# Patient Record
Sex: MALE | Race: WHITE | NOT HISPANIC OR LATINO | Employment: FULL TIME | URBAN - METROPOLITAN AREA
[De-identification: names, ages, dates, MRNs, and addresses within clinical notes are randomized per-mention and may not be internally consistent; named-entity substitution may affect disease eponyms.]

---

## 2021-04-01 ENCOUNTER — HOSPITAL ENCOUNTER (EMERGENCY)
Facility: CLINIC | Age: 31
Discharge: ANOTHER HOSPITAL | End: 2021-04-02
Attending: EMERGENCY MEDICINE | Admitting: EMERGENCY MEDICINE
Payer: COMMERCIAL

## 2021-04-01 ENCOUNTER — APPOINTMENT (OUTPATIENT)
Dept: GENERAL RADIOLOGY | Facility: CLINIC | Age: 31
End: 2021-04-01
Attending: EMERGENCY MEDICINE
Payer: COMMERCIAL

## 2021-04-01 ENCOUNTER — APPOINTMENT (OUTPATIENT)
Dept: CT IMAGING | Facility: CLINIC | Age: 31
End: 2021-04-01
Attending: EMERGENCY MEDICINE
Payer: COMMERCIAL

## 2021-04-01 DIAGNOSIS — E13.10 DIABETIC KETOACIDOSIS WITHOUT COMA ASSOCIATED WITH OTHER SPECIFIED DIABETES MELLITUS (H): ICD-10-CM

## 2021-04-01 DIAGNOSIS — R65.20 SEVERE SEPSIS (H): ICD-10-CM

## 2021-04-01 DIAGNOSIS — A41.9 SEVERE SEPSIS (H): ICD-10-CM

## 2021-04-01 LAB
ALBUMIN SERPL-MCNC: 3.7 G/DL (ref 3.4–5)
ALBUMIN UR-MCNC: 10 MG/DL
ALP SERPL-CCNC: 96 U/L (ref 40–150)
ALT SERPL W P-5'-P-CCNC: 23 U/L (ref 0–70)
ANION GAP SERPL CALCULATED.3IONS-SCNC: 25 MMOL/L (ref 3–14)
APPEARANCE UR: CLEAR
AST SERPL W P-5'-P-CCNC: 15 U/L (ref 0–45)
BACTERIA #/AREA URNS HPF: ABNORMAL /HPF
BASE DEFICIT BLDV-SCNC: 16.4 MMOL/L
BASOPHILS # BLD AUTO: 0.1 10E9/L (ref 0–0.2)
BASOPHILS NFR BLD AUTO: 0.4 %
BILIRUB SERPL-MCNC: 0.5 MG/DL (ref 0.2–1.3)
BILIRUB UR QL STRIP: NEGATIVE
BUN SERPL-MCNC: 26 MG/DL (ref 7–30)
CALCIUM SERPL-MCNC: 8.4 MG/DL (ref 8.5–10.1)
CHLORIDE SERPL-SCNC: 96 MMOL/L (ref 94–109)
CO2 BLDCOV-SCNC: 14 MMOL/L (ref 21–28)
CO2 SERPL-SCNC: 12 MMOL/L (ref 20–32)
COLOR UR AUTO: ABNORMAL
CREAT SERPL-MCNC: 0.8 MG/DL (ref 0.66–1.25)
DIFFERENTIAL METHOD BLD: ABNORMAL
EOSINOPHIL # BLD AUTO: 0 10E9/L (ref 0–0.7)
EOSINOPHIL NFR BLD AUTO: 0 %
ERYTHROCYTE [DISTWIDTH] IN BLOOD BY AUTOMATED COUNT: 11.6 % (ref 10–15)
FLUAV RNA RESP QL NAA+PROBE: NEGATIVE
FLUBV RNA RESP QL NAA+PROBE: NEGATIVE
GFR SERPL CREATININE-BSD FRML MDRD: >90 ML/MIN/{1.73_M2}
GLUCOSE BLDC GLUCOMTR-MCNC: 308 MG/DL (ref 70–99)
GLUCOSE BLDC GLUCOMTR-MCNC: 391 MG/DL (ref 70–99)
GLUCOSE BLDC GLUCOMTR-MCNC: 416 MG/DL (ref 70–99)
GLUCOSE BLDC GLUCOMTR-MCNC: 474 MG/DL (ref 70–99)
GLUCOSE SERPL-MCNC: 531 MG/DL (ref 70–99)
GLUCOSE UR STRIP-MCNC: >1000 MG/DL
HBA1C MFR BLD: 12 % (ref 0–5.6)
HCO3 BLDV-SCNC: 12 MMOL/L (ref 21–28)
HCT VFR BLD AUTO: 46.6 % (ref 40–53)
HGB BLD-MCNC: 15.7 G/DL (ref 13.3–17.7)
HGB UR QL STRIP: NEGATIVE
HYALINE CASTS #/AREA URNS LPF: 3 /LPF (ref 0–2)
IMM GRANULOCYTES # BLD: 0.5 10E9/L (ref 0–0.4)
IMM GRANULOCYTES NFR BLD: 1.8 %
KETONES UR STRIP-MCNC: >150 MG/DL
LABORATORY COMMENT REPORT: NORMAL
LACTATE BLD-SCNC: 2.2 MMOL/L (ref 0.7–2.1)
LACTATE BLD-SCNC: 4.4 MMOL/L (ref 0.7–2)
LEUKOCYTE ESTERASE UR QL STRIP: NEGATIVE
LYMPHOCYTES # BLD AUTO: 1.3 10E9/L (ref 0.8–5.3)
LYMPHOCYTES NFR BLD AUTO: 5 %
MAGNESIUM SERPL-MCNC: 2 MG/DL (ref 1.6–2.3)
MCH RBC QN AUTO: 29.6 PG (ref 26.5–33)
MCHC RBC AUTO-ENTMCNC: 33.7 G/DL (ref 31.5–36.5)
MCV RBC AUTO: 88 FL (ref 78–100)
MONOCYTES # BLD AUTO: 1 10E9/L (ref 0–1.3)
MONOCYTES NFR BLD AUTO: 3.9 %
MUCOUS THREADS #/AREA URNS LPF: PRESENT /LPF
NEUTROPHILS # BLD AUTO: 22.8 10E9/L (ref 1.6–8.3)
NEUTROPHILS NFR BLD AUTO: 88.9 %
NITRATE UR QL: NEGATIVE
NRBC # BLD AUTO: 0 10*3/UL
NRBC BLD AUTO-RTO: 0 /100
OXYHGB MFR BLDV: 69 %
PCO2 BLDV: 36 MM HG (ref 40–50)
PCO2 BLDV: 40 MM HG (ref 40–50)
PH BLDV: 7.13 PH (ref 7.32–7.43)
PH BLDV: 7.16 PH (ref 7.32–7.43)
PH UR STRIP: 5 PH (ref 5–7)
PHOSPHATE SERPL-MCNC: 6.8 MG/DL (ref 2.5–4.5)
PLATELET # BLD AUTO: 391 10E9/L (ref 150–450)
PO2 BLDV: 42 MM HG (ref 25–47)
PO2 BLDV: 43 MM HG (ref 25–47)
POTASSIUM SERPL-SCNC: 4.5 MMOL/L (ref 3.4–5.3)
PROT SERPL-MCNC: 7.7 G/DL (ref 6.8–8.8)
RBC # BLD AUTO: 5.3 10E12/L (ref 4.4–5.9)
RBC #/AREA URNS AUTO: 0 /HPF (ref 0–2)
RSV RNA SPEC QL NAA+PROBE: NEGATIVE
SAO2 % BLDV FROM PO2: 65 %
SARS-COV-2 RNA RESP QL NAA+PROBE: NEGATIVE
SODIUM SERPL-SCNC: 133 MMOL/L (ref 133–144)
SOURCE: ABNORMAL
SP GR UR STRIP: 1.02 (ref 1–1.03)
SPECIMEN SOURCE: NORMAL
SQUAMOUS #/AREA URNS AUTO: 0 /HPF (ref 0–1)
UROBILINOGEN UR STRIP-MCNC: 0 MG/DL (ref 0–2)
WBC # BLD AUTO: 25.6 10E9/L (ref 4–11)
WBC #/AREA URNS AUTO: 2 /HPF (ref 0–5)

## 2021-04-01 PROCEDURE — 81001 URINALYSIS AUTO W/SCOPE: CPT | Performed by: EMERGENCY MEDICINE

## 2021-04-01 PROCEDURE — 83036 HEMOGLOBIN GLYCOSYLATED A1C: CPT | Performed by: EMERGENCY MEDICINE

## 2021-04-01 PROCEDURE — 250N000012 HC RX MED GY IP 250 OP 636 PS 637: Performed by: EMERGENCY MEDICINE

## 2021-04-01 PROCEDURE — 83605 ASSAY OF LACTIC ACID: CPT | Performed by: EMERGENCY MEDICINE

## 2021-04-01 PROCEDURE — 84132 ASSAY OF SERUM POTASSIUM: CPT | Mod: 91 | Performed by: EMERGENCY MEDICINE

## 2021-04-01 PROCEDURE — 83605 ASSAY OF LACTIC ACID: CPT | Mod: 91

## 2021-04-01 PROCEDURE — 87636 SARSCOV2 & INF A&B AMP PRB: CPT | Performed by: EMERGENCY MEDICINE

## 2021-04-01 PROCEDURE — 999N001017 HC STATISTIC GLUCOSE BY METER IP

## 2021-04-01 PROCEDURE — 85025 COMPLETE CBC W/AUTO DIFF WBC: CPT | Performed by: EMERGENCY MEDICINE

## 2021-04-01 PROCEDURE — 74177 CT ABD & PELVIS W/CONTRAST: CPT

## 2021-04-01 PROCEDURE — 250N000011 HC RX IP 250 OP 636: Performed by: EMERGENCY MEDICINE

## 2021-04-01 PROCEDURE — 83735 ASSAY OF MAGNESIUM: CPT | Performed by: EMERGENCY MEDICINE

## 2021-04-01 PROCEDURE — 80053 COMPREHEN METABOLIC PANEL: CPT | Performed by: EMERGENCY MEDICINE

## 2021-04-01 PROCEDURE — 71045 X-RAY EXAM CHEST 1 VIEW: CPT

## 2021-04-01 PROCEDURE — 84100 ASSAY OF PHOSPHORUS: CPT | Performed by: EMERGENCY MEDICINE

## 2021-04-01 PROCEDURE — 96366 THER/PROPH/DIAG IV INF ADDON: CPT

## 2021-04-01 PROCEDURE — 250N000009 HC RX 250: Performed by: EMERGENCY MEDICINE

## 2021-04-01 PROCEDURE — 96368 THER/DIAG CONCURRENT INF: CPT

## 2021-04-01 PROCEDURE — 99291 CRITICAL CARE FIRST HOUR: CPT | Mod: 25

## 2021-04-01 PROCEDURE — 82805 BLOOD GASES W/O2 SATURATION: CPT | Performed by: EMERGENCY MEDICINE

## 2021-04-01 PROCEDURE — 96365 THER/PROPH/DIAG IV INF INIT: CPT

## 2021-04-01 PROCEDURE — 96375 TX/PRO/DX INJ NEW DRUG ADDON: CPT

## 2021-04-01 PROCEDURE — 87040 BLOOD CULTURE FOR BACTERIA: CPT | Mod: XS | Performed by: EMERGENCY MEDICINE

## 2021-04-01 PROCEDURE — 999N001174 HC STATISTIC STREP A RAPID: Performed by: EMERGENCY MEDICINE

## 2021-04-01 PROCEDURE — 99292 CRITICAL CARE ADDL 30 MIN: CPT

## 2021-04-01 PROCEDURE — 87651 STREP A DNA AMP PROBE: CPT | Performed by: EMERGENCY MEDICINE

## 2021-04-01 PROCEDURE — 82803 BLOOD GASES ANY COMBINATION: CPT

## 2021-04-01 PROCEDURE — C9803 HOPD COVID-19 SPEC COLLECT: HCPCS

## 2021-04-01 PROCEDURE — 258N000003 HC RX IP 258 OP 636: Performed by: EMERGENCY MEDICINE

## 2021-04-01 RX ORDER — PIPERACILLIN SODIUM, TAZOBACTAM SODIUM 4; .5 G/20ML; G/20ML
4.5 INJECTION, POWDER, LYOPHILIZED, FOR SOLUTION INTRAVENOUS ONCE
Status: COMPLETED | OUTPATIENT
Start: 2021-04-01 | End: 2021-04-01

## 2021-04-01 RX ORDER — ONDANSETRON 2 MG/ML
4 INJECTION INTRAMUSCULAR; INTRAVENOUS EVERY 30 MIN PRN
Status: DISCONTINUED | OUTPATIENT
Start: 2021-04-01 | End: 2021-04-02 | Stop reason: HOSPADM

## 2021-04-01 RX ORDER — IOPAMIDOL 755 MG/ML
75 INJECTION, SOLUTION INTRAVASCULAR ONCE
Status: COMPLETED | OUTPATIENT
Start: 2021-04-01 | End: 2021-04-01

## 2021-04-01 RX ORDER — DIPHENHYDRAMINE HYDROCHLORIDE AND LIDOCAINE HYDROCHLORIDE AND ALUMINUM HYDROXIDE AND MAGNESIUM HYDRO
10 KIT ONCE
Status: COMPLETED | OUTPATIENT
Start: 2021-04-01 | End: 2021-04-02

## 2021-04-01 RX ORDER — POTASSIUM CHLORIDE 7.45 MG/ML
10 INJECTION INTRAVENOUS ONCE
Status: COMPLETED | OUTPATIENT
Start: 2021-04-01 | End: 2021-04-01

## 2021-04-01 RX ORDER — DEXTROSE MONOHYDRATE 25 G/50ML
25-50 INJECTION, SOLUTION INTRAVENOUS
Status: DISCONTINUED | OUTPATIENT
Start: 2021-04-01 | End: 2021-04-02 | Stop reason: HOSPADM

## 2021-04-01 RX ADMIN — ONDANSETRON 4 MG: 2 INJECTION INTRAMUSCULAR; INTRAVENOUS at 20:40

## 2021-04-01 RX ADMIN — POTASSIUM CHLORIDE 10 MEQ: 7.46 INJECTION, SOLUTION INTRAVENOUS at 20:39

## 2021-04-01 RX ADMIN — IOPAMIDOL 75 ML: 755 INJECTION, SOLUTION INTRAVENOUS at 21:16

## 2021-04-01 RX ADMIN — SODIUM CHLORIDE 5.5 UNITS/HR: 9 INJECTION, SOLUTION INTRAVENOUS at 21:09

## 2021-04-01 RX ADMIN — PIPERACILLIN SODIUM AND TAZOBACTAM SODIUM 4.5 G: 4; .5 INJECTION, POWDER, LYOPHILIZED, FOR SOLUTION INTRAVENOUS at 20:51

## 2021-04-01 RX ADMIN — SODIUM CHLORIDE 62 ML: 9 INJECTION, SOLUTION INTRAVENOUS at 21:23

## 2021-04-01 RX ADMIN — SODIUM CHLORIDE 1000 ML: 9 INJECTION, SOLUTION INTRAVENOUS at 20:39

## 2021-04-02 ENCOUNTER — HOSPITAL ENCOUNTER (INPATIENT)
Facility: CLINIC | Age: 31
LOS: 5 days | Discharge: HOME | End: 2021-04-07
Attending: INTERNAL MEDICINE | Admitting: INTERNAL MEDICINE
Payer: COMMERCIAL

## 2021-04-02 VITALS
RESPIRATION RATE: 20 BRPM | WEIGHT: 150 LBS | OXYGEN SATURATION: 99 % | SYSTOLIC BLOOD PRESSURE: 110 MMHG | DIASTOLIC BLOOD PRESSURE: 71 MMHG | TEMPERATURE: 98.1 F | HEART RATE: 129 BPM

## 2021-04-02 DIAGNOSIS — E10.69 TYPE 1 DIABETES MELLITUS WITH OTHER SPECIFIED COMPLICATION (H): ICD-10-CM

## 2021-04-02 DIAGNOSIS — E10.10 DIABETIC KETOACIDOSIS WITHOUT COMA ASSOCIATED WITH TYPE 1 DIABETES MELLITUS (H): Primary | ICD-10-CM

## 2021-04-02 DIAGNOSIS — K21.00 GASTROESOPHAGEAL REFLUX DISEASE WITH ESOPHAGITIS WITHOUT HEMORRHAGE: ICD-10-CM

## 2021-04-02 LAB
ANION GAP SERPL CALCULATED.3IONS-SCNC: 11 MMOL/L (ref 3–14)
ANION GAP SERPL CALCULATED.3IONS-SCNC: 13 MMOL/L (ref 3–14)
ANION GAP SERPL CALCULATED.3IONS-SCNC: 4 MMOL/L (ref 3–14)
ANION GAP SERPL CALCULATED.3IONS-SCNC: 7 MMOL/L (ref 3–14)
BASE DEFICIT BLDV-SCNC: 5.4 MMOL/L
BUN SERPL-MCNC: 14 MG/DL (ref 7–30)
BUN SERPL-MCNC: 19 MG/DL (ref 7–30)
BUN SERPL-MCNC: 21 MG/DL (ref 7–30)
BUN SERPL-MCNC: 23 MG/DL (ref 7–30)
CALCIUM SERPL-MCNC: 8.5 MG/DL (ref 8.5–10.1)
CALCIUM SERPL-MCNC: 8.8 MG/DL (ref 8.5–10.1)
CALCIUM SERPL-MCNC: 9 MG/DL (ref 8.5–10.1)
CALCIUM SERPL-MCNC: 9.2 MG/DL (ref 8.5–10.1)
CHLORIDE SERPL-SCNC: 112 MMOL/L (ref 94–109)
CHLORIDE SERPL-SCNC: 112 MMOL/L (ref 94–109)
CHLORIDE SERPL-SCNC: 113 MMOL/L (ref 94–109)
CHLORIDE SERPL-SCNC: 113 MMOL/L (ref 94–109)
CO2 SERPL-SCNC: 17 MMOL/L (ref 20–32)
CO2 SERPL-SCNC: 18 MMOL/L (ref 20–32)
CO2 SERPL-SCNC: 22 MMOL/L (ref 20–32)
CO2 SERPL-SCNC: 26 MMOL/L (ref 20–32)
CREAT SERPL-MCNC: 0.5 MG/DL (ref 0.66–1.25)
CREAT SERPL-MCNC: 0.57 MG/DL (ref 0.66–1.25)
CREAT SERPL-MCNC: 0.58 MG/DL (ref 0.66–1.25)
CREAT SERPL-MCNC: 0.58 MG/DL (ref 0.66–1.25)
DEPRECATED S PYO AG THROAT QL EIA: NEGATIVE
ERYTHROCYTE [DISTWIDTH] IN BLOOD BY AUTOMATED COUNT: 12 % (ref 10–15)
GFR SERPL CREATININE-BSD FRML MDRD: >90 ML/MIN/{1.73_M2}
GLUCOSE BLDC GLUCOMTR-MCNC: 122 MG/DL (ref 70–99)
GLUCOSE BLDC GLUCOMTR-MCNC: 139 MG/DL (ref 70–99)
GLUCOSE BLDC GLUCOMTR-MCNC: 144 MG/DL (ref 70–99)
GLUCOSE BLDC GLUCOMTR-MCNC: 147 MG/DL (ref 70–99)
GLUCOSE BLDC GLUCOMTR-MCNC: 159 MG/DL (ref 70–99)
GLUCOSE BLDC GLUCOMTR-MCNC: 163 MG/DL (ref 70–99)
GLUCOSE BLDC GLUCOMTR-MCNC: 171 MG/DL (ref 70–99)
GLUCOSE BLDC GLUCOMTR-MCNC: 178 MG/DL (ref 70–99)
GLUCOSE BLDC GLUCOMTR-MCNC: 179 MG/DL (ref 70–99)
GLUCOSE BLDC GLUCOMTR-MCNC: 182 MG/DL (ref 70–99)
GLUCOSE BLDC GLUCOMTR-MCNC: 186 MG/DL (ref 70–99)
GLUCOSE BLDC GLUCOMTR-MCNC: 189 MG/DL (ref 70–99)
GLUCOSE BLDC GLUCOMTR-MCNC: 196 MG/DL (ref 70–99)
GLUCOSE BLDC GLUCOMTR-MCNC: 219 MG/DL (ref 70–99)
GLUCOSE BLDC GLUCOMTR-MCNC: 222 MG/DL (ref 70–99)
GLUCOSE BLDC GLUCOMTR-MCNC: 223 MG/DL (ref 70–99)
GLUCOSE BLDC GLUCOMTR-MCNC: 224 MG/DL (ref 70–99)
GLUCOSE BLDC GLUCOMTR-MCNC: 228 MG/DL (ref 70–99)
GLUCOSE BLDC GLUCOMTR-MCNC: 229 MG/DL (ref 70–99)
GLUCOSE BLDC GLUCOMTR-MCNC: 242 MG/DL (ref 70–99)
GLUCOSE BLDC GLUCOMTR-MCNC: 242 MG/DL (ref 70–99)
GLUCOSE BLDC GLUCOMTR-MCNC: 251 MG/DL (ref 70–99)
GLUCOSE BLDC GLUCOMTR-MCNC: 261 MG/DL (ref 70–99)
GLUCOSE BLDC GLUCOMTR-MCNC: 275 MG/DL (ref 70–99)
GLUCOSE SERPL-MCNC: 132 MG/DL (ref 70–99)
GLUCOSE SERPL-MCNC: 203 MG/DL (ref 70–99)
GLUCOSE SERPL-MCNC: 223 MG/DL (ref 70–99)
GLUCOSE SERPL-MCNC: 239 MG/DL (ref 70–99)
HCO3 BLDV-SCNC: 20 MMOL/L (ref 21–28)
HCT VFR BLD AUTO: 42.5 % (ref 40–53)
HGB BLD-MCNC: 14.9 G/DL (ref 13.3–17.7)
KETONES BLD-SCNC: 3.1 MMOL/L (ref 0–0.6)
MAGNESIUM SERPL-MCNC: 2.2 MG/DL (ref 1.6–2.3)
MAGNESIUM SERPL-MCNC: 2.3 MG/DL (ref 1.6–2.3)
MCH RBC QN AUTO: 30.4 PG (ref 26.5–33)
MCHC RBC AUTO-ENTMCNC: 35.1 G/DL (ref 31.5–36.5)
MCV RBC AUTO: 87 FL (ref 78–100)
O2/TOTAL GAS SETTING VFR VENT: ABNORMAL %
OXYHGB MFR BLDV: 87 %
PCO2 BLDV: 38 MM HG (ref 40–50)
PH BLDV: 7.33 PH (ref 7.32–7.43)
PHOSPHATE SERPL-MCNC: 1.8 MG/DL (ref 2.5–4.5)
PHOSPHATE SERPL-MCNC: 2.5 MG/DL (ref 2.5–4.5)
PLATELET # BLD AUTO: 354 10E9/L (ref 150–450)
PO2 BLDV: 49 MM HG (ref 25–47)
POTASSIUM SERPL-SCNC: 3.8 MMOL/L (ref 3.4–5.3)
POTASSIUM SERPL-SCNC: 3.9 MMOL/L (ref 3.4–5.3)
POTASSIUM SERPL-SCNC: 4 MMOL/L (ref 3.4–5.3)
POTASSIUM SERPL-SCNC: 4 MMOL/L (ref 3.4–5.3)
POTASSIUM SERPL-SCNC: 4.2 MMOL/L (ref 3.4–5.3)
RBC # BLD AUTO: 4.9 10E12/L (ref 4.4–5.9)
SODIUM SERPL-SCNC: 141 MMOL/L (ref 133–144)
SODIUM SERPL-SCNC: 142 MMOL/L (ref 133–144)
SODIUM SERPL-SCNC: 142 MMOL/L (ref 133–144)
SODIUM SERPL-SCNC: 143 MMOL/L (ref 133–144)
SPECIMEN SOURCE: NORMAL
SPECIMEN SOURCE: NORMAL
STREP GROUP A PCR: NOT DETECTED
TSH SERPL DL<=0.005 MIU/L-ACNC: 0.87 MU/L (ref 0.4–4)
WBC # BLD AUTO: 23.9 10E9/L (ref 4–11)

## 2021-04-02 PROCEDURE — 120N000013 HC R&B IMCU

## 2021-04-02 PROCEDURE — 80048 BASIC METABOLIC PNL TOTAL CA: CPT | Performed by: INTERNAL MEDICINE

## 2021-04-02 PROCEDURE — 85027 COMPLETE CBC AUTOMATED: CPT | Performed by: INTERNAL MEDICINE

## 2021-04-02 PROCEDURE — 250N000013 HC RX MED GY IP 250 OP 250 PS 637: Performed by: INTERNAL MEDICINE

## 2021-04-02 PROCEDURE — 250N000012 HC RX MED GY IP 250 OP 636 PS 637: Performed by: EMERGENCY MEDICINE

## 2021-04-02 PROCEDURE — 36415 COLL VENOUS BLD VENIPUNCTURE: CPT | Performed by: INTERNAL MEDICINE

## 2021-04-02 PROCEDURE — 250N000012 HC RX MED GY IP 250 OP 636 PS 637: Performed by: INTERNAL MEDICINE

## 2021-04-02 PROCEDURE — 258N000003 HC RX IP 258 OP 636: Performed by: INTERNAL MEDICINE

## 2021-04-02 PROCEDURE — 82010 KETONE BODYS QUAN: CPT | Performed by: INTERNAL MEDICINE

## 2021-04-02 PROCEDURE — 250N000013 HC RX MED GY IP 250 OP 250 PS 637: Performed by: EMERGENCY MEDICINE

## 2021-04-02 PROCEDURE — 84100 ASSAY OF PHOSPHORUS: CPT | Performed by: INTERNAL MEDICINE

## 2021-04-02 PROCEDURE — 84443 ASSAY THYROID STIM HORMONE: CPT | Performed by: INTERNAL MEDICINE

## 2021-04-02 PROCEDURE — 250N000011 HC RX IP 250 OP 636: Performed by: INTERNAL MEDICINE

## 2021-04-02 PROCEDURE — 99291 CRITICAL CARE FIRST HOUR: CPT | Performed by: INTERNAL MEDICINE

## 2021-04-02 PROCEDURE — 999N001017 HC STATISTIC GLUCOSE BY METER IP

## 2021-04-02 PROCEDURE — 250N000009 HC RX 250: Performed by: INTERNAL MEDICINE

## 2021-04-02 PROCEDURE — 258N000003 HC RX IP 258 OP 636: Performed by: EMERGENCY MEDICINE

## 2021-04-02 PROCEDURE — 82805 BLOOD GASES W/O2 SATURATION: CPT | Performed by: INTERNAL MEDICINE

## 2021-04-02 PROCEDURE — 83735 ASSAY OF MAGNESIUM: CPT | Performed by: INTERNAL MEDICINE

## 2021-04-02 PROCEDURE — 250N000011 HC RX IP 250 OP 636: Performed by: EMERGENCY MEDICINE

## 2021-04-02 PROCEDURE — 99223 1ST HOSP IP/OBS HIGH 75: CPT | Mod: AI | Performed by: INTERNAL MEDICINE

## 2021-04-02 PROCEDURE — 96368 THER/DIAG CONCURRENT INF: CPT

## 2021-04-02 PROCEDURE — 258N000002 HC RX IP 258 OP 250: Performed by: INTERNAL MEDICINE

## 2021-04-02 RX ORDER — BISACODYL 5 MG
15 TABLET, DELAYED RELEASE (ENTERIC COATED) ORAL DAILY PRN
Status: DISCONTINUED | OUTPATIENT
Start: 2021-04-02 | End: 2021-04-07 | Stop reason: HOSPADM

## 2021-04-02 RX ORDER — PROCHLORPERAZINE 25 MG
25 SUPPOSITORY, RECTAL RECTAL EVERY 12 HOURS PRN
Status: DISCONTINUED | OUTPATIENT
Start: 2021-04-02 | End: 2021-04-07 | Stop reason: HOSPADM

## 2021-04-02 RX ORDER — LIDOCAINE 40 MG/G
CREAM TOPICAL
Status: DISCONTINUED | OUTPATIENT
Start: 2021-04-02 | End: 2021-04-04

## 2021-04-02 RX ORDER — POTASSIUM CHLORIDE 7.45 MG/ML
10 INJECTION INTRAVENOUS ONCE
Status: COMPLETED | OUTPATIENT
Start: 2021-04-02 | End: 2021-04-02

## 2021-04-02 RX ORDER — DEXTROSE MONOHYDRATE 25 G/50ML
25-50 INJECTION, SOLUTION INTRAVENOUS
Status: DISCONTINUED | OUTPATIENT
Start: 2021-04-02 | End: 2021-04-02

## 2021-04-02 RX ORDER — NALOXONE HYDROCHLORIDE 0.4 MG/ML
0.2 INJECTION, SOLUTION INTRAMUSCULAR; INTRAVENOUS; SUBCUTANEOUS
Status: DISCONTINUED | OUTPATIENT
Start: 2021-04-02 | End: 2021-04-07 | Stop reason: HOSPADM

## 2021-04-02 RX ORDER — NICOTINE POLACRILEX 4 MG
15-30 LOZENGE BUCCAL
Status: DISCONTINUED | OUTPATIENT
Start: 2021-04-02 | End: 2021-04-02

## 2021-04-02 RX ORDER — BISACODYL 5 MG
5 TABLET, DELAYED RELEASE (ENTERIC COATED) ORAL DAILY PRN
Status: DISCONTINUED | OUTPATIENT
Start: 2021-04-02 | End: 2021-04-07 | Stop reason: HOSPADM

## 2021-04-02 RX ORDER — BISACODYL 5 MG
10 TABLET, DELAYED RELEASE (ENTERIC COATED) ORAL DAILY PRN
Status: DISCONTINUED | OUTPATIENT
Start: 2021-04-02 | End: 2021-04-07 | Stop reason: HOSPADM

## 2021-04-02 RX ORDER — DEXTROSE MONOHYDRATE 25 G/50ML
25-50 INJECTION, SOLUTION INTRAVENOUS
Status: DISCONTINUED | OUTPATIENT
Start: 2021-04-02 | End: 2021-04-04

## 2021-04-02 RX ORDER — ACETAMINOPHEN 325 MG/1
650 TABLET ORAL EVERY 4 HOURS PRN
Status: DISCONTINUED | OUTPATIENT
Start: 2021-04-02 | End: 2021-04-07 | Stop reason: HOSPADM

## 2021-04-02 RX ORDER — SODIUM CHLORIDE 9 MG/ML
INJECTION, SOLUTION INTRAVENOUS CONTINUOUS
Status: DISCONTINUED | OUTPATIENT
Start: 2021-04-02 | End: 2021-04-04

## 2021-04-02 RX ORDER — NALOXONE HYDROCHLORIDE 0.4 MG/ML
0.4 INJECTION, SOLUTION INTRAMUSCULAR; INTRAVENOUS; SUBCUTANEOUS
Status: DISCONTINUED | OUTPATIENT
Start: 2021-04-02 | End: 2021-04-07 | Stop reason: HOSPADM

## 2021-04-02 RX ORDER — ONDANSETRON 2 MG/ML
4 INJECTION INTRAMUSCULAR; INTRAVENOUS EVERY 6 HOURS PRN
Status: DISCONTINUED | OUTPATIENT
Start: 2021-04-02 | End: 2021-04-07 | Stop reason: HOSPADM

## 2021-04-02 RX ORDER — SODIUM CHLORIDE 450 MG/100ML
INJECTION, SOLUTION INTRAVENOUS CONTINUOUS
Status: DISCONTINUED | OUTPATIENT
Start: 2021-04-02 | End: 2021-04-03

## 2021-04-02 RX ORDER — DEXTROSE MONOHYDRATE 100 MG/ML
INJECTION, SOLUTION INTRAVENOUS CONTINUOUS PRN
Status: DISCONTINUED | OUTPATIENT
Start: 2021-04-02 | End: 2021-04-02

## 2021-04-02 RX ORDER — ONDANSETRON 4 MG/1
4 TABLET, ORALLY DISINTEGRATING ORAL EVERY 6 HOURS PRN
Status: DISCONTINUED | OUTPATIENT
Start: 2021-04-02 | End: 2021-04-07 | Stop reason: HOSPADM

## 2021-04-02 RX ORDER — HYDROMORPHONE HYDROCHLORIDE 1 MG/ML
0.3 INJECTION, SOLUTION INTRAMUSCULAR; INTRAVENOUS; SUBCUTANEOUS
Status: DISCONTINUED | OUTPATIENT
Start: 2021-04-02 | End: 2021-04-06

## 2021-04-02 RX ORDER — NICOTINE POLACRILEX 4 MG
15-30 LOZENGE BUCCAL
Status: DISCONTINUED | OUTPATIENT
Start: 2021-04-02 | End: 2021-04-04

## 2021-04-02 RX ORDER — DEXTROSE MONOHYDRATE, SODIUM CHLORIDE, AND POTASSIUM CHLORIDE 50; 1.49; 4.5 G/1000ML; G/1000ML; G/1000ML
INJECTION, SOLUTION INTRAVENOUS CONTINUOUS
Status: DISCONTINUED | OUTPATIENT
Start: 2021-04-02 | End: 2021-04-05

## 2021-04-02 RX ADMIN — POTASSIUM CHLORIDE, DEXTROSE MONOHYDRATE AND SODIUM CHLORIDE: 150; 5; 450 INJECTION, SOLUTION INTRAVENOUS at 20:19

## 2021-04-02 RX ADMIN — SODIUM CHLORIDE: 9 INJECTION, SOLUTION INTRAVENOUS at 04:21

## 2021-04-02 RX ADMIN — PROCHLORPERAZINE EDISYLATE 10 MG: 5 INJECTION INTRAMUSCULAR; INTRAVENOUS at 17:19

## 2021-04-02 RX ADMIN — SODIUM CHLORIDE 3 UNITS/HR: 9 INJECTION, SOLUTION INTRAVENOUS at 04:23

## 2021-04-02 RX ADMIN — SODIUM CHLORIDE: 4.5 INJECTION, SOLUTION INTRAVENOUS at 04:39

## 2021-04-02 RX ADMIN — ACETAMINOPHEN 650 MG: 325 TABLET, FILM COATED ORAL at 08:22

## 2021-04-02 RX ADMIN — SODIUM CHLORIDE: 9 INJECTION, SOLUTION INTRAVENOUS at 01:35

## 2021-04-02 RX ADMIN — INSULIN GLARGINE 5 UNITS: 100 INJECTION, SOLUTION SUBCUTANEOUS at 04:31

## 2021-04-02 RX ADMIN — LIDOCAINE HYDROCHLORIDE 30 ML: 20 SOLUTION ORAL; TOPICAL at 12:21

## 2021-04-02 RX ADMIN — ONDANSETRON 4 MG: 2 INJECTION INTRAMUSCULAR; INTRAVENOUS at 07:05

## 2021-04-02 RX ADMIN — Medication: at 16:00

## 2021-04-02 RX ADMIN — POTASSIUM CHLORIDE 10 MEQ: 7.46 INJECTION, SOLUTION INTRAVENOUS at 00:32

## 2021-04-02 RX ADMIN — PROCHLORPERAZINE EDISYLATE 10 MG: 5 INJECTION INTRAMUSCULAR; INTRAVENOUS at 11:16

## 2021-04-02 RX ADMIN — POTASSIUM CHLORIDE, DEXTROSE MONOHYDRATE AND SODIUM CHLORIDE: 150; 5; 450 INJECTION, SOLUTION INTRAVENOUS at 13:22

## 2021-04-02 RX ADMIN — Medication 3 UNITS/HR: at 10:09

## 2021-04-02 RX ADMIN — SODIUM CHLORIDE, POTASSIUM CHLORIDE, SODIUM LACTATE AND CALCIUM CHLORIDE 1000 ML: 600; 310; 30; 20 INJECTION, SOLUTION INTRAVENOUS at 08:14

## 2021-04-02 RX ADMIN — SODIUM PHOSPHATE, MONOBASIC, MONOHYDRATE AND SODIUM PHOSPHATE, DIBASIC, ANHYDROUS 15 MMOL: 276; 142 INJECTION, SOLUTION INTRAVENOUS at 22:38

## 2021-04-02 RX ADMIN — DIPHENHYDRAMINE HYDROCHLORIDE AND LIDOCAINE HYDROCHLORIDE AND ALUMINUM HYDROXIDE AND MAGNESIUM HYDRO 10 ML: KIT at 00:32

## 2021-04-02 RX ADMIN — Medication 1 LOZENGE: at 03:35

## 2021-04-02 SDOH — HEALTH STABILITY: MENTAL HEALTH: HOW OFTEN DO YOU HAVE 6 OR MORE DRINKS ON ONE OCCASION?: NOT ASKED

## 2021-04-02 SDOH — HEALTH STABILITY: MENTAL HEALTH: HOW MANY STANDARD DRINKS CONTAINING ALCOHOL DO YOU HAVE ON A TYPICAL DAY?: NOT ASKED

## 2021-04-02 SDOH — HEALTH STABILITY: MENTAL HEALTH: HOW OFTEN DO YOU HAVE A DRINK CONTAINING ALCOHOL?: MONTHLY OR LESS

## 2021-04-02 ASSESSMENT — ACTIVITIES OF DAILY LIVING (ADL)
ADLS_ACUITY_SCORE: 14
ADLS_ACUITY_SCORE: 14
DEPENDENT_IADLS:: INDEPENDENT
ADLS_ACUITY_SCORE: 16
ADLS_ACUITY_SCORE: 16

## 2021-04-02 ASSESSMENT — ENCOUNTER SYMPTOMS
NAUSEA: 1
POLYDIPSIA: 1
WEAKNESS: 1
FREQUENCY: 1
TROUBLE SWALLOWING: 1
COUGH: 0
SORE THROAT: 1
SHORTNESS OF BREATH: 0
FEVER: 1
DIARRHEA: 1
FATIGUE: 1
VOMITING: 1
CHILLS: 1

## 2021-04-02 ASSESSMENT — MIFFLIN-ST. JEOR: SCORE: 1708.32

## 2021-04-02 NOTE — PHARMACY-ADMISSION MEDICATION HISTORY
Admission medication history interview status for this patient is complete. See [facility] admission navigator for allergy information, prior to admission medications and immunization status.     Medication history interview done, indicate source(s): Patient and Family (wife).  Medication history resources (including written lists, pill bottles, clinic record):None  Pharmacy: none    Changes made to PTA medication list:  Added: none  Deleted: none  Changed: none    Actions taken by pharmacist (provider contacted, etc): Patient was not completely interviewable in the morning for MR pharmacist (Rita).  I spoke to patient's wife in the room ~noon.      Additional medication history information: Does not take any prescription medications. Patient sometimes takes Dayquil and Nyquil prn. Also prn OTC allergy medication, however wife did not think that patient will need his allergy medication while hospitalized.    Medication reconciliation/reorder completed by provider prior to medication history?  no   (Y/N)     For patients on insulin therapy: no    Prior to Admission medications    No prescription meds pta

## 2021-04-02 NOTE — ED NOTES
1 set of blood cultures collected and sent to lab  
Bed: ED15  Expected date:   Expected time:   Means of arrival:   Comments:  EMS  
DATE:  4/1/2021   TIME OF RECEIPT FROM LAB:  2052  LAB TEST:  bs  LAB VALUE:  531  RESULTS GIVEN WITH READ-BACK TO (PROVIDER):  [facility]  TIME LAB VALUE REPORTED TO PROVIDER:   2053    
Update to wife Ananya.  [phone]  
complains of pain/discomfort

## 2021-04-02 NOTE — ED PROVIDER NOTES
History     Chief Complaint:  Hyperglycemia       The history is provided by the patient and the EMS personnel.     Wally Avendano is a 30 year old male otherwise healthy who presents for evaluation of vomiting.  The patient was BIBA from home, and states that he developed vomiting an diarrhea today.  He complains of generalized abdominal and chest pain.  He had temperature to 101F at home yesterday.  His wife and 6 month old child were tested for COVID yesterday, which was negative.  He complains of sore throat secondary to vomiting, which is giving him difficulty .  He notes that he has found it difficult to gain weight for the last several weeks.  He drinks lots of water and urinates frequently.  He denies cough, SOB, dysuria, or severe headache.        Review of Systems   Constitutional: Positive for chills, fatigue and fever.   HENT: Positive for sore throat and trouble swallowing.    Respiratory: Negative for cough and shortness of breath.    Gastrointestinal: Positive for diarrhea, nausea and vomiting.   Endocrine: Positive for polydipsia and polyuria.   Genitourinary: Positive for frequency. Negative for urgency.   Neurological: Positive for weakness.   All other systems reviewed and are negative.      Allergies:  No Known Drug Allergies      Medications:   The patient is not currently taking any prescribed medications.      Medical History:   History reviewed. The patient denies any medical history.      Social History:  The patient was accompanied to the ED by EMS.  Marital Status:       Physical Exam     Patient Vitals for the past 24 hrs:   BP Temp Temp src Pulse Resp SpO2 Weight   04/01/21 2100 122/65 -- -- 123 10 99 % --   04/01/21 2045 133/74 -- -- 134 16 99 % --   04/01/21 2030 132/80 -- -- 130 18 95 % --   04/01/21 2015 135/80 -- -- 123 15 94 % --   04/01/21 2000 133/83 -- -- 126 11 96 % --   04/01/21 1945 (!) 125/91 98.1  F (36.7  C) Oral 133 22 99 % 68 kg (150 lb)          Physical  Exam  General: ill appearing, pale, thin  HENT: dry mucous membranes, lips peeling.  OP reddened, but no abscess or tonsillar hypertropy.  CV: tachycardic rate, regular rhythm  Resp: tachypneic, clear throughout, no crackles or wheezing  GI: abdomen soft and nontender, no guarding  MSK: no bony tenderness  Skin: warm, pale  Neuro: very fatigued  Psych: unable to assess    Emergency Department Course     Imaging:    XR Chest Port 1 View:   IMPRESSION: No focal infiltrate, pleural effusion or pneumothorax.   Normal cardiomediastinal silhouette.   Reading per radiology.     CT Abdomen Pelvis w Contrast:  IMPRESSION:   1.  No acute findings in the abdomen and pelvis.   Reading per radiology.     Laboratory:    CBC: WBC 25.6 (H), HGB 15.7,   CMP: Carbon Dioxide 12 (L), Anion Gap 25 (H), Glucose 531 (HH), Calcium 8.4 (L), o/w WNL (Creatinine 0.80)   Lactic Acid (Resulted 2000): 4.4 (HH)    Magnesium: 2.0  Phosphorous: 6.8 (H)   Glucose by Meter (Collected 2057): 474 (H)    Glucose by Meter (Collected 2207): 416 (H)    Hemoglobin A1c: 12.0 (H)   Blood Culture x2: Pending     Blood gas venous (Resulted 2051): pH Venous 7.13 (LL), pCO2 Venous 36 (L), pO2 Venous 43, Bicarbonate Venous 12 (L), Oxyhemoglobin Venous 69,  Base Deficit Venous 16.4 (A)   Venous - ISTAT Lactate (Resulted 2239): pH 7.16 (LL), pCO2 40, pO2 42, Bicarbonate 14 (L), O2 Sat 65, Lactic Acid 2.2 (H)      UA with Microscopic: Glucose >1000 (A), Ketones >150 (A), Protein Albumin 10 (A), Bacteria Few (A), Mucous Present (A), Hyaline Casts 3 (H), o/w WNL     Symptomatic Influenza A/B & SARS-CoV2 (COVID-19) Virus PCR Multiplex: Pending     Emergency Department Course:    Reviewed:  I reviewed the patient's nursing notes, vitals, past medical records.    Assessments:  2016 I performed an exam of the patient, as documented above.   2229 Patient rechecked and updated following imaging and laboratory results. We discussed the plan for transfer as there are no  available ICU beds here.   2315 I rechecked the patient and updated him on the plan for transfer to [facility] for further care. He is in agreement with this plan.  He appears clinically improved.  Recheck of VBG shows improvement in metabolic acidosis.  Potassium 3.9, additional repletion ordered.    Consults:   2312 I spoke with Dr. Beltran of the hospitalist/intensivist service at [facility] regarding patient's presentation, findings, and plan of care. They are in agreement to accept the patient for transfer to a bed for further monitoring, care, and treatment.      Interventions:  2039 Normal Saline 1000 mL IV   2039 Potassium chloride 10 mEq IV   2040 Zofran 4 mg IV   2051 Zosyn 4.5 mg IV  2109 Insulin novolog 5.5 units/hour IV  2302 Insulin novolog 12 units/hour (Rate Change) IV    Disposition:  The patient was transferred to [facility] via EMS. Dr. Beltran accepted the patient for transfer.     Impression & Plan       Medical Decision Making:  Wally Avendano is a 30 year old male previously healthy who was brought in by ambulance from home. He presented with diarrhea and vomiting, though en route to the ED he had blood sugar as high as 470.  On exam, he appears very dehydrated, tachycardic, but afebrile. He does not have previous history of diabetes. He was started on aggressive fluid hydration. Labs are concerning for a possible infectious etiology as the precipitant whose symptoms, although I have not yet identified a source of infection. Chest xray is negative for focal infiltrate. CT abdomen pelvis is negative as well. Urinalysis does not demonstrate findings of infection. Blood cultures are pending as is COVID PCR. The patient has a anion gap of 25 with bicarb of 12 and pH of 7.13. He was started on an insulin drip in the ED. He will need to be transferred as our facility currently does not have any ICU beds available. Patient was informed and agrees to this plan.     Critical Care Time for this patient is  45 minutes, exclusive of procedures.     Covid-19  Wally Avendano was evaluated during a global COVID-19 pandemic, which necessitated consideration that the patient might be at risk for infection with the SARS-CoV-2 virus that causes COVID-19.   Applicable protocols for evaluation were followed during the patient's care.   COVID-19 was considered as part of the patient's evaluation. The plan for testing is:  a test was obtained during this visit.     Diagnosis:     ICD-10-CM    1. Diabetic ketoacidosis without coma associated with other specified diabetes mellitus (H)  E13.10 Blood culture     Blood culture     Group A Streptococcus PCR Throat Swab   2. Severe sepsis (H)  A41.9     R65.20         Scribe Disclosure:  IReyna, am serving as a scribe at 8:13 PM on 4/1/2021 to document services personally performed by Ayesha Estrada MD based on my observations and the provider's statements to me.      Ayesha Estrada MD  04/02/21 1050

## 2021-04-02 NOTE — ED PROVIDER NOTES
I assumed care from my partner Dr. Estrada while the patient was in room 15, awaiting EMS arrival for transport to [facility] to be admitted to the ICU there, for DKA, given lack of ICU beds here at [facility].    There were no acute events in the early morning hours.  At 2 AM, I went to room 15 and found the paramedics about to leave with the patient after having just received report from the nurse.  He departed uneventfully from the emergency department.     Ruben Watson MD  04/02/21 0231

## 2021-04-02 NOTE — PLAN OF CARE
VS stable. Clear LS. Complained of sore throat. Tele is sinus tach. Transitioning off of insulin drip. Slept on and off throughout the night.

## 2021-04-02 NOTE — ED TRIAGE NOTES
BIBA from home. Pt came home from work at 1130am, started feeling unwell, having diarrhea. Pt called an ambulance BG initially was 299, then 470. Pt states he has been having fevers at home since yesterday and that his whole family had COVID but he hasn't been tested.    Pt denies any PMHx.

## 2021-04-02 NOTE — PROGRESS NOTES
[facility]  Hospitalist Progress Note  Fuad Landeros MD 04/02/2021    Reason for Stay (Diagnosis): new dx insulin-dependent DM         Assessment and Plan:      Summary of Stay: Wally Avendano is a 30 year old male admitted on 4/2/2021 with DKA.      Mr. Avendano had presented to [facility] with severe malaise assoc with fevers/chills, diarrhea, vomiting with polyuria/polydypsia. He was found to be significantly hyperglcemic and ultimately to have AGMA with VBG pH of 7.16 (HCO3 12).  His AG closed quickly, but it appears that his resuscitation was very spare, having been given only 1 L NS.      He had been started on Insulin drip and because the gap closed quickly, this was stopped.  Pt gives a history of more than 6 months of intermittent polyuria/polydypsia and significant weight loss.     Problem List:   1. DKA.    2. 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  Will need eval for DM including anti-NATHANAEL antibody.     PLAN:  1.  Empirically will gave another bolus of crystalloid (LR 1000 ml over the next 2 hr because the chloride is already 112).    2.  Recheck labs at noon (about 4 hrs after bolus).   3.  Resumed insulin drip with glucose -anticipating this will continue overnight- due to pt being unable to eat (odynophagia) and with persistently measurable ketones.     DVT Prophylaxis: PCDs  Code Status: Full Code  Discharge Dispo: home  Estimated Disch Date / # of Days until Disch: TBD, likely at least 2 days    At least 30 minutes critical care time was spent in management of this pt today.          Interval History (Subjective):      Chart reviewed, pt interviewed.    Very uncomfortable due to pain with swallowing. Having some persistent nausea.     I met with patient's wife who indicates that Mr. Avendano has been losing weight over the course of at least a year.  When they  about 5 years ago, he weighed more than 200 pounds.                  Physical Exam:      Last Vital  "Signs:  /85   Pulse 128   Temp 98.8  F (37.1  C) (Oral)   Resp 20   Ht 1.981 m (6' 6\")   Wt 62 kg (136 lb 11.2 oz)   SpO2 93%   BMI 15.80 kg/m      I/O last 3 completed shifts:  In: 172 [I.V.:172]  Out: -     Constitutional: Awake, alert, cooperative, no apparent distress.  Cachectic.   Respiratory: Clear to auscultation bilaterally, no crackles or wheezing   Cardiovascular: Regular rate and rhythm, normal S1 and S2, and no murmur noted   Abdomen: Normal bowel sounds, soft, non-distended, non-tender   Skin: No rashes, no cyanosis, dry to touch   Neuro: Alert and oriented.  Appears fully appropriate to situation.  Exhausted.   Extremities: No edema, normal range of motion   Other(s):        All other systems: Negative          Medications:      All current medications were reviewed with changes reflected in problem list.         Data:      All new lab and imaging data was reviewed.   Labs/Imaging:  Results for orders placed or performed during the hospital encounter of 04/02/21 (from the past 24 hour(s))   Glucose by meter   Result Value Ref Range    Glucose 196 (H) 70 - 99 mg/dL   Basic metabolic panel   Result Value Ref Range    Sodium 142 133 - 144 mmol/L    Potassium 4.0 3.4 - 5.3 mmol/L    Chloride 113 (H) 94 - 109 mmol/L    Carbon Dioxide 22 20 - 32 mmol/L    Anion Gap 7 3 - 14 mmol/L    Glucose 203 (H) 70 - 99 mg/dL    Urea Nitrogen 23 7 - 30 mg/dL    Creatinine 0.58 (L) 0.66 - 1.25 mg/dL    GFR Estimate >90 >60 mL/min/[1.73_m2]    GFR Estimate If Black >90 >60 mL/min/[1.73_m2]    Calcium 9.2 8.5 - 10.1 mg/dL   Glucose by meter   Result Value Ref Range    Glucose 222 (H) 70 - 99 mg/dL   Glucose by meter   Result Value Ref Range    Glucose 179 (H) 70 - 99 mg/dL   Glucose by meter   Result Value Ref Range    Glucose 224 (H) 70 - 99 mg/dL   Basic metabolic panel   Result Value Ref Range    Sodium 142 133 - 144 mmol/L    Potassium 4.2 3.4 - 5.3 mmol/L    Chloride 112 (H) 94 - 109 mmol/L    Carbon " Dioxide 17 (L) 20 - 32 mmol/L    Anion Gap 13 3 - 14 mmol/L    Glucose 239 (H) 70 - 99 mg/dL    Urea Nitrogen 21 7 - 30 mg/dL    Creatinine 0.57 (L) 0.66 - 1.25 mg/dL    GFR Estimate >90 >60 mL/min/[1.73_m2]    GFR Estimate If Black >90 >60 mL/min/[1.73_m2]    Calcium 9.0 8.5 - 10.1 mg/dL   TSH   Result Value Ref Range    TSH 0.87 0.40 - 4.00 mU/L   Glucose by meter   Result Value Ref Range    Glucose 228 (H) 70 - 99 mg/dL   Glucose by meter   Result Value Ref Range    Glucose 223 (H) 70 - 99 mg/dL   Glucose by meter   Result Value Ref Range    Glucose 261 (H) 70 - 99 mg/dL   Glucose by meter   Result Value Ref Range    Glucose 275 (H) 70 - 99 mg/dL   Glucose by meter   Result Value Ref Range    Glucose 242 (H) 70 - 99 mg/dL   Glucose by meter   Result Value Ref Range    Glucose 219 (H) 70 - 99 mg/dL   Blood gas venous with oxyhemoglobin   Result Value Ref Range    Ph Venous 7.33 7.32 - 7.43 pH    PCO2 Venous 38 (L) 40 - 50 mm Hg    PO2 Venous 49 (H) 25 - 47 mm Hg    Bicarbonate Venous 20 (L) 21 - 28 mmol/L    FIO2 Room air      Oxyhemoglobin Venous 87 %    Base Deficit Venous 5.4 mmol/L   Basic metabolic panel   Result Value Ref Range    Sodium 141 133 - 144 mmol/L    Potassium 4.0 3.4 - 5.3 mmol/L    Chloride 112 (H) 94 - 109 mmol/L    Carbon Dioxide 18 (L) 20 - 32 mmol/L    Anion Gap 11 3 - 14 mmol/L    Glucose 223 (H) 70 - 99 mg/dL    Urea Nitrogen 19 7 - 30 mg/dL    Creatinine 0.58 (L) 0.66 - 1.25 mg/dL    GFR Estimate >90 >60 mL/min/[1.73_m2]    GFR Estimate If Black >90 >60 mL/min/[1.73_m2]    Calcium 8.8 8.5 - 10.1 mg/dL   Phosphorus   Result Value Ref Range    Phosphorus 2.5 2.5 - 4.5 mg/dL   Magnesium   Result Value Ref Range    Magnesium 2.3 1.6 - 2.3 mg/dL   CBC with platelets   Result Value Ref Range    WBC 23.9 (H) 4.0 - 11.0 10e9/L    RBC Count 4.90 4.4 - 5.9 10e12/L    Hemoglobin 14.9 13.3 - 17.7 g/dL    Hematocrit 42.5 40.0 - 53.0 %    MCV 87 78 - 100 fl    MCH 30.4 26.5 - 33.0 pg    MCHC 35.1 31.5  - 36.5 g/dL    RDW 12.0 10.0 - 15.0 %    Platelet Count 354 150 - 450 10e9/L   Ketone Beta-Hydroxybutyrate Quantitative   Result Value Ref Range    Ketone Quantitative 3.1 (HH) 0.0 - 0.6 mmol/L   Glucose by meter   Result Value Ref Range    Glucose 186 (H) 70 - 99 mg/dL   Care Management / Social Work IP Consult    Narrative    Yeni Guajardo RN     4/2/2021  1:24 PM  Care Management Initial Consult    General Information  Assessment completed with: Patient,    Type of CM/SW Visit: Initial Assessment    Primary Care Provider verified and updated as needed:   Yes(currently does not have PCP- arranging PCP apt)   Readmission within the last 30 days:        Reason for Consult: care coordination/care conference,   discharge planning  Advance Care Planning: Advance Care Planning Reviewed: no   concerns identified          Communication Assessment  Patient's communication style: spoken language (English or   Bilingual)             Cognitive  Cognitive/Neuro/Behavioral: WDL                      Living Environment:   People in home: child(brian), dependent, spouse     Current living Arrangements: apartment      Able to return to prior arrangements: yes       Family/Social Support:  Care provided by: self  Provides care for: child(brian)  Marital Status:   Wife          Description of Support System: Supportive, Involved    Support Assessment: Adequate family and caregiver support    Current Resources:   Patient receiving home care services: No     Community Resources: None  Equipment currently used at home: none  Supplies currently used at home: None    Employment/Financial:  Employment Status: employed full-time        Financial Concerns: No concerns identified           Lifestyle & Psychosocial Needs:        Socioeconomic History     Marital status:      Spouse name: Not on file     Number of children: Not on file     Years of education: Not on file     Highest education level: Not on file     Tobacco Use  "    Smoking status: Never Smoker   Substance and Sexual Activity     Alcohol use: Yes     Frequency: Monthly or less       Functional Status:  Prior to admission patient needed assistance:   Dependent ADLs:: Independent  Dependent IADLs:: Independent       Mental Health Status:  Mental Health Status: No Current Concerns       Chemical Dependency Status:      Unable to assess          Values/Beliefs:  Spiritual, Cultural Beliefs, Christian Practices, Values that   affect care: no               Additional Information:  Pt presented with fevers/chills temp 101 with heaving and emesis.    He was admitted DKA.  A1C 12.0. Met with pt at bedside to   discuss discharge planning and diabetes education.  Pt said he   was tired and had difficulty talking due to sore throat from   throwing up and asked that visit be brief at this time.  Pt   confirmed he currently does not have a PCP and welcomed   assistance in establishing care with a PCP.  Choice of system was   offered.  Pt requested to go to [facility]. [facility]   referral sent for apt support.  Provided \" Understanding   Diabetes\" booklet with pt.  He requested to review content when   he was feeling better.    Yeni Guajardo RN, BSN, PHN, CTS  Care Coordinator  [facility]  [phone]           Glucose by meter   Result Value Ref Range    Glucose 171 (H) 70 - 99 mg/dL   Glucose by meter   Result Value Ref Range    Glucose 189 (H) 70 - 99 mg/dL   Glucose by meter   Result Value Ref Range    Glucose 147 (H) 70 - 99 mg/dL   Glucose by meter   Result Value Ref Range    Glucose 178 (H) 70 - 99 mg/dL     "

## 2021-04-02 NOTE — PLAN OF CARE
[facility] - Pt A/O x 4, VSS, pt denies headache, dizziness & SOB. Pt reports throat pain and intermittent nausea. Pt also reports that he is having difficulty swallowing. Pt tried GI cocktail, refused throat lozengers, tried gargling with warm salt water. Pt up independent/SBA. Lung sounds clear, RA. Tele: . Insulin gtt restarted @ 1000. Will continue with plan of care.    [facility] notified.

## 2021-04-02 NOTE — H&P
History and Physical     Wally Avendano MRN# [id number]   YOB: 1990 Age: 30 year old      Date of Admission:  4/2/2021    Primary care provider: No Ref-Primary, Physician          Assessment and Plan:     Summary of Stay: Wally Avendano is a 30 year old otherwise healthy male admitted on 4/2/2021 with newly diagnosed T1dm complicated by dka.     2 days ago noticed fevers/chills, with temp up to 101.  This was followed by n/v, dry heaving, with associated abdominal and throat pain from throwing up.  He has not had any diarrhea.    He's also noticed polyuria and polydipsia.  Yesterday he came home and was feeling so badly that he called the ambulance and was brought into [facility] ER for evaluation.     EMS reports  range, on recheck at [facility] it was > 500 with bicarb of 12, gap of 25, and vbg 7.16/40/42. CBC with elevated WBC 25.6 with neutrophilia.  He was given a 1 L IVF bolus followed by 100 ml/hour, empiric pip-tazo and on an insulin gtt and transferred here for direct admission as they did not have any ICU beds available there.     He only complains of throat pain for me, to the point that it is difficult to swallow and he just wants to spit out his secretions as it hurts to swallow.  No more fevers noted at home.  There was some concern for COVID and he reports getting tested yesterday and it was negative.  His wife and child are negative as well.  He denies cp/cough/sob/loss of sense of taste or smell/or any known covid exposures.     Repeat BMP on arrival here while on the insulin gtt show that his gap has closed and bicarb is now 22.  hgb A1c 12 %    Problem List:   DKA  DKA resolved as gap closed and bicarb wnl.  Decreased gtt 6->3 unit(s) per hour.  Start glargine 5 unit(s), along with ISS, and stop gtt one hour later  -will need titration of insulin  -diabetic/insulin administration education   -additional IVF    Newly diagnosed T1dm    Sore throat  Strep negative, likely due to profuse n/v from  DKA  -cepastat lozenges    Leukocytosis  Due to dehydration and stress. No clear infection that I can see.  Recheck in am. Monitor for fever.  Will hold off on additional empiric abx at this time.       DVT Prophylaxis: Pneumatic Compression Devices  Code Status: Full Code  Functional Status:  independent  Fuentes: not needed  Access:  PIV              Chief Complaint:     dka       History of Present Illness:   Wally Avendano is a 30 year old otherwise healthy male admitted on 4/2/2021 with newly diagnosed T1dm complicated by dka.     2 days ago noticed fevers/chills, with temp up to 101.  This was followed by n/v, dry heaving, with associated abdominal and throat pain from throwing up.  He has not had any diarrhea.    He's also noticed polyuria and polydipsia.  Yesterday he came home and was feeling so badly that he called the ambulance and was brought into [facility] ER for evaluation.     EMS reports  range, on recheck at [facility] it was > 500 with bicarb of 12, gap of 25, and vbg 7.16/40/42. CBC with elevated WBC 25.6 with neutrophilia.  He was given a 1 L IVF bolus followed by 100 ml/hour, empiric pip-tazo and on an insulin gtt and transferred here for direct admission as they did not have any ICU beds available there.     He only complains of throat pain for me, to the point that it is difficult to swallow and he just wants to spit out his secretions as it hurts to swallow.  No more fevers noted at home.  There was some concern for COVID and he reports getting tested yesterday and it was negative.  His wife and child are negative as well.  He denies cp/cough/sob/loss of sense of taste or smell/or any known covid exposures.     Repeat BMP on arrival here while on the insulin gtt show that his gap has closed and bicarb is now 22.  hgb A1c 12 %    The history is obtained in discussion with my partner Dr Beltran who took sign out from ER MD at [facility].  And discussion with the patient with good reliability         Past Medical  "History:     Past Medical History:   Diagnosis Date     Known health problems: none              Past Surgical History:     Past Surgical History:   Procedure Laterality Date     NO HISTORY OF SURGERY               Social History:     Social History     Tobacco Use     Smoking status: Never Smoker   Substance Use Topics     Alcohol use: Yes     Frequency: Monthly or less   lives with wife and child.  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  Code status is full           Family History:     Family History   Problem Relation Age of Onset     Other - See Comments Mother         PBC            Allergies:   No Known Allergies          Medications:   None             Review of Systems:     A Comprehensive greater than 10 system review of systems was carried out.  Pertinent positives and negatives are noted above.  Otherwise negative for contributory information.           Physical Exam:   Blood pressure 128/85, pulse 128, temperature 98.8  F (37.1  C), temperature source Oral, resp. rate 20, height 1.981 m (6' 6\"), weight 62 kg (136 lb 11.2 oz), SpO2 93 %.  Exam:    General:  Pleasant nad looks stated age  HEENT:  Head nc/at sclera clear PERRL O/P:  dry mucus membranes no posterior pharyngeal erythema or exudate.  Neck is supple  Lungs: cta b nl effort   CV:  RRR-tachy  no m/r/g no le edema  Abd:  S/nt/nd no r/g  Neuro:  Cn 2-12 grossly intact and guerra  Alert and oriented affect appropriate   Skin:  W/d no c/c               Data:          Lab Results   Component Value Date     04/02/2021    Lab Results   Component Value Date    CHLORIDE 113 04/02/2021    Lab Results   Component Value Date    BUN 23 04/02/2021      Lab Results   Component Value Date    POTASSIUM 4.0 04/02/2021    Lab Results   Component Value Date    CO2 22 04/02/2021    Lab Results   Component Value Date    CR 0.58 04/02/2021        Lab Results   Component Value Date    WBC 25.6 (H) 04/01/2021    HGB 15.7 04/01/2021    HCT 46.6 04/01/2021    MCV " 88 04/01/2021     04/01/2021     Lab Results   Component Value Date     (H) 04/02/2021     Lab Results   Component Value Date    AST 15 04/01/2021    ALT 23 04/01/2021    ALKPHOS 96 04/01/2021    BILITOTAL 0.5 04/01/2021            Imaging:     Recent Results (from the past 24 hour(s))   XR Chest Port 1 View    Narrative    XR CHEST PORT 1 VW 4/1/2021 9:30 PM    HISTORY: sepsis    COMPARISON: None.      Impression    IMPRESSION: No focal infiltrate, pleural effusion or pneumothorax.  Normal cardiomediastinal silhouette.    ANUPAM CRAWFORD MD   CT Abdomen Pelvis w Contrast    Narrative    CT ABDOMEN PELVIS W CONTRAST 4/1/2021 9:36 PM    CLINICAL HISTORY: Nausea/vomiting    TECHNIQUE: CT scan of the abdomen and pelvis was performed following  injection of IV contrast. Multiplanar reformats were obtained. Dose  reduction techniques were used.  CONTRAST: 75 mL Isovue-370    COMPARISON: None.    FINDINGS:   LOWER CHEST: Normal.    HEPATOBILIARY: Normal.    PANCREAS: Normal.    SPLEEN: Normal.    ADRENAL GLANDS: Normal.    KIDNEYS/BLADDER: Normal.    BOWEL: No obstruction or inflammatory change. Normal appendix.    PELVIC ORGANS: Distended urinary bladder. Small prostatic  calcifications.    ADDITIONAL FINDINGS: No free fluid, extraluminal air or fluid  collections. No lymphadenopathy.    MUSCULOSKELETAL: Normal.      Impression    IMPRESSION:   1.  No acute findings in the abdomen and pelvis.    ANUPAM CRAWFORD MD

## 2021-04-02 NOTE — PLAN OF CARE
Pt alert and oriented X 4. Apical pulse tachycardia noted, Tele Sinus Tachycardia. Lung sounds diminished. Pt reports 7/10 throat pain, pt spitting out thick clear secretions, bedside suction set up and pt using to clear secretions. Hoarse/forced voice, ulcerated lips, lubricant offered. Pt reports intermittent nausea with compazine being most effective.   Plan: Continue hourly blood glucose with insulin drip, Compazine for nausea, pain management. Encourage oral intake as tolerated.     1820- Tele tech notified this writer the pt's HR in the 160's-170's while up to the bathroom. HR at been consistently in the 120's-130's. Admitting Hospital Physician paged.

## 2021-04-02 NOTE — CONSULTS
Care Management Initial Consult    General Information  Assessment completed with: Patient,    Type of CM/SW Visit: Initial Assessment    Primary Care Provider verified and updated as needed: Yes(currently does not have PCP- arranging PCP apt)   Readmission within the last 30 days:        Reason for Consult: care coordination/care conference, discharge planning  Advance Care Planning: Advance Care Planning Reviewed: no concerns identified          Communication Assessment  Patient's communication style: spoken language (English or Bilingual)             Cognitive  Cognitive/Neuro/Behavioral: WDL                      Living Environment:   People in home: child(brian), dependent, spouse     Current living Arrangements: apartment      Able to return to prior arrangements: yes       Family/Social Support:  Care provided by: self  Provides care for: child(brian)  Marital Status:   Wife          Description of Support System: Supportive, Involved    Support Assessment: Adequate family and caregiver support    Current Resources:   Patient receiving home care services: No     Community Resources: None  Equipment currently used at home: none  Supplies currently used at home: None    Employment/Financial:  Employment Status: employed full-time        Financial Concerns: No concerns identified           Lifestyle & Psychosocial Needs:        Socioeconomic History     Marital status:      Spouse name: Not on file     Number of children: Not on file     Years of education: Not on file     Highest education level: Not on file     Tobacco Use     Smoking status: Never Smoker   Substance and Sexual Activity     Alcohol use: Yes     Frequency: Monthly or less       Functional Status:  Prior to admission patient needed assistance:   Dependent ADLs:: Independent  Dependent IADLs:: Independent       Mental Health Status:  Mental Health Status: No Current Concerns       Chemical Dependency Status:      Unable to assess       "    Values/Beliefs:  Spiritual, Cultural Beliefs, Denominational Practices, Values that affect care: no               Additional Information:  Pt presented with fevers/chills temp 101 with heaving and emesis.  He was admitted DKA.  A1C 12.0. Met with pt at bedside to discuss discharge planning and diabetes education.  Pt said he was tired and had difficulty talking due to sore throat from throwing up and asked that visit be brief at this time.  Pt confirmed he currently does not have a PCP and welcomed assistance in establishing care with a PCP.  Choice of system was offered.  Pt requested to go to [facility] clinic. [facility] referral sent for apt support.  Provided \" Understanding Diabetes\" booklet with pt.  He requested to review content when he was feeling better.    Yeni Guajardo RN, BSN, PHN, CTS  Care Coordinator  [facility]  [phone]          "

## 2021-04-02 NOTE — RESULT ENCOUNTER NOTE
Group A Streptococcus PCR is NEGATIVE  No treatment or change in treatment [facility] ED lab result Strep Group A protocol.

## 2021-04-03 LAB
ALBUMIN SERPL-MCNC: 3 G/DL (ref 3.4–5)
ALP SERPL-CCNC: 76 U/L (ref 40–150)
ALT SERPL W P-5'-P-CCNC: 15 U/L (ref 0–70)
ANION GAP SERPL CALCULATED.3IONS-SCNC: 3 MMOL/L (ref 3–14)
ANION GAP SERPL CALCULATED.3IONS-SCNC: 4 MMOL/L (ref 3–14)
AST SERPL W P-5'-P-CCNC: 10 U/L (ref 0–45)
BASE EXCESS BLDV CALC-SCNC: 3.3 MMOL/L
BILIRUB SERPL-MCNC: 0.8 MG/DL (ref 0.2–1.3)
BUN SERPL-MCNC: 9 MG/DL (ref 7–30)
BUN SERPL-MCNC: 9 MG/DL (ref 7–30)
CALCIUM SERPL-MCNC: 8.3 MG/DL (ref 8.5–10.1)
CALCIUM SERPL-MCNC: 8.3 MG/DL (ref 8.5–10.1)
CHLORIDE SERPL-SCNC: 109 MMOL/L (ref 94–109)
CHLORIDE SERPL-SCNC: 109 MMOL/L (ref 94–109)
CO2 SERPL-SCNC: 28 MMOL/L (ref 20–32)
CO2 SERPL-SCNC: 28 MMOL/L (ref 20–32)
CREAT SERPL-MCNC: 0.43 MG/DL (ref 0.66–1.25)
CREAT SERPL-MCNC: 0.46 MG/DL (ref 0.66–1.25)
ERYTHROCYTE [DISTWIDTH] IN BLOOD BY AUTOMATED COUNT: 11.9 % (ref 10–15)
GFR SERPL CREATININE-BSD FRML MDRD: >90 ML/MIN/{1.73_M2}
GFR SERPL CREATININE-BSD FRML MDRD: >90 ML/MIN/{1.73_M2}
GLUCOSE BLDC GLUCOMTR-MCNC: 112 MG/DL (ref 70–99)
GLUCOSE BLDC GLUCOMTR-MCNC: 116 MG/DL (ref 70–99)
GLUCOSE BLDC GLUCOMTR-MCNC: 125 MG/DL (ref 70–99)
GLUCOSE BLDC GLUCOMTR-MCNC: 138 MG/DL (ref 70–99)
GLUCOSE BLDC GLUCOMTR-MCNC: 142 MG/DL (ref 70–99)
GLUCOSE BLDC GLUCOMTR-MCNC: 149 MG/DL (ref 70–99)
GLUCOSE BLDC GLUCOMTR-MCNC: 150 MG/DL (ref 70–99)
GLUCOSE BLDC GLUCOMTR-MCNC: 150 MG/DL (ref 70–99)
GLUCOSE BLDC GLUCOMTR-MCNC: 151 MG/DL (ref 70–99)
GLUCOSE BLDC GLUCOMTR-MCNC: 155 MG/DL (ref 70–99)
GLUCOSE BLDC GLUCOMTR-MCNC: 157 MG/DL (ref 70–99)
GLUCOSE BLDC GLUCOMTR-MCNC: 162 MG/DL (ref 70–99)
GLUCOSE BLDC GLUCOMTR-MCNC: 162 MG/DL (ref 70–99)
GLUCOSE BLDC GLUCOMTR-MCNC: 166 MG/DL (ref 70–99)
GLUCOSE BLDC GLUCOMTR-MCNC: 169 MG/DL (ref 70–99)
GLUCOSE BLDC GLUCOMTR-MCNC: 171 MG/DL (ref 70–99)
GLUCOSE BLDC GLUCOMTR-MCNC: 178 MG/DL (ref 70–99)
GLUCOSE BLDC GLUCOMTR-MCNC: 181 MG/DL (ref 70–99)
GLUCOSE BLDC GLUCOMTR-MCNC: 181 MG/DL (ref 70–99)
GLUCOSE BLDC GLUCOMTR-MCNC: 182 MG/DL (ref 70–99)
GLUCOSE BLDC GLUCOMTR-MCNC: 183 MG/DL (ref 70–99)
GLUCOSE BLDC GLUCOMTR-MCNC: 209 MG/DL (ref 70–99)
GLUCOSE BLDC GLUCOMTR-MCNC: 217 MG/DL (ref 70–99)
GLUCOSE BLDC GLUCOMTR-MCNC: 219 MG/DL (ref 70–99)
GLUCOSE SERPL-MCNC: 148 MG/DL (ref 70–99)
GLUCOSE SERPL-MCNC: 169 MG/DL (ref 70–99)
HCO3 BLDV-SCNC: 29 MMOL/L (ref 21–28)
HCT VFR BLD AUTO: 38.6 % (ref 40–53)
HGB BLD-MCNC: 13.5 G/DL (ref 13.3–17.7)
KETONES BLD-SCNC: 1.3 MMOL/L (ref 0–0.6)
MAGNESIUM SERPL-MCNC: 2 MG/DL (ref 1.6–2.3)
MCH RBC QN AUTO: 30.5 PG (ref 26.5–33)
MCHC RBC AUTO-ENTMCNC: 35 G/DL (ref 31.5–36.5)
MCV RBC AUTO: 87 FL (ref 78–100)
O2/TOTAL GAS SETTING VFR VENT: ABNORMAL %
OXYHGB MFR BLDV: 67 %
PCO2 BLDV: 48 MM HG (ref 40–50)
PH BLDV: 7.39 PH (ref 7.32–7.43)
PHOSPHATE SERPL-MCNC: 1.6 MG/DL (ref 2.5–4.5)
PHOSPHATE SERPL-MCNC: 2.2 MG/DL (ref 2.5–4.5)
PLATELET # BLD AUTO: 289 10E9/L (ref 150–450)
PO2 BLDV: 33 MM HG (ref 25–47)
POTASSIUM SERPL-SCNC: 3.4 MMOL/L (ref 3.4–5.3)
POTASSIUM SERPL-SCNC: 3.6 MMOL/L (ref 3.4–5.3)
PROT SERPL-MCNC: 6.5 G/DL (ref 6.8–8.8)
RBC # BLD AUTO: 4.43 10E12/L (ref 4.4–5.9)
SODIUM SERPL-SCNC: 140 MMOL/L (ref 133–144)
SODIUM SERPL-SCNC: 141 MMOL/L (ref 133–144)
WBC # BLD AUTO: 15.5 10E9/L (ref 4–11)

## 2021-04-03 PROCEDURE — 250N000011 HC RX IP 250 OP 636: Performed by: INTERNAL MEDICINE

## 2021-04-03 PROCEDURE — C9113 INJ PANTOPRAZOLE SODIUM, VIA: HCPCS | Performed by: INTERNAL MEDICINE

## 2021-04-03 PROCEDURE — 272N000097 HC TRAY VALVED DOUBLE LUMEN

## 2021-04-03 PROCEDURE — 84100 ASSAY OF PHOSPHORUS: CPT | Performed by: INTERNAL MEDICINE

## 2021-04-03 PROCEDURE — 36569 INSJ PICC 5 YR+ W/O IMAGING: CPT

## 2021-04-03 PROCEDURE — 250N000013 HC RX MED GY IP 250 OP 250 PS 637: Performed by: INTERNAL MEDICINE

## 2021-04-03 PROCEDURE — 250N000009 HC RX 250: Performed by: INTERNAL MEDICINE

## 2021-04-03 PROCEDURE — 99207 PR CDG-MDM COMPONENT: MEETS LOW - DOWN CODED: CPT | Performed by: INTERNAL MEDICINE

## 2021-04-03 PROCEDURE — 85027 COMPLETE CBC AUTOMATED: CPT | Performed by: INTERNAL MEDICINE

## 2021-04-03 PROCEDURE — 82010 KETONE BODYS QUAN: CPT | Performed by: INTERNAL MEDICINE

## 2021-04-03 PROCEDURE — 0CJY8ZZ INSPECTION OF MOUTH AND THROAT, VIA NATURAL OR ARTIFICIAL OPENING ENDOSCOPIC: ICD-10-PCS | Performed by: OTOLARYNGOLOGY

## 2021-04-03 PROCEDURE — 258N000003 HC RX IP 258 OP 636: Performed by: INTERNAL MEDICINE

## 2021-04-03 PROCEDURE — 99232 SBSQ HOSP IP/OBS MODERATE 35: CPT | Performed by: INTERNAL MEDICINE

## 2021-04-03 PROCEDURE — 120N000013 HC R&B IMCU

## 2021-04-03 PROCEDURE — 999N001017 HC STATISTIC GLUCOSE BY METER IP

## 2021-04-03 PROCEDURE — 83735 ASSAY OF MAGNESIUM: CPT | Performed by: INTERNAL MEDICINE

## 2021-04-03 PROCEDURE — 80048 BASIC METABOLIC PNL TOTAL CA: CPT | Performed by: INTERNAL MEDICINE

## 2021-04-03 PROCEDURE — 82805 BLOOD GASES W/O2 SATURATION: CPT | Performed by: INTERNAL MEDICINE

## 2021-04-03 PROCEDURE — 80053 COMPREHEN METABOLIC PANEL: CPT | Performed by: INTERNAL MEDICINE

## 2021-04-03 PROCEDURE — 36415 COLL VENOUS BLD VENIPUNCTURE: CPT | Performed by: INTERNAL MEDICINE

## 2021-04-03 RX ORDER — FLUCONAZOLE 2 MG/ML
400 INJECTION, SOLUTION INTRAVENOUS EVERY 24 HOURS
Status: DISCONTINUED | OUTPATIENT
Start: 2021-04-03 | End: 2021-04-03

## 2021-04-03 RX ORDER — HEPARIN SODIUM,PORCINE 10 UNIT/ML
5-10 VIAL (ML) INTRAVENOUS EVERY 24 HOURS
Status: DISCONTINUED | OUTPATIENT
Start: 2021-04-03 | End: 2021-04-05

## 2021-04-03 RX ORDER — FLUCONAZOLE 2 MG/ML
400 INJECTION, SOLUTION INTRAVENOUS ONCE
Status: COMPLETED | OUTPATIENT
Start: 2021-04-03 | End: 2021-04-03

## 2021-04-03 RX ORDER — HEPARIN SODIUM,PORCINE 10 UNIT/ML
2-5 VIAL (ML) INTRAVENOUS
Status: DISCONTINUED | OUTPATIENT
Start: 2021-04-03 | End: 2021-04-03

## 2021-04-03 RX ORDER — LIDOCAINE 40 MG/G
CREAM TOPICAL
Status: DISCONTINUED | OUTPATIENT
Start: 2021-04-03 | End: 2021-04-06

## 2021-04-03 RX ORDER — HEPARIN SODIUM,PORCINE 10 UNIT/ML
5-10 VIAL (ML) INTRAVENOUS
Status: DISCONTINUED | OUTPATIENT
Start: 2021-04-03 | End: 2021-04-03

## 2021-04-03 RX ORDER — FLUCONAZOLE 2 MG/ML
200 INJECTION, SOLUTION INTRAVENOUS EVERY 24 HOURS
Status: DISCONTINUED | OUTPATIENT
Start: 2021-04-04 | End: 2021-04-04

## 2021-04-03 RX ADMIN — HYDROMORPHONE HYDROCHLORIDE 0.3 MG: 1 INJECTION, SOLUTION INTRAMUSCULAR; INTRAVENOUS; SUBCUTANEOUS at 17:41

## 2021-04-03 RX ADMIN — PROCHLORPERAZINE EDISYLATE 10 MG: 5 INJECTION INTRAMUSCULAR; INTRAVENOUS at 12:08

## 2021-04-03 RX ADMIN — POTASSIUM CHLORIDE, DEXTROSE MONOHYDRATE AND SODIUM CHLORIDE: 150; 5; 450 INJECTION, SOLUTION INTRAVENOUS at 03:00

## 2021-04-03 RX ADMIN — Medication: at 00:37

## 2021-04-03 RX ADMIN — POTASSIUM CHLORIDE, DEXTROSE MONOHYDRATE AND SODIUM CHLORIDE: 150; 5; 450 INJECTION, SOLUTION INTRAVENOUS at 16:37

## 2021-04-03 RX ADMIN — PANTOPRAZOLE SODIUM 40 MG: 40 INJECTION, POWDER, FOR SOLUTION INTRAVENOUS at 22:05

## 2021-04-03 RX ADMIN — ONDANSETRON 4 MG: 2 INJECTION INTRAMUSCULAR; INTRAVENOUS at 06:04

## 2021-04-03 RX ADMIN — HYDROMORPHONE HYDROCHLORIDE 0.3 MG: 1 INJECTION, SOLUTION INTRAMUSCULAR; INTRAVENOUS; SUBCUTANEOUS at 07:56

## 2021-04-03 RX ADMIN — FLUCONAZOLE IN SODIUM CHLORIDE 400 MG: 2 INJECTION, SOLUTION INTRAVENOUS at 08:35

## 2021-04-03 RX ADMIN — SODIUM PHOSPHATE, MONOBASIC, MONOHYDRATE AND SODIUM PHOSPHATE, DIBASIC, ANHYDROUS 15 MMOL: 276; 142 INJECTION, SOLUTION INTRAVENOUS at 10:23

## 2021-04-03 RX ADMIN — HYDROMORPHONE HYDROCHLORIDE 0.3 MG: 1 INJECTION, SOLUTION INTRAMUSCULAR; INTRAVENOUS; SUBCUTANEOUS at 22:50

## 2021-04-03 RX ADMIN — SODIUM PHOSPHATE, MONOBASIC, MONOHYDRATE AND SODIUM PHOSPHATE, DIBASIC, ANHYDROUS 9 MMOL: 276; 142 INJECTION, SOLUTION INTRAVENOUS at 22:47

## 2021-04-03 RX ADMIN — PANTOPRAZOLE SODIUM 40 MG: 40 INJECTION, POWDER, FOR SOLUTION INTRAVENOUS at 08:17

## 2021-04-03 RX ADMIN — PROCHLORPERAZINE EDISYLATE 10 MG: 5 INJECTION INTRAMUSCULAR; INTRAVENOUS at 00:37

## 2021-04-03 RX ADMIN — ONDANSETRON 4 MG: 2 INJECTION INTRAMUSCULAR; INTRAVENOUS at 17:37

## 2021-04-03 RX ADMIN — Medication: at 07:17

## 2021-04-03 RX ADMIN — POTASSIUM CHLORIDE, DEXTROSE MONOHYDRATE AND SODIUM CHLORIDE: 150; 5; 450 INJECTION, SOLUTION INTRAVENOUS at 10:28

## 2021-04-03 RX ADMIN — SALINE NASAL SPRAY 1 SPRAY: 1.5 SOLUTION NASAL at 02:05

## 2021-04-03 RX ADMIN — HYDROMORPHONE HYDROCHLORIDE 0.3 MG: 1 INJECTION, SOLUTION INTRAMUSCULAR; INTRAVENOUS; SUBCUTANEOUS at 11:44

## 2021-04-03 RX ADMIN — Medication 3 UNITS/HR: at 21:33

## 2021-04-03 ASSESSMENT — ACTIVITIES OF DAILY LIVING (ADL)
ADLS_ACUITY_SCORE: 16

## 2021-04-03 NOTE — PROGRESS NOTES
[facility]  Hospitalist Progress Note  Fuad Landeros MD 04/03/2021    Reason for Stay (Diagnosis): new dx insulin-dependent DM         Assessment and Plan:      Summary of Stay: Wally Avendano is a 30 year old male admitted on 4/2/2021 with DKA.      Mr. Avendano had presented to [facility] with severe malaise assoc with fevers/chills, diarrhea, vomiting with polyuria/polydypsia. He was found to be significantly hyperglcemic and ultimately to have AGMA with VBG pH of 7.16 (HCO3 12).  His AG closed quickly, but it appears that his resuscitation was very spare, having been given only 1 L NS.      He had been started on Insulin drip and because the gap closed quickly, this was stopped.  Pt gives a history of more than 6 months of intermittent polyuria/polydypsia and significant weight loss.     Problem List:   1. DKA.  Ketones still not fully clear.  2. 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  Will need eval for DM including anti-NATHANAEL antibody.   3. Severe dysphagia, thought to be due to severe vomiting. Oropharynx yesterday was clear, but today, when I was able to look more deeply, there appeared to be exudate very far posteriorly.  Very high risk for esophageal candidiasis. (unable to swallow even his own saliva) Due to my concern for possible pharyngeal injury or staph infection, I also called ENT.     PLAN:  1.  ENT consult.  2.  Insulin drip with glucose -anticipating this will continue overnight- due to pt being unable to eat (odynophagia) and with persistently measurable ketones.   3.  Reviewed he case with Dr. Ríos ([facility]) who recommended empiric trial of PPI and Fluconazole IV.  She could be contacted directly tomorrow if needed for endoscopy.  ([phone]).  4.  Pt will need teaching about insulin and diabetes. Will need glucometer and equipment.    DVT Prophylaxis: PCDs  Code Status: Full Code  Discharge Dispo: home  Estimated Disch Date / # of Days until Disch: TBD,  "likely at least 2 days        Interval History (Subjective):      Today, Mr. Avendano remained quite sleepy.  He was comfortable on Dilaudid but really does not seem significantly improved from yesterday at all.                  Physical Exam:      Last Vital Signs:  /74 (BP Location: Left arm)   Pulse 108   Temp 98.9  F (37.2  C) (Oral)   Resp 18   Ht 1.981 m (6' 6\")   Wt 62 kg (136 lb 11.2 oz)   SpO2 98%   BMI 15.80 kg/m      I/O last 3 completed shifts:  In: 1529 [I.V.:1529]  Out: 1050 [Urine:1050]    Constitutional: Awake, alert, cooperative, no apparent distress.  Cachectic.   Respiratory: Clear to auscultation bilaterally, no crackles or wheezing   Cardiovascular: Regular rate and rhythm, normal S1 and S2, and no murmur noted   Abdomen: Normal bowel sounds, soft, non-distended, non-tender   Skin: No rashes, no cyanosis, dry to touch   Neuro: Alert and oriented.  Very sleepy presumably due to sedative medications.   Extremities: No edema, normal range of motion   Other(s):        All other systems: Negative          Medications:      All current medications were reviewed with changes reflected in problem list.         Data:      All new lab and imaging data was reviewed.   Labs/Imaging:  Results for orders placed or performed during the hospital encounter of 04/02/21 (from the past 24 hour(s))   Glucose by meter   Result Value Ref Range    Glucose 178 (H) 70 - 99 mg/dL   Glucose by meter   Result Value Ref Range    Glucose 182 (H) 70 - 99 mg/dL   Glucose by meter   Result Value Ref Range    Glucose 163 (H) 70 - 99 mg/dL   Glucose by meter   Result Value Ref Range    Glucose 139 (H) 70 - 99 mg/dL   Basic metabolic panel   Result Value Ref Range    Sodium 143 133 - 144 mmol/L    Potassium 3.8 3.4 - 5.3 mmol/L    Chloride 113 (H) 94 - 109 mmol/L    Carbon Dioxide 26 20 - 32 mmol/L    Anion Gap 4 3 - 14 mmol/L    Glucose 132 (H) 70 - 99 mg/dL    Urea Nitrogen 14 7 - 30 mg/dL    Creatinine 0.50 (L) 0.66 - " 1.25 mg/dL    GFR Estimate >90 >60 mL/min/[1.73_m2]    GFR Estimate If Black >90 >60 mL/min/[1.73_m2]    Calcium 8.5 8.5 - 10.1 mg/dL   Magnesium   Result Value Ref Range    Magnesium 2.2 1.6 - 2.3 mg/dL   Phosphorus   Result Value Ref Range    Phosphorus 1.8 (L) 2.5 - 4.5 mg/dL   Glucose by meter   Result Value Ref Range    Glucose 122 (H) 70 - 99 mg/dL   Glucose by meter   Result Value Ref Range    Glucose 144 (H) 70 - 99 mg/dL   Glucose by meter   Result Value Ref Range    Glucose 159 (H) 70 - 99 mg/dL   Glucose by meter   Result Value Ref Range    Glucose 217 (H) 70 - 99 mg/dL   Glucose by meter   Result Value Ref Range    Glucose 183 (H) 70 - 99 mg/dL   Glucose by meter   Result Value Ref Range    Glucose 157 (H) 70 - 99 mg/dL   Glucose by meter   Result Value Ref Range    Glucose 142 (H) 70 - 99 mg/dL   Glucose by meter   Result Value Ref Range    Glucose 171 (H) 70 - 99 mg/dL   Glucose by meter   Result Value Ref Range    Glucose 162 (H) 70 - 99 mg/dL   Glucose by meter   Result Value Ref Range    Glucose 182 (H) 70 - 99 mg/dL   Comprehensive metabolic panel   Result Value Ref Range    Sodium 140 133 - 144 mmol/L    Potassium 3.6 3.4 - 5.3 mmol/L    Chloride 109 94 - 109 mmol/L    Carbon Dioxide 28 20 - 32 mmol/L    Anion Gap 3 3 - 14 mmol/L    Glucose 169 (H) 70 - 99 mg/dL    Urea Nitrogen 9 7 - 30 mg/dL    Creatinine 0.46 (L) 0.66 - 1.25 mg/dL    GFR Estimate >90 >60 mL/min/[1.73_m2]    GFR Estimate If Black >90 >60 mL/min/[1.73_m2]    Calcium 8.3 (L) 8.5 - 10.1 mg/dL    Bilirubin Total 0.8 0.2 - 1.3 mg/dL    Albumin 3.0 (L) 3.4 - 5.0 g/dL    Protein Total 6.5 (L) 6.8 - 8.8 g/dL    Alkaline Phosphatase 76 40 - 150 U/L    ALT 15 0 - 70 U/L    AST 10 0 - 45 U/L   CBC with platelets   Result Value Ref Range    WBC 15.5 (H) 4.0 - 11.0 10e9/L    RBC Count 4.43 4.4 - 5.9 10e12/L    Hemoglobin 13.5 13.3 - 17.7 g/dL    Hematocrit 38.6 (L) 40.0 - 53.0 %    MCV 87 78 - 100 fl    MCH 30.5 26.5 - 33.0 pg    MCHC 35.0  31.5 - 36.5 g/dL    RDW 11.9 10.0 - 15.0 %    Platelet Count 289 150 - 450 10e9/L   Blood gas venous with oxyhemoglobin   Result Value Ref Range    Ph Venous 7.39 7.32 - 7.43 pH    PCO2 Venous 48 40 - 50 mm Hg    PO2 Venous 33 25 - 47 mm Hg    Bicarbonate Venous 29 (H) 21 - 28 mmol/L    FIO2 R air      Oxyhemoglobin Venous 67 %    Base Excess Venous 3.3 mmol/L   Ketone Beta-Hydroxybutyrate Quantitative   Result Value Ref Range    Ketone Quantitative 1.3 (H) 0.0 - 0.6 mmol/L   Glucose by meter   Result Value Ref Range    Glucose 181 (H) 70 - 99 mg/dL   Glucose by meter   Result Value Ref Range    Glucose 150 (H) 70 - 99 mg/dL   Phosphorus   Result Value Ref Range    Phosphorus 1.6 (L) 2.5 - 4.5 mg/dL   Basic metabolic panel   Result Value Ref Range    Sodium 141 133 - 144 mmol/L    Potassium 3.4 3.4 - 5.3 mmol/L    Chloride 109 94 - 109 mmol/L    Carbon Dioxide 28 20 - 32 mmol/L    Anion Gap 4 3 - 14 mmol/L    Glucose 148 (H) 70 - 99 mg/dL    Urea Nitrogen 9 7 - 30 mg/dL    Creatinine 0.43 (L) 0.66 - 1.25 mg/dL    GFR Estimate >90 >60 mL/min/[1.73_m2]    GFR Estimate If Black >90 >60 mL/min/[1.73_m2]    Calcium 8.3 (L) 8.5 - 10.1 mg/dL   Magnesium   Result Value Ref Range    Magnesium 2.0 1.6 - 2.3 mg/dL   Glucose by meter   Result Value Ref Range    Glucose 125 (H) 70 - 99 mg/dL   Glucose by meter   Result Value Ref Range    Glucose 138 (H) 70 - 99 mg/dL   Double Lumen PICC Placement    Narrative    Nathalia Hernandez RN     4/3/2021 10:18 AM  [facility]    Double Lumen PICC Placement    Date/Time: 4/3/2021 10:17 AM  Performed by: Nathalia Hernandez RN  Authorized by: Fuad Landeros MD   Indications: vascular access    UNIVERSAL PROTOCOL   Site Marked: NA  Prior Images Obtained and Reviewed:  Yes  Required items: Required blood products, implants, devices and special   equipment available    Patient identity confirmed:  Verbally with patient, arm band,   hospital-assigned identification number  and anonymous protocol, patient   vented/unresponsive  Patient was reevaluated immediately before administering moderate or deep   sedation or anesthesia  Confirmation Checklist:  Patient's identity using two indicators, relevant   allergies, procedure was appropriate and matched the consent or emergent   situation and correct equipment/implants were available  Time out: Immediately prior to the procedure a time out was called    Universal Protocol: the Joint Commission Universal Protocol was followed    Preparation: Patient was prepped and draped in usual sterile fashion    ESBL (mL):  1         ANESTHESIA    Anesthesia: Local infiltration  Local Anesthetic:  Lidocaine 1% without epinephrine  Anesthetic Total (mL):  1      SEDATION    Patient Sedated: No        Preparation: skin prepped with ChloraPrep  Skin prep agent: skin prep agent completely dried prior to procedure  Sterile barriers: maximum sterile barriers were used: cap, mask, sterile   gown, sterile gloves, and large sterile sheet  Hand hygiene: hand hygiene performed prior to central venous catheter   insertion  Type of line used: Power PICC  Catheter type: double lumen  Lumen type: valved  Catheter size: 5 Fr  Brand: Bard  Lot number: [id number]  Placement method: venipuncture, MST, ultrasound and tip confirmation   system  Number of attempts: 1  Successful placement: yes  Orientation: right  Location: basilic vein (vein diameter 6.0mm)  Arm circumference: adults 10 cm  Extremity circumference: 22.5  Visible catheter length: 4  Internal length: 43 cm  Total catheter length: 47  Dressing and securement: chlorhexidine patch applied, blood cleaned with   CHG, blood removed, statlock, site cleaned and sterile dressing applied  Post procedure assessment: free fluid flow and blood return through all   ports  PROCEDURE Describe Procedure: Ok to use PICC line, verified with 3cg Tip   Confirmation. CHESTER Zuleta informed ok to use.    Glucose by meter   Result Value  Ref Range    Glucose 166 (H) 70 - 99 mg/dL   Glucose by meter   Result Value Ref Range    Glucose 209 (H) 70 - 99 mg/dL   Glucose by meter   Result Value Ref Range    Glucose 219 (H) 70 - 99 mg/dL   Glucose by meter   Result Value Ref Range    Glucose 155 (H) 70 - 99 mg/dL   Glucose by meter   Result Value Ref Range    Glucose 150 (H) 70 - 99 mg/dL   Glucose by meter   Result Value Ref Range    Glucose 112 (H) 70 - 99 mg/dL

## 2021-04-03 NOTE — PROVIDER NOTIFICATION
Pt 9pm labs resulted- phos 1.8- not on protocol. Thanks    Phosphorous protocol ordered.     Maria Zepeda RN on 4/2/2021 at 9:42 PM

## 2021-04-03 NOTE — CONSULTS
Otolaryngology Consult  Date: 04/04/21  Requesting Provider: Fuad Landeros MD    CC: throat pain    HPI: Wally Avendano is a 30 year old man who presented to the hospital on 4/2/21 with severe malaise, fevers/chills, diarrhea, vomiting with polyuria and found to be in DKA. He reports that he was vomiting quite significantly and frequently the past couple of days. He reports that since he started vomiting he has had progressively worsening sore throat, especially with swallowing. He feels like the sore throat is caused by the significant vomiting. When I see him today he does have gagging and bilious emesis multiple times. He denies difficulty breathing. He denies hot potato voice. He does have some hoarseness that he thinks is secondary to the acid from vomiting. No hemoptysis. He does have a lower lip sore that he reports started since the vomiting. He did not have any sore throat prior to vomiting. No fevers today. He had a negative COVID test. He has had a negative strep test. He had a leukocytosis on admission that was thought to be due to the dehydration and stress related to DKA.    PMH  Past Medical History:   Diagnosis Date     Known health problems: none      PSH  None    Social History     Socioeconomic History     Marital status:      Spouse name: Not on file     Number of children: Not on file     Years of education: Not on file     Highest education level: Not on file   Occupational History     Not on file   Social Needs     Financial resource strain: Not on file     Food insecurity     Worry: Not on file     Inability: Not on file     Transportation needs     Medical: Not on file     Non-medical: Not on file   Tobacco Use     Smoking status: Never Smoker   Substance and Sexual Activity     Alcohol use: Yes     Frequency: Monthly or less     Drug use: Not on file     Sexual activity: Not on file   Lifestyle     Physical activity     Days per week: Not on file     Minutes per session: Not on file      Stress: Not on file   Relationships     Social connections     Talks on phone: Not on file     Gets together: Not on file     Attends Che[employer] service: Not on file     Active member of club or organization: Not on file     Attends meetings of clubs or organizations: Not on file     Relationship status: Not on file     Intimate partner violence     Fear of current or ex partner: Not on file     Emotionally abused: Not on file     Physically abused: Not on file     Forced sexual activity: Not on file   Other Topics Concern     Not on file   Social History Narrative     Not on file     Lives with wife and child    Family History   Problem Relation Age of Onset     Other - See Comments Mother         PBC     Meds prior to admission  None    Allergies  None    ROS  10 point ROS performed and negative except as stated in HPI    Physical Exam  Temp: 99  F (37.2  C) Temp src: Oral BP: 128/74 Pulse: 103   Resp: 20 SpO2: 96 % O2 Device: None (Room air)    Awake alert in mild discomfort  EOMI, PERRL  Neck normal  Oropharynx dry, there is some mucosal sloughing of the posterior aspect of the uvula and the posterior pharyngeal wall. No tonsillar hypertrophy  Affect normal  Skin of head and neck normal on inspection and palpation  Respirations nonlabored on room air  Moves all 4 extremities spontaneously  Neck strength and ROM normal    Procedure  Flexible laryngoscopy  Indication: odynophagia  Description of procedure: after verbal consent obtained, the flexible scope was passed through the right nasal passage. The mucosa of the nasopharynx, epiglottis and posterior pharyngeal wall have a brownish exudate consistent with mucosal sloughing related to significant repeated emesis. There is mild edema of the epiglottis but the airway is widely patent. Normal TVF motion. No purulence and no masses or lesions.    Assessment/Plan: 30 year old man admitted for DKA with significant repeated emesis for greater than 2 days. Since repeated  emesis has had significant odynophagia. Flexible scope exam does not reveal any overt signs of infection or airway compromise, but there is diffuse mucosal sloughing consistent with repeated emesis and tissue burn from the acid exposure. There is mild to moderate epiglottis edema but again the airway remains widely patent. No need for other interventions from ENT perspective at this point.    Akhil Rodriguez MD

## 2021-04-03 NOTE — PROGRESS NOTES
RAMYAOVER.  - Got sign out earlier from Dr. Landeros.  -Labs checked, Michelet pending. BMP ok, acidosis resolved, bicarb 26.  Phosphorus low, replace per protocol.  Recent Labs   Lab 04/02/21  1230 04/01/21 2231 04/01/21 1947   PHV 7.33 7.16* 7.13*   PO2V 49* 42 43   PCO2V 38* 40 36*   HCO3V 20* 14* 12*          Lab Results   Component Value Date     04/02/2021     04/02/2021     04/02/2021    Lab Results   Component Value Date    CHLORIDE 113 04/02/2021    CHLORIDE 112 04/02/2021    CHLORIDE 112 04/02/2021    Lab Results   Component Value Date    BUN 14 04/02/2021    BUN 19 04/02/2021    BUN 21 04/02/2021      Lab Results   Component Value Date    POTASSIUM 3.8 04/02/2021    POTASSIUM 4.0 04/02/2021    POTASSIUM 4.2 04/02/2021    Lab Results   Component Value Date    CO2 26 04/02/2021    CO2 18 04/02/2021    CO2 17 04/02/2021    Lab Results   Component Value Date    CR 0.50 04/02/2021    CR 0.58 04/02/2021    CR 0.57 04/02/2021        Recent Labs   Lab 04/02/21 2111 04/02/21 2055 04/02/21 2008 04/02/21  1919 04/02/21  1807 04/02/21  1715 04/02/21  1230 04/02/21  1230 04/02/21  0651 04/02/21  0651 04/02/21  0329 04/02/21  0329 04/01/21 1947 04/01/21 1947   GLC  --  132*  --   --   --   --   --  223*  --  239*  --  203*  --  531*   *  --  139* 163* 182* 178*   < >  --    < >  --    < >  --    < >  --     < > = values in this interval not displayed.

## 2021-04-03 NOTE — PROCEDURES
[facility]    Double Lumen PICC Placement    Date/Time: 4/3/2021 10:17 AM  Performed by: Nathalia Hernandez RN  Authorized by: Fuad Landeros MD   Indications: vascular access    UNIVERSAL PROTOCOL   Site Marked: NA  Prior Images Obtained and Reviewed:  Yes  Required items: Required blood products, implants, devices and special equipment available    Patient identity confirmed:  Verbally with patient, arm band, hospital-assigned identification number and anonymous protocol, patient vented/unresponsive  Patient was reevaluated immediately before administering moderate or deep sedation or anesthesia  Confirmation Checklist:  Patient's identity using two indicators, relevant allergies, procedure was appropriate and matched the consent or emergent situation and correct equipment/implants were available  Time out: Immediately prior to the procedure a time out was called    Universal Protocol: the Joint Commission Universal Protocol was followed    Preparation: Patient was prepped and draped in usual sterile fashion    ESBL (mL):  1         ANESTHESIA    Anesthesia: Local infiltration  Local Anesthetic:  Lidocaine 1% without epinephrine  Anesthetic Total (mL):  1      SEDATION    Patient Sedated: No        Preparation: skin prepped with ChloraPrep  Skin prep agent: skin prep agent completely dried prior to procedure  Sterile barriers: maximum sterile barriers were used: cap, mask, sterile gown, sterile gloves, and large sterile sheet  Hand hygiene: hand hygiene performed prior to central venous catheter insertion  Type of line used: Power PICC  Catheter type: double lumen  Lumen type: valved  Catheter size: 5 Fr  Brand: Bard  Lot number: [id number]  Placement method: venipuncture, MST, ultrasound and tip confirmation system  Number of attempts: 1  Successful placement: yes  Orientation: right  Location: basilic vein (vein diameter 6.0mm)  Arm circumference: adults 10 cm  Extremity circumference:  22.5  Visible catheter length: 4  Internal length: 43 cm  Total catheter length: 47  Dressing and securement: chlorhexidine patch applied, blood cleaned with CHG, blood removed, statlock, site cleaned and sterile dressing applied  Post procedure assessment: free fluid flow and blood return through all ports  PROCEDURE Describe Procedure: Ok to use PICC line, verified with 3cg Tip Confirmation. CHESTER Zuleta informed ok to use.

## 2021-04-03 NOTE — PLAN OF CARE
[facility] - Pt A/O x 4, VSS, pt denies headache, dizziness & SOB. Pt reports throat pain and intermittent nausea. IV Zofran, Compazine; IV Dilaudid. Pt also reports that he is having difficulty swallowing. Pt refused throat lozenges. Pt up independent. Lung sounds diminshed, RA. Tele: ST. Insulin gtt, Q1H blood glucose checks - see MAR. Phos level 1.6 - replaced per protocol. New PICC line placed. IV Protonix, Diflucan. Will continue with plan of care.    ENT consulted.

## 2021-04-03 NOTE — PLAN OF CARE
A&O. VSS. Continues to have a sore throat and difficulty swallowing own secretions. Oral suctioning per self. Poor PO intake. Remains on insulin gtt. BG range 122-217. Phos 1.8- replaced IV per protocol. PRN compazine and Zofran given x1.Tele ST. Up independent. Spouse updated overnight- has multiple questions. Continue plan of care.  Maria Zepeda RN on 4/3/2021 at 4:17 AM

## 2021-04-04 LAB
ALBUMIN SERPL-MCNC: 2.8 G/DL (ref 3.4–5)
ALP SERPL-CCNC: 79 U/L (ref 40–150)
ALT SERPL W P-5'-P-CCNC: 16 U/L (ref 0–70)
ANION GAP SERPL CALCULATED.3IONS-SCNC: 2 MMOL/L (ref 3–14)
AST SERPL W P-5'-P-CCNC: 11 U/L (ref 0–45)
BILIRUB SERPL-MCNC: 0.6 MG/DL (ref 0.2–1.3)
BUN SERPL-MCNC: 3 MG/DL (ref 7–30)
CALCIUM SERPL-MCNC: 8.1 MG/DL (ref 8.5–10.1)
CHLORIDE SERPL-SCNC: 99 MMOL/L (ref 94–109)
CO2 SERPL-SCNC: 35 MMOL/L (ref 20–32)
CREAT SERPL-MCNC: 0.42 MG/DL (ref 0.66–1.25)
ERYTHROCYTE [DISTWIDTH] IN BLOOD BY AUTOMATED COUNT: 11.7 % (ref 10–15)
GFR SERPL CREATININE-BSD FRML MDRD: >90 ML/MIN/{1.73_M2}
GLUCOSE BLDC GLUCOMTR-MCNC: 130 MG/DL (ref 70–99)
GLUCOSE BLDC GLUCOMTR-MCNC: 136 MG/DL (ref 70–99)
GLUCOSE BLDC GLUCOMTR-MCNC: 143 MG/DL (ref 70–99)
GLUCOSE BLDC GLUCOMTR-MCNC: 147 MG/DL (ref 70–99)
GLUCOSE BLDC GLUCOMTR-MCNC: 157 MG/DL (ref 70–99)
GLUCOSE BLDC GLUCOMTR-MCNC: 168 MG/DL (ref 70–99)
GLUCOSE BLDC GLUCOMTR-MCNC: 173 MG/DL (ref 70–99)
GLUCOSE BLDC GLUCOMTR-MCNC: 176 MG/DL (ref 70–99)
GLUCOSE BLDC GLUCOMTR-MCNC: 180 MG/DL (ref 70–99)
GLUCOSE BLDC GLUCOMTR-MCNC: 181 MG/DL (ref 70–99)
GLUCOSE BLDC GLUCOMTR-MCNC: 182 MG/DL (ref 70–99)
GLUCOSE BLDC GLUCOMTR-MCNC: 187 MG/DL (ref 70–99)
GLUCOSE BLDC GLUCOMTR-MCNC: 228 MG/DL (ref 70–99)
GLUCOSE BLDC GLUCOMTR-MCNC: 229 MG/DL (ref 70–99)
GLUCOSE BLDC GLUCOMTR-MCNC: 238 MG/DL (ref 70–99)
GLUCOSE SERPL-MCNC: 163 MG/DL (ref 70–99)
HCT VFR BLD AUTO: 38.1 % (ref 40–53)
HGB BLD-MCNC: 13.6 G/DL (ref 13.3–17.7)
MAGNESIUM SERPL-MCNC: 1.8 MG/DL (ref 1.6–2.3)
MCH RBC QN AUTO: 30.7 PG (ref 26.5–33)
MCHC RBC AUTO-ENTMCNC: 35.7 G/DL (ref 31.5–36.5)
MCV RBC AUTO: 86 FL (ref 78–100)
PHOSPHATE SERPL-MCNC: 2.3 MG/DL (ref 2.5–4.5)
PLATELET # BLD AUTO: 246 10E9/L (ref 150–450)
POTASSIUM SERPL-SCNC: 3 MMOL/L (ref 3.4–5.3)
POTASSIUM SERPL-SCNC: 4 MMOL/L (ref 3.4–5.3)
PROT SERPL-MCNC: 6.5 G/DL (ref 6.8–8.8)
RBC # BLD AUTO: 4.43 10E12/L (ref 4.4–5.9)
SODIUM SERPL-SCNC: 136 MMOL/L (ref 133–144)
WBC # BLD AUTO: 11.3 10E9/L (ref 4–11)

## 2021-04-04 PROCEDURE — C9113 INJ PANTOPRAZOLE SODIUM, VIA: HCPCS | Performed by: INTERNAL MEDICINE

## 2021-04-04 PROCEDURE — 250N000011 HC RX IP 250 OP 636: Performed by: INTERNAL MEDICINE

## 2021-04-04 PROCEDURE — 250N000013 HC RX MED GY IP 250 OP 250 PS 637: Performed by: INTERNAL MEDICINE

## 2021-04-04 PROCEDURE — 250N000009 HC RX 250: Performed by: INTERNAL MEDICINE

## 2021-04-04 PROCEDURE — 83735 ASSAY OF MAGNESIUM: CPT | Performed by: INTERNAL MEDICINE

## 2021-04-04 PROCEDURE — 80053 COMPREHEN METABOLIC PANEL: CPT | Performed by: INTERNAL MEDICINE

## 2021-04-04 PROCEDURE — 99233 SBSQ HOSP IP/OBS HIGH 50: CPT | Performed by: INTERNAL MEDICINE

## 2021-04-04 PROCEDURE — 250N000012 HC RX MED GY IP 250 OP 636 PS 637: Performed by: INTERNAL MEDICINE

## 2021-04-04 PROCEDURE — 120N000013 HC R&B IMCU

## 2021-04-04 PROCEDURE — 84132 ASSAY OF SERUM POTASSIUM: CPT | Performed by: INTERNAL MEDICINE

## 2021-04-04 PROCEDURE — 85027 COMPLETE CBC AUTOMATED: CPT | Performed by: INTERNAL MEDICINE

## 2021-04-04 PROCEDURE — 258N000003 HC RX IP 258 OP 636: Performed by: INTERNAL MEDICINE

## 2021-04-04 PROCEDURE — 84100 ASSAY OF PHOSPHORUS: CPT | Performed by: INTERNAL MEDICINE

## 2021-04-04 PROCEDURE — 999N001017 HC STATISTIC GLUCOSE BY METER IP

## 2021-04-04 RX ORDER — MAGNESIUM SULFATE HEPTAHYDRATE 40 MG/ML
2 INJECTION, SOLUTION INTRAVENOUS ONCE
Status: COMPLETED | OUTPATIENT
Start: 2021-04-04 | End: 2021-04-04

## 2021-04-04 RX ORDER — NICOTINE POLACRILEX 4 MG
15-30 LOZENGE BUCCAL
Status: DISCONTINUED | OUTPATIENT
Start: 2021-04-04 | End: 2021-04-07 | Stop reason: HOSPADM

## 2021-04-04 RX ORDER — LANOLIN ALCOHOL/MO/W.PET/CERES
3 CREAM (GRAM) TOPICAL
Status: DISCONTINUED | OUTPATIENT
Start: 2021-04-04 | End: 2021-04-07 | Stop reason: HOSPADM

## 2021-04-04 RX ORDER — FLUCONAZOLE 2 MG/ML
200 INJECTION, SOLUTION INTRAVENOUS EVERY 24 HOURS
Status: DISCONTINUED | OUTPATIENT
Start: 2021-04-05 | End: 2021-04-06

## 2021-04-04 RX ORDER — POTASSIUM CHLORIDE 1500 MG/1
80 TABLET, EXTENDED RELEASE ORAL ONCE
Status: DISCONTINUED | OUTPATIENT
Start: 2021-04-04 | End: 2021-04-04

## 2021-04-04 RX ORDER — POTASSIUM CHLORIDE 29.8 MG/ML
20 INJECTION INTRAVENOUS
Status: COMPLETED | OUTPATIENT
Start: 2021-04-04 | End: 2021-04-04

## 2021-04-04 RX ORDER — DEXTROSE MONOHYDRATE 25 G/50ML
25-50 INJECTION, SOLUTION INTRAVENOUS
Status: DISCONTINUED | OUTPATIENT
Start: 2021-04-04 | End: 2021-04-07 | Stop reason: HOSPADM

## 2021-04-04 RX ADMIN — ONDANSETRON 4 MG: 2 INJECTION INTRAMUSCULAR; INTRAVENOUS at 07:30

## 2021-04-04 RX ADMIN — SODIUM PHOSPHATE, MONOBASIC, MONOHYDRATE AND SODIUM PHOSPHATE, DIBASIC, ANHYDROUS 9 MMOL: 276; 142 INJECTION, SOLUTION INTRAVENOUS at 13:10

## 2021-04-04 RX ADMIN — POTASSIUM CHLORIDE, DEXTROSE MONOHYDRATE AND SODIUM CHLORIDE: 150; 5; 450 INJECTION, SOLUTION INTRAVENOUS at 20:39

## 2021-04-04 RX ADMIN — POTASSIUM CHLORIDE 20 MEQ: 29.8 INJECTION, SOLUTION INTRAVENOUS at 10:37

## 2021-04-04 RX ADMIN — POTASSIUM CHLORIDE 20 MEQ: 29.8 INJECTION, SOLUTION INTRAVENOUS at 09:38

## 2021-04-04 RX ADMIN — HYDROMORPHONE HYDROCHLORIDE 0.3 MG: 1 INJECTION, SOLUTION INTRAMUSCULAR; INTRAVENOUS; SUBCUTANEOUS at 12:20

## 2021-04-04 RX ADMIN — HYDROMORPHONE HYDROCHLORIDE 0.3 MG: 1 INJECTION, SOLUTION INTRAMUSCULAR; INTRAVENOUS; SUBCUTANEOUS at 19:04

## 2021-04-04 RX ADMIN — HYDROMORPHONE HYDROCHLORIDE 0.3 MG: 1 INJECTION, SOLUTION INTRAMUSCULAR; INTRAVENOUS; SUBCUTANEOUS at 15:59

## 2021-04-04 RX ADMIN — PROCHLORPERAZINE EDISYLATE 10 MG: 5 INJECTION INTRAMUSCULAR; INTRAVENOUS at 11:02

## 2021-04-04 RX ADMIN — POTASSIUM CHLORIDE 20 MEQ: 29.8 INJECTION, SOLUTION INTRAVENOUS at 13:09

## 2021-04-04 RX ADMIN — PROCHLORPERAZINE EDISYLATE 10 MG: 5 INJECTION INTRAMUSCULAR; INTRAVENOUS at 18:14

## 2021-04-04 RX ADMIN — TOPICAL ANESTHETIC 1 ML: 200 SPRAY DENTAL; PERIODONTAL at 11:02

## 2021-04-04 RX ADMIN — FLUCONAZOLE IN SODIUM CHLORIDE 200 MG: 2 INJECTION, SOLUTION INTRAVENOUS at 07:31

## 2021-04-04 RX ADMIN — PROCHLORPERAZINE EDISYLATE 10 MG: 5 INJECTION INTRAMUSCULAR; INTRAVENOUS at 05:02

## 2021-04-04 RX ADMIN — PANTOPRAZOLE SODIUM 40 MG: 40 INJECTION, POWDER, FOR SOLUTION INTRAVENOUS at 07:30

## 2021-04-04 RX ADMIN — POTASSIUM CHLORIDE, DEXTROSE MONOHYDRATE AND SODIUM CHLORIDE: 150; 5; 450 INJECTION, SOLUTION INTRAVENOUS at 07:00

## 2021-04-04 RX ADMIN — ONDANSETRON 4 MG: 2 INJECTION INTRAMUSCULAR; INTRAVENOUS at 02:06

## 2021-04-04 RX ADMIN — POTASSIUM CHLORIDE, DEXTROSE MONOHYDRATE AND SODIUM CHLORIDE: 150; 5; 450 INJECTION, SOLUTION INTRAVENOUS at 00:06

## 2021-04-04 RX ADMIN — ONDANSETRON 4 MG: 2 INJECTION INTRAMUSCULAR; INTRAVENOUS at 23:18

## 2021-04-04 RX ADMIN — MAGNESIUM SULFATE HEPTAHYDRATE 2 G: 40 INJECTION, SOLUTION INTRAVENOUS at 08:41

## 2021-04-04 RX ADMIN — ONDANSETRON 4 MG: 2 INJECTION INTRAMUSCULAR; INTRAVENOUS at 15:12

## 2021-04-04 RX ADMIN — HYDROMORPHONE HYDROCHLORIDE 0.3 MG: 1 INJECTION, SOLUTION INTRAMUSCULAR; INTRAVENOUS; SUBCUTANEOUS at 02:05

## 2021-04-04 RX ADMIN — POTASSIUM CHLORIDE 20 MEQ: 29.8 INJECTION, SOLUTION INTRAVENOUS at 12:08

## 2021-04-04 RX ADMIN — INSULIN GLARGINE 36 UNITS: 100 INJECTION, SOLUTION SUBCUTANEOUS at 09:11

## 2021-04-04 RX ADMIN — Medication: at 06:13

## 2021-04-04 RX ADMIN — PANTOPRAZOLE SODIUM 40 MG: 40 INJECTION, POWDER, FOR SOLUTION INTRAVENOUS at 20:59

## 2021-04-04 RX ADMIN — HYDROMORPHONE HYDROCHLORIDE 0.3 MG: 1 INJECTION, SOLUTION INTRAMUSCULAR; INTRAVENOUS; SUBCUTANEOUS at 07:31

## 2021-04-04 ASSESSMENT — ACTIVITIES OF DAILY LIVING (ADL)
ADLS_ACUITY_SCORE: 16

## 2021-04-04 NOTE — PLAN OF CARE
VSS, continues to c/o pain/sore throat, Dilaudid IV administerd,  ENT consulted and saw patient earlier, continues on Insulin gtt per DKA protocol, Ketones 1.3, IVF @150ml/hr, prn Zofran for n/v, Phosphorous recheck 2.2, will replace per protocol. will continue with POC.

## 2021-04-04 NOTE — PROGRESS NOTES
[facility]  Hospitalist Progress Note    Assessment & Plan   Wally Avendano is a 30 year old heathy male who was admitted on 4/2/2021 in DKA.     Mr. Avendano had presented to [facility] with severe malaise assoc with fevers/chills, diarrhea, vomiting with polyuria/polydypsia. He was found to be significantly hyperglcemic and ultimately to have AGMA with VBG pH of 7.16 (HCO3 12).  His AG closed quickly, but it appears that his resuscitation was very spare, having been given only 1 L NS.       He had been started on Insulin drip and because the gap closed quickly, this was stopped.  Pt gives a history of more than 6 months of intermittent polyuria/polydypsia and significant weight loss.     Due to severe nausea with vomiting he has scabs on his upper and lower lip, sore/horse voice, and has limited ability to swallow or eat. ENT evaluated patient and performed a flexible laryngoscopy which showed diffuse mucosal sloughing. Mild edema of the epiglottis. Supportive cares provided.     Diabetic ketoacidosis with newly diagnosed presumed type I diabetes mellitus   Patient started on insulin gtt on admission, Hemoglobin A1c 12.0. Initial lactiac acid 4.4 and improved with IVF. TSH 0.97. No personal history of autoimmune diseases. Mother had PBC.   -Transition insulin gtt to insulin glargine 36 units and sliding scale insulin, and insulin 1:15 CHO ratio if eating. POCT glucose q 4 hours given poor oral intake  -Tele monitor reviewed.   -Nutrition and diabetic education counseling - patient will need insulin with glucometer ect on discharge  -Endocrinology outpatient follow-up with anti-NATHANAEL studies.     Odynophagia secondary to hyperemesis resulting in epiglottis edema and tissue burn  -ENT consulted - appreciate assistance  -Case discussed with [facility] (Dr. Ríos) on 4/3 by Dr. Landeros. Recommended trial of IV PPI and Fluconazole started 4/4. Monitor response and if no improvement consult for EGD.   -Supportive  cares - IV fluids, antiemetics, PPI, and pain control.     Electrolyte derangements  -Hypokalemia - IVF and IV replacement ordered with potassium recheck.   -Hypophosphatemia - replacement protocol  -Magnesium - IV replacement ordered    FEN: D5/0.45%NS/20 meq KCl while not eating well; replacement protocol; regular  Activity: As tolerated  DVT Prophylaxis: Pneumatic Compression Devices  Family update: Declined - patient updating wife.     Code Status: Full Code    Expected discharge: 2 - 3 days, recommended to prior living arrangement once oral intake improves and pain controlled. .    Jackelin Perez, DO    Text Page (7am - 6pm, M-F)    Interval History   Doing ok today. Continues to have severe throat pain. Difficulty swallowing and unable to eat. Horse voice. No chest pain or palpitations. No respiratory symptoms. No abdominal pain. Discussed with nursing.     -Data reviewed today: I reviewed all new labs and imaging results over the last 24 hours. I personally reviewed     Physical Exam   Temp: 99.3  F (37.4  C) Temp src: Oral BP: 119/77 Pulse: 114   Resp: 18 SpO2: 96 % O2 Device: None (Room air)    Vitals:    04/02/21 0259   Weight: 62 kg (136 lb 11.2 oz)     Vital Signs with Ranges  Temp:  [98.3  F (36.8  C)-99.6  F (37.6  C)] 99.2  F (37.3  C)  Pulse:  [] 101  Resp:  [16-20] 20  BP: (119-134)/(69-86) 128/75  SpO2:  [94 %-99 %] 94 %  I/O last 3 completed shifts:  In: 3504 [I.V.:3504]  Out: 550 [Urine:550]    Constitutional: Awake, alert, cooperative, Ill in appearance. Moderate distress. Uncomfortable.   HEENT: Atraumatic. Normocephalic. Conjunctiva non-injected. Sclera anicteric. Dry membranes. Erythematous oropharynx. No exudate appreciated. Scab lesions on upper and lower lip.   Respiratory: Moves air bilaterally. Clear to auscultation bilaterally, no crackles or wheezing  Cardiovascular: Tachycardic and regular rhythm, normal S1 and S2, and no murmur noted  GI: Normal bowel sounds, soft,  non-distended, non-tender  Skin/Integumen: No rashes, no cyanosis, no edema    Medications     dextrose 5% and 0.45% NaCl + KCl 20 mEq/L 150 mL/hr at 04/04/21 0700     insulin (regular) 4 mL/hr at 04/04/21 0701     sodium chloride 10 mL/hr at 04/03/21 1611       fluconazole  200 mg Intravenous Q24H     heparin lock flush  5-10 mL Intracatheter Q24H     pantoprazole (PROTONIX) IV  40 mg Intravenous BID     sodium chloride (PF)  10 mL Intracatheter Q7 Days     sodium chloride (PF)  3 mL Intracatheter Q8H     sodium phosphate  9 mmol Intravenous Once       Data   Recent Labs   Lab 04/04/21  0608 04/03/21  0810 04/03/21  0648 04/02/21  1230 04/02/21  1230   WBC 11.3*  --  15.5*  --  23.9*   HGB 13.6  --  13.5  --  14.9   MCV 86  --  87  --  87     --  289  --  354    141 140   < > 141   POTASSIUM 3.0* 3.4 3.6   < > 4.0   CHLORIDE 99 109 109   < > 112*   CO2 35* 28 28   < > 18*   BUN 3* 9 9   < > 19   CR 0.42* 0.43* 0.46*   < > 0.58*   ANIONGAP 2* 4 3   < > 11   MARY 8.1* 8.3* 8.3*   < > 8.8   * 148* 169*   < > 223*   ALBUMIN 2.8*  --  3.0*  --   --    PROTTOTAL 6.5*  --  6.5*  --   --    BILITOTAL 0.6  --  0.8  --   --    ALKPHOS 79  --  76  --   --    ALT 16  --  15  --   --    AST 11  --  10  --   --     < > = values in this interval not displayed.       No results found for this or any previous visit (from the past 24 hour(s)).

## 2021-04-04 NOTE — CONSULTS
"NUTRITION ASSESSMENT      REASON FOR NUTRITION CONSULT:  Provider Order  -  \"new diabetes on insulin\".      ASSESSMENT:  Unable to complete nutrition assessment due to RD off-site.        FOLLOW UP:   Will follow up when on-site as able.       Jocelin Elkins RDN, LD  Clinical Dietitian  3rd floor/ICU: [phone]  All other floors: [phone]  Weekend/holiday: [phone]  "

## 2021-04-04 NOTE — PROGRESS NOTES
"  SLP Orders received for swallow evaluation. Pt with cont pain with any swallowing; unable to take water, spitting out secretions. Vocal quality gravelly, harsh. Declined PO attempts with SLP today. \"I can't swallow anything.\"    ENT scope 4/3 - diffuse mucosal sloughing consistent with repeated emesis and tissue burn from the acid exposure. There is mild to moderate epiglottis edema but again the airway remains widely patent.     Anticipate improved PO tolerance with time/healing. Will check status tomorrow 4/5.  "

## 2021-04-04 NOTE — PLAN OF CARE
A&O. VSS. Continues to c/o sore throat and difficulty swallowing. IV Dilaudid given x1. Zofran and Compazine given for nausea. Remains on insulin gtt overnight. BG range 136-187. IVF's infusing. Phosphorous completed- recheck this AM. Right PICC in place. Tele SR. Continue plan of care.  Maria Zepeda RN on 4/4/2021 at 4:37 AM

## 2021-04-04 NOTE — PLAN OF CARE
Vss A&Ox4. Sleepy. Did not sleep well with  q1hour bg checks. Insulin gtt stopped at 1100.  Poor appetite d/ t sore throat and dif swallowing. Pt spitting up sputum/secretions frequently.  Nausea but denies emesis. Sp consulted. Pt has not had anything po today. Ivf infusing per Dr. Perez orders. Mg k and phos replaced recheck ordered for tomorrow. Tele SR.

## 2021-04-05 ENCOUNTER — APPOINTMENT (OUTPATIENT)
Dept: SPEECH THERAPY | Facility: CLINIC | Age: 31
End: 2021-04-05
Attending: INTERNAL MEDICINE
Payer: COMMERCIAL

## 2021-04-05 LAB
ANION GAP SERPL CALCULATED.3IONS-SCNC: 7 MMOL/L (ref 3–14)
BUN SERPL-MCNC: 7 MG/DL (ref 7–30)
CALCIUM SERPL-MCNC: 8.4 MG/DL (ref 8.5–10.1)
CHLORIDE SERPL-SCNC: 97 MMOL/L (ref 94–109)
CO2 SERPL-SCNC: 27 MMOL/L (ref 20–32)
CREAT SERPL-MCNC: 0.48 MG/DL (ref 0.66–1.25)
ERYTHROCYTE [DISTWIDTH] IN BLOOD BY AUTOMATED COUNT: 11.5 % (ref 10–15)
GFR SERPL CREATININE-BSD FRML MDRD: >90 ML/MIN/{1.73_M2}
GLUCOSE BLDC GLUCOMTR-MCNC: 147 MG/DL (ref 70–99)
GLUCOSE BLDC GLUCOMTR-MCNC: 155 MG/DL (ref 70–99)
GLUCOSE BLDC GLUCOMTR-MCNC: 180 MG/DL (ref 70–99)
GLUCOSE BLDC GLUCOMTR-MCNC: 217 MG/DL (ref 70–99)
GLUCOSE BLDC GLUCOMTR-MCNC: 227 MG/DL (ref 70–99)
GLUCOSE BLDC GLUCOMTR-MCNC: 292 MG/DL (ref 70–99)
GLUCOSE SERPL-MCNC: 234 MG/DL (ref 70–99)
HCT VFR BLD AUTO: 43.5 % (ref 40–53)
HGB BLD-MCNC: 14.6 G/DL (ref 13.3–17.7)
MAGNESIUM SERPL-MCNC: 2 MG/DL (ref 1.6–2.3)
MCH RBC QN AUTO: 29.7 PG (ref 26.5–33)
MCHC RBC AUTO-ENTMCNC: 33.6 G/DL (ref 31.5–36.5)
MCV RBC AUTO: 89 FL (ref 78–100)
PHOSPHATE SERPL-MCNC: 2.6 MG/DL (ref 2.5–4.5)
PLATELET # BLD AUTO: 288 10E9/L (ref 150–450)
POTASSIUM SERPL-SCNC: 3.9 MMOL/L (ref 3.4–5.3)
RBC # BLD AUTO: 4.91 10E12/L (ref 4.4–5.9)
SODIUM SERPL-SCNC: 131 MMOL/L (ref 133–144)
WBC # BLD AUTO: 8.6 10E9/L (ref 4–11)

## 2021-04-05 PROCEDURE — C9113 INJ PANTOPRAZOLE SODIUM, VIA: HCPCS | Performed by: INTERNAL MEDICINE

## 2021-04-05 PROCEDURE — 92610 EVALUATE SWALLOWING FUNCTION: CPT | Mod: GN | Performed by: SPEECH-LANGUAGE PATHOLOGIST

## 2021-04-05 PROCEDURE — 84100 ASSAY OF PHOSPHORUS: CPT | Performed by: INTERNAL MEDICINE

## 2021-04-05 PROCEDURE — 83735 ASSAY OF MAGNESIUM: CPT | Performed by: INTERNAL MEDICINE

## 2021-04-05 PROCEDURE — 250N000011 HC RX IP 250 OP 636: Performed by: INTERNAL MEDICINE

## 2021-04-05 PROCEDURE — 258N000003 HC RX IP 258 OP 636: Performed by: INTERNAL MEDICINE

## 2021-04-05 PROCEDURE — 99233 SBSQ HOSP IP/OBS HIGH 50: CPT | Performed by: INTERNAL MEDICINE

## 2021-04-05 PROCEDURE — 85027 COMPLETE CBC AUTOMATED: CPT | Performed by: INTERNAL MEDICINE

## 2021-04-05 PROCEDURE — 80048 BASIC METABOLIC PNL TOTAL CA: CPT | Performed by: INTERNAL MEDICINE

## 2021-04-05 PROCEDURE — 999N001017 HC STATISTIC GLUCOSE BY METER IP

## 2021-04-05 PROCEDURE — 250N000009 HC RX 250: Performed by: INTERNAL MEDICINE

## 2021-04-05 PROCEDURE — 250N000012 HC RX MED GY IP 250 OP 636 PS 637: Performed by: INTERNAL MEDICINE

## 2021-04-05 PROCEDURE — 120N000001 HC R&B MED SURG/OB

## 2021-04-05 RX ORDER — SCOLOPAMINE TRANSDERMAL SYSTEM 1 MG/1
1 PATCH, EXTENDED RELEASE TRANSDERMAL
Status: DISCONTINUED | OUTPATIENT
Start: 2021-04-05 | End: 2021-04-07 | Stop reason: HOSPADM

## 2021-04-05 RX ORDER — SODIUM CHLORIDE 9 MG/ML
INJECTION, SOLUTION INTRAVENOUS CONTINUOUS
Status: DISCONTINUED | OUTPATIENT
Start: 2021-04-05 | End: 2021-04-05

## 2021-04-05 RX ADMIN — POTASSIUM CHLORIDE, DEXTROSE MONOHYDRATE AND SODIUM CHLORIDE: 150; 5; 450 INJECTION, SOLUTION INTRAVENOUS at 03:37

## 2021-04-05 RX ADMIN — HYDROMORPHONE HYDROCHLORIDE 0.3 MG: 1 INJECTION, SOLUTION INTRAMUSCULAR; INTRAVENOUS; SUBCUTANEOUS at 16:49

## 2021-04-05 RX ADMIN — HYDROMORPHONE HYDROCHLORIDE 0.3 MG: 1 INJECTION, SOLUTION INTRAMUSCULAR; INTRAVENOUS; SUBCUTANEOUS at 20:36

## 2021-04-05 RX ADMIN — HYDROMORPHONE HYDROCHLORIDE 0.3 MG: 1 INJECTION, SOLUTION INTRAMUSCULAR; INTRAVENOUS; SUBCUTANEOUS at 08:10

## 2021-04-05 RX ADMIN — PROCHLORPERAZINE EDISYLATE 10 MG: 5 INJECTION INTRAMUSCULAR; INTRAVENOUS at 13:02

## 2021-04-05 RX ADMIN — PANTOPRAZOLE SODIUM 40 MG: 40 INJECTION, POWDER, FOR SOLUTION INTRAVENOUS at 08:12

## 2021-04-05 RX ADMIN — INSULIN GLARGINE 45 UNITS: 100 INJECTION, SOLUTION SUBCUTANEOUS at 09:38

## 2021-04-05 RX ADMIN — HYDROMORPHONE HYDROCHLORIDE 0.3 MG: 1 INJECTION, SOLUTION INTRAMUSCULAR; INTRAVENOUS; SUBCUTANEOUS at 13:02

## 2021-04-05 RX ADMIN — SODIUM CHLORIDE: 9 INJECTION, SOLUTION INTRAVENOUS at 09:32

## 2021-04-05 RX ADMIN — PANTOPRAZOLE SODIUM 40 MG: 40 INJECTION, POWDER, FOR SOLUTION INTRAVENOUS at 20:34

## 2021-04-05 RX ADMIN — FLUCONAZOLE IN SODIUM CHLORIDE 200 MG: 2 INJECTION, SOLUTION INTRAVENOUS at 08:11

## 2021-04-05 RX ADMIN — ONDANSETRON 4 MG: 2 INJECTION INTRAMUSCULAR; INTRAVENOUS at 20:31

## 2021-04-05 RX ADMIN — SCOPOLAMINE 1 PATCH: 1 PATCH TRANSDERMAL at 11:37

## 2021-04-05 RX ADMIN — HYDROMORPHONE HYDROCHLORIDE 0.3 MG: 1 INJECTION, SOLUTION INTRAMUSCULAR; INTRAVENOUS; SUBCUTANEOUS at 03:37

## 2021-04-05 RX ADMIN — HYDROMORPHONE HYDROCHLORIDE 0.3 MG: 1 INJECTION, SOLUTION INTRAMUSCULAR; INTRAVENOUS; SUBCUTANEOUS at 00:08

## 2021-04-05 RX ADMIN — ONDANSETRON 4 MG: 2 INJECTION INTRAMUSCULAR; INTRAVENOUS at 06:55

## 2021-04-05 ASSESSMENT — ACTIVITIES OF DAILY LIVING (ADL)
ADLS_ACUITY_SCORE: 16

## 2021-04-05 ASSESSMENT — MIFFLIN-ST. JEOR: SCORE: 1705.6

## 2021-04-05 NOTE — PROGRESS NOTES
[facility]  Hospitalist Progress Note    Assessment & Plan   Wally Avendano is a 30 year old heathy male who was admitted on 4/2/2021 in DKA.     Mr. Avendano had presented to [facility] with severe malaise assoc with fevers/chills, diarrhea, vomiting with polyuria/polydypsia. He was found to be significantly hyperglcemic and ultimately to have AGMA with VBG pH of 7.16 (HCO3 12).  His AG closed quickly, but it appears that his resuscitation was very spare, having been given only 1 L NS.       He had been started on Insulin drip and because the gap closed quickly, this was stopped.  Pt gives a history of more than 6 months of intermittent polyuria/polydypsia and significant weight loss.     Due to severe nausea with vomiting he has scabs on his upper and lower lip, sore/horse voice, and has limited ability to swallow or eat. ENT evaluated patient and performed a flexible laryngoscopy which showed diffuse mucosal sloughing. Mild edema of the epiglottis. Supportive cares provided.     Diabetic ketoacidosis with newly diagnosed presumed type I diabetes mellitus   Patient started on insulin gtt on admission, Hemoglobin A1c 12.0. Initial lactiac acid 4.4 and improved with IVF. TSH 0.97. No personal history of autoimmune diseases. Mother had PBC.   -Insulin glargine 45 units (increased 4/5 given hyperglycemia) and sliding scale insulin, and insulin 1:15 CHO ratio if eating. POCT glucose q 4 hours given poor oral intake  -Tele monitor reviewed.   -Nutrition and diabetic education counseling - patient will need insulin with glucometer ect on discharge  -Endocrinology outpatient follow-up with anti-NATHANAEL studies.     Odynophagia secondary to hyperemesis resulting in epiglottis edema and tissue burn  -ENT consulted - appreciate assistance  -Case discussed with [facility] (Dr. Ríos) on 4/3 by Dr. Landeros. Recommended trial of IV PPI and Fluconazole started 4/4. Monitor response and if no improvement consult for EGD.    -Supportive cares - IV fluids, antiemetics, PPI, and pain control.   -Slowly improving - tolerating liquids.     Electrolyte derangements  -Hypokalemia - monitor  -Hypophosphatemia - replacement protocol  -Magnesium - monitor  -Hyponatremia - discontinue IVF. Monitor.     Underweight, BMI 15: Complicates cares.  Awaiting RD consultation    FEN: Oral; monitor; regular  Activity: As tolerated  DVT Prophylaxis: Pneumatic Compression Devices  Family update: Declined - patient updating wife.     Code Status: Full Code    Expected discharge: 1-2, recommended to prior living arrangement once oral intake improves and pain controlled. .    Jackelin Perez, DO    Text Page (7am - 6pm, M-F)    Interval History   Patient is doing remarkably better today.  His voice is not as hoarse.  Pain present with swallowing but slowly improving.  Still producing significant oral secretions which he is spitting out due to discomfort with swallowing.  No chest pain or palpitations.  No respiratory concerns.  No abdominal discomfort. Discussed with nursing.     -Data reviewed today: I reviewed all new labs and imaging results over the last 24 hours. I personally reviewed     Physical Exam   Temp: 96.9  F (36.1  C) Temp src: Axillary BP: 139/88 Pulse: 105   Resp: 16 SpO2: 98 % O2 Device: None (Room air)    Vitals:    04/02/21 0259 04/05/21 0609   Weight: 62 kg (136 lb 11.2 oz) 61.7 kg (136 lb 1.6 oz)     Vital Signs with Ranges  Temp:  [96.9  F (36.1  C)-99.3  F (37.4  C)] 96.9  F (36.1  C)  Pulse:  [] 105  Resp:  [12-18] 16  BP: (108-141)/(63-88) 139/88  SpO2:  [95 %-99 %] 98 %  I/O last 3 completed shifts:  In: 3126 [I.V.:7776]  Out: -     Constitutional: Awake, alert, cooperative, Ill in appearance. Moderate distress. Improved comfort.   HEENT: Atraumatic. Normocephalic. Conjunctiva non-injected. Sclera anicteric. Dry membranes. Scab lesions on lower lip (improved)  Respiratory: Moves air bilaterally. Clear to auscultation  bilaterally, no crackles or wheezing  Cardiovascular: Tachycardic and regular rhythm, normal S1 and S2, and no murmur noted  GI: Normal bowel sounds, soft, non-distended, non-tender  Skin/Integumen: No rashes, no cyanosis, no edema    Medications       fluconazole  200 mg Intravenous Q24H     insulin aspart   Subcutaneous TID w/meals     insulin aspart  1-6 Units Subcutaneous Q4H     insulin glargine  45 Units Subcutaneous QAM AC     pantoprazole (PROTONIX) IV  40 mg Intravenous BID     scopolamine  1 patch Transdermal Q72H    And     scopolamine   Transdermal Q8H     sodium chloride (PF)  10 mL Intracatheter Q7 Days     sodium chloride (PF)  3 mL Intracatheter Q8H     Data   Recent Labs   Lab 04/05/21  0645 04/04/21  1523 04/04/21  0608 04/03/21  0810 04/03/21  0648   WBC 8.6  --  11.3*  --  15.5*   HGB 14.6  --  13.6  --  13.5   MCV 89  --  86  --  87     --  246  --  289   *  --  136 141 140   POTASSIUM 3.9 4.0 3.0* 3.4 3.6   CHLORIDE 97  --  99 109 109   CO2 27  --  35* 28 28   BUN 7  --  3* 9 9   CR 0.48*  --  0.42* 0.43* 0.46*   ANIONGAP 7  --  2* 4 3   MARY 8.4*  --  8.1* 8.3* 8.3*   *  --  163* 148* 169*   ALBUMIN  --   --  2.8*  --  3.0*   PROTTOTAL  --   --  6.5*  --  6.5*   BILITOTAL  --   --  0.6  --  0.8   ALKPHOS  --   --  79  --  76   ALT  --   --  16  --  15   AST  --   --  11  --  10     No results found for this or any previous visit (from the past 24 hour(s)).

## 2021-04-05 NOTE — PROGRESS NOTES
"CLINICAL NUTRITION SERVICES  -  ASSESSMENT NOTE    Recommendations Ordered by Registered Dietitian (RD):   Continue diet as ordered   Ordered Ensure Enlive BID (strawberry)    Malnutrition:   % Weight Loss:  Up to 10% in 6 months (non-severe malnutrition)  % Intake:  Decreased intake does not meet criteria for malnutrition - consuming <50% of nutritional needs for the past 3 days   Subcutaneous Fat Loss: Orbital region moderate depletion and Upper arm region mild-moderate depletion  Muscle Loss:  Temporal region moderate depletion, Clavicle bone region moderate depletion, Acromion bone region moderate depletion, Scapular bone region mild-moderate depletion, Anterior thigh region moderate depletion and Posterior calf region mild-moderate depletion  Fluid Retention:  None noted    Malnutrition Diagnosis: Severe malnutrition  In Context of:  Acute illness or injury  Chronic illness or disease       REASON FOR ASSESSMENT  Wally Avendano is a 30 year old male seen by Registered Dietitian for Provider Order - \"new diabetes on insulin\"     NUTRITION HISTORY  - Information obtained from patient and chart   - Patient admitted for DKA and newly diagnosed type 1 DM   - No known past medical history   - Typical diet at home: regular diet   - Typical food/fluid intake PTA: pt states that he was eating well up until admission. Typically consumes 2 large meals per day with a snack in the morning.   - Supplements: none  - Chewing/swallowing difficulty: none   - Food allergies: NKFA    CURRENT NUTRITION ORDERS  Diet Order:     Regular Diet     Current Intake/Tolerance:  Upon review of the flowsheets pt is consuming 0% of meals ordered, Ordered one small meal since admission. Barriers to PO intake include sore throat and inability to keep down own secretions. Pt \"spitting\" into bag during conversation.     NUTRITION FOCUSED PHYSICAL ASSESSMENT FOR DIAGNOSING MALNUTRITION)  Yes             Observed:    Muscle wasting (refer to " "documentation in Malnutrition section) and Subcutaneous fat loss (refer to documentation in Malnutrition section)    Obtained from Chart/Interdisciplinary Team:  - per MD note on 4/3: \"Severe dysphagia, thought to be due to severe vomiting. Oropharynx yesterday was clear, but today, when I was able to look more deeply, there appeared to be exudate very far posteriorly.  Very high risk for esophageal candidiasis. (unable to swallow even his own saliva) Due to my concern for possible pharyngeal injury or staph infection, I also called ENT.\"   - PICC placed on 4/3  - ENT is following   - Per MD note on 4/4: \"Odynophagia secondary to hyperemesis resulting in epiglottis edema and tissue burn\"   - Per RN note on 4/5: \"Continues to have  dificulty swallowing secretions. Spitting up sputum/saliva frequently. But denies emesis\"     ANTHROPOMETRICS  Height: 6' 6\"  Weight: 61.7 kg ( 136 lbs 1.6 oz)   Body mass index is 15.73 kg/m .  Weight Status:  Underweight BMI <18.5  Weight History: weight is down 6.3 kg in the past 4 months, this equates to a weight loss of 9%. Pt denies any recent weight loss. States that his normal body weight is around 150 lbs.  Per care everywhere:  68 kg (150 lb) 12/09/2020 9:55 AM CST     Wt Readings from Last 10 Encounters:   04/05/21 61.7 kg (136 lb 1.6 oz)   04/01/21 68 kg (150 lb)     LABS  Labs reviewed    Electrolytes  Potassium (mmol/L)   Date Value   04/05/2021 3.9   04/04/2021 4.0   04/04/2021 3.0 (L)     Phosphorus (mg/dL)   Date Value   04/05/2021 2.6   04/04/2021 2.3 (L)   04/03/2021 2.2 (L)   04/03/2021 1.6 (L)   04/02/2021 1.8 (L)    Blood Glucose  Glucose (mg/dL)   Date Value   04/05/2021 234 (H)   04/04/2021 163 (H)   04/03/2021 148 (H)   04/03/2021 169 (H)   04/02/2021 132 (H)     Hemoglobin A1C (%)   Date Value   04/01/2021 12.0 (H)    Inflammatory Markers  WBC (10e9/L)   Date Value   04/05/2021 8.6   04/04/2021 11.3 (H)   04/03/2021 15.5 (H)     Albumin (g/dL)   Date Value "   04/04/2021 2.8 (L)   04/03/2021 3.0 (L)   04/01/2021 3.7      Magnesium (mg/dL)   Date Value   04/05/2021 2.0   04/04/2021 1.8   04/03/2021 2.0     Sodium (mmol/L)   Date Value   04/05/2021 131 (L)   04/04/2021 136   04/03/2021 141    Renal  Urea Nitrogen (mg/dL)   Date Value   04/05/2021 7   04/04/2021 3 (L)   04/03/2021 9     Creatinine (mg/dL)   Date Value   04/05/2021 0.48 (L)   04/04/2021 0.42 (L)   04/03/2021 0.43 (L)     Additional  Ketones Urine (mg/dL)   Date Value   04/01/2021 >150 (A)        MEDICATIONS  Medications reviewed    fluconazole  200 mg Intravenous Q24H     insulin aspart   Subcutaneous TID w/meals     insulin aspart  1-6 Units Subcutaneous Q4H     insulin glargine  45 Units Subcutaneous QAM AC     pantoprazole (PROTONIX) IV  40 mg Intravenous BID     scopolamine  1 patch Transdermal Q72H    And     scopolamine   Transdermal Q8H     sodium chloride (PF)  10 mL Intracatheter Q7 Days     sodium chloride (PF)  3 mL Intracatheter Q8H         acetaminophen, benzocaine 20%, sore throat lozenge, bisacodyl **OR** bisacodyl **OR** bisacodyl, glucose **OR** dextrose **OR** glucagon, HYDROmorphone, lidocaine 4%, lidocaine viscous 2% 15 mL and maalox/mylanta w/ simethicone 15 mL (GI COCKTAIL), lidocaine (buffered or not buffered), lidocaine (buffered or not buffered), magnesium hydroxide, melatonin, naloxone **OR** naloxone **OR** naloxone **OR** naloxone, ondansetron **OR** ondansetron, phenol, prochlorperazine **OR** prochlorperazine, sodium chloride, sodium chloride (PF), sodium chloride (PF), sodium chloride (PF), sodium chloride (PF)     ASSESSED NUTRITION NEEDS PER APPROVED PRACTICE GUIDELINES:    Dosing Weight 61.7 kg   Estimated Energy Needs: 6418-4857 kcals (35-40 Kcal/Kg)  Justification: underweight  Estimated Protein Needs:  grams protein (1.5-2 g pro/Kg)  Justification: Repletion and preservation of lean body mass  Estimated Fluid Needs: per MD     MALNUTRITION:  % Weight Loss:  Up to  10% in 6 months (non-severe malnutrition)  % Intake:  Decreased intake does not meet criteria for malnutrition - consuming <50% of nutritional needs for the past 3 days   Subcutaneous Fat Loss: Orbital region moderate depletion and Upper arm region mild-moderate depletion  Muscle Loss:  Temporal region moderate depletion, Clavicle bone region moderate depletion, Acromion bone region moderate depletion, Scapular bone region mild-moderate depletion, Anterior thigh region moderate depletion and Posterior calf region mild-moderate depletion  Fluid Retention:  None noted    Malnutrition Diagnosis: Severe malnutrition  In Context of:  Acute illness or injury  Chronic illness or disease    NUTRITION DIAGNOSIS:  Altered nutrition-related lab value related to newly diagnosed type 1 diabetes as evidenced by pt with a A1C of 12.     NUTRITION INTERVENTIONS  Recommendations / Nutrition Prescription  Continue diet as ordered   Ordered Ensure Enlive BID (strawberry)     Implementation    Nutrition education:   o Assessed learning needs, learning preferences, and willingness to learn  o Nutrition Education (Content):  a) Provided handout carbohydrate counting for diabetes via the nutrition care manual   b) Discussed what foods contain carbohydrates, what 15 g of carbohydrate looks like and the importance of consuming meals consistently throughout the day ~ 4-5 hours apart from each other.   o Nutrition Education (Application):  a) Discussed eating habits and recommended alternative food choices  o Patient verbalizes understanding of diet by asking questions and engaging in conversation   o Anticipate good compliance  o Diet Education - refer to Education Flowsheet    Medical Food Supplement: as above  Collaboration and Referral of Nutrition care: discussed pt during interdisciplinary rounds this morning     Nutrition Goals  Pt to consume >/=75% of meals ordered TID     MONITORING AND EVALUATION:  Progress towards goals will be  monitored and evaluated per protocol and Practice Guidelines    Connie Germain, RD, LD  Clinical Dietitian

## 2021-04-05 NOTE — PLAN OF CARE
End of Shift Note  See flowsheets for vital signs and complete assessments.    Pertinent Assessments: A&Ox4. Tele - SR/ST. C/O throat pain, controlled with PRN IV dilaudid. Able to drink some liquids this morning. Spitting up sputum and phlegm. Intermittent nausea noted, controlled with PRN IV Zofran. Denies SOB, numbness & tingling. Lips are cracked and dry.  and 217. Up SBA.     Major Shift Events: None    Treatment Plan: IVF, glucose monitoring, symptom management.    Discharge: 2-3 days per MD note.    Bedside RN: Oxana Hutchinson

## 2021-04-05 NOTE — PLAN OF CARE
Vss A&OX4 up sba. Calls aprop. Bg 292 and 180. Ivf with dextrose was d/c'd. Pt is taking frequent sips of liquids. Continues to have  dificulty swallowing secretions. Spitting up sputum/saliva frequently. But denies emesis. ABC intact.  Voice is less hoarse today. Pt feels he is improving.  Sore on lip healing/scabbed lip balm at bedside. C/o sore throat with some relief from dilauded. Nausea controlled with zofran, compazine and now scopolomine patch. LBM 5 days ago. Pt denies abd pain or discomfort. Has not been eating anything solid. Hypo bs. Denies passing gas. Encouraged ambulation in lacy with staff. Pt agrees after he rests to ambulate.

## 2021-04-05 NOTE — PROGRESS NOTES
CLINICAL SWALLOW EVALUATION       04/03/21 0800   General Information   Past History of Dysphagia None   Disability/Function   Hearing Difficulty or Deaf no   Wear Glasses or Blind no   Concentrating, Remembering or Making Decisions Difficulty no   Difficulty Communicating no   Difficulty Eating/Swallowing no   Walking or Climbing Stairs Difficulty no   Dressing/Bathing Difficulty no   Toileting issues no   Doing Errands Independently Difficulty (such as shopping) no   Equipment Currently Used at Home none   Fall history within last six months no   Change in Functional Status Since Onset of Current Illness/Injury no   General Swallowing Observations   Current Diet/Method of Nutritional Intake (General Swallowing Observations, NIS) regular diet;thin liquids   Respiratory Support (General Swallowing Observations) none   Swallowing Evaluation Clinical swallow evaluation   Clinical Swallow Evaluation   Additional evaluation(s) completed today No   Clinical Swallow Evaluation Textures Trialed Thin Liquids   Clinical Swallow Eval: Thin Liquid Texture Trial   Mode of Presentation, Thin Liquids cup;self-fed   Volume of Liquid or Food Presented 3 oz   Oral Phase of Swallow WFL   Pharyngeal Phase of Swallow intact   Diagnostic Statement No overt Sx of aspiration   Esophageal Phase of Swallow   Patient reports or presents with symptoms of esophageal dysphagia Yes   Esophageal comments Suspect esophagitis likely with recent vomiting hx   Swallowing Recommendations   Diet Consistency Recommendations regular diet;thin liquids   Supervision Level for Intake patient independent   Mode of Delivery Recommendations bolus size, small   Swallowing Maneuver Recommendations double dry swallow   Recommended Feeding/Eating Techniques (Swallow Eval) patient is independent, no specific recommendations;maintain upright sitting position for eating;maintain upright posture during/after eating for 30 minutes   Medication Administration  Recommendations, Swallowing (SLP) Whole or crushed pills per patient preference   SLP Therapy Assessment/Plan   Criteria for Skilled Therapeutic Interventions Met (SLP Eval) does not meet criteria for skilled intervention   SLP Diagnosis Minimal oropharyngeal dysphagia, odynophagia   Rehab Potential (SLP Eval) good, to achieve stated therapy goals   Therapy Frequency (SLP Eval) evaluation only   Comment, Therapy Assessment/Plan (SLP) Expect patient will progress to regular oral intake as odynophagia subsides.  Suspect minimal aspiration risk with copious secretions and suspected sensation impairment associated with odynophagia.    Therapy Plan Review/Discharge Plan (SLP)   Therapy Plan Review (SLP) evaluation/treatment results reviewed   SLP Discharge Planning    SLP Discharge Recommendation (DC Rec) home   SLP Rationale for DC Rec No further SLP services are indicated   SLP Brief overview of current status  Clinical swallow evaluation completed: evaluation only, as patient will likely progress to regular textures as odynophagia subsides.  Patient demonstrated no overt Sx of aspiration with thin liquid intake today and verbalized comprehension of generalized safe swallowing strategies, oral care recommendations, secretion management strategies, and plan to advance diet independently as tolerated.  Patient politely declines any puree or solid PO trials today.     Total Evaluation Time   Total Evaluation Time (Minutes) 25

## 2021-04-05 NOTE — PROGRESS NOTES
"CLINICAL SWALLOW EVALUATION       04/05/21 0900   General Information   Onset of Illness/Injury or Date of Surgery 04/02/21   Referring Physician Dr. Perez   Patient/Family Therapy Goal Statement (SLP) Patient would like to return to eating regular food.   Pertinent History of Current Problem Patient admitted 4/2 with DKA, new DM1 diagnosis.  Patient experienced at least a full day of regular vomiting prior to admission and experienced significant odynophagia as a result of vomiting.  Found to have mucosal sloughing and mild epiglottic edema with patent airway.  Patient acknowledges improvement of voicing function today by \"about 50%\" and has been able to swallow liquids this morning.  He continues to have significant amounts of mucus production which he is managing independently.     Disability/Function   Hearing Difficulty or Deaf no   Wear Glasses or Blind no   Concentrating, Remembering or Making Decisions Difficulty no   Difficulty Communicating no   Difficulty Eating/Swallowing no   Walking or Climbing Stairs Difficulty no   Dressing/Bathing Difficulty no   Toileting issues no   Doing Errands Independently Difficulty (such as shopping) no   Equipment Currently Used at Home none   Fall history within last six months no   Change in Functional Status Since Onset of Current Illness/Injury no   Type of Evaluation   Type of Evaluation Swallow Evaluation   Oral Motor   Oral Musculature generally intact   Structural Abnormalities none present   Mucosal Quality other (see comments)  (Mucosal sloughing per ENT eval)   Dentition (Oral Motor)   Dentition (Oral Motor) natural dentition   Facial Symmetry (Oral Motor)   Facial Symmetry (Oral Motor) WNL   Lip Function (Oral Motor)   Lip Range of Motion (Oral Motor) WNL   Tongue Function (Oral Motor)   Tongue ROM (Oral Motor) WNL   Jaw Function (Oral Motor)   Jaw Function (Oral Motor) WNL   Cough/Swallow/Gag Reflex (Oral Motor)   Soft Palate/Velum (Oral Motor) WNL   Volitional " Throat Clear/Cough (Oral Motor) WNL   Volitional Swallow (Oral Motor) WNL   Comment, Cough/Swallow/Gag Reflex (Oral Motor) Thick mucus with throat clear, copious amounts   General Swallowing Observations   Current Diet/Method of Nutritional Intake (General Swallowing Observations, NIS) regular diet;thin liquids   Respiratory Support (General Swallowing Observations) none   Swallowing Evaluation Clinical swallow evaluation   Clinical Swallow Evaluation   Additional evaluation(s) completed today No   Clinical Swallow Evaluation Textures Trialed Thin Liquids   Clinical Swallow Eval: Thin Liquid Texture Trial   Mode of Presentation, Thin Liquids cup;self-fed   Volume of Liquid or Food Presented 3 oz   Oral Phase of Swallow WFL   Pharyngeal Phase of Swallow intact   Diagnostic Statement No overt Sx of aspiration   Esophageal Phase of Swallow   Patient reports or presents with symptoms of esophageal dysphagia Yes   Esophageal comments Suspect esophagitis likely with recent vomiting hx   Swallowing Recommendations   Diet Consistency Recommendations regular diet;thin liquids   Supervision Level for Intake patient independent   Mode of Delivery Recommendations bolus size, small   Swallowing Maneuver Recommendations double dry swallow   Recommended Feeding/Eating Techniques (Swallow Eval) patient is independent, no specific recommendations;maintain upright sitting position for eating;maintain upright posture during/after eating for 30 minutes   Medication Administration Recommendations, Swallowing (SLP) Whole or crushed pills per patient preference   SLP Therapy Assessment/Plan   Criteria for Skilled Therapeutic Interventions Met (SLP Eval) does not meet criteria for skilled intervention   Rehab Potential (SLP Eval) good, to achieve stated therapy goals   Therapy Frequency (SLP Eval) evaluation only   Comment, Therapy Assessment/Plan (SLP) Expect patient will progress to regular oral intake as odynophagia subsides.  Suspect  minimal aspiration risk with copious secretions and suspected sensation impairment associated with odynophagia.    Therapy Plan Review/Discharge Plan (SLP)   Therapy Plan Review (SLP) evaluation/treatment results reviewed   SLP Discharge Planning    SLP Discharge Recommendation (DC Rec) home   SLP Rationale for DC Rec No further SLP services are indicated   SLP Brief overview of current status  Clinical swallow evaluation completed: evaluation only, as patient will likely progress to regular textures as odynophagia subsides.  Patient demonstrated no overt Sx of aspiration with thin liquid intake today and verbalized comprehension of generalized safe swallowing strategies, oral care recommendations, secretion management strategies, and plan to advance diet independently as tolerated.  Patient politely declines any puree or solid PO trials today.

## 2021-04-05 NOTE — PLAN OF CARE
VSS, prn Dilaudid IV for c/o throat pain, hoarse voice, SLP consulted, unable to evaluate with patient not able to swallow, IVF 150ml/hr, Potassium recheck at 4.0, Phosphorous replaced per protocol, recheck AM, ,229 with sliding scale insulin coverage, spouse/ father  updated through telephone, will continue with POC.

## 2021-04-06 LAB
ANION GAP SERPL CALCULATED.3IONS-SCNC: 7 MMOL/L (ref 3–14)
BUN SERPL-MCNC: 9 MG/DL (ref 7–30)
CALCIUM SERPL-MCNC: 8.4 MG/DL (ref 8.5–10.1)
CHLORIDE SERPL-SCNC: 97 MMOL/L (ref 94–109)
CO2 SERPL-SCNC: 30 MMOL/L (ref 20–32)
CREAT SERPL-MCNC: 0.57 MG/DL (ref 0.66–1.25)
ERYTHROCYTE [DISTWIDTH] IN BLOOD BY AUTOMATED COUNT: 11.4 % (ref 10–15)
GFR SERPL CREATININE-BSD FRML MDRD: >90 ML/MIN/{1.73_M2}
GLUCOSE BLDC GLUCOMTR-MCNC: 127 MG/DL (ref 70–99)
GLUCOSE BLDC GLUCOMTR-MCNC: 139 MG/DL (ref 70–99)
GLUCOSE BLDC GLUCOMTR-MCNC: 144 MG/DL (ref 70–99)
GLUCOSE BLDC GLUCOMTR-MCNC: 189 MG/DL (ref 70–99)
GLUCOSE BLDC GLUCOMTR-MCNC: 197 MG/DL (ref 70–99)
GLUCOSE BLDC GLUCOMTR-MCNC: 222 MG/DL (ref 70–99)
GLUCOSE SERPL-MCNC: 162 MG/DL (ref 70–99)
HCT VFR BLD AUTO: 40 % (ref 40–53)
HGB BLD-MCNC: 14.4 G/DL (ref 13.3–17.7)
MAGNESIUM SERPL-MCNC: 2 MG/DL (ref 1.6–2.3)
MCH RBC QN AUTO: 30.8 PG (ref 26.5–33)
MCHC RBC AUTO-ENTMCNC: 36 G/DL (ref 31.5–36.5)
MCV RBC AUTO: 86 FL (ref 78–100)
PHOSPHATE SERPL-MCNC: 3.5 MG/DL (ref 2.5–4.5)
PLATELET # BLD AUTO: 281 10E9/L (ref 150–450)
POTASSIUM SERPL-SCNC: 3.6 MMOL/L (ref 3.4–5.3)
RBC # BLD AUTO: 4.68 10E12/L (ref 4.4–5.9)
SODIUM SERPL-SCNC: 134 MMOL/L (ref 133–144)
WBC # BLD AUTO: 10.7 10E9/L (ref 4–11)

## 2021-04-06 PROCEDURE — 250N000012 HC RX MED GY IP 250 OP 636 PS 637: Performed by: INTERNAL MEDICINE

## 2021-04-06 PROCEDURE — 250N000011 HC RX IP 250 OP 636: Performed by: INTERNAL MEDICINE

## 2021-04-06 PROCEDURE — 80048 BASIC METABOLIC PNL TOTAL CA: CPT | Performed by: INTERNAL MEDICINE

## 2021-04-06 PROCEDURE — 99233 SBSQ HOSP IP/OBS HIGH 50: CPT | Performed by: INTERNAL MEDICINE

## 2021-04-06 PROCEDURE — 120N000001 HC R&B MED SURG/OB

## 2021-04-06 PROCEDURE — 83735 ASSAY OF MAGNESIUM: CPT | Performed by: INTERNAL MEDICINE

## 2021-04-06 PROCEDURE — 999N001017 HC STATISTIC GLUCOSE BY METER IP

## 2021-04-06 PROCEDURE — 85027 COMPLETE CBC AUTOMATED: CPT | Performed by: INTERNAL MEDICINE

## 2021-04-06 PROCEDURE — C9113 INJ PANTOPRAZOLE SODIUM, VIA: HCPCS | Performed by: INTERNAL MEDICINE

## 2021-04-06 PROCEDURE — 84100 ASSAY OF PHOSPHORUS: CPT | Performed by: INTERNAL MEDICINE

## 2021-04-06 PROCEDURE — 250N000009 HC RX 250: Performed by: INTERNAL MEDICINE

## 2021-04-06 PROCEDURE — 250N000013 HC RX MED GY IP 250 OP 250 PS 637: Performed by: INTERNAL MEDICINE

## 2021-04-06 RX ORDER — HYDROMORPHONE HYDROCHLORIDE 1 MG/ML
0.3 INJECTION, SOLUTION INTRAMUSCULAR; INTRAVENOUS; SUBCUTANEOUS EVERY 6 HOURS PRN
Status: DISCONTINUED | OUTPATIENT
Start: 2021-04-06 | End: 2021-04-07

## 2021-04-06 RX ORDER — FLUCONAZOLE 200 MG/1
200 TABLET ORAL DAILY
Status: DISCONTINUED | OUTPATIENT
Start: 2021-04-07 | End: 2021-04-07 | Stop reason: HOSPADM

## 2021-04-06 RX ORDER — HYDROMORPHONE HYDROCHLORIDE 1 MG/ML
0.3 INJECTION, SOLUTION INTRAMUSCULAR; INTRAVENOUS; SUBCUTANEOUS EVERY 6 HOURS PRN
Status: DISCONTINUED | OUTPATIENT
Start: 2021-04-06 | End: 2021-04-06

## 2021-04-06 RX ORDER — PANTOPRAZOLE SODIUM 40 MG/1
40 TABLET, DELAYED RELEASE ORAL
Status: DISCONTINUED | OUTPATIENT
Start: 2021-04-06 | End: 2021-04-07 | Stop reason: HOSPADM

## 2021-04-06 RX ADMIN — ONDANSETRON 4 MG: 2 INJECTION INTRAMUSCULAR; INTRAVENOUS at 04:52

## 2021-04-06 RX ADMIN — PROCHLORPERAZINE EDISYLATE 10 MG: 5 INJECTION INTRAMUSCULAR; INTRAVENOUS at 12:19

## 2021-04-06 RX ADMIN — PANTOPRAZOLE SODIUM 40 MG: 40 TABLET, DELAYED RELEASE ORAL at 16:15

## 2021-04-06 RX ADMIN — HYDROMORPHONE HYDROCHLORIDE 0.3 MG: 1 INJECTION, SOLUTION INTRAMUSCULAR; INTRAVENOUS; SUBCUTANEOUS at 20:28

## 2021-04-06 RX ADMIN — HYDROMORPHONE HYDROCHLORIDE 0.3 MG: 1 INJECTION, SOLUTION INTRAMUSCULAR; INTRAVENOUS; SUBCUTANEOUS at 00:38

## 2021-04-06 RX ADMIN — FLUCONAZOLE IN SODIUM CHLORIDE 200 MG: 2 INJECTION, SOLUTION INTRAVENOUS at 08:49

## 2021-04-06 RX ADMIN — PROCHLORPERAZINE EDISYLATE 10 MG: 5 INJECTION INTRAMUSCULAR; INTRAVENOUS at 07:21

## 2021-04-06 RX ADMIN — PROCHLORPERAZINE EDISYLATE 10 MG: 5 INJECTION INTRAMUSCULAR; INTRAVENOUS at 17:17

## 2021-04-06 RX ADMIN — PROCHLORPERAZINE EDISYLATE 10 MG: 5 INJECTION INTRAMUSCULAR; INTRAVENOUS at 00:38

## 2021-04-06 RX ADMIN — INSULIN GLARGINE 45 UNITS: 100 INJECTION, SOLUTION SUBCUTANEOUS at 08:42

## 2021-04-06 RX ADMIN — TOPICAL ANESTHETIC 0.5 ML: 200 SPRAY DENTAL; PERIODONTAL at 12:31

## 2021-04-06 RX ADMIN — HYDROMORPHONE HYDROCHLORIDE 0.3 MG: 1 INJECTION, SOLUTION INTRAMUSCULAR; INTRAVENOUS; SUBCUTANEOUS at 08:38

## 2021-04-06 RX ADMIN — PANTOPRAZOLE SODIUM 40 MG: 40 INJECTION, POWDER, FOR SOLUTION INTRAVENOUS at 08:45

## 2021-04-06 ASSESSMENT — ACTIVITIES OF DAILY LIVING (ADL)
ADLS_ACUITY_SCORE: 16

## 2021-04-06 NOTE — PLAN OF CARE
Diagnosis. DKA  Labs/Protocol. Potassium, Magnesium, Phosphorus all recheck in the AM.   Vitals. VSS  Respiratory. LS clear. On room air.   Neuro. A&O. Calm and cooperative throughout shift.   GI/. Having nausea throughout shift, gave zofran with relief. Pain in throat relieved by Dilaudid. Cough up phlem.  GI following. No BM on shift.   Skin. WDL  LDA's. PICC saline locked. Blood return noted.   Diet. Regular  Activity. Independent   Plan. Continue to monitor patients pain.

## 2021-04-06 NOTE — PLAN OF CARE
Diagnosis. DKA  Labs/Protocol. Potassium, Magnesium, Phosphorus all recheck in the AM.   Vitals. VSS  Respiratory. LS clear. On room air.   Neuro. A&O. Calm and cooperative throughout shift.   GI/. Having nausea throughout shift, gave zofran with relief. Pain in throat relieved by Dilaudid. Cough up phlem.  GI following  Skin. WDL  LDA's. PICC saline locked. Blood return noted.   Diet. Regular  Activity. Independent   Plan. Continue to monitor patients pain.

## 2021-04-06 NOTE — PROGRESS NOTES
[facility]  Hospitalist Progress Note    Assessment & Plan   Wally Avendano is a 30 year old heathy male who was admitted on 4/2/2021 in DKA.     Mr. Avendano had presented to [facility] with severe malaise assoc with fevers/chills, diarrhea, vomiting with polyuria/polydypsia. He was found to be significantly hyperglcemic and ultimately to have AGMA with VBG pH of 7.16 (HCO3 12).  His AG closed quickly, but it appears that his resuscitation was very spare, having been given only 1 L NS.       He had been started on Insulin drip and because the gap closed quickly, this was stopped.  Pt gives a history of more than 6 months of intermittent polyuria/polydypsia and significant weight loss.     Due to severe nausea with vomiting he has scabs on his upper and lower lip, sore/horse voice, and has limited ability to swallow or eat. ENT evaluated patient and performed a flexible laryngoscopy which showed diffuse mucosal sloughing. Mild edema of the epiglottis. Supportive cares provided.     Diabetic ketoacidosis with newly diagnosed presumed type I diabetes mellitus   Patient started on insulin gtt on admission, Hemoglobin A1c 12.0. Initial lactiac acid 4.4 and improved with IVF. TSH 0.97. No personal history of autoimmune diseases. Mother had PBC.   -Insulin glargine 45 units (increased 4/5 given hyperglycemia) and sliding scale insulin, and insulin 1:15 CHO ratio if eating. POCT glucose ACHS   -Tele monitor reviewed.   -Nutrition and diabetic education counseling - patient will need insulin with glucometer ect on discharge  -Endocrinology outpatient follow-up with anti-NATHANAEL studies.     Odynophagia secondary to hyperemesis resulting in epiglottis edema and tissue burn  -ENT consulted - appreciate assistance  -Case discussed with [facility] (Dr. Ríos) on 4/3 by Dr. Landeros. Recommended trial of IV PPI and Fluconazole started 4/4 changed to oral 4/6. Monitor response and if no improvement consult for EGD.   -Supportive  cares - antiemetics, PPI, and pain control (decrase narcotics; encourage non-narcotic pain control options)  -Slowly improving - attempting to increase diet/oral intake.     Electrolyte derangements  -Hypokalemia - monitor  -Hypophosphatemia - replaced; monitor  -Magnesium - monitor  -Hyponatremia - monitor    Underweight, BMI 15; severe protein calorie malnutrition: Complicates cares.  RD assistance appreciated.     FEN: Oral; monitor; regular  Activity: As tolerated  DVT Prophylaxis: Pneumatic Compression Devices  Family update: Declined - patient updating wife.     Code Status: Full Code    Expected discharge: Tomorrow, recommended to prior living arrangement once oral intake improves and pain controlled. .    Jackelin Perez, DO    Text Page (7am - 6pm, M-F)    Interval History   Patient is resting comfortably. Sore throat present and still requiring IV narcotics for pain control. Tolerated some liquids. No chest pain or palpitations. Discussed reducing narcotics and trial of oral meds. No GI symptoms. No BM. Discussed with nursing.     -Data reviewed today: I reviewed all new labs and imaging results over the last 24 hours. I personally reviewed     Physical Exam   Temp: 96.8  F (36  C) Temp src: Oral BP: 113/70 Pulse: 108   Resp: 16 SpO2: 97 % O2 Device: None (Room air)    Vitals:    04/02/21 0259 04/05/21 0609   Weight: 62 kg (136 lb 11.2 oz) 61.7 kg (136 lb 1.6 oz)     Vital Signs with Ranges  Temp:  [96.8  F (36  C)-97.9  F (36.6  C)] 96.8  F (36  C)  Pulse:  [] 108  Resp:  [16-18] 16  BP: (113-142)/(70-87) 113/70  SpO2:  [97 %-98 %] 97 %  I/O last 3 completed shifts:  In: 13 [I.V.:13]  Out: -     Constitutional: Awake, alert, cooperative, Ill in appearance. Moderate distress. Improved comfort.   HEENT: Atraumatic. Normocephalic. Conjunctiva non-injected. Sclera anicteric. Dry membranes. Scab lesions on lower lip (improved)  Respiratory: Moves air bilaterally. Clear to auscultation bilaterally, no  crackles or wheezing  Cardiovascular: Tachycardic and regular rhythm, normal S1 and S2, and no murmur noted  GI: Normal bowel sounds, soft, non-distended, non-tender  Skin/Integumen: No rashes, no cyanosis, no edema    Medications       fluconazole  200 mg Intravenous Q24H     insulin aspart   Subcutaneous TID w/meals     insulin aspart  1-6 Units Subcutaneous Q4H     insulin glargine  45 Units Subcutaneous QAM AC     pantoprazole (PROTONIX) IV  40 mg Intravenous BID     scopolamine  1 patch Transdermal Q72H    And     scopolamine   Transdermal Q8H     sodium chloride (PF)  10 mL Intracatheter Q7 Days     sodium chloride (PF)  3 mL Intracatheter Q8H     Data   Recent Labs   Lab 04/06/21  0600 04/05/21  0645 04/04/21  1523 04/04/21  0608 04/03/21  0648 04/03/21  0648   WBC 10.7 8.6  --  11.3*  --  15.5*   HGB 14.4 14.6  --  13.6  --  13.5   MCV 86 89  --  86  --  87    288  --  246  --  289    131*  --  136   < > 140   POTASSIUM 3.6 3.9 4.0 3.0*   < > 3.6   CHLORIDE 97 97  --  99   < > 109   CO2 30 27  --  35*   < > 28   BUN 9 7  --  3*   < > 9   CR 0.57* 0.48*  --  0.42*   < > 0.46*   ANIONGAP 7 7  --  2*   < > 3   MARY 8.4* 8.4*  --  8.1*   < > 8.3*   * 234*  --  163*   < > 169*   ALBUMIN  --   --   --  2.8*  --  3.0*   PROTTOTAL  --   --   --  6.5*  --  6.5*   BILITOTAL  --   --   --  0.6  --  0.8   ALKPHOS  --   --   --  79  --  76   ALT  --   --   --  16  --  15   AST  --   --   --  11  --  10    < > = values in this interval not displayed.     No results found for this or any previous visit (from the past 24 hour(s)).

## 2021-04-06 NOTE — PLAN OF CARE
VSS.  AOX4.  Breathing is unlabored and pt denies SOB.  Pt throat pain 3-7/10; dilaudid IV given; also used PRN hurricane spray; pt stated it worked well but not for long.  Regular diet with poor appetite due to nausea and intermittent vomiting; Compazine given and effective.  Pt sleeping a majority of shift.    BG's 189 and 222; no carb coverage insulin given due to low intake.    Staff encouraged oral intake this shift; pt took small bites of chicken noodle soup and boost drink; he became nauseous after and Compazine given.    PICC line; CHG bath done on NOC shift.    K+ 3.6    Mag 2.0    Phos 3.5    Possible discharge tomorrow.

## 2021-04-07 VITALS
HEIGHT: 78 IN | WEIGHT: 136.1 LBS | HEART RATE: 110 BPM | TEMPERATURE: 98 F | BODY MASS INDEX: 15.75 KG/M2 | RESPIRATION RATE: 16 BRPM | DIASTOLIC BLOOD PRESSURE: 82 MMHG | SYSTOLIC BLOOD PRESSURE: 122 MMHG | OXYGEN SATURATION: 97 %

## 2021-04-07 LAB
BACTERIA SPEC CULT: NO GROWTH
BACTERIA SPEC CULT: NO GROWTH
GLUCOSE BLDC GLUCOMTR-MCNC: 110 MG/DL (ref 70–99)
GLUCOSE BLDC GLUCOMTR-MCNC: 152 MG/DL (ref 70–99)
GLUCOSE BLDC GLUCOMTR-MCNC: 183 MG/DL (ref 70–99)
GLUCOSE BLDC GLUCOMTR-MCNC: 185 MG/DL (ref 70–99)
SPECIMEN SOURCE: NORMAL
SPECIMEN SOURCE: NORMAL

## 2021-04-07 PROCEDURE — 99239 HOSP IP/OBS DSCHRG MGMT >30: CPT | Performed by: INTERNAL MEDICINE

## 2021-04-07 PROCEDURE — 250N000011 HC RX IP 250 OP 636: Performed by: INTERNAL MEDICINE

## 2021-04-07 PROCEDURE — 250N000013 HC RX MED GY IP 250 OP 250 PS 637: Performed by: INTERNAL MEDICINE

## 2021-04-07 PROCEDURE — 250N000012 HC RX MED GY IP 250 OP 636 PS 637: Performed by: INTERNAL MEDICINE

## 2021-04-07 PROCEDURE — 999N001017 HC STATISTIC GLUCOSE BY METER IP

## 2021-04-07 RX ORDER — ONDANSETRON 4 MG/1
4 TABLET, ORALLY DISINTEGRATING ORAL EVERY 6 HOURS PRN
Qty: 30 TABLET | Refills: 0 | Status: SHIPPED | OUTPATIENT
Start: 2021-04-07 | End: 2021-04-23

## 2021-04-07 RX ORDER — CONTAINER,EMPTY
EACH MISCELLANEOUS
Qty: 1 EACH | Refills: 0 | Status: SHIPPED | OUTPATIENT
Start: 2021-04-07 | End: 2022-11-02

## 2021-04-07 RX ORDER — PANTOPRAZOLE SODIUM 40 MG/1
40 TABLET, DELAYED RELEASE ORAL
Qty: 80 TABLET | Refills: 0 | Status: SHIPPED | OUTPATIENT
Start: 2021-04-07 | End: 2021-05-17

## 2021-04-07 RX ORDER — BLOOD PRESSURE TEST KIT
KIT MISCELLANEOUS
Qty: 100 EACH | Refills: 0 | Status: SHIPPED | OUTPATIENT
Start: 2021-04-07 | End: 2022-11-02

## 2021-04-07 RX ORDER — FLUCONAZOLE 200 MG/1
200 TABLET ORAL DAILY
Qty: 3 TABLET | Refills: 0 | Status: SHIPPED | OUTPATIENT
Start: 2021-04-08 | End: 2021-04-11

## 2021-04-07 RX ADMIN — ONDANSETRON 4 MG: 4 TABLET, ORALLY DISINTEGRATING ORAL at 09:06

## 2021-04-07 RX ADMIN — INSULIN GLARGINE 45 UNITS: 100 INJECTION, SOLUTION SUBCUTANEOUS at 08:55

## 2021-04-07 RX ADMIN — PROCHLORPERAZINE EDISYLATE 10 MG: 5 INJECTION INTRAMUSCULAR; INTRAVENOUS at 11:44

## 2021-04-07 RX ADMIN — Medication 3 MG: at 02:21

## 2021-04-07 RX ADMIN — HYDROMORPHONE HYDROCHLORIDE 0.3 MG: 1 INJECTION, SOLUTION INTRAMUSCULAR; INTRAVENOUS; SUBCUTANEOUS at 02:22

## 2021-04-07 RX ADMIN — FLUCONAZOLE 200 MG: 200 TABLET ORAL at 08:59

## 2021-04-07 RX ADMIN — PANTOPRAZOLE SODIUM 40 MG: 40 TABLET, DELAYED RELEASE ORAL at 06:35

## 2021-04-07 ASSESSMENT — ACTIVITIES OF DAILY LIVING (ADL)
ADLS_ACUITY_SCORE: 16

## 2021-04-07 NOTE — PLAN OF CARE
VSS.  AOX4.  Breathing is unlabored and pt denies SOB.  Independent. Regular diet.  Pt denies pain.    BG's 152, 185, and 247.    Pt appetite improving today.    Zofran and compazine given for nausea.      Pt to discharge; wife will  pt at 16:30.      Education and return demonstration performed for blood sugar checks and insulin pen administration.  Pt performing tasks competently and verbally he states  understands the disease process.  He also verbalized that he has some smart phone applications he will try for carb counting and BG check reminders.    Home medication in fridge.      PICC line removed without complication.

## 2021-04-07 NOTE — PLAN OF CARE
Pt discharge instructions and education given and verbalized understanding. PICC line removed by day shift nurse, no bleeding/redness on site at discharge. Pt belongings given to Pt. Medications from pharmacy given and Pt signed receiving them. VSS &  at discharge.   X Size Of Lesion In Cm (Optional): 1.8

## 2021-04-07 NOTE — PROGRESS NOTES
Care Management Follow Up    Length of Stay (days): 5    Expected Discharge Date: 04/08/21     Concerns to be Addressed: discharge planning     Patient plan of care discussed at interdisciplinary rounds: Yes    Anticipated Discharge Disposition: Home     Referrals Placed by CM/SW: Scheduled Follow-up appointments    Additional Information:  Per discussion with Dr. Perez, pt will need DM teaching prior to discharge and follow-up w/ PCP and Endocrinology. Per chart review, CM has provided with Understanding Diabetes booklet earlier in stay, Dietician has met w/ pt and Pharmacy consult is in place for DM medication education as well.     Pt is already scheduled to establish care at the [facility] with Gemma Johnson CNP on 4/22, AVS updated. Called and scheduled Endocrinology appointment at [facility] for Tuesday 5/18 at 10:30am with Dr. Panchal, CARLIS updated with this information as well.     Spoke with pt at bedside to update, he is receptive and understanding to the above information and plan. He reports that his biggest concern at discharge is lifestyle changes related to monitoring his BG and dosing himself with insulin. Reinforced that Pharmacist would follow-up with pt regarding medications prior to discharge. He already has new meter kit at bedside but has not attempted to use his new meter yet. Spoke with bedside RN to teach new meter and encourage pt to check his own BG and dose own insulin prior to discharge, per bedside RN pt started dosing his own insulin today and has been doing well with this. Pt denies having any other discharge needs at this time.     Will continue to follow patient for any additional discharge needs.     Kisha Duran RN BSN   Inpatient Care Coordination  [facility]   Phone [phone]

## 2021-04-07 NOTE — PLAN OF CARE
VSS on ra. A/O. Denies N/v/d, or stomach upset. Did have throat pain and had trouble sleeping. Given Melatonin and Dilaudid. Pt was sleeping. He did not eat this shift. He stated that he was able to tolerate broth earlier on 4/6. . PICC line in place.

## 2021-04-07 NOTE — DISCHARGE SUMMARY
[facility]  Discharge Summary Hospitalist    Date of Admission:  4/2/2021  Date of Discharge: 4/7/2021  Discharging Provider: Jackelin Perez DO  Date of Service (when I saw the patient): 04/07/21    Discharge Diagnoses   Diabetic ketoacidosis   Newly diagnosed type I DM  Odynophagia with esophagitis  Hyperemesis   Electrolyte abnormalities  Severe protein-calorie malnutriiton    History of Present Illness   Wally Avendano is a 30 year old heathy male who was admitted on 4/2/2021 in DKA.     Hospital Course   Wally Avendano was admitted on 4/2/2021.  The following problems were addressed during his hospitalization:    Diabetic ketoacidosis with newly diagnosed presumed type I diabetes mellitus   Patient started on insulin gtt on admission, Hemoglobin A1c 12.0. Initial lactiac acid 4.4 and improved with IVF. TSH 0.97. No personal history of autoimmune diseases. Mother had PBC. Once AG closed patient was transitioned to subcutaneous insulin. He was slow to discharge due to minimal oral intake given odynophagia. Discharged with insulin, diabetes supplies, and education. Patient to follow-up with PCP and referral sent for Endocrinology.     Odynophagia with esophagitis secondary to hyperemesis resulting in epiglottis edema: ENT consulted. Scope completed at bedside. Patient started on IV Fluconazole and IV PPI. As oral intake improved he was transitioned to oral medications. Discharged with Oral fluconazole and PPI to complete course. Patient has chronic nausea and feels like symptoms are managed. Reports tolerating diet and pain control is adequate with throat spray.     Jackelin Perez DO    Significant Results and Procedures  see below labs/imaging. ENT flexible laryngoscopy 4/3/21    Pending Results  None    Code Status  Full Code       Primary Care Physician   [facility]    Physical Exam   Temp: 97.9  F (36.6  C) Temp src: Oral BP: 134/89 Pulse: 100   Resp: 16 SpO2: 97 % O2 Device: None  (Room air)    Vitals:    04/02/21 0259 04/05/21 0609   Weight: 62 kg (136 lb 11.2 oz) 61.7 kg (136 lb 1.6 oz)     Vital Signs with Ranges  Temp:  [97.9  F (36.6  C)-98.2  F (36.8  C)] 97.9  F (36.6  C)  Pulse:  [100-105] 100  Resp:  [16] 16  BP: (122-134)/(72-89) 134/89  SpO2:  [97 %-100 %] 97 %  I/O last 3 completed shifts:  In: 510 [P.O.:480; I.V.:30]  Out: 200 [Urine:200]    Constitutional: Awake, alert, cooperative, non-toxic.No acute distress. Thin.  HEENT: Atraumatic. Normocephalic. Conjunctiva non-injected. Sclera anicteric. MMM. Scab lesions on lower lip (improved)  Respiratory: Moves air bilaterally. Clear to auscultation bilaterally, no crackles or wheezing  Cardiovascular: Tachycardic and regular rhythm, normal S1 and S2, and no murmur noted  GI: Normal bowel sounds, soft, non-distended, non-tender  Skin/Integumen: No rashes, no cyanosis, no edema    Discharge Disposition   Discharged to home  Condition at discharge: Stable    Consultations This Hospital Stay   PHARMACY DISCHARGE EDUCATION BY PHARMACIST  CARE MANAGEMENT / SOCIAL WORK IP CONSULT  GASTROENTEROLOGY IP CONSULT  VASCULAR ACCESS ADULT IP CONSULT  ENT IP CONSULT  NUTRITION SERVICES ADULT IP CONSULT  SPEECH LANGUAGE PATH ADULT IP CONSULT    Time Spent on this Encounter   IJackelin DO, personally saw the patient today and spent greater than 30 minutes discharging this patient.    Discharge Orders      Endocrinology Adult Referral      Reason for your hospital stay    Newly diagnosed type I diabetes with diabetic ketoacidosis     Follow-up and recommended labs and tests     Follow up with primary care provider, [facility], within 7 days for hospital follow- up.  The following labs/tests are recommended: BMP/CBC.  Follow up with Endocrinology     Activity    Your activity upon discharge: activity as tolerated     Monitor and record    blood glucose 4 times a day, before meals and at bedtime. Take blood sugar readings to follow-up  appointment with your primary care provider.     When to contact your care team    Call your primary doctor if you have any of the following:  increased shortness of breath, increased drainage, increased swelling or increased pain. If your blood sugars are <70 and/or <200 and/or you are having symptoms of low or high blood sugars notify your doctor or return to the hospital.     Diet    Follow this diet upon discharge: Orders Placed This Encounter      Snacks/Supplements Adult: Boost Plus; With Meals      Regular Diet Adult     Discharge Medications   Current Discharge Medication List      START taking these medications    Details   Alcohol Swabs PADS Use to swab the area of the injection or migel as directed   Per insurance coverage  Qty: 100 each, Refills: 0    Associated Diagnoses: Diabetic ketoacidosis without coma associated with type 1 diabetes mellitus (H); Type 1 diabetes mellitus with other specified complication (H)      benzocaine-menthol (CHLORASEPTIC) 6-10 MG lozenge Place 1 lozenge inside cheek every 2 hours as needed for sore throat (dry/sore throat without fever)  Qty: 30 lozenge, Refills: 0    Associated Diagnoses: Diabetic ketoacidosis without coma associated with type 1 diabetes mellitus (H)      blood glucose (NO BRAND SPECIFIED) lancets standard To use to test glucose level in the blood  Use to test blood sugar  3  times daily as directed. To accompany glucose monitor brands per insurance coverage.  Qty: 100 each, Refills: 0    Associated Diagnoses: Diabetic ketoacidosis without coma associated with type 1 diabetes mellitus (H); Type 1 diabetes mellitus with other specified complication (H)      blood glucose (NO BRAND SPECIFIED) test strip To use to test glucose level in the blood  Use to test blood sugar three times daily as directed. To accompany glucose monitor brands per insurance coverage.  Qty: 120 strip, Refills: 0    Associated Diagnoses: Diabetic ketoacidosis without coma associated  with type 1 diabetes mellitus (H); Type 1 diabetes mellitus with other specified complication (H)      blood glucose calibration (NO BRAND SPECIFIED) solution Used to calibrate the blood glucose monitor as needed and as directed.  To accompany  blood glucose brands per insurance coverage  Qty: 1 each, Refills: 0    Associated Diagnoses: Diabetic ketoacidosis without coma associated with type 1 diabetes mellitus (H); Type 1 diabetes mellitus with other specified complication (H)      blood glucose monitoring (NO BRAND SPECIFIED) meter device kit Use to test blood sugar 4 times daily or as directed.  Qty: 1 kit, Refills: 0    Associated Diagnoses: Diabetic ketoacidosis without coma associated with type 1 diabetes mellitus (H)      fluconazole (DIFLUCAN) 200 MG tablet Take 1 tablet (200 mg) by mouth daily for 3 days  Qty: 3 tablet, Refills: 0    Associated Diagnoses: Diabetic ketoacidosis without coma associated with type 1 diabetes mellitus (H); Gastroesophageal reflux disease with esophagitis without hemorrhage      glucose (BD GLUCOSE) 4 g chewable tablet Take 4 tablets by mouth every 15 minutes as needed for low blood sugar  Qty: 50 tablet, Refills: 0    Associated Diagnoses: Diabetic ketoacidosis without coma associated with type 1 diabetes mellitus (H); Type 1 diabetes mellitus with other specified complication (H)      insulin aspart (NOVOLOG PEN) 100 UNIT/ML pen Inject 3 Units Subcutaneous 3 times daily (before meals)  Qty: 270 mL, Refills: 0    Associated Diagnoses: Diabetic ketoacidosis without coma associated with type 1 diabetes mellitus (H); Type 1 diabetes mellitus with other specified complication (H)      insulin glargine (LANTUS PEN) 100 UNIT/ML pen Inject 45 Units Subcutaneous At Bedtime  Qty: 5 mL, Refills: 0    Comments: If Lantus is not covered by insurance, may substitute Basaglar at same dose and frequency.    Associated Diagnoses: Diabetic ketoacidosis without coma associated with type 1  diabetes mellitus (H); Type 1 diabetes mellitus with other specified complication (H)      ondansetron (ZOFRAN-ODT) 4 MG ODT tab Take 1 tablet (4 mg) by mouth every 6 hours as needed for nausea or vomiting  Qty: 30 tablet, Refills: 0    Associated Diagnoses: Diabetic ketoacidosis without coma associated with type 1 diabetes mellitus (H)      pantoprazole (PROTONIX) 40 MG EC tablet Take 1 tablet (40 mg) by mouth 2 times daily (before meals)  Qty: 80 tablet, Refills: 0    Associated Diagnoses: Diabetic ketoacidosis without coma associated with type 1 diabetes mellitus (H); Gastroesophageal reflux disease with esophagitis without hemorrhage      phenol (CHLORASEPTIC) 1.4 % spray Take 1 spray (1 mL) by mouth every hour as needed for sore throat (without fever)  Qty: 177 mL, Refills: 0    Comments: Spray bottle  Associated Diagnoses: Diabetic ketoacidosis without coma associated with type 1 diabetes mellitus (H)      Sharps Container MISC Use as directed to dispose of needles, lancets and other sharps  Per Insurance coverage  Qty: 1 each, Refills: 0    Associated Diagnoses: Diabetic ketoacidosis without coma associated with type 1 diabetes mellitus (H); Type 1 diabetes mellitus with other specified complication (H)           Allergies   No Known Allergies  Data   Most Recent 3 CBC's:  Recent Labs   Lab Test 04/06/21  0600 04/05/21  0645 04/04/21  0608   WBC 10.7 8.6 11.3*   HGB 14.4 14.6 13.6   MCV 86 89 86    288 246      Most Recent 3 BMP's:  Recent Labs   Lab Test 04/06/21  0600 04/05/21  0645 04/04/21  1523 04/04/21  0608    131*  --  136   POTASSIUM 3.6 3.9 4.0 3.0*   CHLORIDE 97 97  --  99   CO2 30 27  --  35*   BUN 9 7  --  3*   CR 0.57* 0.48*  --  0.42*   ANIONGAP 7 7  --  2*   MARY 8.4* 8.4*  --  8.1*   * 234*  --  163*     Most Recent 2 LFT's:  Recent Labs   Lab Test 04/04/21  0608 04/03/21  0648   AST 11 10   ALT 16 15   ALKPHOS 79 76   BILITOTAL 0.6 0.8     Most Recent INR's and  Anticoagulation Dosing History:  Anticoagulation Dose History     There is no flowsheet data to display.        Most Recent 3 Troponin's:No lab results found.  Most Recent Cholesterol Panel:No lab results found.  Most Recent 6 Bacteria Isolates From Any Culture (See EPIC Reports for Culture Details):  Recent Labs   Lab Test 04/01/21 2043   CULT No growth  No growth     Most Recent TSH, T4 and A1c Labs:  Recent Labs   Lab Test 04/02/21  0651 04/01/21  1947   TSH 0.87  --    A1C  --  12.0*     Results for orders placed or performed during the hospital encounter of 04/01/21   XR Chest Port 1 View    Narrative    XR CHEST PORT 1 VW 4/1/2021 9:30 PM    HISTORY: sepsis    COMPARISON: None.      Impression    IMPRESSION: No focal infiltrate, pleural effusion or pneumothorax.  Normal cardiomediastinal silhouette.    ANUPAM CRAWFORD MD   CT Abdomen Pelvis w Contrast    Narrative    CT ABDOMEN PELVIS W CONTRAST 4/1/2021 9:36 PM    CLINICAL HISTORY: Nausea/vomiting    TECHNIQUE: CT scan of the abdomen and pelvis was performed following  injection of IV contrast. Multiplanar reformats were obtained. Dose  reduction techniques were used.  CONTRAST: 75 mL Isovue-370    COMPARISON: None.    FINDINGS:   LOWER CHEST: Normal.    HEPATOBILIARY: Normal.    PANCREAS: Normal.    SPLEEN: Normal.    ADRENAL GLANDS: Normal.    KIDNEYS/BLADDER: Normal.    BOWEL: No obstruction or inflammatory change. Normal appendix.    PELVIC ORGANS: Distended urinary bladder. Small prostatic  calcifications.    ADDITIONAL FINDINGS: No free fluid, extraluminal air or fluid  collections. No lymphadenopathy.    MUSCULOSKELETAL: Normal.      Impression    IMPRESSION:   1.  No acute findings in the abdomen and pelvis.    ANUPAM CRAWFORD MD

## 2021-04-07 NOTE — PLAN OF CARE
VSS. SPO2 95-98% RA. LS: clear. Afebrile. C/O pain dilaudid PRN given, C/O nausea compazine PRN given. Up independently. A&OX4.  &127. PICC saline locked to the Right. Continue monitoring possible discharge tomorrow with outpatient follow up with endocrinologist.

## 2021-04-22 ENCOUNTER — OFFICE VISIT (OUTPATIENT)
Dept: PEDIATRICS | Facility: CLINIC | Age: 31
End: 2021-04-22
Payer: COMMERCIAL

## 2021-04-22 VITALS
HEART RATE: 116 BPM | DIASTOLIC BLOOD PRESSURE: 64 MMHG | RESPIRATION RATE: 12 BRPM | TEMPERATURE: 98.3 F | OXYGEN SATURATION: 99 % | WEIGHT: 155 LBS | SYSTOLIC BLOOD PRESSURE: 108 MMHG | BODY MASS INDEX: 19.27 KG/M2 | HEIGHT: 75 IN

## 2021-04-22 DIAGNOSIS — E10.65 TYPE 1 DIABETES MELLITUS WITH HYPERGLYCEMIA (H): ICD-10-CM

## 2021-04-22 DIAGNOSIS — E10.10 DIABETIC KETOACIDOSIS WITHOUT COMA ASSOCIATED WITH TYPE 1 DIABETES MELLITUS (H): ICD-10-CM

## 2021-04-22 DIAGNOSIS — Z09 HOSPITAL DISCHARGE FOLLOW-UP: Primary | ICD-10-CM

## 2021-04-22 LAB
ERYTHROCYTE [DISTWIDTH] IN BLOOD BY AUTOMATED COUNT: 12.7 % (ref 10–15)
HCT VFR BLD AUTO: 39.7 % (ref 40–53)
HGB BLD-MCNC: 13.2 G/DL (ref 13.3–17.7)
MCH RBC QN AUTO: 30.2 PG (ref 26.5–33)
MCHC RBC AUTO-ENTMCNC: 33.2 G/DL (ref 31.5–36.5)
MCV RBC AUTO: 91 FL (ref 78–100)
PLATELET # BLD AUTO: 287 10E9/L (ref 150–450)
RBC # BLD AUTO: 4.37 10E12/L (ref 4.4–5.9)
WBC # BLD AUTO: 6.5 10E9/L (ref 4–11)

## 2021-04-22 PROCEDURE — 99204 OFFICE O/P NEW MOD 45 MIN: CPT | Performed by: NURSE PRACTITIONER

## 2021-04-22 PROCEDURE — 82043 UR ALBUMIN QUANTITATIVE: CPT | Performed by: NURSE PRACTITIONER

## 2021-04-22 PROCEDURE — 80061 LIPID PANEL: CPT | Performed by: NURSE PRACTITIONER

## 2021-04-22 PROCEDURE — 36415 COLL VENOUS BLD VENIPUNCTURE: CPT | Performed by: NURSE PRACTITIONER

## 2021-04-22 PROCEDURE — 80048 BASIC METABOLIC PNL TOTAL CA: CPT | Performed by: NURSE PRACTITIONER

## 2021-04-22 PROCEDURE — 85027 COMPLETE CBC AUTOMATED: CPT | Performed by: NURSE PRACTITIONER

## 2021-04-22 ASSESSMENT — MIFFLIN-ST. JEOR: SCORE: 1743.71

## 2021-04-22 NOTE — PROGRESS NOTES
Assessment & Plan     Hospital discharge follow-up    Type 1 diabetes mellitus with hyperglycemia (H)  - New diagnosis DM1. Appt with endocrine not until June so recommend scheduling with alternative provider who may be able to see him sooner.   - Referral to diabetes educator. He did not bring glucometer with him today. Educated on carb counting, insulin dosing and diabetes management. Fasting glucoses sound generally within goal. Recommend writing down readings for easier review. If fasting glucose > 130, can increase dose dose by 3 units every 3-5 days until fasting glucose .  Advised 80 not true hypoglycemia but symptomatic as body was used to hyperglycemia. This will improve over time  - Diabetes educator may be able to help with Novolog adjustments until can be seen by endocrine. If having readings < 80, he will notify me. May need to adjust insulin dose  - Lipid panel reflex to direct LDL Non-fasting  - Albumin Random Urine Quantitative with Creat Ratio  - Basic metabolic panel  (Ca, Cl, CO2, Creat, Gluc, K, Na, BUN)  - CBC with platelets  - AMBULATORY ADULT DIABETES EDUCATOR REFERRAL; Future    Diabetic ketoacidosis without coma associated with type 1 diabetes mellitus (H)  - Feeling better. Tolerating PO diet. Eating three meals a day now. Sounds more comfortable with carb counting. No abdominal pain, N/V           See Patient Instructions    Return in about 3 months (around 7/22/2021) for Physical Exam.    BAKARI Hernandez [facility] [place] is a 30 year old who presents for the following health issues     Patient here to establish care  AND    History of Present Illness       Diabetes:   He presents for follow up of diabetes.  He is checking home blood glucose four or more times daily. He checks blood glucose before meals and at bedtime.  Blood glucose is sometimes over 200 and never under 70. He is aware of hypoglycemia symptoms including shakiness. He  has no concerns regarding his diabetes at this time.  He is having excessive thirst. The patient has not had a diabetic eye exam in the last 12 months.         He eats 0-1 servings of fruits and vegetables daily.He consumes 0 sweetened beverage(s) daily.He exercises with enough effort to increase his heart rate 20 to 29 minutes per day.  He exercises with enough effort to increase his heart rate 5 days per week.   He is taking medications regularly.       Hospital Follow-up Visit:    Hospital/Nursing Home/IP Rehab Facility: [facility]  Date of Admission: 4/2/21  Date of Discharge: 4/7/21  Reason(s) for Admission: diabetic ketoacidoses    Admitted 4/1/21 with DKA. Hgb 12   Discharged on insulin. Reports receiving some diabetes education during hospital stay  No family history of diabetes  Odynophagia with esophagitis 2/2 hyperemesis and epiglottis edema. Discharge on oral fluconazole and PPI       Was your hospitalization related to COVID-19? No   Problems taking medications regularly:  Stopped antifungal medication on his own - states it is better  Medication changes since discharge: see above  Problems adhering to non-medication therapy:  None    Summary of hospitalization:  [facility] discharge summary reviewed  Diagnostic Tests/Treatments reviewed.  Follow up needed: none  Other Healthcare Providers Involved in Patient s Care:         None  Update since discharge: improved.  Post Discharge Medication Reconciliation: discharge medications reconciled, continue medications without change.  Plan of care communicated with patient          Novolog 45 units at bedtime  Novolog 3 units w/ meals  Checking before meals or if symptomatic  Before meal may be 100-130. If more snacking up to 180  Had 1 glucose 220 which was his highest  Lowest was 80, felt shaky  Fasting lower 100s    Review of Systems   Constitutional, HEENT, cardiovascular, pulmonary, gi and gu systems are negative, except as otherwise  "noted.      Objective    /64 (BP Location: Right arm, Cuff Size: Adult Regular)   Pulse 116   Temp 98.3  F (36.8  C) (Tympanic)   Resp 12   Ht 1.905 m (6' 3\")   Wt 70.3 kg (155 lb)   SpO2 99%   BMI 19.37 kg/m    Body mass index is 19.37 kg/m .  Physical Exam   GENERAL: healthy, alert and no distress  RESP: lungs clear to auscultation - no rales, rhonchi or wheezes  CV: regular rate and rhythm, normal S1 S2, no S3 or S4, no murmur, click or rub, no peripheral edema and peripheral pulses strong  ABDOMEN: soft, nontender, no hepatosplenomegaly, no masses and bowel sounds normal  PSYCH: mentation appears normal, affect normal/bright    Results for orders placed or performed in visit on 04/22/21 (from the past 24 hour(s))   CBC with platelets   Result Value Ref Range    WBC 6.5 4.0 - 11.0 10e9/L    RBC Count 4.37 (L) 4.4 - 5.9 10e12/L    Hemoglobin 13.2 (L) 13.3 - 17.7 g/dL    Hematocrit 39.7 (L) 40.0 - 53.0 %    MCV 91 78 - 100 fl    MCH 30.2 26.5 - 33.0 pg    MCHC 33.2 31.5 - 36.5 g/dL    RDW 12.7 10.0 - 15.0 %    Platelet Count 287 150 - 450 10e9/L               "

## 2021-04-22 NOTE — LETTER
April 27, 2021      Wally CINDY Avendano  [address]  [place] [address]        Dear ,    You had a very mild anemia which I'm not concerned about at this time. You had normal hemoglobins when checked in the hospital. I recommend rechecking in the future when you have other labs done to ensure that its not decreasing further. I ordered a recheck, along with checking iron stores. Cholesterol is great. Urine test is normal.     Gemma Johnson, CNP

## 2021-04-22 NOTE — PATIENT INSTRUCTIONS
I would like for you to be seen by endocrinology sooner. There are 2 endocrinologists in the [facility] who have better access - Dr. Richard and Dr. Ingram. It will be a virtual visit so location shouldn't matter. Please call [phone] to schedule    Goal for fasting glucose (in morning before eating and drinking anything) is   Post prandial (glucose peaks 2 hours after you eat) goal is less than 180    If fasting glucose over 130, increase Lantus by 3 units   Let me know if you're having any low sugars    It would be helpful if you can write out your numbers to review with diabetes educator

## 2021-04-23 ENCOUNTER — TELEPHONE (OUTPATIENT)
Dept: PEDIATRICS | Facility: CLINIC | Age: 31
End: 2021-04-23

## 2021-04-23 LAB
ANION GAP SERPL CALCULATED.3IONS-SCNC: 6 MMOL/L (ref 3–14)
BUN SERPL-MCNC: 17 MG/DL (ref 7–30)
CALCIUM SERPL-MCNC: 8.9 MG/DL (ref 8.5–10.1)
CHLORIDE SERPL-SCNC: 103 MMOL/L (ref 94–109)
CHOLEST SERPL-MCNC: 149 MG/DL
CO2 SERPL-SCNC: 28 MMOL/L (ref 20–32)
CREAT SERPL-MCNC: 0.69 MG/DL (ref 0.66–1.25)
CREAT UR-MCNC: 29 MG/DL
GFR SERPL CREATININE-BSD FRML MDRD: >90 ML/MIN/{1.73_M2}
GLUCOSE SERPL-MCNC: 157 MG/DL (ref 70–99)
HDLC SERPL-MCNC: 56 MG/DL
LDLC SERPL CALC-MCNC: 75 MG/DL
MICROALBUMIN UR-MCNC: <5 MG/L
MICROALBUMIN/CREAT UR: NORMAL MG/G CR (ref 0–17)
NONHDLC SERPL-MCNC: 93 MG/DL
POTASSIUM SERPL-SCNC: 4.1 MMOL/L (ref 3.4–5.3)
SODIUM SERPL-SCNC: 138 MMOL/L (ref 133–144)
TRIGL SERPL-MCNC: 88 MG/DL

## 2021-04-23 NOTE — TELEPHONE ENCOUNTER
Diabetes Education Scheduling Outreach #1:    Call to patient to schedule. Left message with phone number to call to schedule.    Plan for 2nd outreach attempt within 1 week.    Josefa Mcneill  [employer]  Diabetes and Nutrition Scheduling

## 2021-04-26 ENCOUNTER — VIRTUAL VISIT (OUTPATIENT)
Dept: ENDOCRINOLOGY | Facility: CLINIC | Age: 31
End: 2021-04-26
Payer: COMMERCIAL

## 2021-04-26 DIAGNOSIS — E10.69 TYPE 1 DIABETES MELLITUS WITH OTHER SPECIFIED COMPLICATION (H): ICD-10-CM

## 2021-04-26 DIAGNOSIS — E10.9 TYPE 1 DIABETES MELLITUS WITHOUT COMPLICATION (H): Primary | ICD-10-CM

## 2021-04-26 DIAGNOSIS — Z79.4 LONG TERM (CURRENT) USE OF INSULIN (H): ICD-10-CM

## 2021-04-26 DIAGNOSIS — E10.10 DIABETIC KETOACIDOSIS WITHOUT COMA ASSOCIATED WITH TYPE 1 DIABETES MELLITUS (H): ICD-10-CM

## 2021-04-26 PROCEDURE — 99204 OFFICE O/P NEW MOD 45 MIN: CPT | Mod: 95 | Performed by: INTERNAL MEDICINE

## 2021-04-26 RX ORDER — PROCHLORPERAZINE 25 MG/1
SUPPOSITORY RECTAL
Qty: 3 EACH | Refills: 11 | Status: SHIPPED | OUTPATIENT
Start: 2021-04-26 | End: 2022-09-06

## 2021-04-26 RX ORDER — PROCHLORPERAZINE 25 MG/1
SUPPOSITORY RECTAL
Qty: 1 EACH | Refills: 3 | Status: SHIPPED | OUTPATIENT
Start: 2021-04-26 | End: 2022-09-06

## 2021-04-26 NOTE — PATIENT INSTRUCTIONS
Lantus 32 units    Novolog 1 unit for each 15 grams of carbohydrates    Start Dexcom sensor  I'm ordering it today and it will require most likely a prior authorization    Keep your appointment with the diabetes educator in May    Follow up with me in 6 weeks

## 2021-04-26 NOTE — PROGRESS NOTES
Wally is a 30 year old who is being evaluated via a billable video visit.      How would you like to obtain your AVS? Mail a copy  If the video visit is dropped, the invitation should be resent by: Text to cell phone: [phone]  Will anyone else be joining your video visit? No     Video Start Time: 3:45PM  Video-Visit Details    Type of service:  Video Visit    Video End Time:4:28PM    Originating Location (pt. Location): Home    Distant Location (provider location):  [facility]     Platform used for Video Visit: [employer]         HPI:  Wally Avendano a 30 year old male is here  for the first time for evaluation of Diabetes mellitus type 1.       The patient has had diabetes since  4/2021 and has diabetes complications of  none.      On April 1 the patient noted nausea this and continued vomiting.  He presented to the ED and was diagnosed with diabetes.  Patient had DKA.  Patient had felt quite well until the day of admission.  He noted mild increase in thirst polyuria and polydipsia.  Mild decrease in weight loss, being usually around 160 pounds and more recently since discharge 135 pounds.  Patient denies blurred vision.  He has some tingling in the foot he dropped the battery on.    Since discharge patient had been on Lantus 45 units and NovoLog 3 units 3 times a day with meals.    Patient checks blood sugars before and 1 to 2 hours after meals using a glucometer. Checks 6 times daily. Blood sugars before meals , blood sugars after meals 130- 220, usually around 180 milligrams per deciliter.    He has noted some dizziness dizziness with getting up    Current diabetic medications:  Lantus 45 units daily Novolog 3 units with meals three times daily    SMBG: checks before meals, and after meals,   Denies hypoglycemia    Meal plan: 3.5 meals a days, with bedtimes snack; he does not snack in between meals.  Food choices have always been quite healthy.  Currently limits to 60 g and less  with meals.  Patient cooks likes to prepare meals.  Will have low-carb tortillas meat and vegetables for dinner for example.    Exercise: work batteries plus , moving around a lot of batteries,    Complications  1. Retinopathy: Not evaluated  2. Nephropathy: Not evaluated  3. Neuropathy: No  4. Hypoglycemia: No  5. Macrovascular: No    Previously used diabetes medications: none    FH father diaebetes diet controlled, irregular heart beat    SH used to drink beer with dinner,no smoking    ASSESSMENT:   Wally Avendano a 30 year old male is here for an evaluation in regard Diabetes Mellitus type 1, by reviewing the available blood glucose reading and recent HgA1c, the patient's diabetes is considered to be in good control.  He is doing remarkably well with checking blood sugars and diet.  His Lantus dose is too high considering his body habitus and NovoLog is too low.  Patient is interested in glucose sensor at this point.  Patient has appointment with diabetes educator May 20 in [place].      PLAN:  Decrease Lantus to 32 units daily  Novolog carb ratio 15  See diabetes educator as scheduled, there should be teaching for Dexcom; I will try to contact the educator in this regard  Blood work prior to next visit for NATHANAEL thyroid function electrolytes liver panel lipid panel    Patient to contact the clinic if blood glucose (sugar) is under 70 two times in one week or above 200 for 3 consecutive days.           Latoya Perez MD  Endocrinology and Diabetes    Pager [phone]        ROS:    Complete 10 point review of systems is negative except for what mentioned above.    PMH:  Patient Active Problem List   Diagnosis     DKA (diabetic ketoacidoses) (H)       SOCIAL HISTORY:  Social History     Socioeconomic History     Marital status:      Spouse name: Not on file     Number of children: Not on file     Years of education: Not on file     Highest education level: Not on file   Occupational  History     Not on file   Social Needs     Financial resource strain: Not on file     Food insecurity     Worry: Not on file     Inability: Not on file     Transportation needs     Medical: Not on file     Non-medical: Not on file   Tobacco Use     Smoking status: Never Smoker     Smokeless tobacco: Never Used   Substance and Sexual Activity     Alcohol use: Yes     Frequency: Monthly or less     Drug use: Never     Sexual activity: Yes     Partners: Female   Lifestyle     Physical activity     Days per week: Not on file     Minutes per session: Not on file     Stress: Not on file   Relationships     Social connections     Talks on phone: Not on file     Gets together: Not on file     Attends Quaker service: Not on file     Active member of club or organization: Not on file     Attends meetings of clubs or organizations: Not on file     Relationship status: Not on file     Intimate partner violence     Fear of current or ex partner: Not on file     Emotionally abused: Not on file     Physically abused: Not on file     Forced sexual activity: Not on file   Other Topics Concern     Not on file   Social History Narrative     Not on file       FAMILY HISTORY:  Family History   Problem Relation Age of Onset     Other - See Comments Mother         PBC       Medications:   Current Outpatient Medications   Medication Sig Dispense Refill     Alcohol Swabs PADS Use to swab the area of the injection or migel as directed   Per insurance coverage 100 each 0     blood glucose (NO BRAND SPECIFIED) lancets standard To use to test glucose level in the blood  Use to test blood sugar  3  times daily as directed. To accompany glucose monitor brands per insurance coverage. 100 each 0     blood glucose (NO BRAND SPECIFIED) test strip To use to test glucose level in the blood  Use to test blood sugar three times daily as directed. To accompany glucose monitor brands per insurance coverage. 120 strip 0     blood glucose calibration (NO  BRAND SPECIFIED) solution Used to calibrate the blood glucose monitor as needed and as directed.  To accompany  blood glucose brands per insurance coverage 1 each 0     blood glucose monitoring (NO BRAND SPECIFIED) meter device kit Use to test blood sugar 4 times daily or as directed. 1 kit 0     glucose (BD GLUCOSE) 4 g chewable tablet Take 4 tablets by mouth every 15 minutes as needed for low blood sugar 50 tablet 0     insulin aspart (NOVOLOG PEN) 100 UNIT/ML pen Inject 3 Units Subcutaneous 3 times daily (before meals) 270 mL 0     insulin glargine (LANTUS PEN) 100 UNIT/ML pen Inject 45 Units Subcutaneous At Bedtime 5 mL 0     Sharps Container MISC Use as directed to dispose of needles, lancets and other sharps  Per Insurance coverage 1 each 0     pantoprazole (PROTONIX) 40 MG EC tablet Take 1 tablet (40 mg) by mouth 2 times daily (before meals) (Patient not taking: Reported on 4/26/2021) 80 tablet 0       PHYSICAL EXAM:   There were no vitals taken for this visit.  Physical Examination:  There were no vitals taken for this visit.  There is no height or weight on file to calculate BMI.    Wt Readings from Last 4 Encounters:  04/22/21 : 70.3 kg (155 lb)  04/05/21 : 61.7 kg (136 lb 1.6 oz)  04/01/21 : 68 kg (150 lb)     Reported vitals:  There were no vitals taken for this visit.   healthy, alert and no distress  PSYCH: Alert and oriented times 3; coherent speech, normal   rate and volume, able to articulate logical thoughts, able   to abstract reason, no tangential thoughts, no hallucinations   or delusions  Her affect is normal and pleasant  RESP: No cough, no audible wheezing, able to talk in full sentences  Remainder of exam unable to be completed due to telephone visits     LABS:  Lab Results   Component Value Date     04/22/2021    CHLORIDE 103 04/22/2021    CO2 28 04/22/2021     (H) 04/22/2021    CR 0.69 04/22/2021    CR 0.57 (L) 04/06/2021    CR 0.48 (L) 04/05/2021    CR 0.42 (L) 04/04/2021     CR 0.43 (L) 04/03/2021    MARY 8.9 04/22/2021    MAG 2.0 04/06/2021    ALBUMIN 2.8 (L) 04/04/2021    ALKPHOS 79 04/04/2021    LDL 75 04/22/2021    HDL 56 04/22/2021    TRIG 88 04/22/2021     Lab Results   Component Value Date    MICROL <5 04/22/2021     Lab Results   Component Value Date    A1C 12.0 (H) 04/01/2021       Lab Results   Component Value Date    HGB 13.2 (L) 04/22/2021

## 2021-04-26 NOTE — LETTER
4/26/2021         RE: Wally Avendano  [address]  [place] [address]        Dear Colleague,    Thank you for referring your patient, Wally Avendano, to the [facility]. Please see a copy of my visit note below.    Wally is a 30 year old who is being evaluated via a billable video visit.      How would you like to obtain your AVS? Mail a copy  If the video visit is dropped, the invitation should be resent by: Text to cell phone: [phone]  Will anyone else be joining your video visit? No     Video Start Time: 3:45PM  Video-Visit Details    Type of service:  Video Visit    Video End Time:4:28PM    Originating Location (pt. Location): Home    Distant Location (provider location):  [facility]     Platform used for Video Visit: [employer]         HPI:  Wally Avendano a 30 year old male is here  for the first time for evaluation of Diabetes mellitus type 1.       The patient has had diabetes since  4/2021 and has diabetes complications of  none.      On April 1 the patient noted nausea this and continued vomiting.  He presented to the ED and was diagnosed with diabetes.  Patient had DKA.  Patient had felt quite well until the day of admission.  He noted mild increase in thirst polyuria and polydipsia.  Mild decrease in weight loss, being usually around 160 pounds and more recently since discharge 135 pounds.  Patient denies blurred vision.  He has some tingling in the foot he dropped the battery on.    Since discharge patient had been on Lantus 45 units and NovoLog 3 units 3 times a day with meals.    Patient checks blood sugars before and 1 to 2 hours after meals using a glucometer. Checks 6 times daily. Blood sugars before meals , blood sugars after meals 130- 220, usually around 180 milligrams per deciliter.    He has noted some dizziness dizziness with getting up    Current diabetic medications:  Lantus 45 units daily Novolog 3 units with meals three  times daily    SMBG: checks before meals, and after meals,   Denies hypoglycemia    Meal plan: 3.5 meals a days, with bedtimes snack; he does not snack in between meals.  Food choices have always been quite healthy.  Currently limits to 60 g and less with meals.  Patient cooks likes to prepare meals.  Will have low-carb tortillas meat and vegetables for dinner for example.    Exercise: work batteries plus , moving around a lot of batteries,    Complications  1. Retinopathy: Not evaluated  2. Nephropathy: Not evaluated  3. Neuropathy: No  4. Hypoglycemia: No  5. Macrovascular: No    Previously used diabetes medications: none    FH father diaebetes diet controlled, irregular heart beat    SH used to drink beer with dinner,no smoking    ASSESSMENT:   Wally Avendano a 30 year old male is here for an evaluation in regard Diabetes Mellitus type 1, by reviewing the available blood glucose reading and recent HgA1c, the patient's diabetes is considered to be in good control.  He is doing remarkably well with checking blood sugars and diet.  His Lantus dose is too high considering his body habitus and NovoLog is too low.  Patient is interested in glucose sensor at this point.  Patient has appointment with diabetes educator May 20 in [place].      PLAN:  Decrease Lantus to 32 units daily  Novolog carb ratio 15  See diabetes educator as scheduled, there should be teaching for Dexcom; I will try to contact the educator in this regard  Blood work prior to next visit for NATHANAEL thyroid function electrolytes liver panel lipid panel    Patient to contact the clinic if blood glucose (sugar) is under 70 two times in one week or above 200 for 3 consecutive days.           Latoya Perez MD  Endocrinology and Diabetes    Pager [phone]        ROS:    Complete 10 point review of systems is negative except for what mentioned above.    PMH:  Patient Active Problem List   Diagnosis     DKA (diabetic ketoacidoses) (H)        SOCIAL HISTORY:  Social History     Socioeconomic History     Marital status:      Spouse name: Not on file     Number of children: Not on file     Years of education: Not on file     Highest education level: Not on file   Occupational History     Not on file   Social Needs     Financial resource strain: Not on file     Food insecurity     Worry: Not on file     Inability: Not on file     Transportation needs     Medical: Not on file     Non-medical: Not on file   Tobacco Use     Smoking status: Never Smoker     Smokeless tobacco: Never Used   Substance and Sexual Activity     Alcohol use: Yes     Frequency: Monthly or less     Drug use: Never     Sexual activity: Yes     Partners: Female   Lifestyle     Physical activity     Days per week: Not on file     Minutes per session: Not on file     Stress: Not on file   Relationships     Social connections     Talks on phone: Not on file     Gets together: Not on file     Attends Cheondoism service: Not on file     Active member of club or organization: Not on file     Attends meetings of clubs or organizations: Not on file     Relationship status: Not on file     Intimate partner violence     Fear of current or ex partner: Not on file     Emotionally abused: Not on file     Physically abused: Not on file     Forced sexual activity: Not on file   Other Topics Concern     Not on file   Social History Narrative     Not on file       FAMILY HISTORY:  Family History   Problem Relation Age of Onset     Other - See Comments Mother         PBC       Medications:   Current Outpatient Medications   Medication Sig Dispense Refill     Alcohol Swabs PADS Use to swab the area of the injection or migel as directed   Per insurance coverage 100 each 0     blood glucose (NO BRAND SPECIFIED) lancets standard To use to test glucose level in the blood  Use to test blood sugar  3  times daily as directed. To accompany glucose monitor brands per insurance coverage. 100 each 0      blood glucose (NO BRAND SPECIFIED) test strip To use to test glucose level in the blood  Use to test blood sugar three times daily as directed. To accompany glucose monitor brands per insurance coverage. 120 strip 0     blood glucose calibration (NO BRAND SPECIFIED) solution Used to calibrate the blood glucose monitor as needed and as directed.  To accompany  blood glucose brands per insurance coverage 1 each 0     blood glucose monitoring (NO BRAND SPECIFIED) meter device kit Use to test blood sugar 4 times daily or as directed. 1 kit 0     glucose (BD GLUCOSE) 4 g chewable tablet Take 4 tablets by mouth every 15 minutes as needed for low blood sugar 50 tablet 0     insulin aspart (NOVOLOG PEN) 100 UNIT/ML pen Inject 3 Units Subcutaneous 3 times daily (before meals) 270 mL 0     insulin glargine (LANTUS PEN) 100 UNIT/ML pen Inject 45 Units Subcutaneous At Bedtime 5 mL 0     Sharps Container MISC Use as directed to dispose of needles, lancets and other sharps  Per Insurance coverage 1 each 0     pantoprazole (PROTONIX) 40 MG EC tablet Take 1 tablet (40 mg) by mouth 2 times daily (before meals) (Patient not taking: Reported on 4/26/2021) 80 tablet 0       PHYSICAL EXAM:   There were no vitals taken for this visit.  Physical Examination:  There were no vitals taken for this visit.  There is no height or weight on file to calculate BMI.    Wt Readings from Last 4 Encounters:  04/22/21 : 70.3 kg (155 lb)  04/05/21 : 61.7 kg (136 lb 1.6 oz)  04/01/21 : 68 kg (150 lb)     Reported vitals:  There were no vitals taken for this visit.   healthy, alert and no distress  PSYCH: Alert and oriented times 3; coherent speech, normal   rate and volume, able to articulate logical thoughts, able   to abstract reason, no tangential thoughts, no hallucinations   or delusions  Her affect is normal and pleasant  RESP: No cough, no audible wheezing, able to talk in full sentences  Remainder of exam unable to be completed due to telephone  visits     LABS:  Lab Results   Component Value Date     04/22/2021    CHLORIDE 103 04/22/2021    CO2 28 04/22/2021     (H) 04/22/2021    CR 0.69 04/22/2021    CR 0.57 (L) 04/06/2021    CR 0.48 (L) 04/05/2021    CR 0.42 (L) 04/04/2021    CR 0.43 (L) 04/03/2021    MARY 8.9 04/22/2021    MAG 2.0 04/06/2021    ALBUMIN 2.8 (L) 04/04/2021    ALKPHOS 79 04/04/2021    LDL 75 04/22/2021    HDL 56 04/22/2021    TRIG 88 04/22/2021     Lab Results   Component Value Date    MICROL <5 04/22/2021     Lab Results   Component Value Date    A1C 12.0 (H) 04/01/2021       Lab Results   Component Value Date    HGB 13.2 (L) 04/22/2021           Again, thank you for allowing me to participate in the care of your patient.        Sincerely,        Latoya Perez MD

## 2021-04-27 DIAGNOSIS — D64.9 ANEMIA, UNSPECIFIED TYPE: Primary | ICD-10-CM

## 2021-05-01 ENCOUNTER — TELEPHONE (OUTPATIENT)
Dept: PEDIATRICS | Facility: CLINIC | Age: 31
End: 2021-05-01

## 2021-05-01 DIAGNOSIS — E10.10 DIABETIC KETOACIDOSIS WITHOUT COMA ASSOCIATED WITH TYPE 1 DIABETES MELLITUS (H): ICD-10-CM

## 2021-05-01 DIAGNOSIS — E10.69 TYPE 1 DIABETES MELLITUS WITH OTHER SPECIFIED COMPLICATION (H): ICD-10-CM

## 2021-05-03 NOTE — TELEPHONE ENCOUNTER
Routing refill request to provider for review/approval because:  New for endo  Ynes Sullivan RN, BSN  Message handled by CLINIC NURSE.

## 2021-05-05 ENCOUNTER — TELEPHONE (OUTPATIENT)
Dept: ENDOCRINOLOGY | Facility: CLINIC | Age: 31
End: 2021-05-05

## 2021-05-05 DIAGNOSIS — E10.10 DIABETIC KETOACIDOSIS WITHOUT COMA ASSOCIATED WITH TYPE 1 DIABETES MELLITUS (H): ICD-10-CM

## 2021-05-05 DIAGNOSIS — E10.69 TYPE 1 DIABETES MELLITUS WITH OTHER SPECIFIED COMPLICATION (H): ICD-10-CM

## 2021-05-05 NOTE — TELEPHONE ENCOUNTER
Writer contacted patient to review. Rx for test strips was entered by Dr. Perez on 5/4/21 but it locally printed.     Writer resent prescription to pharmacy.       Adilene Nance RN  Endocrine Care Coordinator  [facility]

## 2021-05-05 NOTE — TELEPHONE ENCOUNTER
Looks like the rx was local print for the testing supplies. Could you please fax or e-scribe to [facility]? Thanks!      [facility] Fuentes [facility] [place]   Phone: [phone]  Fax: [phone]

## 2021-05-05 NOTE — TELEPHONE ENCOUNTER
[facility] Call Center    Phone Message    May a detailed message be left on voicemail: yes     Reason for Call: Other: pt looking to get more test strips for his dexcom, please adviase with him     Action Taken: Message routed to:  Adult Clinics: Endocrinology p [id number]    Travel Screening: Not Applicable

## 2021-05-20 ENCOUNTER — VIRTUAL VISIT (OUTPATIENT)
Dept: EDUCATION SERVICES | Facility: CLINIC | Age: 31
End: 2021-05-20
Payer: COMMERCIAL

## 2021-05-20 DIAGNOSIS — E10.65 TYPE 1 DIABETES MELLITUS WITH HYPERGLYCEMIA (H): ICD-10-CM

## 2021-05-20 PROCEDURE — G0108 DIAB MANAGE TRN  PER INDIV: HCPCS | Mod: 95

## 2021-05-20 NOTE — PROGRESS NOTES
"Diabetes Self-Management Education & Support  Type of Service: Telephone Visit    How would patient like to obtain AVS? Mita    Presents for: Initial Assessment for new diagnosis    SUBJECTIVE/OBJECTIVE:  Presents for: Initial Assessment for new diagnosis  Accompanied by: Self  Diabetes education in the past 24mo: Yes  Diabetes type: Type 1  Other concerns:: None  Cultural Influences/Ethnic Background:  American      Diabetes Symptoms & Complications:     Complications assessed today?: No    Patient Problem List and Family Medical History reviewed for relevant medical history, current medical status, and diabetes risk factors.    Vitals:  There were no vitals taken for this visit.  Estimated body mass index is 19.37 kg/m  as calculated from the following:    Height as of 4/22/21: 1.905 m (6' 3\").    Weight as of 4/22/21: 70.3 kg (155 lb).   Last 3 BP:   BP Readings from Last 3 Encounters:   04/22/21 108/64   04/07/21 122/82   04/02/21 110/71       History   Smoking Status     Never Smoker   Smokeless Tobacco     Never Used       Labs:  Lab Results   Component Value Date    A1C 12.0 04/01/2021     Lab Results   Component Value Date     04/22/2021     Lab Results   Component Value Date    LDL 75 04/22/2021     HDL Cholesterol   Date Value Ref Range Status   04/22/2021 56 >39 mg/dL Final   ]  GFR Estimate   Date Value Ref Range Status   04/22/2021 >90 >60 mL/min/[1.73_m2] Final     Comment:     Non  GFR Calc  Starting 12/18/2018, serum creatinine based estimated GFR (eGFR) will be   calculated using the Chronic Kidney Disease Epidemiology Collaboration   (CKD-EPI) equation.       GFR Estimate If Black   Date Value Ref Range Status   04/22/2021 >90 >60 mL/min/[1.73_m2] Final     Comment:      GFR Calc  Starting 12/18/2018, serum creatinine based estimated GFR (eGFR) will be   calculated using the Chronic Kidney Disease Epidemiology Collaboration   (CKD-EPI) equation.       Lab " Results   Component Value Date    CR 0.69 2021     No results found for: MICROALBUMIN    Healthy Eating:  Healthy Eating Assessed Today: Yes  Meal planning/habits: Carb counting  Dinner: protein, veggie  Snacks: cheese stick, beef stick, nuts  Has patient met with a dietitian in the past?: Yes    Being Active:  Being Active Assessed Today: Yes    Monitoring:  Monitoring Assessed Today: Yes  Did patient bring glucose meter to appointment? : Yes  Times checking blood sugar at home (number): 3  Times checking blood sugar at home (per): Day    Fastin-110  Pre-meal: 100-130  No lows.  Highest BG ~160.    Taking Medications:  Diabetes Medication(s)     Diabetic Other       glucose (BD GLUCOSE) 4 g chewable tablet    Take 4 tablets by mouth every 15 minutes as needed for low blood sugar    Insulin       insulin aspart (NOVOLOG PEN) 100 UNIT/ML pen    Sig 2-15 units with meals 2-4 times daily     insulin glargine (LANTUS PEN) 100 UNIT/ML pen    Sig 30-45 units once daily          Taking Medication Assessed Today: Yes  Current Treatments: Insulin Injections  Problems taking diabetes medications regularly?: No  Diabetes medication side effects?: No    Problem Solving:  Problem Solving Assessed Today: No              Reducing Risks:  Reducing Risks Assessed Today: No    Healthy Coping:  Healthy Coping Assessed Today: Yes  Emotional response to diabetes: Ready to learn  Stage of change: ACTION (Actively working towards change)  Patient Activation Measure Survey Score:  JANAE Score (Last Two) 2021   JANAE Raw Score 34   Activation Score 68.9   JANAE Level 3       Diabetes knowledge and skills assessment:   Patient is knowledgeable in diabetes management concepts related to: Healthy Eating, Being Active, Monitoring and Taking Medication    Patient needs further education on the following diabetes management concepts: Monitoring, Taking Medication, Problem Solving and Reducing Risks    Based on learning assessment above,  most appropriate setting for further diabetes education would be: Individual setting.      INTERVENTIONS:    Education provided today on:  AADE Self-Care Behaviors:  Monitoring: individual blood glucose targets, frequency of monitoring and use of CGM  Taking Medication: action of prescribed medication, when to take medications and use of sliding scale  Problem Solving: high blood glucose - causes, signs/symptoms, treatment and prevention and low blood glucose - causes, signs/symptoms, treatment and prevention    Opportunities for ongoing education and support in diabetes-self management were discussed.    Pt verbalized understanding of concepts discussed and recommendations provided today.       Education Materials Provided:  No new materials provided today      ASSESSMENT:  Patient with a new dx of Type 1 diabetes in April.  Received some education in the hospital.  Has no specific questions or concerns today.    Patient is following a somewhat lower carb diet as he doesn't like taking big doses of Novolog.  Some meals are <10gm carb (more protein, veg) and some may be ~75-80.  We discussed that 60-75gm per meal would be appropriate.  Patient has regained the weight he lost before diagnosis.  He states he has always been fairly lean.    Patient is having some post-meal vomiting ~2x/week.  Unclear if this is gastroparesis-related?  Encouraged patient to note frequency of this, and what meals it follows and discuss with Endo at next appt.    Discussed use of Dexcom.  Provided option of in-person appt to learn how to use, versus using the Dexcom videos/tutorials found online.  Patient states he is pretty tech savvy and will check out the online videos.      Reviewed pt's current insulin doses.  Recommend no changes as BGs are within target.  Provided patient with sliding scale of +1 unit:50>150 and explained how this can be used to correct high numbers before meals and added to the meal dose.          Patient's most  recent   Lab Results   Component Value Date    A1C 12.0 04/01/2021    is not meeting goal of <7.0    PLAN  See Patient Instructions for co-developed, patient-stated behavior change goals.    RELL Montanez CDE    Time Spent: 40 minutes  Encounter Type: Individual    Any diabetes medication dose changes were made via the CDE Protocol and Collaborative Practice Agreement with the patient's referring provider. A copy of this encounter was shared with the provider.

## 2021-05-20 NOTE — PATIENT INSTRUCTIONS
MY DIABETES TODAY:    1)  Goal A1C is under <7.0  Mine is:      Lab Results   Component Value Date    A1C 12.0 04/01/2021       2)  Goal LDL (bad cholesterol) under 100  (measured at least yearly)- I am currently at:   Lab Results   Component Value Date    LDL 75 04/22/2021       3)  Goal blood pressure under 130/80- mine was Data Unavailable today    Care Plan:  Meal Plan Recommendation: eat 3 meals a day, have small snacks between meals, if needed  Exercise / activity plan: Aim for 30+ minutes daily for good health  Check blood sugars 3-4x/day  No changes in the patient's current treatment plan    Follow up:  Call ([phone]), e-mail ([email or web address]), or send [employer] message with questions, concerns or if follow-up is needed.  Follow-up for annual diabetes education review in 1 year or sooner, if needed.     Bring blood glucose meter and logbook with you to all doctor and follow-up appointments.     [facility] the [facility]:  For Your Diabetes or Nutrition Education Appointments Call:  [phone]   For Diabetes or Nutrition Related Questions:   [phone]  Send [employer] Message   If you need a medication refill please contact your pharmacy. Please allow 3 business days for your refills to be completed.

## 2021-06-02 DIAGNOSIS — D64.9 ANEMIA, UNSPECIFIED TYPE: ICD-10-CM

## 2021-06-02 DIAGNOSIS — E10.9 TYPE 1 DIABETES MELLITUS WITHOUT COMPLICATION (H): ICD-10-CM

## 2021-06-02 LAB
ERYTHROCYTE [DISTWIDTH] IN BLOOD BY AUTOMATED COUNT: 11.9 % (ref 10–15)
HCT VFR BLD AUTO: 41.1 % (ref 40–53)
HGB BLD-MCNC: 13.9 G/DL (ref 13.3–17.7)
MCH RBC QN AUTO: 29.5 PG (ref 26.5–33)
MCHC RBC AUTO-ENTMCNC: 33.8 G/DL (ref 31.5–36.5)
MCV RBC AUTO: 87 FL (ref 78–100)
PLATELET # BLD AUTO: 229 10E9/L (ref 150–450)
RBC # BLD AUTO: 4.71 10E12/L (ref 4.4–5.9)
WBC # BLD AUTO: 6.5 10E9/L (ref 4–11)

## 2021-06-02 PROCEDURE — 82728 ASSAY OF FERRITIN: CPT | Performed by: NURSE PRACTITIONER

## 2021-06-02 PROCEDURE — 85027 COMPLETE CBC AUTOMATED: CPT | Performed by: NURSE PRACTITIONER

## 2021-06-02 PROCEDURE — 84132 ASSAY OF SERUM POTASSIUM: CPT | Performed by: NURSE PRACTITIONER

## 2021-06-02 PROCEDURE — 84443 ASSAY THYROID STIM HORMONE: CPT | Performed by: NURSE PRACTITIONER

## 2021-06-02 PROCEDURE — 86341 ISLET CELL ANTIBODY: CPT | Mod: 90 | Performed by: NURSE PRACTITIONER

## 2021-06-02 PROCEDURE — 82947 ASSAY GLUCOSE BLOOD QUANT: CPT | Performed by: NURSE PRACTITIONER

## 2021-06-02 PROCEDURE — 82043 UR ALBUMIN QUANTITATIVE: CPT | Performed by: INTERNAL MEDICINE

## 2021-06-02 PROCEDURE — 36415 COLL VENOUS BLD VENIPUNCTURE: CPT | Performed by: NURSE PRACTITIONER

## 2021-06-02 PROCEDURE — 99000 SPECIMEN HANDLING OFFICE-LAB: CPT | Performed by: NURSE PRACTITIONER

## 2021-06-03 ENCOUNTER — OFFICE VISIT (OUTPATIENT)
Dept: URGENT CARE | Facility: URGENT CARE | Age: 31
End: 2021-06-03
Payer: COMMERCIAL

## 2021-06-03 ENCOUNTER — TELEPHONE (OUTPATIENT)
Dept: PEDIATRICS | Facility: CLINIC | Age: 31
End: 2021-06-03

## 2021-06-03 ENCOUNTER — HOSPITAL ENCOUNTER (EMERGENCY)
Facility: CLINIC | Age: 31
Discharge: HOME | End: 2021-06-03
Attending: EMERGENCY MEDICINE | Admitting: EMERGENCY MEDICINE
Payer: COMMERCIAL

## 2021-06-03 VITALS
WEIGHT: 155 LBS | OXYGEN SATURATION: 99 % | SYSTOLIC BLOOD PRESSURE: 118 MMHG | BODY MASS INDEX: 19.37 KG/M2 | DIASTOLIC BLOOD PRESSURE: 64 MMHG | HEART RATE: 131 BPM | TEMPERATURE: 97.8 F

## 2021-06-03 VITALS
HEART RATE: 104 BPM | OXYGEN SATURATION: 99 % | DIASTOLIC BLOOD PRESSURE: 60 MMHG | RESPIRATION RATE: 16 BRPM | BODY MASS INDEX: 19.37 KG/M2 | TEMPERATURE: 98 F | SYSTOLIC BLOOD PRESSURE: 105 MMHG | WEIGHT: 155 LBS

## 2021-06-03 DIAGNOSIS — R79.89 KETONEMIA: ICD-10-CM

## 2021-06-03 DIAGNOSIS — F12.10 MARIJUANA ABUSE: ICD-10-CM

## 2021-06-03 DIAGNOSIS — R11.10 INTRACTABLE VOMITING, PRESENCE OF NAUSEA NOT SPECIFIED, UNSPECIFIED VOMITING TYPE: Primary | ICD-10-CM

## 2021-06-03 DIAGNOSIS — R11.2 NAUSEA AND VOMITING, INTRACTABILITY OF VOMITING NOT SPECIFIED, UNSPECIFIED VOMITING TYPE: ICD-10-CM

## 2021-06-03 LAB
ALBUMIN SERPL-MCNC: 4.2 G/DL (ref 3.4–5)
ALP SERPL-CCNC: 52 U/L (ref 40–150)
ALT SERPL W P-5'-P-CCNC: 18 U/L (ref 0–70)
ANION GAP SERPL CALCULATED.3IONS-SCNC: 11 MMOL/L (ref 3–14)
AST SERPL W P-5'-P-CCNC: 9 U/L (ref 0–45)
BASOPHILS # BLD AUTO: 0 10E9/L (ref 0–0.2)
BASOPHILS NFR BLD AUTO: 0.5 %
BILIRUB SERPL-MCNC: 1.4 MG/DL (ref 0.2–1.3)
BUN SERPL-MCNC: 15 MG/DL (ref 7–30)
CALCIUM SERPL-MCNC: 9.1 MG/DL (ref 8.5–10.1)
CHLORIDE SERPL-SCNC: 105 MMOL/L (ref 94–109)
CO2 BLDCOV-SCNC: 21 MMOL/L (ref 21–28)
CO2 SERPL-SCNC: 22 MMOL/L (ref 20–32)
CREAT SERPL-MCNC: 0.73 MG/DL (ref 0.66–1.25)
CREAT UR-MCNC: 110 MG/DL
DIFFERENTIAL METHOD BLD: NORMAL
EOSINOPHIL # BLD AUTO: 0.1 10E9/L (ref 0–0.7)
EOSINOPHIL NFR BLD AUTO: 0.8 %
ERYTHROCYTE [DISTWIDTH] IN BLOOD BY AUTOMATED COUNT: 11.9 % (ref 10–15)
FERRITIN SERPL-MCNC: 69 NG/ML (ref 26–388)
GFR SERPL CREATININE-BSD FRML MDRD: >90 ML/MIN/{1.73_M2}
GLUCOSE BLDC GLUCOMTR-MCNC: 184 MG/DL (ref 70–99)
GLUCOSE SERPL-MCNC: 103 MG/DL (ref 70–99)
GLUCOSE SERPL-MCNC: 182 MG/DL (ref 70–99)
HCT VFR BLD AUTO: 41.8 % (ref 40–53)
HGB BLD-MCNC: 14.3 G/DL (ref 13.3–17.7)
IMM GRANULOCYTES # BLD: 0 10E9/L (ref 0–0.4)
IMM GRANULOCYTES NFR BLD: 0.1 %
KETONES BLD-SCNC: 2.9 MMOL/L (ref 0–0.6)
LACTATE BLD-SCNC: 1.7 MMOL/L (ref 0.7–2.1)
LIPASE SERPL-CCNC: 17 U/L (ref 73–393)
LYMPHOCYTES # BLD AUTO: 1.7 10E9/L (ref 0.8–5.3)
LYMPHOCYTES NFR BLD AUTO: 23.1 %
MCH RBC QN AUTO: 28.8 PG (ref 26.5–33)
MCHC RBC AUTO-ENTMCNC: 34.2 G/DL (ref 31.5–36.5)
MCV RBC AUTO: 84 FL (ref 78–100)
MICROALBUMIN UR-MCNC: 12 MG/L
MICROALBUMIN/CREAT UR: 11.36 MG/G CR (ref 0–17)
MONOCYTES # BLD AUTO: 0.4 10E9/L (ref 0–1.3)
MONOCYTES NFR BLD AUTO: 4.8 %
NEUTROPHILS # BLD AUTO: 5.3 10E9/L (ref 1.6–8.3)
NEUTROPHILS NFR BLD AUTO: 70.7 %
NRBC # BLD AUTO: 0 10*3/UL
NRBC BLD AUTO-RTO: 0 /100
PCO2 BLDV: 24 MM HG (ref 40–50)
PH BLDV: 7.56 PH (ref 7.32–7.43)
PLATELET # BLD AUTO: 256 10E9/L (ref 150–450)
PO2 BLDV: 47 MM HG (ref 25–47)
POTASSIUM SERPL-SCNC: 3.5 MMOL/L (ref 3.4–5.3)
POTASSIUM SERPL-SCNC: 4 MMOL/L (ref 3.4–5.3)
PROT SERPL-MCNC: 7.4 G/DL (ref 6.8–8.8)
RBC # BLD AUTO: 4.96 10E12/L (ref 4.4–5.9)
SAO2 % BLDV FROM PO2: 89 %
SODIUM SERPL-SCNC: 138 MMOL/L (ref 133–144)
TSH SERPL DL<=0.005 MIU/L-ACNC: 1.72 MU/L (ref 0.4–4)
WBC # BLD AUTO: 7.5 10E9/L (ref 4–11)

## 2021-06-03 PROCEDURE — 99284 EMERGENCY DEPT VISIT MOD MDM: CPT | Mod: 25

## 2021-06-03 PROCEDURE — 82010 KETONE BODYS QUAN: CPT | Performed by: EMERGENCY MEDICINE

## 2021-06-03 PROCEDURE — 258N000001 HC RX 258: Performed by: EMERGENCY MEDICINE

## 2021-06-03 PROCEDURE — 96361 HYDRATE IV INFUSION ADD-ON: CPT

## 2021-06-03 PROCEDURE — 250N000011 HC RX IP 250 OP 636: Performed by: EMERGENCY MEDICINE

## 2021-06-03 PROCEDURE — 80053 COMPREHEN METABOLIC PANEL: CPT | Performed by: EMERGENCY MEDICINE

## 2021-06-03 PROCEDURE — 999N001017 HC STATISTIC GLUCOSE BY METER IP

## 2021-06-03 PROCEDURE — 96374 THER/PROPH/DIAG INJ IV PUSH: CPT

## 2021-06-03 PROCEDURE — 258N000003 HC RX IP 258 OP 636: Performed by: EMERGENCY MEDICINE

## 2021-06-03 PROCEDURE — 85025 COMPLETE CBC W/AUTO DIFF WBC: CPT | Performed by: EMERGENCY MEDICINE

## 2021-06-03 PROCEDURE — 83690 ASSAY OF LIPASE: CPT | Performed by: EMERGENCY MEDICINE

## 2021-06-03 PROCEDURE — 83605 ASSAY OF LACTIC ACID: CPT

## 2021-06-03 PROCEDURE — 82803 BLOOD GASES ANY COMBINATION: CPT

## 2021-06-03 PROCEDURE — 96375 TX/PRO/DX INJ NEW DRUG ADDON: CPT

## 2021-06-03 RX ORDER — ONDANSETRON 2 MG/ML
8 INJECTION INTRAMUSCULAR; INTRAVENOUS ONCE
Status: COMPLETED | OUTPATIENT
Start: 2021-06-03 | End: 2021-06-03

## 2021-06-03 RX ORDER — PROMETHAZINE HYDROCHLORIDE 25 MG/1
25 TABLET ORAL EVERY 6 HOURS PRN
Qty: 10 TABLET | Refills: 0 | Status: ON HOLD | OUTPATIENT
Start: 2021-06-03 | End: 2022-02-26

## 2021-06-03 RX ORDER — ONDANSETRON 4 MG/1
4 TABLET, ORALLY DISINTEGRATING ORAL EVERY 6 HOURS PRN
Qty: 15 TABLET | Refills: 0 | Status: SHIPPED | OUTPATIENT
Start: 2021-06-03 | End: 2021-07-16

## 2021-06-03 RX ORDER — ONDANSETRON 4 MG/1
4 TABLET, ORALLY DISINTEGRATING ORAL ONCE
Status: COMPLETED | OUTPATIENT
Start: 2021-06-03 | End: 2021-06-03

## 2021-06-03 RX ORDER — DIPHENHYDRAMINE HYDROCHLORIDE 50 MG/ML
25 INJECTION INTRAMUSCULAR; INTRAVENOUS ONCE
Status: COMPLETED | OUTPATIENT
Start: 2021-06-03 | End: 2021-06-03

## 2021-06-03 RX ADMIN — DEXTROSE AND SODIUM CHLORIDE: 5; 450 INJECTION, SOLUTION INTRAVENOUS at 19:28

## 2021-06-03 RX ADMIN — SODIUM CHLORIDE, POTASSIUM CHLORIDE, SODIUM LACTATE AND CALCIUM CHLORIDE 1000 ML: 600; 310; 30; 20 INJECTION, SOLUTION INTRAVENOUS at 18:33

## 2021-06-03 RX ADMIN — ONDANSETRON 4 MG: 4 TABLET, ORALLY DISINTEGRATING ORAL at 18:57

## 2021-06-03 RX ADMIN — ONDANSETRON 8 MG: 2 INJECTION INTRAMUSCULAR; INTRAVENOUS at 18:27

## 2021-06-03 RX ADMIN — DIPHENHYDRAMINE HYDROCHLORIDE 25 MG: 50 INJECTION INTRAMUSCULAR; INTRAVENOUS at 18:31

## 2021-06-03 RX ADMIN — PROCHLORPERAZINE EDISYLATE 10 MG: 5 INJECTION INTRAMUSCULAR; INTRAVENOUS at 18:29

## 2021-06-03 ASSESSMENT — ENCOUNTER SYMPTOMS
NAUSEA: 1
DYSURIA: 0
ABDOMINAL PAIN: 1
FEVER: 0
VOMITING: 1

## 2021-06-03 NOTE — ED PROVIDER NOTES
History   Chief Complaint:  Nausea & Vomiting       HPI   Wally Avendano is a 30 year old male with history of type 1 diabetes who presents with two weeks of nausea with 10-15 episodes of vomiting today. He complains of mild upper abdominal pain secondary to the vomiting.  There was no significant abdominal pain prior to the onset of nausea and vomiting.  He has not passed any stool but he is still passing gas. He denies any fever or dysuria. He does have a history of T1DM and had a BS of 160 this morning. He smokes marijuana regularly for back pain.    Review of Systems   Constitutional: Negative for fever.   Gastrointestinal: Positive for abdominal pain, nausea and vomiting.   Genitourinary: Negative for dysuria.   All other systems reviewed and are negative.      Allergies:  The patient has no known allergies.     Medications:  Insulin   Zofran    Past Medical History:    T1DM  DKA      Social History:  Patient presents alone to the ED.  He uses marijuana regularly.    Physical Exam     Patient Vitals for the past 24 hrs:   BP Temp Temp src Pulse Resp SpO2 Weight   06/03/21 2000 105/60 -- -- 104 16 99 % --   06/03/21 1945 101/56 -- -- 106 -- -- --   06/03/21 1930 102/48 -- -- 105 -- -- --   06/03/21 1920 112/61 -- -- -- -- -- --   06/03/21 1915 112/61 -- -- 100 14 99 % --   06/03/21 1900 111/66 -- -- 99 -- -- --   06/03/21 1845 116/61 -- -- 101 -- -- --   06/03/21 1830 124/80 -- -- 100 -- -- --   06/03/21 1815 126/84 -- -- 109 -- 98 % --   06/03/21 1800 125/81 -- -- -- -- -- --   06/03/21 1754 (!) 141/83 98  F (36.7  C) Oral 116 22 99 % 70.3 kg (155 lb)       Physical Exam      Eyes:    Conjunctiva normal  Neck:    Supple, no meningismus.     CV:     Regular rate and rhythm.      No murmurs, rubs or gallops.     No lower extremity edema.  PULM:    Clear to auscultation bilateral.       No respiratory distress.      Good air exchange.     No rales or wheezing.  ABD:    Soft, non-distended.       Minimal tenderness  in the epigastric area.     Bowel sounds normal.     No pulsatile masses.       No rebound, guarding or rigidity.     No CVA tenderness.   MSK:     No gross deformity to all four extremities.   LYMPH:   No cervical lymphadenopathy.  NEURO:   Alert.  Good muscular tone, no atrophy.   Skin:    Warm, dry and intact.    Psych:    Mood is depressed and affect is appropriate.      Emergency Department Course     Laboratory:  CMP: glucose 182(H), bilirubin 1.4(H), o/w WNL (Creatinine 0.73)   Glucose 1755: 184(H)   CBC: WBC 7.5, HGB 14.3,   blood gas venous: pH: 7.56(H), PCO2: 24(L), PO2: 47, Bicarbonate: 21, O2 sat: 89, lactic acid 1.7      Ketone BHB: 2.9(HH)  Lipase: 17(L)    Emergency Department Course:    Reviewed:  I reviewed nursing notes, vitals, past medical history and care everywhere    Assessments:   I obtained history and examined the patient as noted above.    I rechecked the patient and explained findings.    I spoke with the patient's wife on the phone.   I answered all questions prior to discharge.    Interventions:  1827 Zofran 8 mg IV  1829 Compazine 10 mg IV  183 Benadryl 25 mg IV  1833 lactated ringers bolus 1000 mL IV  1928 dextrose 5% and 0.45% NaCl infusion IV    Disposition:  The patient was discharged to home.       Impression & Plan   CMS Diagnoses: None    Medical Decision Makin-year-old male with a history of type 1 diabetes and chronic marijuana abuse presents with recurrent nausea and vomiting.  His abdominal examination is benign with no significant reproducible tenderness.  No obstructive findings on exam.  No indication for advanced imaging.  Laboratory studies revealed mild hyperglycemia and ketonemia but no evidence of DKA.  Patient was given IV fluids, antiemetics and dextrose containing fluids to reverse the ketonemia.  He was able to tolerate oral fluid challenge.  His symptoms may be related to viral illness vs cannabis hyperemesis syndrome.  Again low  suspicion for intra-abdominal catastrophe such as obstructive disease, early onset appendicitis.  Radiation risk outweighs the benefit.  Patient safe for discharge home with initiation of antiemetics and recommendation to discontinue marijuana use.        Diagnosis:    ICD-10-CM    1. Nausea and vomiting, intractability of vomiting not specified, unspecified vomiting type  R11.2    2. Ketonemia  R79.89    3. Marijuana abuse  F12.10        Discharge Medications:  New Prescriptions    ONDANSETRON (ZOFRAN ODT) 4 MG ODT TAB    Take 1 tablet (4 mg) by mouth every 6 hours as needed for nausea    PROMETHAZINE (PHENERGAN) 25 MG TABLET    Take 1 tablet (25 mg) by mouth every 6 hours as needed for nausea       Scribe Disclosure:  I, Chris Sigala, am serving as a scribe at 6:06 PM on 6/3/2021 to document services personally performed by Saroj Shirley MD based on my observations and the provider's statements to me.            Saroj Shirley MD  06/03/21 3927

## 2021-06-03 NOTE — PROGRESS NOTES
Patient with DM type 1, here due to persistent vomiting.  Has been struggling with abdomina issues.  Has history of DKA, recent hospitalization on 4/2.  Sugar 160 per patient this morning.    Electrolyte machine in UC not working.  zofran 4 mg ODT given in clinic but patient continues to throw up.    Recommend ER evaluation, wife will bring via private car to [facility] ER, ER notified.    No charge  Edwin Coats MD,  Ashlyn 3, 2021 5:38 PM

## 2021-06-03 NOTE — TELEPHONE ENCOUNTER
"Pt's wife called with pt next to them answering questions.    States that the pt is experiencing abdominal pain, midline that has persisted for over a month. Rates pain, on average a 3 (out of 10), but is intermittent also. Admits to vomiting and nausea, vomiting 3-4 times in the past day.    Pt states they have had constipation, but last BM was last night, mostly \"normal\".    Denies fever, chills.    Pt was recently seen by diabetic educator in May, discussed post-meal vomiting that had been ongoing for 2 weeks. Pt has not been seen by a provider for this yet, but was suggested that it could be gastroparesis.    Pt is not currently taking any medications for n/v or abdominal pain.    Recommended the pt be seen, no appts available with providers today and they stated that they can not be seen tomorrow as their child will be in surgery.    Recommended the pt be seen in urgent care. Pt agreed.      - Jeremias \"Dontae\" CHESTER Dudley - Patient Advocate Liason (PAL)  [facility]    "

## 2021-06-03 NOTE — ED TRIAGE NOTES
A&O x4, ABCs intact. Pt presents with N/V that started this morning. Pt has type 1 DM and noted his BG this morning to be 160. Pt denies recent illness.

## 2021-06-04 NOTE — ED NOTES
DATE:  6/3/2021   TIME OF RECEIPT FROM LAB:  1906  LAB TEST:  ketones  LAB VALUE:  2.9  RESULTS GIVEN WITH READ-BACK TO (PROVIDER):  aaron  TIME LAB VALUE REPORTED TO PROVIDER:   1906

## 2021-06-05 LAB — GAD65 AB SER IA-ACNC: 99.9 IU/ML (ref 0–5)

## 2021-06-07 ENCOUNTER — VIRTUAL VISIT (OUTPATIENT)
Dept: ENDOCRINOLOGY | Facility: CLINIC | Age: 31
End: 2021-06-07
Payer: COMMERCIAL

## 2021-06-07 DIAGNOSIS — E10.69 TYPE 1 DIABETES MELLITUS WITH OTHER SPECIFIED COMPLICATION (H): Primary | ICD-10-CM

## 2021-06-07 PROCEDURE — 99215 OFFICE O/P EST HI 40 MIN: CPT | Mod: 95 | Performed by: INTERNAL MEDICINE

## 2021-06-07 RX ORDER — GLUCAGON 3 MG/1
1 POWDER NASAL PRN
Qty: 1 EACH | Refills: 1 | Status: SHIPPED | OUTPATIENT
Start: 2021-06-07 | End: 2022-11-02

## 2021-06-07 NOTE — PATIENT INSTRUCTIONS
- decrease Lantus to 28 units  - Novolog carbratio    - using bettie, scan 5 times daily    - follow up as inperson visit    - Baqsimi for low blood sugars

## 2021-06-07 NOTE — LETTER
6/7/2021         RE: Wally Avendano  [address]  [facility] [address]        Dear Colleague,    Thank you for referring your patient, Wally Avendano, to the [facility]. Please see a copy of my visit note below.    Blood sugar readings per patient   AM(fasting) beforeLunch  PM   6/7 144   6/6      199  6/5 131     208  6/4 204  157   134  6/3 167  6/2      140  6/1 213  191   170    Wally is a 30 year old who is being evaluated via a billable video visit.      How would you like to obtain your AVS? Mail a copy  If the video visit is dropped, the invitation should be resent by: Text to cell phone: [phone]  Will anyone else be joining your video visit? No     Endocrinology and Diabetes Clinic      Wally Avendano is a 30 year old male who is being evaluated via a billable video visit.        Video Start Time: 3:58pm  Video End Time: 4:30pm    Interval history  -Seen in ED on Jun 3rd for frequent nausea and vomiting.  Was found to be dehydrated secondary to viral illness versus cannabis hyperemesis syndrome, no DKA  - pt uses Canabis every evening after work for chronic back pain, nausea in the morning  -  Had been sick for about 2 weeks now, really bad on Thursday last week  - unclear etiology, notes that 2 year old son is in childcare, although the child has not been sick  - has noted more constipation over the last 2 weeks  - pain in the middle of the stomach  - NATHANAEL clearly elevated     Diabetes medicatio  - has been on Lantus 32  - has seen diabetes educator in [place] [employer]  - Novolog takes 4-5 units, usually with all meals, 3 meals     Hypoglycemia: at night, not at work    SMBG: has John Paul at home, met with educator over the phone;  Check 1-3 times daily, has been busy, fasting BGs 140-200s   Patient checks blood sugars before and 1 to 2 hours after meals using a glucometer. Checks 6 times daily. Blood sugars before meals , blood sugars after meals 130- 220, usually around 180  milligrams per deciliter.    He has noted some dizziness dizziness with getting up    No weight loss    Current diabetic medications:  Lantus 45 units daily Novolog 3 units with meals three times daily      Meal plan: 3.5 meals a days, with bedtimes snack; he does not snack in between meals.  Food choices have always been quite healthy.  Currently limits to 60 g and less with meals.  Patient cooks likes to prepare meals.  Will have low-carb tortillas meat and vegetables for dinner for example. Pt snack during the day on cheese etc    Exercise: work batteries plus , moving around a lot of batteries,    Complications  1. Retinopathy: Not evaluated  2. Nephropathy: Not evaluated  3. Neuropathy: No - gastroparesis  4. Hypoglycemia: No  5. Macrovascular: No    Previously used diabetes medications: none    FH father diabetes diet controlled, irregular heart beat    SH used to drink beer with dinner,no smoking    ASSESSMENT:   Wally Avendano a 30 year old male with newly diagnosed  T1DM, blood glucose modestly controlled, currently with nausea and vomiting x 2 weeks.     Rec to decrease Lantus and check BGs more frequently , start using John Paul.    Reviewed sick day, to cont with long acting insulin, try to eat easy to digest foods and cover with Novolog and correct for high BGs if possible  Re nausea and vomiting- unclear whether viral illness, possibly inducing gastroparesis  Could be Cannabis, which he uses for chronic back pain, less likely adidson's, however will check    Re constipation, TSH recently nl, reviewed to stay hydrated. He is not using opiods.    Hypoglycemia: reviewed rule of 15, reviewed glucagon.    Pt could have used more educations with diabetes educator, however has plans to go back    PLAN:  Decrease Lantus to 28 units daily  Novolog carb ratio 15  Lab soon  Follow up in 6 weeks in person    Latoya Perez MD  Endocrinology and Diabetes    Telephone contact:  [employer]  [facility] [phone]  [facility] [phone]          ROS:    Complete 10 point review of systems is negative except for what mentioned above.    PMH:  Patient Active Problem List   Diagnosis     DKA (diabetic ketoacidoses) (H)       SOCIAL HISTORY:  Social History     Socioeconomic History     Marital status:      Spouse name: Not on file     Number of children: Not on file     Years of education: Not on file     Highest education level: Not on file   Occupational History     Not on file   Social Needs     Financial resource strain: Not on file     Food insecurity     Worry: Not on file     Inability: Not on file     Transportation needs     Medical: Not on file     Non-medical: Not on file   Tobacco Use     Smoking status: Never Smoker     Smokeless tobacco: Never Used   Substance and Sexual Activity     Alcohol use: Yes     Frequency: Monthly or less     Drug use: Never     Sexual activity: Yes     Partners: Female   Lifestyle     Physical activity     Days per week: Not on file     Minutes per session: Not on file     Stress: Not on file   Relationships     Social connections     Talks on phone: Not on file     Gets together: Not on file     Attends Religion service: Not on file     Active member of club or organization: Not on file     Attends meetings of clubs or organizations: Not on file     Relationship status: Not on file     Intimate partner violence     Fear of current or ex partner: Not on file     Emotionally abused: Not on file     Physically abused: Not on file     Forced sexual activity: Not on file   Other Topics Concern     Not on file   Social History Narrative     Not on file       FAMILY HISTORY:  Family History   Problem Relation Age of Onset     Other - See Comments Mother         PBC       Medications:   Current Outpatient Medications   Medication Sig Dispense Refill     Alcohol Swabs PADS Use to swab the area of the injection or  migel as directed   Per insurance coverage 100 each 0     blood glucose (NO BRAND SPECIFIED) lancets standard To use to test glucose level in the blood  Use to test blood sugar  3  times daily as directed. To accompany glucose monitor brands per insurance coverage. 100 each 0     blood glucose (NO BRAND SPECIFIED) test strip Use to test blood sugar three times daily as directed. To accompany glucose monitor brands per insurance coverage. 350 strip 3     blood glucose calibration (NO BRAND SPECIFIED) solution Used to calibrate the blood glucose monitor as needed and as directed.  To accompany  blood glucose brands per insurance coverage 1 each 0     blood glucose monitoring (NO BRAND SPECIFIED) meter device kit Use to test blood sugar 4 times daily or as directed. 1 kit 0     insulin aspart (NOVOLOG PEN) 100 UNIT/ML pen Sig 2-15 units with meals 2-4 times daily 35 mL 11     insulin glargine (LANTUS PEN) 100 UNIT/ML pen Sig 30-45 units once daily 15 mL 11     insulin pen needle (31G X 5 MM) 31G X 5 MM miscellaneous Use 4 pen needles daily or as directed. 120 each 11     Continuous Blood Gluc Sensor (DEXCOM G6 SENSOR) MISC Change every 10 days. (Patient not taking: Reported on 6/7/2021) 3 each 11     Continuous Blood Gluc Transmit (DEXCOM G6 TRANSMITTER) MISC Change every 3 months. (Patient not taking: Reported on 6/7/2021) 1 each 3     glucose (BD GLUCOSE) 4 g chewable tablet Take 4 tablets by mouth every 15 minutes as needed for low blood sugar (Patient not taking: Reported on 6/7/2021) 50 tablet 0     ondansetron (ZOFRAN ODT) 4 MG ODT tab Take 1 tablet (4 mg) by mouth every 6 hours as needed for nausea (Patient not taking: Reported on 6/7/2021) 15 tablet 0     promethazine (PHENERGAN) 25 MG tablet Take 1 tablet (25 mg) by mouth every 6 hours as needed for nausea (Patient not taking: Reported on 6/7/2021) 10 tablet 0     Sharps Container MISC Use as directed to dispose of needles, lancets and other sharps  Per  Insurance coverage 1 each 0       Physical Examination:  There were no vitals taken for this visit.  There is no height or weight on file to calculate BMI.    Wt Readings from Last 4 Encounters:   06/03/21 70.3 kg (155 lb)   06/03/21 70.3 kg (155 lb)   04/22/21 70.3 kg (155 lb)   04/05/21 61.7 kg (136 lb 1.6 oz)      Reported vitals:  There were no vitals taken for this visit.   healthy, alert and no distress  PSYCH: Alert and oriented times 3; coherent speech, normal   rate and volume, able to articulate logical thoughts, able   to abstract reason, no tangential thoughts, no hallucinations   or delusions  Her affect is normal and pleasant  RESP: No cough, no audible wheezing, able to talk in full sentences  Remainder of exam unable to be completed due to telephone visits     LABS:  Lab Results   Component Value Date    TSH 1.72 06/02/2021     Lab 6/2/21:  NATHANAEL Ab 99.9 international unit(s)/ml  CBC nl  Ferritin 69  Micro alb/creatinine ratio 11    Lab Results   Component Value Date    MICROL 12 06/02/2021    MICROL <5 04/22/2021     Lab Results   Component Value Date    A1C 12.0 (H) 04/01/2021       Lab Results   Component Value Date    HGB 14.3 06/03/2021       HPI:  Wally Avendano a young  male with Diabetes mellitus type 1.     The patient has had diabetes since  4/2021 and has diabetes complications of  none.    On April 1 the patient noted nausea this and continued vomiting.  He presented to the ED and was diagnosed with diabetes.  Patient had DKA.  Patient had felt quite well until the day of admission.  He noted mild increase in thirst polyuria and polydipsia.  Mild decrease in weight loss, being usually around 160 pounds and more recently since discharge 135 pounds.  Patient denies blurred vision.  He has some tingling in the foot he dropped the battery on.        Again, thank you for allowing me to participate in the care of your patient.        Sincerely,        Latoya Perez MD

## 2021-06-07 NOTE — PROGRESS NOTES
Blood sugar readings per patient   AM(fasting) beforeLunch  PM   6/7 144   6/6      199  6/5 131     208  6/4 204  157   134  6/3 167  6/2      140  6/1 213  191   170    Wally is a 30 year old who is being evaluated via a billable video visit.      How would you like to obtain your AVS? Mail a copy  If the video visit is dropped, the invitation should be resent by: Text to cell phone: [phone]  Will anyone else be joining your video visit? No     Endocrinology and Diabetes Clinic      Wally Avendano is a 30 year old male who is being evaluated via a billable video visit.        Video Start Time: 3:58pm  Video End Time: 4:30pm    Interval history  -Seen in ED on Jun 3rd for frequent nausea and vomiting.  Was found to be dehydrated secondary to viral illness versus cannabis hyperemesis syndrome, no DKA  - pt uses Canabis every evening after work for chronic back pain, nausea in the morning  -  Had been sick for about 2 weeks now, really bad on Thursday last week  - unclear etiology, notes that 2 year old son is in childcare, although the child has not been sick  - has noted more constipation over the last 2 weeks  - pain in the middle of the stomach  - NATHANAEL clearly elevated     Diabetes medicatio  - has been on Lantus 32  - has seen diabetes educator in [place]  - Novolog takes 4-5 units, usually with all meals, 3 meals     Hypoglycemia: at night, not at work    SMBG: has John Paul at home, met with educator over the phone;  Check 1-3 times daily, has been busy, fasting BGs 140-200s   Patient checks blood sugars before and 1 to 2 hours after meals using a glucometer. Checks 6 times daily. Blood sugars before meals , blood sugars after meals 130- 220, usually around 180 milligrams per deciliter.    He has noted some dizziness dizziness with getting up    No weight loss    Current diabetic medications:  Lantus 45 units daily Novolog 3 units with meals three times daily      Meal plan: 3.5 meals a days, with bedtimes  snack; he does not snack in between meals.  Food choices have always been quite healthy.  Currently limits to 60 g and less with meals.  Patient cooks likes to prepare meals.  Will have low-carb tortillas meat and vegetables for dinner for example. Pt snack during the day on cheese etc    Exercise: work batteries plus , moving around a lot of batteries,    Complications  1. Retinopathy: Not evaluated  2. Nephropathy: Not evaluated  3. Neuropathy: No - gastroparesis  4. Hypoglycemia: No  5. Macrovascular: No    Previously used diabetes medications: none    FH father diabetes diet controlled, irregular heart beat    SH used to drink beer with dinner,no smoking    ASSESSMENT:   Wally Avendano a 30 year old male with newly diagnosed  T1DM, blood glucose modestly controlled, currently with nausea and vomiting x 2 weeks.     Rec to decrease Lantus and check BGs more frequently , start using John Paul.    Reviewed sick day, to cont with long acting insulin, try to eat easy to digest foods and cover with Novolog and correct for high BGs if possible  Re nausea and vomiting- unclear whether viral illness, possibly inducing gastroparesis  Could be Cannabis, which he uses for chronic back pain, less likely adidson's, however will check    Re constipation, TSH recently nl, reviewed to stay hydrated. He is not using opiods.    Hypoglycemia: reviewed rule of 15, reviewed glucagon.    Pt could have used more educations with diabetes educator, however has plans to go back    PLAN:  Decrease Lantus to 28 units daily  Novolog carb ratio 15  Lab soon  Follow up in 6 weeks in person    Latoya Perez MD  Endocrinology and Diabetes    Telephone contact:  [facility] [phone]  [facility] [phone]          ROS:    Complete 10 point review of systems is negative except for what mentioned above.    PMH:  Patient Active Problem List   Diagnosis     DKA  (diabetic ketoacidoses) (H)       SOCIAL HISTORY:  Social History     Socioeconomic History     Marital status:      Spouse name: Not on file     Number of children: Not on file     Years of education: Not on file     Highest education level: Not on file   Occupational History     Not on file   Social Needs     Financial resource strain: Not on file     Food insecurity     Worry: Not on file     Inability: Not on file     Transportation needs     Medical: Not on file     Non-medical: Not on file   Tobacco Use     Smoking status: Never Smoker     Smokeless tobacco: Never Used   Substance and Sexual Activity     Alcohol use: Yes     Frequency: Monthly or less     Drug use: Never     Sexual activity: Yes     Partners: Female   Lifestyle     Physical activity     Days per week: Not on file     Minutes per session: Not on file     Stress: Not on file   Relationships     Social connections     Talks on phone: Not on file     Gets together: Not on file     Attends Shinto service: Not on file     Active member of club or organization: Not on file     Attends meetings of clubs or organizations: Not on file     Relationship status: Not on file     Intimate partner violence     Fear of current or ex partner: Not on file     Emotionally abused: Not on file     Physically abused: Not on file     Forced sexual activity: Not on file   Other Topics Concern     Not on file   Social History Narrative     Not on file       FAMILY HISTORY:  Family History   Problem Relation Age of Onset     Other - See Comments Mother         PBC       Medications:   Current Outpatient Medications   Medication Sig Dispense Refill     Alcohol Swabs PADS Use to swab the area of the injection or migel as directed   Per insurance coverage 100 each 0     blood glucose (NO BRAND SPECIFIED) lancets standard To use to test glucose level in the blood  Use to test blood sugar  3  times daily as directed. To accompany glucose monitor brands per  insurance coverage. 100 each 0     blood glucose (NO BRAND SPECIFIED) test strip Use to test blood sugar three times daily as directed. To accompany glucose monitor brands per insurance coverage. 350 strip 3     blood glucose calibration (NO BRAND SPECIFIED) solution Used to calibrate the blood glucose monitor as needed and as directed.  To accompany  blood glucose brands per insurance coverage 1 each 0     blood glucose monitoring (NO BRAND SPECIFIED) meter device kit Use to test blood sugar 4 times daily or as directed. 1 kit 0     insulin aspart (NOVOLOG PEN) 100 UNIT/ML pen Sig 2-15 units with meals 2-4 times daily 35 mL 11     insulin glargine (LANTUS PEN) 100 UNIT/ML pen Sig 30-45 units once daily 15 mL 11     insulin pen needle (31G X 5 MM) 31G X 5 MM miscellaneous Use 4 pen needles daily or as directed. 120 each 11     Continuous Blood Gluc Sensor (DEXCOM G6 SENSOR) MISC Change every 10 days. (Patient not taking: Reported on 6/7/2021) 3 each 11     Continuous Blood Gluc Transmit (DEXCOM G6 TRANSMITTER) MISC Change every 3 months. (Patient not taking: Reported on 6/7/2021) 1 each 3     glucose (BD GLUCOSE) 4 g chewable tablet Take 4 tablets by mouth every 15 minutes as needed for low blood sugar (Patient not taking: Reported on 6/7/2021) 50 tablet 0     ondansetron (ZOFRAN ODT) 4 MG ODT tab Take 1 tablet (4 mg) by mouth every 6 hours as needed for nausea (Patient not taking: Reported on 6/7/2021) 15 tablet 0     promethazine (PHENERGAN) 25 MG tablet Take 1 tablet (25 mg) by mouth every 6 hours as needed for nausea (Patient not taking: Reported on 6/7/2021) 10 tablet 0     Sharps Container MISC Use as directed to dispose of needles, lancets and other sharps  Per Insurance coverage 1 each 0       Physical Examination:  There were no vitals taken for this visit.  There is no height or weight on file to calculate BMI.    Wt Readings from Last 4 Encounters:   06/03/21 70.3 kg (155 lb)   06/03/21 70.3 kg (155 lb)    04/22/21 70.3 kg (155 lb)   04/05/21 61.7 kg (136 lb 1.6 oz)      Reported vitals:  There were no vitals taken for this visit.   healthy, alert and no distress  PSYCH: Alert and oriented times 3; coherent speech, normal   rate and volume, able to articulate logical thoughts, able   to abstract reason, no tangential thoughts, no hallucinations   or delusions  Her affect is normal and pleasant  RESP: No cough, no audible wheezing, able to talk in full sentences  Remainder of exam unable to be completed due to telephone visits     LABS:  Lab Results   Component Value Date    TSH 1.72 06/02/2021     Lab 6/2/21:  NATHANAEL Ab 99.9 international unit(s)/ml  CBC nl  Ferritin 69  Micro alb/creatinine ratio 11    Lab Results   Component Value Date    MICROL 12 06/02/2021    MICROL <5 04/22/2021     Lab Results   Component Value Date    A1C 12.0 (H) 04/01/2021       Lab Results   Component Value Date    HGB 14.3 06/03/2021       HPI:  Wally Avendano a young  male with Diabetes mellitus type 1.     The patient has had diabetes since  4/2021 and has diabetes complications of  none.    On April 1 the patient noted nausea this and continued vomiting.  He presented to the ED and was diagnosed with diabetes.  Patient had DKA.  Patient had felt quite well until the day of admission.  He noted mild increase in thirst polyuria and polydipsia.  Mild decrease in weight loss, being usually around 160 pounds and more recently since discharge 135 pounds.  Patient denies blurred vision.  He has some tingling in the foot he dropped the battery on.

## 2021-07-16 ENCOUNTER — VIRTUAL VISIT (OUTPATIENT)
Dept: URGENT CARE | Facility: CLINIC | Age: 31
End: 2021-07-16
Payer: COMMERCIAL

## 2021-07-16 ENCOUNTER — APPOINTMENT (OUTPATIENT)
Dept: CT IMAGING | Facility: CLINIC | Age: 31
End: 2021-07-16
Attending: EMERGENCY MEDICINE
Payer: COMMERCIAL

## 2021-07-16 ENCOUNTER — HOSPITAL ENCOUNTER (EMERGENCY)
Facility: CLINIC | Age: 31
Discharge: HOME | End: 2021-07-17
Attending: EMERGENCY MEDICINE | Admitting: EMERGENCY MEDICINE
Payer: COMMERCIAL

## 2021-07-16 DIAGNOSIS — R11.10 VOMITING, INTRACTABILITY OF VOMITING NOT SPECIFIED, PRESENCE OF NAUSEA NOT SPECIFIED, UNSPECIFIED VOMITING TYPE: Primary | ICD-10-CM

## 2021-07-16 DIAGNOSIS — R11.2 NON-INTRACTABLE VOMITING WITH NAUSEA, UNSPECIFIED VOMITING TYPE: ICD-10-CM

## 2021-07-16 DIAGNOSIS — R91.8 GROUND GLASS OPACITY PRESENT ON IMAGING OF LUNG: ICD-10-CM

## 2021-07-16 LAB
ALBUMIN SERPL-MCNC: 3.9 G/DL (ref 3.4–5)
ALP SERPL-CCNC: 76 U/L (ref 40–150)
ALT SERPL W P-5'-P-CCNC: 16 U/L (ref 0–70)
ANION GAP SERPL CALCULATED.3IONS-SCNC: 12 MMOL/L (ref 3–14)
AST SERPL W P-5'-P-CCNC: 17 U/L (ref 0–45)
BASOPHILS # BLD AUTO: 0 10E3/UL (ref 0–0.2)
BASOPHILS NFR BLD AUTO: 0 %
BILIRUB DIRECT SERPL-MCNC: <0.1 MG/DL (ref 0–0.2)
BILIRUB SERPL-MCNC: 0.6 MG/DL (ref 0.2–1.3)
BUN SERPL-MCNC: 9 MG/DL (ref 7–30)
CALCIUM SERPL-MCNC: 9.2 MG/DL (ref 8.5–10.1)
CHLORIDE BLD-SCNC: 96 MMOL/L (ref 94–109)
CO2 SERPL-SCNC: 25 MMOL/L (ref 20–32)
CREAT SERPL-MCNC: 0.72 MG/DL (ref 0.66–1.25)
EOSINOPHIL # BLD AUTO: 0 10E3/UL (ref 0–0.7)
EOSINOPHIL NFR BLD AUTO: 0 %
ERYTHROCYTE [DISTWIDTH] IN BLOOD BY AUTOMATED COUNT: 11.9 % (ref 10–15)
GFR SERPL CREATININE-BSD FRML MDRD: >90 ML/MIN/1.73M2
GLUCOSE BLD-MCNC: 236 MG/DL (ref 70–99)
GLUCOSE BLDC GLUCOMTR-MCNC: 231 MG/DL (ref 70–99)
HCT VFR BLD AUTO: 41.3 % (ref 40–53)
HGB BLD-MCNC: 13.9 G/DL (ref 13.3–17.7)
HOLD SPECIMEN: NORMAL
IMM GRANULOCYTES # BLD: 0.1 10E3/UL
IMM GRANULOCYTES NFR BLD: 0 %
KETONES BLD-SCNC: 2.2 MMOL/L (ref 0–0.6)
LIPASE SERPL-CCNC: <10 U/L (ref 73–393)
LYMPHOCYTES # BLD AUTO: 0.7 10E3/UL (ref 0.8–5.3)
LYMPHOCYTES NFR BLD AUTO: 4 %
MCH RBC QN AUTO: 28 PG (ref 26.5–33)
MCHC RBC AUTO-ENTMCNC: 33.7 G/DL (ref 31.5–36.5)
MCV RBC AUTO: 83 FL (ref 78–100)
MONOCYTES # BLD AUTO: 0.8 10E3/UL (ref 0–1.3)
MONOCYTES NFR BLD AUTO: 5 %
NEUTROPHILS # BLD AUTO: 15.5 10E3/UL (ref 1.6–8.3)
NEUTROPHILS NFR BLD AUTO: 91 %
NRBC # BLD AUTO: 0 10E3/UL
NRBC BLD AUTO-RTO: 0 /100
PH BLDV: 7.39 [PH] (ref 7.32–7.43)
PLATELET # BLD AUTO: 288 10E3/UL (ref 150–450)
POTASSIUM BLD-SCNC: 4 MMOL/L (ref 3.4–5.3)
PROT SERPL-MCNC: 8.3 G/DL (ref 6.8–8.8)
RBC # BLD AUTO: 4.97 10E6/UL (ref 4.4–5.9)
SODIUM SERPL-SCNC: 133 MMOL/L (ref 133–144)
WBC # BLD AUTO: 17.1 10E3/UL (ref 4–11)

## 2021-07-16 PROCEDURE — 85025 COMPLETE CBC W/AUTO DIFF WBC: CPT | Performed by: EMERGENCY MEDICINE

## 2021-07-16 PROCEDURE — 250N000011 HC RX IP 250 OP 636: Performed by: EMERGENCY MEDICINE

## 2021-07-16 PROCEDURE — 82010 KETONE BODYS QUAN: CPT | Performed by: EMERGENCY MEDICINE

## 2021-07-16 PROCEDURE — 99207 PR NO CHARGE LOS: CPT

## 2021-07-16 PROCEDURE — 96374 THER/PROPH/DIAG INJ IV PUSH: CPT

## 2021-07-16 PROCEDURE — 250N000009 HC RX 250: Performed by: EMERGENCY MEDICINE

## 2021-07-16 PROCEDURE — 74177 CT ABD & PELVIS W/CONTRAST: CPT | Mod: 59

## 2021-07-16 PROCEDURE — 83690 ASSAY OF LIPASE: CPT | Performed by: EMERGENCY MEDICINE

## 2021-07-16 PROCEDURE — 36592 COLLECT BLOOD FROM PICC: CPT | Performed by: EMERGENCY MEDICINE

## 2021-07-16 PROCEDURE — 96361 HYDRATE IV INFUSION ADD-ON: CPT

## 2021-07-16 PROCEDURE — 82800 BLOOD PH: CPT | Performed by: EMERGENCY MEDICINE

## 2021-07-16 PROCEDURE — 80076 HEPATIC FUNCTION PANEL: CPT | Performed by: EMERGENCY MEDICINE

## 2021-07-16 PROCEDURE — 96375 TX/PRO/DX INJ NEW DRUG ADDON: CPT

## 2021-07-16 PROCEDURE — 82040 ASSAY OF SERUM ALBUMIN: CPT | Performed by: EMERGENCY MEDICINE

## 2021-07-16 PROCEDURE — 36415 COLL VENOUS BLD VENIPUNCTURE: CPT | Performed by: EMERGENCY MEDICINE

## 2021-07-16 PROCEDURE — 99285 EMERGENCY DEPT VISIT HI MDM: CPT | Mod: 25

## 2021-07-16 PROCEDURE — 80048 BASIC METABOLIC PNL TOTAL CA: CPT | Performed by: EMERGENCY MEDICINE

## 2021-07-16 PROCEDURE — 258N000003 HC RX IP 258 OP 636: Performed by: EMERGENCY MEDICINE

## 2021-07-16 RX ORDER — METOCLOPRAMIDE HYDROCHLORIDE 5 MG/ML
10 INJECTION INTRAMUSCULAR; INTRAVENOUS ONCE
Status: COMPLETED | OUTPATIENT
Start: 2021-07-16 | End: 2021-07-16

## 2021-07-16 RX ORDER — DIPHENHYDRAMINE HYDROCHLORIDE 50 MG/ML
25 INJECTION INTRAMUSCULAR; INTRAVENOUS ONCE
Status: COMPLETED | OUTPATIENT
Start: 2021-07-16 | End: 2021-07-16

## 2021-07-16 RX ORDER — ONDANSETRON 2 MG/ML
4 INJECTION INTRAMUSCULAR; INTRAVENOUS ONCE
Status: COMPLETED | OUTPATIENT
Start: 2021-07-16 | End: 2021-07-16

## 2021-07-16 RX ORDER — IOPAMIDOL 755 MG/ML
500 INJECTION, SOLUTION INTRAVASCULAR ONCE
Status: COMPLETED | OUTPATIENT
Start: 2021-07-16 | End: 2021-07-16

## 2021-07-16 RX ORDER — ONDANSETRON 4 MG/1
4 TABLET, ORALLY DISINTEGRATING ORAL EVERY 6 HOURS PRN
Qty: 9 TABLET | Refills: 0 | Status: SHIPPED | OUTPATIENT
Start: 2021-07-16 | End: 2021-07-19

## 2021-07-16 RX ADMIN — METOCLOPRAMIDE HYDROCHLORIDE 10 MG: 5 INJECTION INTRAMUSCULAR; INTRAVENOUS at 22:12

## 2021-07-16 RX ADMIN — IOPAMIDOL 78 ML: 755 INJECTION, SOLUTION INTRAVENOUS at 20:33

## 2021-07-16 RX ADMIN — DIPHENHYDRAMINE HYDROCHLORIDE 25 MG: 50 INJECTION INTRAMUSCULAR; INTRAVENOUS at 22:11

## 2021-07-16 RX ADMIN — SODIUM CHLORIDE 1000 ML: 9 INJECTION, SOLUTION INTRAVENOUS at 19:19

## 2021-07-16 RX ADMIN — SODIUM CHLORIDE 59 ML: 9 INJECTION, SOLUTION INTRAVENOUS at 20:33

## 2021-07-16 RX ADMIN — ONDANSETRON 4 MG: 2 INJECTION INTRAMUSCULAR; INTRAVENOUS at 19:49

## 2021-07-16 ASSESSMENT — ENCOUNTER SYMPTOMS
VOMITING: 1
ABDOMINAL PAIN: 1
DIARRHEA: 1
NAUSEA: 1

## 2021-07-16 NOTE — ED TRIAGE NOTES
Pt presents to ED with c/o abdominal pain since this morning. Pt reports that he has been having intermittent abdominal pain for the last few months. Pt notes recent diagnosis of type 1 diabetes. Pt is vomiting. Pt states that he did not check his BG at home.  in triage.

## 2021-07-17 ENCOUNTER — HEALTH MAINTENANCE LETTER (OUTPATIENT)
Age: 31
End: 2021-07-17

## 2021-07-17 ENCOUNTER — LAB (OUTPATIENT)
Dept: LAB | Facility: CLINIC | Age: 31
End: 2021-07-17
Payer: COMMERCIAL

## 2021-07-17 VITALS
OXYGEN SATURATION: 95 % | RESPIRATION RATE: 16 BRPM | HEART RATE: 100 BPM | DIASTOLIC BLOOD PRESSURE: 74 MMHG | SYSTOLIC BLOOD PRESSURE: 102 MMHG | TEMPERATURE: 98.5 F

## 2021-07-17 DIAGNOSIS — E10.69 TYPE 1 DIABETES MELLITUS WITH OTHER SPECIFIED COMPLICATION (H): ICD-10-CM

## 2021-07-17 LAB
CORTIS SERPL-MCNC: 37.2 UG/DL (ref 4–22)
HBA1C MFR BLD: 6.8 % (ref 0–5.6)

## 2021-07-17 PROCEDURE — 82024 ASSAY OF ACTH: CPT

## 2021-07-17 PROCEDURE — 83036 HEMOGLOBIN GLYCOSYLATED A1C: CPT

## 2021-07-17 PROCEDURE — 36415 COLL VENOUS BLD VENIPUNCTURE: CPT

## 2021-07-17 PROCEDURE — 82533 TOTAL CORTISOL: CPT

## 2021-07-17 ASSESSMENT — ENCOUNTER SYMPTOMS
SHORTNESS OF BREATH: 0
COUGH: 0
FEVER: 0

## 2021-07-17 NOTE — DISCHARGE INSTRUCTIONS
Lung imaging findings suggestive of possible vaping-related lung injury. We recommend stopping all vaping and moving to different consumption of THC. There is a possibility that THC is causing your GI symptoms as well as we discussed (cannabinoid hyperemesis syndrome). If this is the case, the only effective treatment is stopping all marijuana use.     Follow-up with gastroenterology and primary care to discuss further evaluation and treatment.   If you have not heard from the scheduling office within 2 business days, please call [phone].

## 2021-07-17 NOTE — ED NOTES
DATE:  7/16/2021   TIME OF RECEIPT FROM LAB:  1953  LAB TEST:  Ketones  LAB VALUE:  2.2  RESULTS GIVEN WITH READ-BACK TO (PROVIDER):  Roderick Cha MD  TIME LAB VALUE REPORTED TO PROVIDER:   1953

## 2021-07-17 NOTE — ED PROVIDER NOTES
History   Chief Complaint:  Abdominal Pain and nausea, vomiting, diarrhea      HPI   Wally Avendano is a 30 year old male with history of diabetes who presents with abdominal pain, nausea, vomiting, diarrhea. The patient says that he has been having the symptoms intermittently for the past few months. He says that today around noon he had some soup, and after that had abdominal pain, nausea, and vomiting. He says that he has not taken any medications for his stomach. The patient says that he has had a cold lately, due to his so having had an RSV infection. The patient endorses daily marijuana use, and denies any alcohol use. He says that he uses 28 units of Lantus, and 1 unit of Novolog per 15 g of carbs.     Review of Systems   Constitutional: Negative for fever.   Respiratory: Negative for cough and shortness of breath.    Cardiovascular: Negative for chest pain.   Gastrointestinal: Positive for abdominal pain, diarrhea, nausea and vomiting.   All other systems reviewed and are negative.      Allergies:  No Known Drug Allergies     Medications:  Lantus   Novolog  Glucagon     Past Medical History:    DKA      Diabetes     Past Surgical History:    Patient denies any surgical history      Family History:    PBC     Social History:  Patient presents to the ED alone.   Denies ETOH use.  Endorses daily marijuana use.     Physical Exam     Patient Vitals for the past 24 hrs:   BP Temp Temp src Pulse Resp SpO2   07/16/21 2100 123/64 -- -- -- -- 95 %   07/16/21 2015 110/69 -- -- 96 -- 95 %   07/16/21 2000 113/69 -- -- 98 -- 95 %   07/16/21 1945 112/76 -- -- 104 -- 99 %   07/16/21 1930 115/72 98.5  F (36.9  C) Oral 95 16 97 %   07/16/21 1915 113/79 -- -- 98 -- 96 %   07/16/21 1900 124/87 -- -- -- -- --   07/16/21 1523 (!) 130/98 97.8  F (36.6  C) Temporal 105 15 100 %       Physical Exam  Nursing note and vitals reviewed.  HENT:   Mouth/Throat: Moist mucous membranes.   Eyes: EOMI, nonicteric sclera  Cardiovascular:  Normal rate, regular rhythm, no murmurs, rubs, or gallops  Pulmonary/Chest: Effort normal and breath sounds normal. No respiratory distress. No wheezes. No rales.   Abdominal: Soft. Mild periumbilical pain, nondistended, no guarding or rigidity.   Musculoskeletal: Normal range of motion.   Neurological: Alert. Moves all extremities spontaneously.   Skin: Skin is warm and dry. No rash noted.   Psychiatric: Normal mood and affect.       Emergency Department Course   Imaging:  Abd/pelvis CT,  IV  contrast only TRAUMA / AAA   Final Result   IMPRESSION:    1.  Multifocal ill-defined groundglass nodular infiltrates in both central lung bases. Findings compatible with inflammation.      2.  Cannot exclude mild urinary bladder wall thickening and recommend clinical exclusion of cystitis.          Laboratory:  pH venous: 7.39  BMP: Glucose 236 (H) o/w WNL (Creatinine: 0.72)  Ketone Beta-Hydroxybutyrate: 2.2 (HH)   CBC: WBC: 17.1 (H), HGB: 13.9, PLT: 288  Hepatic Panel: AWNL  Lipase: <10 (L)  Glucose by meter (1522): 231 (H)    Emergency Department Course:    Reviewed:  I reviewed nursing notes, vitals, past medical history and care everywhere     Assessments:  1940 I performed an exam of the patient as documented above.   2300 Pt discharged.     Interventions:  1919 NS 1 L IV  1949 Zofran 4 mg IV     Disposition:  The patient was discharged to home.       Impression & Plan     Medical Decision Making:  Wally Avendano is a 30 year old male who presents to the emergency department today for evaluation of abd pain nausea, and vomiting. Pt with previous visits with similar symptomatology. Pt concerned for undetected underlying pathology. Imaging negative for acute etiology. Labs unremarkable.  Incidental lung groundglass opacities noted which are most likely inflammatory in nature.  Patient does admit to vaping marijuana oil.  Discussed with patient that this is most likely etiology for these lung findings, and that he needs to  stop all use to protect his lungs.  We also discussed possibility that nausea and vomiting is related to cannabinoid hyperemesis syndrome.  Patient doubts this is the case.  Ultimately, he needs gastroenterology evaluation and he was referred.  Also consider diabetic gastroparesis, though diagnosis of diabetes is somewhat recent. He is in stable condition at the time of discharge, indications for return to the ED were discussed as well as follow up. All questions were answered and he is in agreement with the plan.          Diagnosis:    ICD-10-CM    1. Non-intractable vomiting with nausea, unspecified vomiting type  R11.2 Adult Gastro Ref - Procedure Only   2. Ground glass opacity present on imaging of lung  R91.8        Discharge Medications:  Discharge Medication List as of 7/17/2021 12:18 AM      START taking these medications    Details   omeprazole (PRILOSEC) 20 MG DR capsule Take 1 capsule (20 mg) by mouth 2 times daily, Disp-60 capsule, R-0, Local Print             Scribe Disclosure:  I, Moreno Keller, am serving as a scribe at 7:02 PM on 7/16/2021 to document services personally performed by Roderick Cha MD based on my observations and the provider's statements to me.          Roderick Cha MD  07/17/21 1611

## 2021-07-19 DIAGNOSIS — Z11.59 ENCOUNTER FOR SCREENING FOR OTHER VIRAL DISEASES: ICD-10-CM

## 2021-07-19 LAB — ACTH PLAS-MCNC: 27 PG/ML

## 2021-07-25 ENCOUNTER — LAB (OUTPATIENT)
Dept: URGENT CARE | Facility: URGENT CARE | Age: 31
End: 2021-07-25
Attending: INTERNAL MEDICINE
Payer: COMMERCIAL

## 2021-07-25 DIAGNOSIS — Z11.59 ENCOUNTER FOR SCREENING FOR OTHER VIRAL DISEASES: ICD-10-CM

## 2021-07-25 PROCEDURE — U0005 INFEC AGEN DETEC AMPLI PROBE: HCPCS

## 2021-07-25 PROCEDURE — U0003 INFECTIOUS AGENT DETECTION BY NUCLEIC ACID (DNA OR RNA); SEVERE ACUTE RESPIRATORY SYNDROME CORONAVIRUS 2 (SARS-COV-2) (CORONAVIRUS DISEASE [COVID-19]), AMPLIFIED PROBE TECHNIQUE, MAKING USE OF HIGH THROUGHPUT TECHNOLOGIES AS DESCRIBED BY CMS-2020-01-R: HCPCS

## 2021-07-25 NOTE — PROGRESS NOTES
Interval history  - struggled with abdominal pain and diarrhea for one month, awakes during the night, after eating, all day, feeling full,  - pt uses Canabis every evening after work for chronic back pain, and diarrhea. Abdominal pain, filling full, during the night and with meals and occ all day pito  - unclear etiology, notes that 2 year old son is in childcare, although the child has not been sick  -   Diabetes medication  - has been on Lantus 28  Not currently using NovoLog as he is not eating a lot and blood sugar seem to do well without, at times will add NovoLog,    Hypoglycemia: no resolved    SMBG:   Has Dexcom at home, however not started   Checks blood sugars about once a day, range 110 - 160 no hypoglycemia     He has noted some dizziness.  Improving    Meal plan: Currently eating mostly soup, applesauce    Exercise: work batteries plus , moving around a lot of batteries,    Complications  1. Retinopathy: Not evaluated  2. Nephropathy: Not evaluated  3. Neuropathy: No - gastroparesis  4. Hypoglycemia: No  5. Macrovascular: No    Previously used diabetes medications: none    ASSESSMENT:   Wally Avendano a 30 year old male with newly diagnosed  T1DM, blood glucose modestly controlled, currently with nausea, abdominal pain x 6 weeks.     Has Dexcom at home    Reviewed sick day, to cont with long acting insulin,  Re diarrhea and vomiting- unclear whether viral illness, possibly inducing gastroparesis, Celiac, exocrine pancreatic insufficiency    PLAN:  Decrease Lantus to 28 units daily  Novolog carb ratio 20 when eating or 3 units per meal  Lab today for Celiac, fecal elastase    Follow up in 3 months  40 minutes spent on the date of the encounter doing chart review, review of test results, interpretation of tests, patient visit and documentation     This note was generated using computer recognized voice recognition. This might result in some expected imperfection.    Latoya Perez  MD  Endocrinology and Diabetes  Telephone contact:  [facility] [place] [phone]  [facility] [place] [phone]             FH father diabetes diet controlled, irregular heart beat    SH used to drink beer with dinner,no smoking            ROS:    Complete 10 point review of systems is negative except for what mentioned above.    PMH:  Patient Active Problem List   Diagnosis     DKA (diabetic ketoacidoses) (H)       SOCIAL HISTORY:  Social History     Socioeconomic History     Marital status:      Spouse name: Not on file     Number of children: Not on file     Years of education: Not on file     Highest education level: Not on file   Occupational History     Not on file   Tobacco Use     Smoking status: Never Smoker     Smokeless tobacco: Never Used   Substance and Sexual Activity     Alcohol use: Yes     Drug use: Never     Sexual activity: Yes     Partners: Female   Other Topics Concern     Not on file   Social History Narrative     Not on file     Social Determinants of Health     Financial Resource Strain:      Difficulty of Paying Living Expenses:    Food Insecurity:      Worried About Running Out of Food in the Last Year:      Ran Out of Food in the Last Year:    Transportation Needs:      Lack of Transportation (Medical):      Lack of Transportation (Non-Medical):    Physical Activity:      Days of Exercise per Week:      Minutes of Exercise per Session:    Stress:      Feeling of Stress :    Social Connections:      Frequency of Communication with Friends and Family:      Frequency of Social Gatherings with Friends and Family:      Attends [employer] Services:      Active Member of Clubs or Organizations:      Attends Club or Organization Meetings:      Marital Status:    Intimate Partner Violence:      Fear of Current or Ex-Partner:      Emotionally Abused:      Physically Abused:      Sexually Abused:        FAMILY HISTORY:  Family History   Problem  Relation Age of Onset     Other - See Comments Mother         PBC       Medications:   Current Outpatient Medications   Medication Sig Dispense Refill     Alcohol Swabs PADS Use to swab the area of the injection or migel as directed   Per insurance coverage 100 each 0     blood glucose (NO BRAND SPECIFIED) lancets standard To use to test glucose level in the blood  Use to test blood sugar  3  times daily as directed. To accompany glucose monitor brands per insurance coverage. 100 each 0     blood glucose (NO BRAND SPECIFIED) test strip Use to test blood sugar three times daily as directed. To accompany glucose monitor brands per insurance coverage. 350 strip 3     blood glucose calibration (NO BRAND SPECIFIED) solution Used to calibrate the blood glucose monitor as needed and as directed.  To accompany  blood glucose brands per insurance coverage 1 each 0     blood glucose monitoring (NO BRAND SPECIFIED) meter device kit Use to test blood sugar 4 times daily or as directed. 1 kit 0     Continuous Blood Gluc Sensor (DEXCOM G6 SENSOR) MISC Change every 10 days. (Patient not taking: Reported on 6/7/2021) 3 each 11     Continuous Blood Gluc Transmit (DEXCOM G6 TRANSMITTER) MISC Change every 3 months. (Patient not taking: Reported on 6/7/2021) 1 each 3     Glucagon (BAQSIMI ONE PACK) 3 MG/DOSE POWD Spray 1 each in nostril as needed (for severe low blood glucose) 1 each 1     glucose (BD GLUCOSE) 4 g chewable tablet Take 4 tablets by mouth every 15 minutes as needed for low blood sugar (Patient not taking: Reported on 6/7/2021) 50 tablet 0     insulin aspart (NOVOLOG PEN) 100 UNIT/ML pen Sig 2-15 units with meals 2-4 times daily 35 mL 11     insulin glargine (LANTUS PEN) 100 UNIT/ML pen Sig 30-45 units once daily 15 mL 11     insulin pen needle (31G X 5 MM) 31G X 5 MM miscellaneous Use 4 pen needles daily or as directed. 120 each 11     omeprazole (PRILOSEC) 20 MG DR capsule Take 1 capsule (20 mg) by mouth 2 times daily  60 capsule 0     promethazine (PHENERGAN) 25 MG tablet Take 1 tablet (25 mg) by mouth every 6 hours as needed for nausea (Patient not taking: Reported on 6/7/2021) 10 tablet 0     Sharps Container MISC Use as directed to dispose of needles, lancets and other sharps  Per Insurance coverage 1 each 0       Physical Examination:  There were no vitals taken for this visit.  Wt Readings from Last 4 Encounters:   06/03/21 70.3 kg (155 lb)   06/03/21 70.3 kg (155 lb)   04/22/21 70.3 kg (155 lb)   04/05/21 61.7 kg (136 lb 1.6 oz)       General: Well appearing thin young men in no distress.   Psych: good eye contact, no pressured speech    Diabetic foot exam:  Inspection nl  Sensation to monofilame  Sensation to fibration nl  Skin is intact yes  Callus formation is not present  Gait is nl      LABS:  Lab Results   Component Value Date    TSH 1.72 06/02/2021     Lab 6/2/21:  NATHANAEL Ab 99.9 international unit(s)/ml  CBC nl  Ferritin 69  Micro alb/creatinine ratio 11    Lab Results   Component Value Date    MICROL 12 06/02/2021    MICROL <5 04/22/2021     Lab Results   Component Value Date    A1C 6.8 (H) 07/17/2021    A1C 12.0 (H) 04/01/2021       Lab Results   Component Value Date    HGB 13.9 07/16/2021       HPI:  Wally Avendano a young  male with Diabetes mellitus type 1.     The patient has had diabetes since  4/2021 and has diabetes complications of  none.    On April 1 the patient noted nausea this and continued vomiting.  He presented to the ED and was diagnosed with diabetes.  Patient had DKA.  Patient had felt quite well until the day of admission.  He noted mild increase in thirst polyuria and polydipsia.  Mild decrease in weight loss, being usually around 160 pounds and more recently since discharge 135 pounds.  Patient denies blurred vision.  He has some tingling in the foot he dropped the battery on.

## 2021-07-26 ENCOUNTER — OFFICE VISIT (OUTPATIENT)
Dept: ENDOCRINOLOGY | Facility: CLINIC | Age: 31
End: 2021-07-26
Payer: COMMERCIAL

## 2021-07-26 VITALS
WEIGHT: 137.9 LBS | BODY MASS INDEX: 17.24 KG/M2 | DIASTOLIC BLOOD PRESSURE: 69 MMHG | OXYGEN SATURATION: 97 % | SYSTOLIC BLOOD PRESSURE: 100 MMHG | HEART RATE: 105 BPM

## 2021-07-26 DIAGNOSIS — R19.7 DIARRHEA, UNSPECIFIED TYPE: ICD-10-CM

## 2021-07-26 DIAGNOSIS — E10.10 DIABETIC KETOACIDOSIS WITHOUT COMA ASSOCIATED WITH TYPE 1 DIABETES MELLITUS (H): ICD-10-CM

## 2021-07-26 DIAGNOSIS — E10.69 TYPE 1 DIABETES MELLITUS WITH OTHER SPECIFIED COMPLICATION (H): Primary | ICD-10-CM

## 2021-07-26 LAB — SARS-COV-2 RNA RESP QL NAA+PROBE: NEGATIVE

## 2021-07-26 PROCEDURE — 99215 OFFICE O/P EST HI 40 MIN: CPT | Performed by: INTERNAL MEDICINE

## 2021-07-26 NOTE — NURSING NOTE
Wally Avendano's goals for this visit include:   Chief Complaint   Patient presents with     Diabetes     He requests these members of his care team be copied on today's visit information: No    PCP: [facility]    Referring Provider:  No referring provider defined for this encounter.    /69 (BP Location: Left arm, Patient Position: Sitting, Cuff Size: Adult Small)   Pulse 105   Wt 62.6 kg (137 lb 14.4 oz)   SpO2 97%   BMI 17.24 kg/m      Do you need any medication refills at today's visit? No

## 2021-07-26 NOTE — PATIENT INSTRUCTIONS
- Lantus continue at 28 units once daily  - if starting to eat more normal again, restart Novlogo3 units with a meal  - try to check blood glucose at lesat once a day    Blood work today    Stool sample to be dropped    Follow up in 3 months

## 2021-07-26 NOTE — LETTER
7/26/2021         RE: Wally Avendano  [address]  [facility] [address]        Dear Colleague,    Thank you for referring your patient, Wally Avendano, to the [facility]. Please see a copy of my visit note below.            Interval history  - struggled with abdominal pain and diarrhea for one month, awakes during the night, after eating, all day, feeling full,  - pt uses Canabis every evening after work for chronic back pain, and diarrhea. Abdominal pain, filling full, during the night and with meals and occ all day pito  - unclear etiology, notes that 2 year old son is in childcare, although the child has not been sick  -   Diabetes medication  - has been on Lantus 28  Not currently using NovoLog as he is not eating a lot and blood sugar seem to do well without, at times will add NovoLog,    Hypoglycemia: no resolved    SMBG:   Has Dexcom at home, however not started   Checks blood sugars about once a day, range 110 - 160 no hypoglycemia     He has noted some dizziness.  Improving    Meal plan: Currently eating mostly soup, applesauce    Exercise: work batteries plus , moving around a lot of batteries,    Complications  1. Retinopathy: Not evaluated  2. Nephropathy: Not evaluated  3. Neuropathy: No - gastroparesis  4. Hypoglycemia: No  5. Macrovascular: No    Previously used diabetes medications: none    ASSESSMENT:   Wally Avendano a 30 year old male with newly diagnosed  T1DM, blood glucose modestly controlled, currently with nausea, abdominal pain x 6 weeks.     Has Dexcom at home    Reviewed sick day, to cont with long acting insulin,  Re diarrhea and vomiting- unclear whether viral illness, possibly inducing gastroparesis, Celiac, exocrine pancreatic insufficiency    PLAN:  Decrease Lantus to 28 units daily  Novolog carb ratio 20 when eating or 3 units per meal  Lab today for Celiac, fecal elastase    Follow up in 3 months  40 minutes spent on the date of the  encounter doing chart review, review of test results, interpretation of tests, patient visit and documentation     This note was generated using computer recognized voice recognition. This might result in some expected imperfection.    Latoya Perez MD  Endocrinology and Diabetes  Telephone contact:  [facility] [place] [phone]  [facility] [place] [phone]             FH father diabetes diet controlled, irregular heart beat    SH used to drink beer with dinner,no smoking            ROS:    Complete 10 point review of systems is negative except for what mentioned above.    PMH:  Patient Active Problem List   Diagnosis     DKA (diabetic ketoacidoses) (H)       SOCIAL HISTORY:  Social History     Socioeconomic History     Marital status:      Spouse name: Not on file     Number of children: Not on file     Years of education: Not on file     Highest education level: Not on file   Occupational History     Not on file   Tobacco Use     Smoking status: Never Smoker     Smokeless tobacco: Never Used   Substance and Sexual Activity     Alcohol use: Yes     Drug use: Never     Sexual activity: Yes     Partners: Female   Other Topics Concern     Not on file   Social History Narrative     Not on file     Social Determinants of Health     Financial Resource Strain:      Difficulty of Paying Living Expenses:    Food Insecurity:      Worried About Running Out of Food in the Last Year:      Ran Out of Food in the Last Year:    Transportation Needs:      Lack of Transportation (Medical):      Lack of Transportation (Non-Medical):    Physical Activity:      Days of Exercise per Week:      Minutes of Exercise per Session:    Stress:      Feeling of Stress :    Social Connections:      Frequency of Communication with Friends and Family:      Frequency of Social Gatherings with Friends and Family:      Attends [employer] Services:      Active Member of Clubs or Organizations:       Attends Club or Organization Meetings:      Marital Status:    Intimate Partner Violence:      Fear of Current or Ex-Partner:      Emotionally Abused:      Physically Abused:      Sexually Abused:        FAMILY HISTORY:  Family History   Problem Relation Age of Onset     Other - See Comments Mother         PBC       Medications:   Current Outpatient Medications   Medication Sig Dispense Refill     Alcohol Swabs PADS Use to swab the area of the injection or migel as directed   Per insurance coverage 100 each 0     blood glucose (NO BRAND SPECIFIED) lancets standard To use to test glucose level in the blood  Use to test blood sugar  3  times daily as directed. To accompany glucose monitor brands per insurance coverage. 100 each 0     blood glucose (NO BRAND SPECIFIED) test strip Use to test blood sugar three times daily as directed. To accompany glucose monitor brands per insurance coverage. 350 strip 3     blood glucose calibration (NO BRAND SPECIFIED) solution Used to calibrate the blood glucose monitor as needed and as directed.  To accompany  blood glucose brands per insurance coverage 1 each 0     blood glucose monitoring (NO BRAND SPECIFIED) meter device kit Use to test blood sugar 4 times daily or as directed. 1 kit 0     Continuous Blood Gluc Sensor (DEXCOM G6 SENSOR) MISC Change every 10 days. (Patient not taking: Reported on 6/7/2021) 3 each 11     Continuous Blood Gluc Transmit (DEXCOM G6 TRANSMITTER) MISC Change every 3 months. (Patient not taking: Reported on 6/7/2021) 1 each 3     Glucagon (BAQSIMI ONE PACK) 3 MG/DOSE POWD Spray 1 each in nostril as needed (for severe low blood glucose) 1 each 1     glucose (BD GLUCOSE) 4 g chewable tablet Take 4 tablets by mouth every 15 minutes as needed for low blood sugar (Patient not taking: Reported on 6/7/2021) 50 tablet 0     insulin aspart (NOVOLOG PEN) 100 UNIT/ML pen Sig 2-15 units with meals 2-4 times daily 35 mL 11     insulin glargine (LANTUS PEN) 100  UNIT/ML pen Sig 30-45 units once daily 15 mL 11     insulin pen needle (31G X 5 MM) 31G X 5 MM miscellaneous Use 4 pen needles daily or as directed. 120 each 11     omeprazole (PRILOSEC) 20 MG DR capsule Take 1 capsule (20 mg) by mouth 2 times daily 60 capsule 0     promethazine (PHENERGAN) 25 MG tablet Take 1 tablet (25 mg) by mouth every 6 hours as needed for nausea (Patient not taking: Reported on 6/7/2021) 10 tablet 0     Sharps Container MISC Use as directed to dispose of needles, lancets and other sharps  Per Insurance coverage 1 each 0       Physical Examination:  There were no vitals taken for this visit.  Wt Readings from Last 4 Encounters:   06/03/21 70.3 kg (155 lb)   06/03/21 70.3 kg (155 lb)   04/22/21 70.3 kg (155 lb)   04/05/21 61.7 kg (136 lb 1.6 oz)       General: Well appearing thin young men in no distress.   Psych: good eye contact, no pressured speech    Diabetic foot exam:  Inspection nl  Sensation to monofilame  Sensation to fibration nl  Skin is intact yes  Callus formation is not present  Gait is nl      LABS:  Lab Results   Component Value Date    TSH 1.72 06/02/2021     Lab 6/2/21:  NATHANAEL Ab 99.9 international unit(s)/ml  CBC nl  Ferritin 69  Micro alb/creatinine ratio 11    Lab Results   Component Value Date    MICROL 12 06/02/2021    MICROL <5 04/22/2021     Lab Results   Component Value Date    A1C 6.8 (H) 07/17/2021    A1C 12.0 (H) 04/01/2021       Lab Results   Component Value Date    HGB 13.9 07/16/2021       HPI:  Wally Avendano a young  male with Diabetes mellitus type 1.     The patient has had diabetes since  4/2021 and has diabetes complications of  none.    On April 1 the patient noted nausea this and continued vomiting.  He presented to the ED and was diagnosed with diabetes.  Patient had DKA.  Patient had felt quite well until the day of admission.  He noted mild increase in thirst polyuria and polydipsia.  Mild decrease in weight loss, being usually around 160 pounds and more  recently since discharge 135 pounds.  Patient denies blurred vision.  He has some tingling in the foot he dropped the battery on.      Again, thank you for allowing me to participate in the care of your patient.        Sincerely,        Latoya Perez MD

## 2021-07-28 ENCOUNTER — HOSPITAL ENCOUNTER (OUTPATIENT)
Facility: CLINIC | Age: 31
Discharge: HOME | End: 2021-07-28
Attending: INTERNAL MEDICINE | Admitting: INTERNAL MEDICINE
Payer: COMMERCIAL

## 2021-07-28 VITALS
SYSTOLIC BLOOD PRESSURE: 99 MMHG | TEMPERATURE: 96.8 F | RESPIRATION RATE: 16 BRPM | OXYGEN SATURATION: 96 % | WEIGHT: 140 LBS | HEART RATE: 95 BPM | DIASTOLIC BLOOD PRESSURE: 70 MMHG | BODY MASS INDEX: 17.41 KG/M2 | HEIGHT: 75 IN

## 2021-07-28 LAB
GLUCOSE BLDC GLUCOMTR-MCNC: 155 MG/DL (ref 70–99)
UPPER GI ENDOSCOPY: NORMAL

## 2021-07-28 PROCEDURE — 82962 GLUCOSE BLOOD TEST: CPT

## 2021-07-28 PROCEDURE — 250N000009 HC RX 250: Performed by: INTERNAL MEDICINE

## 2021-07-28 PROCEDURE — 43239 EGD BIOPSY SINGLE/MULTIPLE: CPT | Performed by: INTERNAL MEDICINE

## 2021-07-28 PROCEDURE — 88342 IMHCHEM/IMCYTCHM 1ST ANTB: CPT | Mod: TC | Performed by: INTERNAL MEDICINE

## 2021-07-28 PROCEDURE — 88305 TISSUE EXAM BY PATHOLOGIST: CPT | Mod: 26

## 2021-07-28 PROCEDURE — 250N000011 HC RX IP 250 OP 636: Performed by: INTERNAL MEDICINE

## 2021-07-28 PROCEDURE — 999N000099 HC STATISTIC MODERATE SEDATION < 10 MIN: Performed by: INTERNAL MEDICINE

## 2021-07-28 PROCEDURE — 88342 IMHCHEM/IMCYTCHM 1ST ANTB: CPT | Mod: 26

## 2021-07-28 RX ORDER — NALOXONE HYDROCHLORIDE 0.4 MG/ML
0.4 INJECTION, SOLUTION INTRAMUSCULAR; INTRAVENOUS; SUBCUTANEOUS
Status: DISCONTINUED | OUTPATIENT
Start: 2021-07-28 | End: 2021-07-28 | Stop reason: HOSPADM

## 2021-07-28 RX ORDER — FENTANYL CITRATE 50 UG/ML
50-100 INJECTION, SOLUTION INTRAMUSCULAR; INTRAVENOUS
Status: COMPLETED | OUTPATIENT
Start: 2021-07-28 | End: 2021-07-28

## 2021-07-28 RX ORDER — SIMETHICONE 40MG/0.6ML
133 SUSPENSION, DROPS(FINAL DOSAGE FORM)(ML) ORAL
Status: DISCONTINUED | OUTPATIENT
Start: 2021-07-28 | End: 2021-07-28 | Stop reason: HOSPADM

## 2021-07-28 RX ORDER — FLUMAZENIL 0.1 MG/ML
0.2 INJECTION, SOLUTION INTRAVENOUS
Status: DISCONTINUED | OUTPATIENT
Start: 2021-07-28 | End: 2021-07-28 | Stop reason: HOSPADM

## 2021-07-28 RX ORDER — FENTANYL CITRATE 50 UG/ML
25-50 INJECTION, SOLUTION INTRAMUSCULAR; INTRAVENOUS
Status: DISCONTINUED | OUTPATIENT
Start: 2021-07-28 | End: 2021-07-28 | Stop reason: HOSPADM

## 2021-07-28 RX ORDER — ONDANSETRON 2 MG/ML
4 INJECTION INTRAMUSCULAR; INTRAVENOUS EVERY 6 HOURS PRN
Status: DISCONTINUED | OUTPATIENT
Start: 2021-07-28 | End: 2021-07-28 | Stop reason: HOSPADM

## 2021-07-28 RX ORDER — NALOXONE HYDROCHLORIDE 0.4 MG/ML
0.2 INJECTION, SOLUTION INTRAMUSCULAR; INTRAVENOUS; SUBCUTANEOUS
Status: DISCONTINUED | OUTPATIENT
Start: 2021-07-28 | End: 2021-07-28 | Stop reason: HOSPADM

## 2021-07-28 RX ORDER — LIDOCAINE 40 MG/G
CREAM TOPICAL
Status: DISCONTINUED | OUTPATIENT
Start: 2021-07-28 | End: 2021-07-28 | Stop reason: HOSPADM

## 2021-07-28 RX ORDER — ONDANSETRON 4 MG/1
4 TABLET, ORALLY DISINTEGRATING ORAL EVERY 6 HOURS PRN
Status: DISCONTINUED | OUTPATIENT
Start: 2021-07-28 | End: 2021-07-28 | Stop reason: HOSPADM

## 2021-07-28 RX ORDER — ONDANSETRON 2 MG/ML
4 INJECTION INTRAMUSCULAR; INTRAVENOUS
Status: DISCONTINUED | OUTPATIENT
Start: 2021-07-28 | End: 2021-07-28 | Stop reason: HOSPADM

## 2021-07-28 RX ORDER — PROCHLORPERAZINE MALEATE 10 MG
10 TABLET ORAL EVERY 6 HOURS PRN
Status: DISCONTINUED | OUTPATIENT
Start: 2021-07-28 | End: 2021-07-28 | Stop reason: HOSPADM

## 2021-07-28 RX ORDER — ATROPINE SULFATE 0.4 MG/ML
0.4 AMPUL (ML) INJECTION
Status: DISCONTINUED | OUTPATIENT
Start: 2021-07-28 | End: 2021-07-28 | Stop reason: HOSPADM

## 2021-07-28 RX ORDER — EPINEPHRINE 1 MG/ML
0.1 INJECTION, SOLUTION, CONCENTRATE INTRAVENOUS
Status: DISCONTINUED | OUTPATIENT
Start: 2021-07-28 | End: 2021-07-28 | Stop reason: HOSPADM

## 2021-07-28 RX ADMIN — TOPICAL ANESTHETIC 0.5 ML: 200 SPRAY DENTAL; PERIODONTAL at 11:12

## 2021-07-28 RX ADMIN — FENTANYL CITRATE 100 MCG: 50 INJECTION, SOLUTION INTRAMUSCULAR; INTRAVENOUS at 11:12

## 2021-07-28 RX ADMIN — MIDAZOLAM 2 MG: 1 INJECTION INTRAMUSCULAR; INTRAVENOUS at 11:13

## 2021-07-28 RX ADMIN — MIDAZOLAM 2 MG: 1 INJECTION INTRAMUSCULAR; INTRAVENOUS at 11:12

## 2021-07-28 ASSESSMENT — MIFFLIN-ST. JEOR: SCORE: 1675.67

## 2021-07-28 NOTE — CONSULTS
"Pre-Endoscopy History and Physical     Wally Avendano MRN# [id number]   YOB: 1990 Age: 30 year old     Date of Procedure: 7/28/2021  Primary care provider: [facility]  Type of Endoscopy: esophagogastroduodenoscopy (upper GI endoscopy)  Reason for Procedure: nausea/vomiting, dyspepsia  Type of Anesthesia Anticipated: Moderate (conscious) sedation    HPI:    Wally is a 30 year old male who will be undergoing the above procedure.      A history and physical has been performed. The patient's medications and allergies have been reviewed. The risks and benefits of the procedure and the sedation options and risks were discussed with the patient.  All questions were answered and informed consent was obtained.      He denies a personal or family history of anesthesia complications or bleeding disorders.     No Known Allergies     No current facility-administered medications for this encounter.       Patient Active Problem List   Diagnosis     DKA (diabetic ketoacidoses) (H)     Type 1 diabetes mellitus with other specified complication (H)     Diarrhea, unspecified type        Past Medical History:   Diagnosis Date     Known health problems: none         Past Surgical History:   Procedure Laterality Date     NO HISTORY OF SURGERY         Social History     Tobacco Use     Smoking status: Never Smoker     Smokeless tobacco: Never Used   Substance Use Topics     Alcohol use: Yes       Family History   Problem Relation Age of Onset     Other - See Comments Mother         PBC       REVIEW OF SYSTEMS:     5 point ROS negative except as noted above in HPI, including Gen., Resp., CV, GI &  system review.    PHYSICAL EXAM:   There were no vitals taken for this visit. Estimated body mass index is 17.24 kg/m  as calculated from the following:    Height as of 4/22/21: 1.905 m (6' 3\").    Weight as of 7/26/21: 62.6 kg (137 lb 14.4 oz).   GENERAL APPEARANCE: healthy  MENTAL STATUS: alert  AIRWAY EXAM: " Mallampatti Class I (visualization of the soft palate, fauces, uvula, anterior and posterior pillars)  RESP: lungs clear to auscultation - no rales, rhonchi or wheezes  CV: regular rates and rhythm    DIAGNOSTICS:      Not indicated    IMPRESSION     ASA Class 2 - Mild systemic disease    PLAN:     EGD    The above has been forwarded to the consulting provider.    Signed Electronically by: Mikel Restrepo MD  July 28, 2021

## 2021-07-28 NOTE — LETTER
July 19, 2021      Wally Avendano  [address]  [facility] [address]        Dear Wally,     Thank you for choosing [facility]. You are scheduled for the following service(s).   Please be aware that coverage of these services is subject to the terms and limitations of your health insurance plan.  Call member services at your health plan with any benefit or coverage questions.    Date:   7/28/2021  10:30      Procedure: UPPER ENDOSCOPY-EGD  Doctor: Dr. Restrepo           Arrival Time:  10:30 am    *Enter and check in at the Main Hospital Entrance  Procedure Time: 11:00 am       Location:   [facility]        Endoscopy Department, First Floor *         [address] [place], [place] [id number] [phone] or [phone] () to reschedule          PRE-PROCEDURE CHECKLIST    If you have diabetes, ask your regular doctor for diet and medication restrictions.  If you take any antiplatelet or anticoagulant medications (such as Coumadin, Lovenox, Plavix, etc.) and have not already discussed this, please call your primary physician for advice on holding this medication.  If you take Aspirin, you may continue to do so.  If you are or may be pregnant, please discuss the risks and benefits of this procedure with your doctor.  You must arrange for a ride for the day of your exam. If you fail to arrange transportation with a responsible adult, your procedure will need to be cancelled and rescheduled. Taxi, bus and medical transport are not acceptable unless you have a responsible adult that you know & trust with you. Please arrange for this  to be able to pick you up in our department, approximately one hour after your scheduled procedure, if they are not able to stay with you.      Canceling or rescheduling   If you must cancel or reschedule your appointment, please call [phone] as soon as possible.      Upper Endoscopy or Esophagogastroduodenoscopy  (EGD) is a test performed to evaluate symptoms of persistent abdominal pain, nausea, vomiting and difficulty swallowing. It may also be used to treat various conditions of the upper gastrointestinal tract, such as bleeding, narrowing or abnormal growths.     What happens during an upper endoscopy?  On the day of your procedure, plan to spend up to one and a half hour after your arrival at the endoscopy center. The exam itself takes about 5 to 10 minutes.    Before the exam:  - You will change into a gown.   - Your medical history and medication list will be reviewed with you, unless it has already been done over the phone.   - A nurse will insert an intravenous (IV) line into your hand or arm.  - The doctor will talk to you and give you a consent form to sign.    During the exam:  - Medicine will be given through the IV line to help you relax and feel comfortable.   - Your heart rate and oxygen levels will be monitored. If your blood pressure is low, you may be given fluids through the IV line.   - The doctor will insert a flexible, hollow tube, called an endoscope, into your mouth and will advance it slowly through the esophagus, stomach and duodenum (the first part of your small intestine).   - You may have a feeling of pressure or fullness.   - If you have difficulty swallowing, and the doctor finds a narrowing in your esophagus, it may be possible for the area to be expanded-dilated during the exam.   - If abnormal tissue is found, the doctor may remove it through the endoscope (biopsy it) for closer examination. The tissue removal is painless.    After the exam:  - Any tissue samples removed during the exam will be sent to a lab for evaluation. It may take 5 to 7 working days for you to be notified of the results  - The doctor will prepare a full report for the physician who referred you for the upper endoscopy.   - The doctor will talk with you about the initial results of your exam.   - You may feel bloated  after the procedure. That is normal and should not last long.   - Your throat may feel sore for a short time.   - Following the exam, you may resume your normal diet. Avoid alcohol until the next day.   - You may resume your regular activities the day after the procedure.   - Medication given during the exam will prohibit you from driving for the rest of the day.  - A nurse will provide you with complete discharge instructions before you leave the endoscopy center. Be sure to ask the nurse for specific instructions if you take blood thinners such as Aspirin , Coumadin , Lovenox , Plavix , etc.       PREPARATION    To ensure a successful exam, please follow all instructions carefully.      The night before your exam:    STOP eating solid foods at 11:45 pm.     Clear liquids are okay to drink (examples: Gatorade , apple juice, clear broth,coffee or tea without milk or cream, etc.).     DO NOT drink red liquids or alcoholic beverages.    The day of your exam:    STOP drinking clear liquids 4 hours before your exam.     You may take your usual medications with 4 oz. of water, but it needs to be at least 4 hours prior to your procedure.    When you leave for the procedure:    Bring a list of all of your current medications, including any allergy or over-the-counter medications, unless you have already reviewed that with an Endoscopy RN over the phone.     Bring a photo ID as well as up-to-date insurance information, such as your insurance card and any referral forms that might be required by your payer.       DIRECTIONS TO THE ENDOSCOPY DEPARTMENT    From the north ([facility])  Take [address] South, exit on [address] . Get into the left hand anders, turn left (east), go one-half mile to [address] and turn left. Go north to the second stoplight, take a right on [address] and follow it to the Main Hospital entrance.  From the south ([facility])  Take 35N to the 35E split and  exit on [address] . On [address] , turn left (west) to [address]. Turn right (north) on [address]. Go north to the second stoplight, take a right on [address] and follow it to the Main Hospital entrance.  From the east via 35E ([facility])  Take [address]E south to [address]  exit. Turn right on [address] . Go west to [address]. Turn right (north) on [address]. Go to the second stoplight, take a right on [address] to the Main Hospital entrance.  From the east via [address] ([facility])  Take [address] to [address]. Turn left (south) on [address] to [address], turn left (east) on [address] and follow it to the Main Hospital entrance.    From the west via [address] ([place])  Take [address] east to [address]. Turn right (south) on [address] to [address], turn left (east) on [address] and follow it to the Main Hospital entrance.

## 2021-08-02 ENCOUNTER — HOSPITAL ENCOUNTER (EMERGENCY)
Facility: CLINIC | Age: 31
Discharge: HOME | End: 2021-08-02
Attending: EMERGENCY MEDICINE | Admitting: EMERGENCY MEDICINE
Payer: COMMERCIAL

## 2021-08-02 VITALS
WEIGHT: 140 LBS | HEIGHT: 76 IN | DIASTOLIC BLOOD PRESSURE: 70 MMHG | HEART RATE: 85 BPM | BODY MASS INDEX: 17.05 KG/M2 | RESPIRATION RATE: 14 BRPM | SYSTOLIC BLOOD PRESSURE: 110 MMHG | OXYGEN SATURATION: 96 % | TEMPERATURE: 97.3 F

## 2021-08-02 DIAGNOSIS — E88.89 KETOSIS (H): ICD-10-CM

## 2021-08-02 DIAGNOSIS — E87.20 LACTIC ACIDOSIS: ICD-10-CM

## 2021-08-02 DIAGNOSIS — R11.2 NON-INTRACTABLE VOMITING WITH NAUSEA, UNSPECIFIED VOMITING TYPE: ICD-10-CM

## 2021-08-02 DIAGNOSIS — R73.9 HYPERGLYCEMIA: ICD-10-CM

## 2021-08-02 LAB
ALBUMIN SERPL-MCNC: 3.7 G/DL (ref 3.4–5)
ALP SERPL-CCNC: 73 U/L (ref 40–150)
ALT SERPL W P-5'-P-CCNC: 20 U/L (ref 0–70)
ANION GAP SERPL CALCULATED.3IONS-SCNC: 13 MMOL/L (ref 3–14)
ANION GAP SERPL CALCULATED.3IONS-SCNC: 7 MMOL/L (ref 3–14)
AST SERPL W P-5'-P-CCNC: 11 U/L (ref 0–45)
BASOPHILS # BLD AUTO: 0 10E3/UL (ref 0–0.2)
BASOPHILS NFR BLD AUTO: 0 %
BILIRUB SERPL-MCNC: 0.4 MG/DL (ref 0.2–1.3)
BUN SERPL-MCNC: 11 MG/DL (ref 7–30)
BUN SERPL-MCNC: 16 MG/DL (ref 7–30)
CALCIUM SERPL-MCNC: 8.4 MG/DL (ref 8.5–10.1)
CALCIUM SERPL-MCNC: 9 MG/DL (ref 8.5–10.1)
CHLORIDE BLD-SCNC: 100 MMOL/L (ref 94–109)
CHLORIDE BLD-SCNC: 101 MMOL/L (ref 94–109)
CO2 SERPL-SCNC: 22 MMOL/L (ref 20–32)
CO2 SERPL-SCNC: 28 MMOL/L (ref 20–32)
CREAT SERPL-MCNC: 0.63 MG/DL (ref 0.66–1.25)
CREAT SERPL-MCNC: 0.75 MG/DL (ref 0.66–1.25)
EOSINOPHIL # BLD AUTO: 0 10E3/UL (ref 0–0.7)
EOSINOPHIL NFR BLD AUTO: 0 %
ERYTHROCYTE [DISTWIDTH] IN BLOOD BY AUTOMATED COUNT: 12.5 % (ref 10–15)
GFR SERPL CREATININE-BSD FRML MDRD: >90 ML/MIN/1.73M2
GFR SERPL CREATININE-BSD FRML MDRD: >90 ML/MIN/1.73M2
GLUCOSE BLD-MCNC: 258 MG/DL (ref 70–99)
GLUCOSE BLD-MCNC: 411 MG/DL (ref 70–99)
GLUCOSE BLDC GLUCOMTR-MCNC: 342 MG/DL (ref 70–99)
GLUCOSE BLDC GLUCOMTR-MCNC: 417 MG/DL (ref 70–99)
HCO3 BLDV-SCNC: 22 MMOL/L (ref 21–28)
HCO3 BLDV-SCNC: 26 MMOL/L (ref 21–28)
HCO3 BLDV-SCNC: 31 MMOL/L (ref 21–28)
HCT VFR BLD AUTO: 42.9 % (ref 40–53)
HGB BLD-MCNC: 14 G/DL (ref 13.3–17.7)
IMM GRANULOCYTES # BLD: 0 10E3/UL
IMM GRANULOCYTES NFR BLD: 0 %
KETONES BLD-SCNC: 0.3 MMOL/L (ref 0–0.6)
KETONES BLD-SCNC: 2 MMOL/L (ref 0–0.6)
LACTATE BLD-SCNC: 2.1 MMOL/L
LACTATE BLD-SCNC: 3.7 MMOL/L
LACTATE BLD-SCNC: 5.3 MMOL/L
LIPASE SERPL-CCNC: <10 U/L (ref 73–393)
LYMPHOCYTES # BLD AUTO: 0.6 10E3/UL (ref 0.8–5.3)
LYMPHOCYTES NFR BLD AUTO: 4 %
MCH RBC QN AUTO: 27.6 PG (ref 26.5–33)
MCHC RBC AUTO-ENTMCNC: 32.6 G/DL (ref 31.5–36.5)
MCV RBC AUTO: 84 FL (ref 78–100)
MONOCYTES # BLD AUTO: 0.4 10E3/UL (ref 0–1.3)
MONOCYTES NFR BLD AUTO: 3 %
NEUTROPHILS # BLD AUTO: 12.2 10E3/UL (ref 1.6–8.3)
NEUTROPHILS NFR BLD AUTO: 93 %
NRBC # BLD AUTO: 0 10E3/UL
NRBC BLD AUTO-RTO: 0 /100
PATH REPORT.ADDENDUM SPEC: NORMAL
PATH REPORT.COMMENTS IMP SPEC: NORMAL
PATH REPORT.COMMENTS IMP SPEC: NORMAL
PATH REPORT.FINAL DX SPEC: NORMAL
PATH REPORT.GROSS SPEC: NORMAL
PATH REPORT.MICROSCOPIC SPEC OTHER STN: NORMAL
PCO2 BLDV: 34 MM HG (ref 40–50)
PCO2 BLDV: 49 MM HG (ref 40–50)
PCO2 BLDV: 51 MM HG (ref 40–50)
PH BLDV: 7.33 [PH] (ref 7.32–7.43)
PH BLDV: 7.39 [PH] (ref 7.32–7.43)
PH BLDV: 7.43 [PH] (ref 7.32–7.43)
PHOTO IMAGE: NORMAL
PLAT MORPH BLD: NORMAL
PLATELET # BLD AUTO: 364 10E3/UL (ref 150–450)
PO2 BLDV: 17 MM HG (ref 25–47)
PO2 BLDV: 19 MM HG (ref 25–47)
PO2 BLDV: 36 MM HG (ref 25–47)
POTASSIUM BLD-SCNC: 3.5 MMOL/L (ref 3.4–5.3)
POTASSIUM BLD-SCNC: 3.7 MMOL/L (ref 3.4–5.3)
PROT SERPL-MCNC: 8.1 G/DL (ref 6.8–8.8)
RBC # BLD AUTO: 5.08 10E6/UL (ref 4.4–5.9)
RBC MORPH BLD: NORMAL
SAO2 % BLDV: 21 % (ref 94–100)
SAO2 % BLDV: 27 % (ref 94–100)
SAO2 % BLDV: 72 % (ref 94–100)
SODIUM SERPL-SCNC: 135 MMOL/L (ref 133–144)
SODIUM SERPL-SCNC: 136 MMOL/L (ref 133–144)
WBC # BLD AUTO: 13.3 10E3/UL (ref 4–11)

## 2021-08-02 PROCEDURE — 82803 BLOOD GASES ANY COMBINATION: CPT | Mod: 91

## 2021-08-02 PROCEDURE — 96374 THER/PROPH/DIAG INJ IV PUSH: CPT

## 2021-08-02 PROCEDURE — 36415 COLL VENOUS BLD VENIPUNCTURE: CPT | Performed by: EMERGENCY MEDICINE

## 2021-08-02 PROCEDURE — 93005 ELECTROCARDIOGRAM TRACING: CPT

## 2021-08-02 PROCEDURE — 258N000003 HC RX IP 258 OP 636: Performed by: EMERGENCY MEDICINE

## 2021-08-02 PROCEDURE — 250N000011 HC RX IP 250 OP 636

## 2021-08-02 PROCEDURE — 96361 HYDRATE IV INFUSION ADD-ON: CPT

## 2021-08-02 PROCEDURE — 82010 KETONE BODYS QUAN: CPT | Mod: 91 | Performed by: EMERGENCY MEDICINE

## 2021-08-02 PROCEDURE — 250N000011 HC RX IP 250 OP 636: Performed by: EMERGENCY MEDICINE

## 2021-08-02 PROCEDURE — 83690 ASSAY OF LIPASE: CPT | Performed by: EMERGENCY MEDICINE

## 2021-08-02 PROCEDURE — 80053 COMPREHEN METABOLIC PANEL: CPT | Performed by: EMERGENCY MEDICINE

## 2021-08-02 PROCEDURE — 99285 EMERGENCY DEPT VISIT HI MDM: CPT | Mod: 25

## 2021-08-02 PROCEDURE — 85025 COMPLETE CBC W/AUTO DIFF WBC: CPT | Performed by: EMERGENCY MEDICINE

## 2021-08-02 PROCEDURE — 96375 TX/PRO/DX INJ NEW DRUG ADDON: CPT

## 2021-08-02 RX ORDER — ONDANSETRON 2 MG/ML
4 INJECTION INTRAMUSCULAR; INTRAVENOUS
Status: COMPLETED | OUTPATIENT
Start: 2021-08-02 | End: 2021-08-02

## 2021-08-02 RX ORDER — ONDANSETRON 4 MG/1
4 TABLET, ORALLY DISINTEGRATING ORAL EVERY 8 HOURS PRN
Qty: 10 TABLET | Refills: 0 | Status: SHIPPED | OUTPATIENT
Start: 2021-08-02 | End: 2021-08-05

## 2021-08-02 RX ORDER — MORPHINE SULFATE 4 MG/ML
4 INJECTION, SOLUTION INTRAMUSCULAR; INTRAVENOUS ONCE
Status: COMPLETED | OUTPATIENT
Start: 2021-08-02 | End: 2021-08-02

## 2021-08-02 RX ORDER — ONDANSETRON 2 MG/ML
INJECTION INTRAMUSCULAR; INTRAVENOUS
Status: COMPLETED
Start: 2021-08-02 | End: 2021-08-02

## 2021-08-02 RX ORDER — DROPERIDOL 2.5 MG/ML
1.25 INJECTION, SOLUTION INTRAMUSCULAR; INTRAVENOUS ONCE
Status: COMPLETED | OUTPATIENT
Start: 2021-08-02 | End: 2021-08-02

## 2021-08-02 RX ADMIN — SODIUM CHLORIDE, POTASSIUM CHLORIDE, SODIUM LACTATE AND CALCIUM CHLORIDE 1000 ML: 600; 310; 30; 20 INJECTION, SOLUTION INTRAVENOUS at 11:51

## 2021-08-02 RX ADMIN — DROPERIDOL 1.25 MG: 2.5 INJECTION, SOLUTION INTRAMUSCULAR; INTRAVENOUS at 14:34

## 2021-08-02 RX ADMIN — SODIUM CHLORIDE, POTASSIUM CHLORIDE, SODIUM LACTATE AND CALCIUM CHLORIDE 1000 ML: 600; 310; 30; 20 INJECTION, SOLUTION INTRAVENOUS at 10:05

## 2021-08-02 RX ADMIN — MORPHINE SULFATE 4 MG: 4 INJECTION INTRAVENOUS at 09:25

## 2021-08-02 RX ADMIN — ONDANSETRON 4 MG: 2 INJECTION INTRAMUSCULAR; INTRAVENOUS at 06:43

## 2021-08-02 RX ADMIN — SODIUM CHLORIDE 1000 ML: 9 INJECTION, SOLUTION INTRAVENOUS at 06:42

## 2021-08-02 RX ADMIN — SODIUM CHLORIDE 1000 ML: 9 INJECTION, SOLUTION INTRAVENOUS at 09:25

## 2021-08-02 RX ADMIN — ONDANSETRON 4 MG: 2 INJECTION INTRAMUSCULAR; INTRAVENOUS at 09:25

## 2021-08-02 ASSESSMENT — ENCOUNTER SYMPTOMS
DIARRHEA: 0
VOMITING: 1
FEVER: 0
HEMATURIA: 0
DIFFICULTY URINATING: 0
NAUSEA: 1
FREQUENCY: 0
ABDOMINAL PAIN: 1
DYSURIA: 0

## 2021-08-02 ASSESSMENT — MIFFLIN-ST. JEOR: SCORE: 1691.54

## 2021-08-02 NOTE — ED NOTES
DATE:  8/2/2021   TIME OF RECEIPT FROM LAB:  0940  LAB TEST:  Ketones  LAB VALUE: 2.0  RESULTS GIVEN WITH READ-BACK TO (PROVIDER): Dr. Valencia  TIME LAB VALUE REPORTED TO PROVIDER:  0981

## 2021-08-02 NOTE — ED TRIAGE NOTES
Here for n/v since noon associated with abdominal pain. Tried zofran about 5-6 hours ago. ABCs intact.

## 2021-08-02 NOTE — ED PROVIDER NOTES
History   Chief Complaint:  Nausea & Vomiting    The history is provided by the patient.      Wally Avendano is a 30 year old male, with a history of diabetes mellitus type I and DKA, who presents to the ED for evaluation of nausea and vomiting. The patient is s/p EGD on 7/28/2021, which came back w/o worrisome findings. Has been seen in the ED 4 times in the past couple months for nausea and vomiting, most recently on 7/16. He arrives to the ED today after having nausea and vomiting since 1200 yesterday afternoon. He has associated central abdominal pain that has not radiated to other areas since the onset. States he has not been taking his insulin, as his endocrinologist has suggested he stop due to low food intake and a normal blood sugar. Upon eval, states his nausea has since become intermittent since having IVF and Zofran in triage at 0643. States his mouth feels dry. No known sick contacts. Denies diarrhea, fevers, or urinary symptoms.     Review of Systems   Constitutional: Negative for fever.   HENT:        (+) dry mouth   Gastrointestinal: Positive for abdominal pain, nausea and vomiting. Negative for diarrhea.   Genitourinary: Negative for difficulty urinating, dysuria, frequency and hematuria.   All other systems reviewed and are negative.    Allergies:  No Known Drug Allergies      Medications:  Prilosec  Phenergan     Past Medical History:    DKA      Diabetes type I     Past Surgical History:    EGD      Family History:    PBC     Social History:  Marital Status:   PCP: [facility]S [facility]  Presents to the ED alone    Physical Exam     Patient Vitals for the past 24 hrs:   BP Temp Temp src Pulse Resp SpO2 Height Weight   08/02/21 1500 110/70 -- -- 85 14 96 % -- --   08/02/21 1445 -- -- -- 92 8 100 % -- --   08/02/21 1430 115/69 -- -- 85 -- 100 % -- --   08/02/21 1415 116/78 -- -- 98 -- 100 % -- --   08/02/21 1400 -- -- -- -- -- 98 % -- --   08/02/21 1345 -- -- -- -- -- 99 % -- --   08/02/21  "1330 -- -- -- -- -- 99 % -- --   08/02/21 1315 -- -- -- -- -- 98 % -- --   08/02/21 1300 -- -- -- -- -- 97 % -- --   08/02/21 1245 -- -- -- -- -- 99 % -- --   08/02/21 1230 -- -- -- -- -- 100 % -- --   08/02/21 1215 -- -- -- -- -- 100 % -- --   08/02/21 1200 -- -- -- -- -- 99 % -- --   08/02/21 1130 118/83 -- -- 88 -- 97 % -- --   08/02/21 1115 120/79 -- -- 87 -- 100 % -- --   08/02/21 1100 (!) 114/90 -- -- 85 -- 99 % -- --   08/02/21 1045 130/79 -- -- 85 -- 100 % -- --   08/02/21 1030 114/71 -- -- 98 -- 100 % -- --   08/02/21 1015 104/68 -- -- 98 -- 99 % -- --   08/02/21 1000 110/67 -- -- 101 -- 98 % -- --   08/02/21 0945 105/63 -- -- 100 -- 94 % -- --   08/02/21 0935 -- -- -- -- -- 92 % -- --   08/02/21 0930 113/68 -- -- 94 -- -- -- --   08/02/21 0551 120/87 97.3  F (36.3  C) Temporal 112 20 100 % 1.93 m (6' 4\") 63.5 kg (140 lb)       Physical Exam  Constitutional: Vital signs reviewed as above.   HENT:               Head: No external signs of trauma noted.              Eyes: Conjunctivae are normal. Pupils are equal, round, and reactive to light.   Cardiovascular:               Normal rate, regular rhythm and normal heart sounds.                Exam reveals no friction rub.                No murmur heard.  Pulmonary/Chest:               Effort normal and breath sounds normal.               No respiratory distress.               There are no wheezes.               There are no rales.   Gastrointestinal:               Soft.               Bowel sounds normal.               There is no distension.               There is periumbilical and epigastric tenderness.               No RLQ, LLQ, RUQ, or LUQ tenderness              There is no rebound or guarding.   Musculoskeletal:               Normal range of motion.               Normal Tone  Neurological: Patient is alert and oriented to person, place, and time.   Skin: Skin is warm and dry. Patient is not diaphoretic  Psychiatric: The patient appears calm    Emergency " Department Course   ECG  ECG taken at 1410, ECG read at 1415  Normal sinus rhythm with sinus arrhythmia. Rightward axis. Borderline ECG.   No prior ECG for comparison.  Rate 82 bpm. AL interval 152 ms. QRS duration 102 ms. QT/QTc 392/457 ms. P-R-T axes 79 103 77.     Laboratory:    CBC: WBC: 13.3 (H), HGB: 14.0, PLT: 364    CMP: Glucose 411 (H) o/w WNL (Creatinine: 0.75)    BMP: Glucose 258 (H), Calcium: 8.4 (L), Creatinine 0.63 (L), o/w WNL    Lipase: <10 (L)    Glucose by meter (0907): 417 (H)  Glucose by Meter (1046): 342 (H)    Ketone Beta-Hydroxybutyrate (0924): 2.0 (HH)   Ketone Beta-Hydroxybutyrate (1426): 0.3     ISTAT gases lactate cristian POCT (0926): pH Venous 7.43, PCO2 Venous 34 (L), PO2 Venous 36, Bicarbonate Venous 22, O2 Sat Venous 72 (L), Lactic Acid 5.3 (HH)    ISTAT gases lactate cristian POCT (1056): pH Venous 7.33, PCO2 Venous 49, PO2 Venous 17 (L), Bicarbonate Venous 26, O2 Sat Venous 21 (L), Lactic Acid 3.7 (H)    ISTAT gases lactate cristian POCT (1250): pH Venous 7.39, PCO2 Venous 61 (H), PO2 Venous 19 (L), Bicarbonate Venous 31 (H), O2 Sat Venous 27 (L), Lactic Acid 2.1 (H)    Emergency Department Course:    Reviewed:  I reviewed the patient's nursing notes, vitals, past medical records, Care Everywhere.     Assessments & Consults:  ED Course as of Aug 02 1533   Mon Aug 02, 2021   0900 Obtained history and performed exam, as noted above.      1046 Rechecked and updated.  The patient states he feels better and notes that his abdominal pain has stopped.  We discussed his lab results.  I will plan on repeating a venous gas.      1350 Rechecked and updated. Pain and nausea have returned.  The patient states his pain is not too bad but feels more nauseated.  I do note emesis in his emesis bag.  The patient still would ideally like to go home but we talked about his symptoms and I think that medications and a retrial of p.o. challenge is not unreasonable though I think consideration for hospital admission  would be reasonable as well.      1514 Rechecked and updated. BMP looks good. Glucose improving. Ketones improved.      1518 D/W Sintia Raza PA-C. She notes that GI was able to schedule a followup phone appointment at 10 AM tomorrow.           Interventions:  0642: NS 1L IV Bolus   0643: Zofran 4 mg IV  0925: NS 1L IV Bolus   0925: Morphine 4 mg IV  0925: Zofran 4 mg IV  1005: Lactated ringers 1000 mL IV Bolus   1151: Lactated ringers 1000 mL IV Bolus   1434: Inapsine 1.25 mg IV    Disposition:  The patient was discharged home.       Impression & Plan    CMS Diagnosis:       Medical Decision Making:   This 30-year-old male patient presents to the ED due to nausea and vomiting.  Please see the HPI and exam for specifics.  The patient does have a history of DKA and was hospitalized for that in April of this year.  Per my record review, he was seen in this emergency department in both June and July for abdominal/vomiting type symptoms.  There is a question of cannabis hyperemesis as well.     A broad differential was still considered.  Blood work was ordered with results as above. Significant findings include hyperglycemia with ketosis and lactic acidosis, but without a low serum venous pH.  I am not suspicious for sepsis at this time.  I think his lactic acidosis is a function of vomiting. IV fluids were given as well. Patient feels better after the interventions initially and try to p.o. challenge but then vomited.  He was given a dose of droperidol and felt resolution of his symptoms.  Basic metabolic panel was rechecked and is notable for improvement.  Ketones were rechecked and are now in the normal range.  At this time, I believe he can be discharged.  I will send him home with a prescription for Zofran but also encouraged that he follow closely in the outpatient setting with his primary care clinic.  He can return at anytime with new or worsening symptoms.  The patient will have a phone appointment with the  GI clinic tomorrow at 10 AM.  He was made aware of this as well.  Anticipatory guidance given prior to discharge.     Diagnosis:     ICD-10-CM    1. Ketosis (H)  E88.89    2. Lactic acidosis  E87.2    3. Hyperglycemia  R73.9    4. Non-intractable vomiting with nausea, unspecified vomiting type  R11.2        Scribe Disclosure:  Brianna MACHADO, am serving as a scribe on 8/2/2021 at 9:00 AM to personally document services performed by Jamshid Valencia DO based on my observations and the provider's statements to me.           Jamshid Valencia DO  08/02/21 1533

## 2021-08-02 NOTE — DISCHARGE INSTRUCTIONS
What do you do next:   Continue your home medications unless we have specifically changed them  Slowly advance your diet as tolerated.  Follow up as indicated below    When do you return: If you have uncontrollable vomiting, lightheadedness or fainting, uncontrollable fevers, severe abdominal pain, or any other symptoms that concern you, please return to the ED for reevaluation.    Thank you for allowing us to care for you today.

## 2021-08-02 NOTE — ED NOTES
[facility]  ED Nurse Handoff Report    Wally Avendano is a 30 year old male   ED Chief complaint: Nausea & Vomiting  . ED Diagnosis:   Final diagnoses:   Intractable vomiting with nausea, unspecified vomiting type   Ketosis (H)   Lactic acidosis   Hyperglycemia     Allergies: No Known Allergies    Code Status: Full Code  Activity level - Baseline/Home:  Independent. Activity Level - Current:   Independent. Lift room needed: No. Bariatric: No   Needed: No   Isolation: No. Infection: Not Applicable.     Vital Signs:   Vitals:    08/02/21 1400 08/02/21 1415 08/02/21 1430 08/02/21 1445   BP:  116/78 115/69    Pulse:  98 85 92   Resp:    8   Temp:       TempSrc:       SpO2: 98% 100% 100% 100%   Weight:       Height:           Cardiac Rhythm:  ,      Pain level:    Patient confused: No. Patient Falls Risk: Yes.   Elimination Status: Has voided   Patient Report - Initial Complaint: Nausea/Vomitting/abd pain. Focused Assessment: A&OX4, pt c/o N/V abd pain. Unable to tolerate PO, as a result has diabetic acidosis, improved with fluids, however pain and nausea  Did return prompting need for admission.    Tests Performed: Labs, EKG. Abnormal Results:   Labs Ordered and Resulted from Time of ED Arrival Up to the Time of Departure from the ED   COMPREHENSIVE METABOLIC PANEL - Abnormal; Notable for the following components:       Result Value    Glucose 411 (*)     All other components within normal limits   LIPASE - Abnormal; Notable for the following components:    Lipase <10 (*)     All other components within normal limits   KETONE BETA-HYDROXYBUTYRATE QUANTITATIVE, RAPID - Abnormal; Notable for the following components:    Ketone (Beta-Hydroxybutyrate) Quantitative 2.0 (*)     All other components within normal limits   GLUCOSE BY METER - Abnormal; Notable for the following components:    GLUCOSE BY METER POCT 417 (*)     All other components within normal limits   CBC WITH PLATELETS AND DIFFERENTIAL -  Abnormal; Notable for the following components:    WBC Count 13.3 (*)     Absolute Neutrophils 12.2 (*)     Absolute Lymphocytes 0.6 (*)     All other components within normal limits   ISTAT GASES LACTATE VENOUS POCT - Abnormal; Notable for the following components:    Lactic Acid POCT 5.3 (*)     O2 Sat, Venous POCT 72 (*)     pCO2V Venous POCT 34 (*)     All other components within normal limits   ISTAT GASES LACTATE VENOUS POCT - Abnormal; Notable for the following components:    Lactic Acid POCT 3.7 (*)     O2 Sat, Venous POCT 21 (*)     pO2 Venous POCT 17 (*)     All other components within normal limits   GLUCOSE BY METER - Abnormal; Notable for the following components:    GLUCOSE BY METER POCT 342 (*)     All other components within normal limits   ISTAT GASES LACTATE VENOUS POCT - Abnormal; Notable for the following components:    Lactic Acid POCT 2.1 (*)     Bicarbonate Venous POCT 31 (*)     O2 Sat, Venous POCT 27 (*)     pCO2V Venous POCT 51 (*)     pO2 Venous POCT 19 (*)     All other components within normal limits   KETONE BETA-HYDROXYBUTYRATE QUANTITATIVE, RAPID - Normal   RBC AND PLATELET MORPHOLOGY   BASIC METABOLIC PANEL   PERIPHERAL IV CATHETER   ISTAT CG4 GASES LACTATE TYLER NURSING POCT   ISTAT CG4 GASES LACTATE TYLER NURSING POCT   ISTAT CG4 GASES LACTATE TYLER NURSING POCT   CARDIAC CONTINUOUS MONITORING   CBC WITH PLATELETS & DIFFERENTIAL    Narrative:     The following orders were created for panel order CBC with Platelets & Differential.  Procedure                               Abnormality         Status                     ---------                               -----------         ------                     CBC with platelets and d...[[id number]]  Abnormal            Final result               RBC and Platelet Morphology[[id number]]                      Final result                 Please view results for these tests on the individual orders.       Treatments provided: Fluids, cardiac  monitoring, pain management, nausea medication.   Family Comments: Updated wife, Ananya, on the phone who will remain at home with their young child.   OBS brochure/video discussed/provided to patient:  No  ED Medications:   Medications   ondansetron (ZOFRAN) injection 4 mg (4 mg Intravenous Given 8/2/21 0643)   0.9% sodium chloride BOLUS (0 mLs Intravenous Stopped 8/2/21 0924)   0.9% sodium chloride BOLUS (0 mLs Intravenous Stopped 8/2/21 1003)   morphine (PF) injection 4 mg (4 mg Intravenous Given 8/2/21 0925)   ondansetron (ZOFRAN) 2 MG/ML injection (4 mg  Given 8/2/21 0925)   lactated ringers BOLUS 1,000 mL (0 mLs Intravenous Stopped 8/2/21 1042)   lactated ringers BOLUS 1,000 mL (0 mLs Intravenous Stopped 8/2/21 1331)   droperidol (INAPSINE) injection 1.25 mg (1.25 mg Intravenous Given 8/2/21 1434)     Drips infusing:  No  For the majority of the shift, the patient's behavior Green. Interventions performed were N/A.    Sepsis treatment initiated: No     Patient tested for COVID 19 prior to admission: YES    ED Nurse Name/Phone Number: Marcy Zuniga RN,   2:53 PM

## 2021-08-03 LAB
ATRIAL RATE - MUSE: 82 BPM
DIASTOLIC BLOOD PRESSURE - MUSE: NORMAL MMHG
INTERPRETATION ECG - MUSE: NORMAL
P AXIS - MUSE: 79 DEGREES
PR INTERVAL - MUSE: 152 MS
QRS DURATION - MUSE: 102 MS
QT - MUSE: 392 MS
QTC - MUSE: 457 MS
R AXIS - MUSE: 103 DEGREES
SYSTOLIC BLOOD PRESSURE - MUSE: NORMAL MMHG
T AXIS - MUSE: 77 DEGREES
VENTRICULAR RATE- MUSE: 82 BPM

## 2021-08-10 ENCOUNTER — HOSPITAL ENCOUNTER (OUTPATIENT)
Dept: NUCLEAR MEDICINE | Facility: CLINIC | Age: 31
Setting detail: NUCLEAR MEDICINE
Discharge: HOME | End: 2021-08-10
Attending: INTERNAL MEDICINE | Admitting: INTERNAL MEDICINE
Payer: COMMERCIAL

## 2021-08-10 DIAGNOSIS — R11.2 NAUSEA AND VOMITING: ICD-10-CM

## 2021-08-10 PROCEDURE — 78264 GASTRIC EMPTYING IMG STUDY: CPT

## 2021-08-10 PROCEDURE — 343N000001 HC RX 343: Performed by: INTERNAL MEDICINE

## 2021-08-10 PROCEDURE — A9541 TC99M SULFUR COLLOID: HCPCS | Performed by: INTERNAL MEDICINE

## 2021-08-10 RX ADMIN — Medication 2 MILLICURIE: at 08:55

## 2021-09-11 ENCOUNTER — HEALTH MAINTENANCE LETTER (OUTPATIENT)
Age: 31
End: 2021-09-11

## 2021-11-06 ENCOUNTER — HEALTH MAINTENANCE LETTER (OUTPATIENT)
Age: 31
End: 2021-11-06

## 2022-02-17 PROBLEM — E11.10 DKA (DIABETIC KETOACIDOSIS) (H): Status: ACTIVE | Noted: 2021-04-02

## 2022-02-24 ENCOUNTER — NURSE TRIAGE (OUTPATIENT)
Dept: NURSING | Facility: CLINIC | Age: 32
End: 2022-02-24
Payer: COMMERCIAL

## 2022-02-25 ENCOUNTER — HOSPITAL ENCOUNTER (EMERGENCY)
Facility: CLINIC | Age: 32
Discharge: HOME | End: 2022-02-25
Attending: EMERGENCY MEDICINE | Admitting: EMERGENCY MEDICINE
Payer: COMMERCIAL

## 2022-02-25 VITALS
BODY MASS INDEX: 17.04 KG/M2 | WEIGHT: 140 LBS | DIASTOLIC BLOOD PRESSURE: 102 MMHG | HEART RATE: 94 BPM | SYSTOLIC BLOOD PRESSURE: 111 MMHG | OXYGEN SATURATION: 95 % | RESPIRATION RATE: 18 BRPM | TEMPERATURE: 98 F

## 2022-02-25 DIAGNOSIS — R11.2 NON-INTRACTABLE VOMITING WITH NAUSEA, UNSPECIFIED VOMITING TYPE: ICD-10-CM

## 2022-02-25 DIAGNOSIS — K31.84 GASTROPARESIS: ICD-10-CM

## 2022-02-25 LAB
ALBUMIN SERPL-MCNC: 4.1 G/DL (ref 3.4–5)
ALP SERPL-CCNC: 76 U/L (ref 40–150)
ALT SERPL W P-5'-P-CCNC: 52 U/L (ref 0–70)
ANION GAP SERPL CALCULATED.3IONS-SCNC: 8 MMOL/L (ref 3–14)
AST SERPL W P-5'-P-CCNC: 21 U/L (ref 0–45)
BASOPHILS # BLD AUTO: 0 10E3/UL (ref 0–0.2)
BASOPHILS NFR BLD AUTO: 0 %
BILIRUB SERPL-MCNC: 1.1 MG/DL (ref 0.2–1.3)
BUN SERPL-MCNC: 16 MG/DL (ref 7–30)
CALCIUM SERPL-MCNC: 9.6 MG/DL (ref 8.5–10.1)
CHLORIDE BLD-SCNC: 100 MMOL/L (ref 94–109)
CO2 SERPL-SCNC: 30 MMOL/L (ref 20–32)
CREAT SERPL-MCNC: 0.67 MG/DL (ref 0.66–1.25)
EOSINOPHIL # BLD AUTO: 0 10E3/UL (ref 0–0.7)
EOSINOPHIL NFR BLD AUTO: 0 %
ERYTHROCYTE [DISTWIDTH] IN BLOOD BY AUTOMATED COUNT: 12.5 % (ref 10–15)
GFR SERPL CREATININE-BSD FRML MDRD: >90 ML/MIN/1.73M2
GLUCOSE BLD-MCNC: 255 MG/DL (ref 70–99)
HCT VFR BLD AUTO: 43.6 % (ref 40–53)
HGB BLD-MCNC: 15.1 G/DL (ref 13.3–17.7)
HOLD SPECIMEN: NORMAL
HOLD SPECIMEN: NORMAL
IMM GRANULOCYTES # BLD: 0.1 10E3/UL
IMM GRANULOCYTES NFR BLD: 0 %
LIPASE SERPL-CCNC: 24 U/L (ref 73–393)
LYMPHOCYTES # BLD AUTO: 1.2 10E3/UL (ref 0.8–5.3)
LYMPHOCYTES NFR BLD AUTO: 6 %
MCH RBC QN AUTO: 29.1 PG (ref 26.5–33)
MCHC RBC AUTO-ENTMCNC: 34.6 G/DL (ref 31.5–36.5)
MCV RBC AUTO: 84 FL (ref 78–100)
MONOCYTES # BLD AUTO: 0.9 10E3/UL (ref 0–1.3)
MONOCYTES NFR BLD AUTO: 5 %
NEUTROPHILS # BLD AUTO: 17.6 10E3/UL (ref 1.6–8.3)
NEUTROPHILS NFR BLD AUTO: 89 %
NRBC # BLD AUTO: 0 10E3/UL
NRBC BLD AUTO-RTO: 0 /100
PLATELET # BLD AUTO: 316 10E3/UL (ref 150–450)
POTASSIUM BLD-SCNC: 3.2 MMOL/L (ref 3.4–5.3)
PROT SERPL-MCNC: 8.3 G/DL (ref 6.8–8.8)
RBC # BLD AUTO: 5.19 10E6/UL (ref 4.4–5.9)
SODIUM SERPL-SCNC: 138 MMOL/L (ref 133–144)
WBC # BLD AUTO: 19.8 10E3/UL (ref 4–11)

## 2022-02-25 PROCEDURE — 258N000003 HC RX IP 258 OP 636: Performed by: EMERGENCY MEDICINE

## 2022-02-25 PROCEDURE — 99284 EMERGENCY DEPT VISIT MOD MDM: CPT | Mod: 25

## 2022-02-25 PROCEDURE — 36415 COLL VENOUS BLD VENIPUNCTURE: CPT | Performed by: EMERGENCY MEDICINE

## 2022-02-25 PROCEDURE — 250N000011 HC RX IP 250 OP 636: Performed by: EMERGENCY MEDICINE

## 2022-02-25 PROCEDURE — 85025 COMPLETE CBC W/AUTO DIFF WBC: CPT | Performed by: EMERGENCY MEDICINE

## 2022-02-25 PROCEDURE — 80053 COMPREHEN METABOLIC PANEL: CPT | Performed by: EMERGENCY MEDICINE

## 2022-02-25 PROCEDURE — 96374 THER/PROPH/DIAG INJ IV PUSH: CPT

## 2022-02-25 PROCEDURE — 96376 TX/PRO/DX INJ SAME DRUG ADON: CPT

## 2022-02-25 PROCEDURE — 96361 HYDRATE IV INFUSION ADD-ON: CPT

## 2022-02-25 PROCEDURE — 83690 ASSAY OF LIPASE: CPT | Performed by: EMERGENCY MEDICINE

## 2022-02-25 PROCEDURE — 96375 TX/PRO/DX INJ NEW DRUG ADDON: CPT

## 2022-02-25 PROCEDURE — 99285 EMERGENCY DEPT VISIT HI MDM: CPT | Mod: 25

## 2022-02-25 RX ORDER — ONDANSETRON 4 MG/1
4 TABLET, ORALLY DISINTEGRATING ORAL EVERY 6 HOURS PRN
Qty: 10 TABLET | Refills: 0 | Status: ON HOLD | OUTPATIENT
Start: 2022-02-25 | End: 2022-02-27

## 2022-02-25 RX ORDER — ONDANSETRON 2 MG/ML
8 INJECTION INTRAMUSCULAR; INTRAVENOUS ONCE
Status: COMPLETED | OUTPATIENT
Start: 2022-02-25 | End: 2022-02-25

## 2022-02-25 RX ORDER — METOCLOPRAMIDE 10 MG/1
10 TABLET ORAL 3 TIMES DAILY PRN
Qty: 20 TABLET | Refills: 0 | Status: ON HOLD | OUTPATIENT
Start: 2022-02-25 | End: 2022-02-27

## 2022-02-25 RX ORDER — HYDROMORPHONE HYDROCHLORIDE 1 MG/ML
0.5 INJECTION, SOLUTION INTRAMUSCULAR; INTRAVENOUS; SUBCUTANEOUS
Status: DISCONTINUED | OUTPATIENT
Start: 2022-02-25 | End: 2022-02-26 | Stop reason: HOSPADM

## 2022-02-25 RX ORDER — ONDANSETRON 2 MG/ML
4 INJECTION INTRAMUSCULAR; INTRAVENOUS ONCE
Status: COMPLETED | OUTPATIENT
Start: 2022-02-25 | End: 2022-02-25

## 2022-02-25 RX ORDER — METOCLOPRAMIDE HYDROCHLORIDE 5 MG/ML
10 INJECTION INTRAMUSCULAR; INTRAVENOUS ONCE
Status: COMPLETED | OUTPATIENT
Start: 2022-02-25 | End: 2022-02-25

## 2022-02-25 RX ORDER — DIPHENHYDRAMINE HYDROCHLORIDE 50 MG/ML
50 INJECTION INTRAMUSCULAR; INTRAVENOUS ONCE
Status: COMPLETED | OUTPATIENT
Start: 2022-02-25 | End: 2022-02-25

## 2022-02-25 RX ADMIN — DIPHENHYDRAMINE HYDROCHLORIDE 50 MG: 50 INJECTION INTRAMUSCULAR; INTRAVENOUS at 21:21

## 2022-02-25 RX ADMIN — SODIUM CHLORIDE 2000 ML: 9 INJECTION, SOLUTION INTRAVENOUS at 18:14

## 2022-02-25 RX ADMIN — ONDANSETRON 8 MG: 2 INJECTION INTRAMUSCULAR; INTRAVENOUS at 18:15

## 2022-02-25 RX ADMIN — SODIUM CHLORIDE 1000 ML: 9 INJECTION, SOLUTION INTRAVENOUS at 15:57

## 2022-02-25 RX ADMIN — ONDANSETRON 4 MG: 2 INJECTION INTRAMUSCULAR; INTRAVENOUS at 15:57

## 2022-02-25 RX ADMIN — HYDROMORPHONE HYDROCHLORIDE 0.5 MG: 1 INJECTION, SOLUTION INTRAMUSCULAR; INTRAVENOUS; SUBCUTANEOUS at 19:14

## 2022-02-25 RX ADMIN — METOCLOPRAMIDE HYDROCHLORIDE 10 MG: 5 INJECTION INTRAMUSCULAR; INTRAVENOUS at 21:20

## 2022-02-25 ASSESSMENT — ENCOUNTER SYMPTOMS
SORE THROAT: 1
VOMITING: 1
ABDOMINAL PAIN: 1

## 2022-02-25 NOTE — TELEPHONE ENCOUNTER
"Spoke w wife after getting verbal consent from pt.   Wife state pt has gastroparesis and is Type I DM. Vomiting for past 6 hours and diarrhea. Emesis is dark maroon colored. Advised ED now.     Reason for Disposition    [1] Vomiting AND [2] contains red blood or black (\"coffee ground\") material  (Exception: few red streaks in vomit that only happened once)    Additional Information    Negative: Shock suspected (e.g., cold/pale/clammy skin, too weak to stand, low BP, rapid pulse)    Negative: Difficult to awaken or acting confused (e.g., disoriented, slurred speech)    Negative: Sounds like a life-threatening emergency to the triager    Negative: Vomiting occurs only while coughing    Negative: [1] Pregnant < 20 Weeks AND [2] nausea/vomiting began in early pregnancy (i.e., 4-8 weeks pregnant)    Negative: Chest pain    Negative: Headache is main symptom    Negative: Vomiting (or Nausea) in a cancer patient who is currently (or recently) receiving chemotherapy or radiation therapy, or cancer patient who has metastatic or end-stage cancer and is receiving palliative care    Protocols used: VOMITING-A-AH      "

## 2022-02-25 NOTE — ED PROVIDER NOTES
History   Chief Complaint:  Vomiting       The history is provided by the patient.      Wally Avendano is a 31 year old male with history of gastroparesis who presents with vomiting. Other symptoms mentioned include midline abdominal pain and throat pain from vomiting. Additionally, he has some streak of blood in his emesis. He has a history of gastroparesis, and it usually improves after receiving Zofran. He denies alcohol or marijuana use.     Review of Systems   HENT: Positive for sore throat.    Gastrointestinal: Positive for abdominal pain and vomiting.   All other systems reviewed and are negative.    Allergies:  No Known Allergies    Medications:  Insulin Aspart   Insulin Glargine   Zofran   Phenergan   Glucose   Glucagon     Past Medical History:     Type 1 DM   DKA   Gastroparesis       Past Surgical History:    Gastric biopsies   Duodenum biopsies      Family History:    Mother - PBC     Social History:  Patient presents to the ED alone via car    Physical Exam     Patient Vitals for the past 24 hrs:   BP Temp Temp src Pulse Resp SpO2 Weight   02/25/22 2100 -- -- -- -- -- 94 % --   02/25/22 2045 127/85 -- -- 94 -- 92 % --   02/25/22 1830 -- -- -- -- -- 94 % --   02/25/22 1820 136/74 -- -- 92 -- 97 % --   02/25/22 1554 133/52 98  F (36.7  C) Temporal 113 18 100 % 63.5 kg (140 lb)       Physical Exam  General/Appearance: appears stated age, well-groomed, appears comfortable  Eyes: EOMI, no scleral injection, no icterus  ENT: dry oral mucosa  Neck: supple, nl ROM, no stiffness  Cardiovascular: tachy, nl S1S2, no m/r/g, 2+ pulses in all 4 extremities, cap refill <2sec  Respiratory: CTAB, good air movement throughout, no wheezes/rhonchi/rales, no increased WOB, no retractions  GI: abd soft, non-distended, nttp,  no HSM, no rebound, no guarding, nl BS  MSK: FERRER, good tone, no bony abnormality  Skin: warm and well-perfused, no rash, no edema, no ecchymosis, nl turgor  Neuro: GCS 15, alert and oriented, no gross  focal neuro deficits  Psych: interacts appropriately  Heme: no petechia, no purpura, no active bleeding        Emergency Department Course   Laboratory:  Labs Ordered and Resulted from Time of ED Arrival to Time of ED Departure   LIPASE - Abnormal       Result Value    Lipase 24 (*)    COMPREHENSIVE METABOLIC PANEL - Abnormal    Sodium 138      Potassium 3.2 (*)     Chloride 100      Carbon Dioxide (CO2) 30      Anion Gap 8      Urea Nitrogen 16      Creatinine 0.67      Calcium 9.6      Glucose 255 (*)     Alkaline Phosphatase 76      AST 21      ALT 52      Protein Total 8.3      Albumin 4.1      Bilirubin Total 1.1      GFR Estimate >90     CBC WITH PLATELETS AND DIFFERENTIAL - Abnormal    WBC Count 19.8 (*)     RBC Count 5.19      Hemoglobin 15.1      Hematocrit 43.6      MCV 84      MCH 29.1      MCHC 34.6      RDW 12.5      Platelet Count 316      % Neutrophils 89      % Lymphocytes 6      % Monocytes 5      % Eosinophils 0      % Basophils 0      % Immature Granulocytes 0      NRBCs per 100 WBC 0      Absolute Neutrophils 17.6 (*)     Absolute Lymphocytes 1.2      Absolute Monocytes 0.9      Absolute Eosinophils 0.0      Absolute Basophils 0.0      Absolute Immature Granulocytes 0.1      Absolute NRBCs 0.0        Emergency Department Course:    Reviewed:  I reviewed nursing notes, vitals and past medical history    Assessments:  1754 I obtained history and examined the patient as noted above.   2255 I rechecked the patient and explained findings. I discussed plan for discharge home.    Interventions:  1557 Zofran 4 mg IV   1557 NS 1 L IV   1814 NS 2 L IV   1815 Zofran 8 mg IV   1914 Dilaudid 0.5 mg IV   2120 Reglan 10 mg IV   2121 Benadryl 50 mg IV     Disposition:  The patient was discharged to home.     Impression & Plan   Medical Decision Making:  This patient is a 31-year-old male with history of gastroparesis who presents with vomiting.  He has mild mid abdominal discomfort but no significant tenderness  to palpation.  I doubt intra-abdominal pathology such as appendicitis, diverticulitis, other infection.  He seemed to improve his Zofran however felt better after we added Reglan and Benadryl.  He is tolerated p.o. challenge.  He did clinically look dehydrated so he received 2 L of IV fluids here.  He now appears more hydrated.  Clinically I suspect this is consistent with his gastroparesis.  He feels well enough for discharge.  I will send him home with both Zofran and Reglan to see if one of them works better than the other for him.    Diagnosis:    ICD-10-CM    1. Gastroparesis  K31.84    2. Non-intractable vomiting with nausea, unspecified vomiting type  R11.2        Discharge Medications:  New Prescriptions    METOCLOPRAMIDE (REGLAN) 10 MG TABLET    Take 1 tablet (10 mg) by mouth 3 times daily as needed (Nausea or Vomiting)    ONDANSETRON (ZOFRAN ODT) 4 MG ODT TAB    Take 1 tablet (4 mg) by mouth every 6 hours as needed for nausea       Scribe Disclosure:  I, Lyndonbayron Parrish, am serving as a scribe at 5:53 PM on 2/25/2022 to document services personally performed by Nanette Cramer MD based on my observations and the provider's statements to me.        Nanette Cramer MD  02/25/22 1998

## 2022-02-25 NOTE — ED TRIAGE NOTES
Emesis hourly for last 1-2 days without fever. Hx gastroparesis. Scant blood in emesis. Denies alcohol or mariajuana use. Midline abdominal pain and throat pain. ABC in tact ex tachycardia. A/Ox4

## 2022-02-26 ENCOUNTER — HOSPITAL ENCOUNTER (OUTPATIENT)
Facility: CLINIC | Age: 32
Setting detail: OBSERVATION
Discharge: HOME | End: 2022-02-27
Attending: EMERGENCY MEDICINE | Admitting: INTERNAL MEDICINE
Payer: COMMERCIAL

## 2022-02-26 ENCOUNTER — HEALTH MAINTENANCE LETTER (OUTPATIENT)
Age: 32
End: 2022-02-26

## 2022-02-26 ENCOUNTER — APPOINTMENT (OUTPATIENT)
Dept: GENERAL RADIOLOGY | Facility: CLINIC | Age: 32
End: 2022-02-26
Attending: EMERGENCY MEDICINE
Payer: COMMERCIAL

## 2022-02-26 DIAGNOSIS — R11.15 CYCLIC VOMITING SYNDROME: ICD-10-CM

## 2022-02-26 DIAGNOSIS — R11.2 INTRACTABLE NAUSEA AND VOMITING: ICD-10-CM

## 2022-02-26 DIAGNOSIS — E10.10 DIABETIC KETOACIDOSIS WITHOUT COMA ASSOCIATED WITH TYPE 1 DIABETES MELLITUS (H): ICD-10-CM

## 2022-02-26 DIAGNOSIS — E10.69 TYPE 1 DIABETES MELLITUS WITH OTHER SPECIFIED COMPLICATION (H): Primary | ICD-10-CM

## 2022-02-26 DIAGNOSIS — E87.6 HYPOKALEMIA: ICD-10-CM

## 2022-02-26 DIAGNOSIS — K31.84 GASTROPARESIS: ICD-10-CM

## 2022-02-26 LAB
ALBUMIN SERPL-MCNC: 3.5 G/DL (ref 3.4–5)
ALP SERPL-CCNC: 64 U/L (ref 40–150)
ALT SERPL W P-5'-P-CCNC: 45 U/L (ref 0–70)
ANION GAP SERPL CALCULATED.3IONS-SCNC: 3 MMOL/L (ref 3–14)
ANION GAP SERPL CALCULATED.3IONS-SCNC: 9 MMOL/L (ref 3–14)
AST SERPL W P-5'-P-CCNC: 23 U/L (ref 0–45)
BASE EXCESS BLDV CALC-SCNC: 5.1 MMOL/L (ref -7.7–1.9)
BASOPHILS # BLD AUTO: 0 10E3/UL (ref 0–0.2)
BASOPHILS NFR BLD AUTO: 0 %
BILIRUB SERPL-MCNC: 1 MG/DL (ref 0.2–1.3)
BUN SERPL-MCNC: 10 MG/DL (ref 7–30)
BUN SERPL-MCNC: 9 MG/DL (ref 7–30)
CALCIUM SERPL-MCNC: 8.2 MG/DL (ref 8.5–10.1)
CALCIUM SERPL-MCNC: 9.1 MG/DL (ref 8.5–10.1)
CHLORIDE BLD-SCNC: 104 MMOL/L (ref 94–109)
CHLORIDE BLD-SCNC: 106 MMOL/L (ref 94–109)
CO2 SERPL-SCNC: 27 MMOL/L (ref 20–32)
CO2 SERPL-SCNC: 30 MMOL/L (ref 20–32)
CREAT SERPL-MCNC: 0.52 MG/DL (ref 0.66–1.25)
CREAT SERPL-MCNC: 0.58 MG/DL (ref 0.66–1.25)
EOSINOPHIL # BLD AUTO: 0 10E3/UL (ref 0–0.7)
EOSINOPHIL NFR BLD AUTO: 0 %
ERYTHROCYTE [DISTWIDTH] IN BLOOD BY AUTOMATED COUNT: 12.6 % (ref 10–15)
GFR SERPL CREATININE-BSD FRML MDRD: >90 ML/MIN/1.73M2
GFR SERPL CREATININE-BSD FRML MDRD: >90 ML/MIN/1.73M2
GLUCOSE BLD-MCNC: 162 MG/DL (ref 70–99)
GLUCOSE BLD-MCNC: 220 MG/DL (ref 70–99)
GLUCOSE BLDC GLUCOMTR-MCNC: 147 MG/DL (ref 70–99)
GLUCOSE BLDC GLUCOMTR-MCNC: 167 MG/DL (ref 70–99)
HBA1C MFR BLD: 6.6 % (ref 0–5.6)
HCO3 BLDV-SCNC: 27 MMOL/L (ref 21–28)
HCT VFR BLD AUTO: 39.4 % (ref 40–53)
HGB BLD-MCNC: 13.5 G/DL (ref 13.3–17.7)
IMM GRANULOCYTES # BLD: 0 10E3/UL
IMM GRANULOCYTES NFR BLD: 0 %
LACTATE SERPL-SCNC: 1.2 MMOL/L (ref 0.7–2)
LACTATE SERPL-SCNC: 2.4 MMOL/L (ref 0.7–2)
LIPASE SERPL-CCNC: 23 U/L (ref 73–393)
LYMPHOCYTES # BLD AUTO: 0.9 10E3/UL (ref 0.8–5.3)
LYMPHOCYTES NFR BLD AUTO: 8 %
MCH RBC QN AUTO: 29.3 PG (ref 26.5–33)
MCHC RBC AUTO-ENTMCNC: 34.3 G/DL (ref 31.5–36.5)
MCV RBC AUTO: 86 FL (ref 78–100)
MONOCYTES # BLD AUTO: 0.5 10E3/UL (ref 0–1.3)
MONOCYTES NFR BLD AUTO: 4 %
NEUTROPHILS # BLD AUTO: 9.4 10E3/UL (ref 1.6–8.3)
NEUTROPHILS NFR BLD AUTO: 88 %
NRBC # BLD AUTO: 0 10E3/UL
NRBC BLD AUTO-RTO: 0 /100
O2/TOTAL GAS SETTING VFR VENT: 0 %
PCO2 BLDV: 32 MM HG (ref 40–50)
PH BLDV: 7.54 [PH] (ref 7.32–7.43)
PLATELET # BLD AUTO: 243 10E3/UL (ref 150–450)
PO2 BLDV: 31 MM HG (ref 25–47)
POTASSIUM BLD-SCNC: 3.1 MMOL/L (ref 3.4–5.3)
POTASSIUM BLD-SCNC: 3.4 MMOL/L (ref 3.4–5.3)
POTASSIUM BLD-SCNC: 3.6 MMOL/L (ref 3.4–5.3)
PROT SERPL-MCNC: 7.3 G/DL (ref 6.8–8.8)
RBC # BLD AUTO: 4.61 10E6/UL (ref 4.4–5.9)
SARS-COV-2 RNA RESP QL NAA+PROBE: NEGATIVE
SODIUM SERPL-SCNC: 139 MMOL/L (ref 133–144)
SODIUM SERPL-SCNC: 140 MMOL/L (ref 133–144)
WBC # BLD AUTO: 10.8 10E3/UL (ref 4–11)

## 2022-02-26 PROCEDURE — 83690 ASSAY OF LIPASE: CPT | Performed by: EMERGENCY MEDICINE

## 2022-02-26 PROCEDURE — G0378 HOSPITAL OBSERVATION PER HR: HCPCS

## 2022-02-26 PROCEDURE — 87635 SARS-COV-2 COVID-19 AMP PRB: CPT | Performed by: EMERGENCY MEDICINE

## 2022-02-26 PROCEDURE — 82803 BLOOD GASES ANY COMBINATION: CPT | Performed by: EMERGENCY MEDICINE

## 2022-02-26 PROCEDURE — 80053 COMPREHEN METABOLIC PANEL: CPT | Performed by: EMERGENCY MEDICINE

## 2022-02-26 PROCEDURE — 96361 HYDRATE IV INFUSION ADD-ON: CPT

## 2022-02-26 PROCEDURE — 96376 TX/PRO/DX INJ SAME DRUG ADON: CPT

## 2022-02-26 PROCEDURE — 82962 GLUCOSE BLOOD TEST: CPT | Mod: 91

## 2022-02-26 PROCEDURE — 96365 THER/PROPH/DIAG IV INF INIT: CPT

## 2022-02-26 PROCEDURE — 74019 RADEX ABDOMEN 2 VIEWS: CPT

## 2022-02-26 PROCEDURE — 250N000011 HC RX IP 250 OP 636: Performed by: EMERGENCY MEDICINE

## 2022-02-26 PROCEDURE — 96375 TX/PRO/DX INJ NEW DRUG ADDON: CPT

## 2022-02-26 PROCEDURE — 36415 COLL VENOUS BLD VENIPUNCTURE: CPT | Performed by: NURSE PRACTITIONER

## 2022-02-26 PROCEDURE — 99220 PR INITIAL OBSERVATION CARE,LEVEL III: CPT | Performed by: NURSE PRACTITIONER

## 2022-02-26 PROCEDURE — 85025 COMPLETE CBC W/AUTO DIFF WBC: CPT | Performed by: EMERGENCY MEDICINE

## 2022-02-26 PROCEDURE — 96372 THER/PROPH/DIAG INJ SC/IM: CPT | Mod: XU | Performed by: NURSE PRACTITIONER

## 2022-02-26 PROCEDURE — 36415 COLL VENOUS BLD VENIPUNCTURE: CPT | Performed by: EMERGENCY MEDICINE

## 2022-02-26 PROCEDURE — 258N000003 HC RX IP 258 OP 636: Performed by: EMERGENCY MEDICINE

## 2022-02-26 PROCEDURE — C9803 HOPD COVID-19 SPEC COLLECT: HCPCS

## 2022-02-26 PROCEDURE — 83605 ASSAY OF LACTIC ACID: CPT | Performed by: EMERGENCY MEDICINE

## 2022-02-26 PROCEDURE — 99285 EMERGENCY DEPT VISIT HI MDM: CPT | Mod: 25

## 2022-02-26 PROCEDURE — 93005 ELECTROCARDIOGRAM TRACING: CPT

## 2022-02-26 PROCEDURE — 83036 HEMOGLOBIN GLYCOSYLATED A1C: CPT | Performed by: NURSE PRACTITIONER

## 2022-02-26 PROCEDURE — 250N000011 HC RX IP 250 OP 636: Performed by: NURSE PRACTITIONER

## 2022-02-26 PROCEDURE — 84132 ASSAY OF SERUM POTASSIUM: CPT | Performed by: NURSE PRACTITIONER

## 2022-02-26 RX ORDER — ONDANSETRON 4 MG/1
4 TABLET, ORALLY DISINTEGRATING ORAL EVERY 6 HOURS PRN
Status: DISCONTINUED | OUTPATIENT
Start: 2022-02-26 | End: 2022-02-27 | Stop reason: HOSPADM

## 2022-02-26 RX ORDER — POTASSIUM CHLORIDE 7.45 MG/ML
10 INJECTION INTRAVENOUS ONCE
Status: COMPLETED | OUTPATIENT
Start: 2022-02-26 | End: 2022-02-26

## 2022-02-26 RX ORDER — METOCLOPRAMIDE HYDROCHLORIDE 5 MG/ML
10 INJECTION INTRAMUSCULAR; INTRAVENOUS ONCE
Status: COMPLETED | OUTPATIENT
Start: 2022-02-26 | End: 2022-02-26

## 2022-02-26 RX ORDER — IBUPROFEN 200 MG
400 TABLET ORAL EVERY 8 HOURS PRN
COMMUNITY
End: 2022-11-02

## 2022-02-26 RX ORDER — METOCLOPRAMIDE HYDROCHLORIDE 5 MG/ML
10 INJECTION INTRAMUSCULAR; INTRAVENOUS EVERY 6 HOURS
Status: DISCONTINUED | OUTPATIENT
Start: 2022-02-26 | End: 2022-02-27 | Stop reason: HOSPADM

## 2022-02-26 RX ORDER — ACETAMINOPHEN 650 MG/1
650 SUPPOSITORY RECTAL EVERY 6 HOURS PRN
Status: DISCONTINUED | OUTPATIENT
Start: 2022-02-26 | End: 2022-02-27 | Stop reason: HOSPADM

## 2022-02-26 RX ORDER — DEXTROSE MONOHYDRATE 25 G/50ML
25-50 INJECTION, SOLUTION INTRAVENOUS
Status: DISCONTINUED | OUTPATIENT
Start: 2022-02-26 | End: 2022-02-27 | Stop reason: HOSPADM

## 2022-02-26 RX ORDER — NICOTINE POLACRILEX 4 MG
15-30 LOZENGE BUCCAL
Status: DISCONTINUED | OUTPATIENT
Start: 2022-02-26 | End: 2022-02-27 | Stop reason: HOSPADM

## 2022-02-26 RX ORDER — PROCHLORPERAZINE 25 MG
25 SUPPOSITORY, RECTAL RECTAL EVERY 12 HOURS PRN
Status: DISCONTINUED | OUTPATIENT
Start: 2022-02-26 | End: 2022-02-27 | Stop reason: HOSPADM

## 2022-02-26 RX ORDER — POTASSIUM CHLORIDE 7.45 MG/ML
10 INJECTION INTRAVENOUS
Status: DISPENSED | OUTPATIENT
Start: 2022-02-26 | End: 2022-02-26

## 2022-02-26 RX ORDER — ACETAMINOPHEN 325 MG/1
650 TABLET ORAL EVERY 6 HOURS PRN
Status: DISCONTINUED | OUTPATIENT
Start: 2022-02-26 | End: 2022-02-27 | Stop reason: HOSPADM

## 2022-02-26 RX ORDER — SODIUM CHLORIDE AND POTASSIUM CHLORIDE 150; 450 MG/100ML; MG/100ML
INJECTION, SOLUTION INTRAVENOUS CONTINUOUS
Status: DISCONTINUED | OUTPATIENT
Start: 2022-02-26 | End: 2022-02-27

## 2022-02-26 RX ORDER — CHLORPROMAZINE HYDROCHLORIDE 25 MG/ML
25 INJECTION INTRAMUSCULAR ONCE
Status: COMPLETED | OUTPATIENT
Start: 2022-02-26 | End: 2022-02-26

## 2022-02-26 RX ORDER — ONDANSETRON 2 MG/ML
4 INJECTION INTRAMUSCULAR; INTRAVENOUS EVERY 6 HOURS PRN
Status: DISCONTINUED | OUTPATIENT
Start: 2022-02-26 | End: 2022-02-27 | Stop reason: HOSPADM

## 2022-02-26 RX ORDER — PROCHLORPERAZINE MALEATE 5 MG
10 TABLET ORAL EVERY 6 HOURS PRN
Status: DISCONTINUED | OUTPATIENT
Start: 2022-02-26 | End: 2022-02-27 | Stop reason: HOSPADM

## 2022-02-26 RX ORDER — ONDANSETRON 2 MG/ML
4 INJECTION INTRAMUSCULAR; INTRAVENOUS EVERY 30 MIN PRN
Status: DISCONTINUED | OUTPATIENT
Start: 2022-02-26 | End: 2022-02-26

## 2022-02-26 RX ORDER — LORAZEPAM 2 MG/ML
.5-1 INJECTION INTRAMUSCULAR EVERY 4 HOURS PRN
Status: DISCONTINUED | OUTPATIENT
Start: 2022-02-26 | End: 2022-02-27 | Stop reason: HOSPADM

## 2022-02-26 RX ADMIN — POTASSIUM CHLORIDE 10 MEQ: 7.46 INJECTION, SOLUTION INTRAVENOUS at 18:56

## 2022-02-26 RX ADMIN — ONDANSETRON 4 MG: 2 INJECTION INTRAMUSCULAR; INTRAVENOUS at 13:20

## 2022-02-26 RX ADMIN — POTASSIUM CHLORIDE AND SODIUM CHLORIDE: 450; 150 INJECTION, SOLUTION INTRAVENOUS at 20:09

## 2022-02-26 RX ADMIN — HYDROMORPHONE HYDROCHLORIDE 1 MG: 1 INJECTION, SOLUTION INTRAMUSCULAR; INTRAVENOUS; SUBCUTANEOUS at 12:58

## 2022-02-26 RX ADMIN — POTASSIUM CHLORIDE 10 MEQ: 7.46 INJECTION, SOLUTION INTRAVENOUS at 13:55

## 2022-02-26 RX ADMIN — SODIUM CHLORIDE, POTASSIUM CHLORIDE, SODIUM LACTATE AND CALCIUM CHLORIDE 1000 ML: 600; 310; 30; 20 INJECTION, SOLUTION INTRAVENOUS at 12:57

## 2022-02-26 RX ADMIN — METOCLOPRAMIDE HYDROCHLORIDE 10 MG: 5 INJECTION INTRAMUSCULAR; INTRAVENOUS at 12:57

## 2022-02-26 RX ADMIN — ONDANSETRON 4 MG: 2 INJECTION INTRAMUSCULAR; INTRAVENOUS at 15:18

## 2022-02-26 RX ADMIN — METOCLOPRAMIDE HYDROCHLORIDE 10 MG: 5 INJECTION INTRAMUSCULAR; INTRAVENOUS at 18:52

## 2022-02-26 RX ADMIN — LORAZEPAM 1 MG: 2 INJECTION INTRAMUSCULAR; INTRAVENOUS at 17:25

## 2022-02-26 RX ADMIN — CHLORPROMAZINE HYDROCHLORIDE 25 MG: 25 INJECTION INTRAMUSCULAR at 17:44

## 2022-02-26 ASSESSMENT — ENCOUNTER SYMPTOMS
NAUSEA: 1
DIARRHEA: 1
ABDOMINAL PAIN: 1
VOMITING: 1

## 2022-02-26 NOTE — ED PROVIDER NOTES
"  History   Chief Complaint:  Nausea, Vomiting, & Diarrhea    The history is provided by the patient.      Wally Avendano is a 31 year old male with history of gastroparesis and insulin-dependent type 1 diabetes mellitus who presents with nausea, emesis, diarrhea, and mid abdominal pain, persisting since onset approximately 36 hours ago. The patient has history of gastroparesis and has had occasional and similar attacks of the above symptoms. He was seen in ED yesterday for these symptoms, at which point he was provided IV Zofran and discharged to home with Zofran PO. He reports improvement of symptoms in the ED yesterday but recurrent pain following his discharge to home. He did take Zofran last night as indicated and another dose again this morning, however reports little improvement from this. He has taken no other medication for symptom relief, and persisting pain and discomfort prompted his concern and presentation to the ED once again today. Of note, his 1 year old son at home experienced onset of similar nausea and emesis yesterday. The patient is otherwise unsure of clear etiology of his symptoms. He has seen a GI specialist in the past for his gastroparesis, although has not followed-up up in \"awhile.\" He has history of recent COVID-19 infection 2 months ago and has had no recent contact to this. He denies recent travel. The patient does not use alcohol or drugs and has no known drug allergies.    Review of Systems   Gastrointestinal: Positive for abdominal pain, diarrhea, nausea and vomiting.   All other systems reviewed and are negative.    Allergies:  No known drug allergies    Medications:  Glucagon  Insulin aspart  Insulin glargine  Metoclopramide  Ondansetron  Promethazine    Past Medical History:     Type 1 diabetes mellitus  DKA    Past Surgical History:    EGD, combined    Family History:    PBC    Social History:  The patient presented with his father.  The patient arrived in a private " vehicle.    Physical Exam     Patient Vitals for the past 24 hrs:   BP Temp Temp src Pulse Resp SpO2   02/26/22 1515 -- -- -- -- -- 100 %   02/26/22 1500 126/88 -- -- 91 -- 99 %   02/26/22 1445 111/75 -- -- 68 -- 99 %   02/26/22 1430 115/75 -- -- 72 -- 96 %   02/26/22 1415 111/76 -- -- 96 -- 95 %   02/26/22 1400 117/78 -- -- 78 -- 97 %   02/26/22 1345 115/72 -- -- 80 -- --   02/26/22 1330 129/86 -- -- 93 -- --   02/26/22 1300 134/81 -- -- 85 -- 100 %   02/26/22 1227 (!) 134/102 98.2  F (36.8  C) Oral 85 24 100 %     Physical Exam  Constitutional:       Appearance: He is well-developed.   HENT:      Right Ear: External ear normal.      Left Ear: External ear normal.      Mouth/Throat:      Mouth: Mucous membranes are dry.      Pharynx: Oropharynx is clear. No oropharyngeal exudate or posterior oropharyngeal erythema.   Eyes:      General: No scleral icterus.     Extraocular Movements: Extraocular movements intact.      Conjunctiva/sclera: Conjunctivae normal.      Pupils: Pupils are equal, round, and reactive to light.   Cardiovascular:      Rate and Rhythm: Normal rate and regular rhythm.      Heart sounds: Normal heart sounds. No murmur heard.    No friction rub. No gallop.   Pulmonary:      Effort: Pulmonary effort is normal. No respiratory distress.      Breath sounds: Normal breath sounds. No wheezing or rales.   Abdominal:      General: Bowel sounds are normal. There is no distension.      Palpations: Abdomen is soft. There is no mass.      Tenderness: There is abdominal tenderness.      Comments: Mild epigastric TTP   Musculoskeletal:         General: Normal range of motion.      Cervical back: Normal range of motion and neck supple.   Skin:     General: Skin is warm and dry.      Capillary Refill: Capillary refill takes less than 2 seconds.      Findings: No rash.   Neurological:      Mental Status: He is alert.       Emergency Department Course     ECG  ECG obtained at 1240, ECG read at 1244  Normal sinus  rhythm with sinus arrhythmia.  Rightward axis.  Borderline ECG.   Rate 68 bpm. NY interval 142 ms. QRS duration 92 ms. QT/QTc 398/423 ms. P-R-T axes 74 106 72.     Imaging:  XR Abdomen 2 Views   Preliminary Result   IMPRESSION: Negative abdomen. Bowel gas pattern is normal. Nothing for obstruction or free air. No evidence for renal stones.         Report per radiology    Laboratory:  Labs Ordered and Resulted from Time of ED Arrival to Time of ED Departure   COMPREHENSIVE METABOLIC PANEL - Abnormal       Result Value    Sodium 140      Potassium 3.1 (*)     Chloride 104      Carbon Dioxide (CO2) 27      Anion Gap 9      Urea Nitrogen 10      Creatinine 0.58 (*)     Calcium 9.1      Glucose 220 (*)     Alkaline Phosphatase 64      AST 23      ALT 45      Protein Total 7.3      Albumin 3.5      Bilirubin Total 1.0      GFR Estimate >90     LIPASE - Abnormal    Lipase 23 (*)    LACTIC ACID WHOLE BLOOD - Abnormal    Lactic Acid 2.4 (*)    BLOOD GAS VENOUS - Abnormal    pH Venous 7.54 (*)     pCO2 Venous 32 (*)     pO2 Venous 31      Bicarbonate Venous 27      Base Excess/Deficit (+/-) 5.1 (*)     FIO2 0     CBC WITH PLATELETS AND DIFFERENTIAL - Abnormal    WBC Count 10.8      RBC Count 4.61      Hemoglobin 13.5      Hematocrit 39.4 (*)     MCV 86      MCH 29.3      MCHC 34.3      RDW 12.6      Platelet Count 243      % Neutrophils 88      % Lymphocytes 8      % Monocytes 4      % Eosinophils 0      % Basophils 0      % Immature Granulocytes 0      NRBCs per 100 WBC 0      Absolute Neutrophils 9.4 (*)     Absolute Lymphocytes 0.9      Absolute Monocytes 0.5      Absolute Eosinophils 0.0      Absolute Basophils 0.0      Absolute Immature Granulocytes 0.0      Absolute NRBCs 0.0       Emergency Department Course:     Reviewed:  I reviewed nursing notes, vitals, past medical history, Care Everywhere and [facility].    Assessments:  1230 I obtained history and examined the patient as noted above.   1314 I rechecked the patient and  explained findings.  1358 I rechecked the patient. He is in process of PO challenge and reports no nausea at this time.  1504 I rechecked the patient. He is feeling improved and would like to try drinking apple juice.  1527 I rechecked the patient. He failed PO challenge. I discussed plan for admission and he is in agreement with this.    Consults:  9268 I consulted with Jaycob Tafoya PA-C hospitalist, admitting for , regarding the patient's history and presentation here in the emergency department who accepted the patient for admission.    Interventions:  1257 LR 1 L IV  1257 Reglan 10 mg IV  1258 Dilaudid 1 mg IV  1518 Zofran 4 mg IV  1320 Zofran 4 mg IV  1355 Potassium chloride 10 mEq IV    Disposition:  The patient was admitted to the hospital under the care of Dr. Gibson.     Impression & Plan     Medical Decision Making:  Patient presents today for repeat visit of vomiting and nausea.  Patient does have history of gastroparesis.  He was sent home after feeling better but then started to throw up again.  Please see Dr. Cramer's note for previous evaluation.  He did get IV fluids and as well as more medications.  He was feeling better and wanted to try p.o. challenge.  He did tolerate some ice chips but then threw up again when he started drinking water and apple juice.  After multiple rounds medication, patient is still unable to tolerate p.o.  At this point we offered admission based on lack of improvement.  There is no evidence of DKA.  X-ray is reassuring.  He did receive IV potassium for his hypokalemia.  Patient is in agreement with the plan.  Patient is admitted to the observation unit.    Diagnosis:    ICD-10-CM    1. Intractable nausea and vomiting  R11.2    2. Gastroparesis  K31.84      Scribe Disclosure:  I, Carlee Ovalles, am serving as a scribe at 12:30 PM on 2/26/2022 to document services personally performed by Jacki Burns MD based on my observations and the provider's statements to  me.     Jacki Burns MD  02/26/22 5379

## 2022-02-26 NOTE — H&P
[facility]    History and Physical - Hospitalist Service       Date of Admission:  2/26/2022    Assessment & Plan      Wally Avendano is a 31 year old male with PMH of T1DM and DM gastroparesis who presents with cyclical nausea and vomiting.   Being admitted for IVF, symptomatic management, and observation.    Acute medical issues:  # Cyclical nausea and vomiting/hyperemesis in the setting of DM gastroparesis and T1DM.  Etiology also includes possible viral infection vs possible cannabinoid hyperemesis syndrome.   # Mild lactic acidosis -- resolved  # Mild dehydration secondary to above  # Mild hypokalemia  # Mild malnourishment with normal albumin.    # Elevated BP without diagnosis of HTN    Chronic medical issues:  # T1DM -- diagnosed in 2021. A1C 12.0 4/2021 --> 6.8 7/2021.    Plan:   -- Admit to Medicine (observation)  -- IVF: 1/2 NS with 20 meq KCL at 125 ml/hr  -- Antiemetics: Zofran, Compazine, and Ativan.  If refractory add Zyprexa.   -- Thorazine 25 mg IM x 1 (recalcitrant)  -- CLD tonight. NPO if ongoing emesis  -- FSG q4hrs PRN.  Decrease glargine insulin to 10 units this evening.  Correctional scale coverage.  Hypoglycemia protocol.  Check A1C.   -- Replete elytes.   -- Lactate normalized. Repeat BMP at 2000. Repeat AM labs.   -- Enteric stool pathogens.  Will also check Listeria given recent salad.       Diet:   CLD  DVT Prophylaxis: Low Risk/Ambulatory with no VTE prophylaxis indicated  Fuentes Catheter: Not present  Central Lines: None  Cardiac Monitoring: None  Code Status:   Full  Family: Father and spouse updated.     Disposition Plan   Expected Discharge: pending clinical course     The patient's care was discussed with the Bedside Nurse, Patient and EM Team.    BAKARI Main CNP  Hospitalist Service  [facility]  Securely message with the Vocera Web Console (learn more here)  Text page via [employer] Paging/Directory       Addendum:  Mr. Avendano later  acknowledged to RN that he used CBD but denies other substances. Raising concern that other substances including THC may also being used.  UDS pending.     ______________________________________________________________________    Chief Complaint   Nausea, vomiting, and diarrhea    History is obtained from the patient    History of Present Illness   Wally Avendano is a 31 year old male with a PMH of T1DM and gastroparesis who presents with a 36+ hour history of ongoing, unrelenting nausea and vomiting.  He also endorses chronic diarrhea and mild abdominal pain.  His 1 year old son had several episodes of emesis yesterday raising the questions of viral illness.  Likewise, he does have a history of THC use-- states that he hasn't used in over a year, raising the concern for cannabinoid hyperemesis syndrome.  No evidence of DKA.      He was seen in the ED on 2/25/2022 and was treated with IV Zofran and discharged home with PO Zofran.   He endorses improvement in his symptoms at home initially but over the past several hours had worsening of symptoms.   He took Zofran without relief.   He is not vaccinated against COVID and reports having had a mild case of COVID-19 2 months ago.  No recent travel.  No new foods.      His wife endorses no known drug use and states that she gave him a salad for dinner the other night.  He has been evaluated for his gastroparesis in the past although has not followed up for awhile.      ED course: IV, IVF, labs, imaging, treatment  Pertinent findings: Lactic 2.4 --> 1.2; VBG 7.54/32/31/27/5.1; K 3.1; WBC 19.8 -->10.8; Plt 243K; diff WNL.  No AG acidosis.  Normal LFT and lipase. AXR negative for acute disease.   Pertinent treatment: NS 3L, LR 1L  Thorazine 25 mg IM, HM 1.5, Ativan 1 mg, Reglan 10 mg, Zofran 8 mg, K 10 mEq IVPB.     Review of Systems    CONSTITUTIONAL:NEGATIVE for fever, chills, change in weight  ENT/MOUTH: NEGATIVE for ear, mouth and throat problems  RESP: NEGATIVE for  significant cough or SOB  CV: NEGATIVE for chest pain, palpitations or peripheral edema  GI: POSITIVE for abdominal pain epigastric, gas or bloating, nausea and vomiting  ENDOCRINE: POSITIVE  for diarrhea, HX diabetes and weight loss  PSYCHIATRIC: POSITIVE foranxiety and drug usage  Remainder 12 point ROS negative     Past Medical History    I have reviewed this patient's medical history and updated it with pertinent information if needed.   Past Medical History:   Diagnosis Date     Diabetes (H)     type 1     Known health problems: none        Past Surgical History   I have reviewed this patient's surgical history and updated it with pertinent information if needed.  Past Surgical History:   Procedure Laterality Date     ESOPHAGOSCOPY, GASTROSCOPY, DUODENOSCOPY (EGD), COMBINED N/A 7/28/2021    Procedure: ESOPHAGOGASTRODUODENOSCOPY, WITH BIOPSY with duodenum biopsies for celiac sprue and gastric biopsies for h.Pylori by cold biopsy forceps;  Surgeon: Mikel Restrepo MD;  Location:  GI     NO HISTORY OF SURGERY         Social History   I have reviewed this patient's social history and updated it with pertinent information if needed.  Social History     Tobacco Use     Smoking status: Never Smoker     Smokeless tobacco: Never Used   Substance Use Topics     Alcohol use: Yes     Drug use: Never       Family History   I have reviewed this patient's family history and updated it with pertinent information if needed.  Family History   Problem Relation Age of Onset     Other - See Comments Mother         PBC       Prior to Admission Medications   Prior to Admission Medications   Prescriptions Last Dose Informant Patient Reported? Taking?   Alcohol Swabs PADS   No No   Sig: Use to swab the area of the injection or migel as directed   Per insurance coverage   B-D U/F 31G X 8 MM insulin pen needle   Yes No   Continuous Blood Gluc Sensor (DEXCOM G6 SENSOR) MISC   No No   Sig: Change every 10 days.   Patient not taking:  Reported on 6/7/2021   Continuous Blood Gluc Transmit (DEXCOM G6 TRANSMITTER) MISC   No No   Sig: Change every 3 months.   Patient not taking: Reported on 6/7/2021   Glucagon (BAQSIMI ONE PACK) 3 MG/DOSE POWD   No No   Sig: Spray 1 each in nostril as needed (for severe low blood glucose)   Microlet Lancets MISC   Yes No   Sharps Container MISC   No No   Sig: Use as directed to dispose of needles, lancets and other sharps  Per Insurance coverage   blood glucose (NO BRAND SPECIFIED) lancets standard   No No   Sig: To use to test glucose level in the blood  Use to test blood sugar  3  times daily as directed. To accompany glucose monitor brands per insurance coverage.   blood glucose (NO BRAND SPECIFIED) test strip   No No   Sig: Use to test blood sugar three times daily as directed. To accompany glucose monitor brands per insurance coverage.   blood glucose calibration (NO BRAND SPECIFIED) solution   No No   Sig: Used to calibrate the blood glucose monitor as needed and as directed.  To accompany  blood glucose brands per insurance coverage   blood glucose monitoring (CONTOUR NEXT MONITOR W/DEVICE KIT) meter device kit   Yes No   blood glucose monitoring (NO BRAND SPECIFIED) meter device kit   No No   Sig: Use to test blood sugar 4 times daily or as directed.   glucose (BD GLUCOSE) 4 g chewable tablet   No No   Sig: Take 4 tablets by mouth every 15 minutes as needed for low blood sugar   insulin aspart (NOVOLOG PEN) 100 UNIT/ML pen   No No   Sig: Sig 2-15 units with meals 2-4 times daily   insulin glargine (LANTUS PEN) 100 UNIT/ML pen   No No   Sig: Sig 28 units once daily   insulin pen needle (31G X 5 MM) 31G X 5 MM miscellaneous   No No   Sig: Use 4 pen needles daily or as directed.   metoclopramide (REGLAN) 10 MG tablet   No No   Sig: Take 1 tablet (10 mg) by mouth 3 times daily as needed (Nausea or Vomiting)   ondansetron (ZOFRAN ODT) 4 MG ODT tab   No No   Sig: Take 1 tablet (4 mg) by mouth every 6 hours as  needed for nausea   ondansetron (ZOFRAN) 4 MG tablet   Yes No   promethazine (PHENERGAN) 25 MG tablet   No No   Sig: Take 1 tablet (25 mg) by mouth every 6 hours as needed for nausea      Facility-Administered Medications: None     Allergies   No Known Allergies    Physical Exam   Vital Signs: Temp: 98.2  F (36.8  C) Temp src: Oral BP: (!) 152/138 Pulse: 98   Resp: 24 SpO2: 100 % O2 Device: None (Room air)    Weight: 0 lbs 0 oz    General Appearance: Pleasant 30 yo M who appears frail and emaciated  HEENT: PERRLA EOMI Op dry.  Dry tongue.  Respiratory: Lungs clear  Cardiovascular: RRR. S1S2.  GI: Mild epigastric tenderness.  BS x 4 quads.  No HSM.  Lymph/Hematologic: No LAD.  Genitourinary: Deferred  Skin: No worrisome lesions.  Musculoskeletal: FERRER. CMS intact. CR < 3 seconds.    Neurologic: AxOx3. FC. No focal deficits.  Psychiatric: Calm.  Denies SI or HI.     Data   Data reviewed today: I reviewed all medications, new labs and imaging results over the last 24 hours. I personally reviewed    10.8    \    13.5    /    243   N 88    L N/A    140    104    10 /   ------------------------------------ 167 (H)   ALT 45   AST 23   AP 64   ALB 3.5   Ca 9.1  3.4    27    0.58 (L) \    % RETIC N/A    LDH N/A  Troponin N/A    BNP N/A    CK N/A  INR N/A   PTT N/A    D-dimer N/A    Fibrinogen N/A    Antithrombin N/A  Ferritin N/A  CRP N/A    IL-6 N/A  COVID negative   Recent Results (from the past 24 hour(s))   XR Abdomen 2 Views    Narrative    EXAM: XR ABDOMEN 2VIEWS  LOCATION: [facility]  DATE/TIME: 2022 1:08 PM    INDICATION: Pain  COMPARISON: None.      Impression    IMPRESSION: Negative abdomen. Bowel gas pattern is normal. Nothing for obstruction or free air. No evidence for renal stones.      [id number] sinus rhythm with sinus arrhythmia

## 2022-02-26 NOTE — ED NOTES
Vomiting, frequent hiccups as well, lorazepam ordered and given, will reassess. Patient denies other needs/comlaints at this time.

## 2022-02-26 NOTE — ED NOTES
[facility]  ED Nurse Handoff Report    Wally Avendano is a 31 year old male   ED Chief complaint: Nausea, Vomiting, & Diarrhea  . ED Diagnosis:   Final diagnoses:   Intractable nausea and vomiting   Gastroparesis   Hypokalemia     Allergies: No Known Allergies    Code Status: Full Code  Activity level - Baseline/Home:  Independent. Activity Level - Current:   Stand by Assist. Lift room needed: No. Bariatric: No   Needed: No   Isolation: No. Infection: Not Applicable.     Vital Signs:   Vitals:    02/26/22 1430 02/26/22 1445 02/26/22 1500 02/26/22 1515   BP: 115/75 111/75 126/88 (!) 152/138   Pulse: 72 68 91 98   Resp:       Temp:       TempSrc:       SpO2: 96% 99% 99% 100%       Cardiac Rhythm:  ,      Pain level:    Patient confused: No. Patient Falls Risk: Yes.   Elimination Status: Has voided   Patient Report - Initial Complaint:Wally Avendano is a 31 year old male with history of gastroparesis and insulin-dependent type 1 diabetes mellitus who presents with nausea, emesis, diarrhea, and mid abdominal pain, persisting since onset approximately 36 hours ago. The patient has history of gastroparesis and has had occasional and similar attacks of the above symptoms. He was seen in ED yesterday for these symptoms, at which point he was provided IV Zofran and discharged to home with Zofran PO. He reports improvement of symptoms in the ED yesterday but recurrent pain following his discharge to home. He did take Zofran last night as indicated and another dose again this morning, however reports little improvement from this. He has taken no other medication for symptom relief, and persisting pain and discomfort prompted his concern and presentation to the ED once again today. Of note, his 1 year old son at home experienced onset of similar nausea and emesis yesterday. The patient is otherwise unsure of clear etiology of his symptoms. He has seen a GI specialist in the past for his gastroparesis,  "although has not followed-up up in \"awhile.\" He has history of recent COVID-19 infection 2 months ago and has had no recent contact to this. He denies recent travel. The patient does not use alcohol or drugs and has no known drug allergies. . Focused Assessment: PT is alert and oriented times four. Pleasant and cooperative. Skin is warm, dry and intact. Respirations are even and easy.  Tests Performed:   Labs Ordered and Resulted from Time of ED Arrival to Time of ED Departure   COMPREHENSIVE METABOLIC PANEL - Abnormal       Result Value    Sodium 140      Potassium 3.1 (*)     Chloride 104      Carbon Dioxide (CO2) 27      Anion Gap 9      Urea Nitrogen 10      Creatinine 0.58 (*)     Calcium 9.1      Glucose 220 (*)     Alkaline Phosphatase 64      AST 23      ALT 45      Protein Total 7.3      Albumin 3.5      Bilirubin Total 1.0      GFR Estimate >90     LIPASE - Abnormal    Lipase 23 (*)    LACTIC ACID WHOLE BLOOD - Abnormal    Lactic Acid 2.4 (*)    BLOOD GAS VENOUS - Abnormal    pH Venous 7.54 (*)     pCO2 Venous 32 (*)     pO2 Venous 31      Bicarbonate Venous 27      Base Excess/Deficit (+/-) 5.1 (*)     FIO2 0     CBC WITH PLATELETS AND DIFFERENTIAL - Abnormal    WBC Count 10.8      RBC Count 4.61      Hemoglobin 13.5      Hematocrit 39.4 (*)     MCV 86      MCH 29.3      MCHC 34.3      RDW 12.6      Platelet Count 243      % Neutrophils 88      % Lymphocytes 8      % Monocytes 4      % Eosinophils 0      % Basophils 0      % Immature Granulocytes 0      NRBCs per 100 WBC 0      Absolute Neutrophils 9.4 (*)     Absolute Lymphocytes 0.9      Absolute Monocytes 0.5      Absolute Eosinophils 0.0      Absolute Basophils 0.0      Absolute Immature Granulocytes 0.0      Absolute NRBCs 0.0     LACTIC ACID WHOLE BLOOD   . Abnormal Results:   XR Abdomen 2 Views   Final Result   IMPRESSION: Negative abdomen. Bowel gas pattern is normal. Nothing for obstruction or free air. No evidence for renal stones.        "   Treatments provided: IV, monitoring, medications  Family Comments: Here with his father. Is  with one year old son  OBS brochure/video discussed/provided to patient:  YES  ED Medications:   Medications   ondansetron (ZOFRAN) injection 4 mg (4 mg Intravenous Given 2/26/22 1518)   lactated ringers BOLUS 1,000 mL (0 mLs Intravenous Stopped 2/26/22 1355)   metoclopramide (REGLAN) injection 10 mg (10 mg Intravenous Given 2/26/22 1257)   HYDROmorphone (DILAUDID) injection 1 mg (1 mg Intravenous Given 2/26/22 1258)   potassium chloride 10 mEq in 100 mL sterile water intermittent infusion (premix) (0 mEq Intravenous Stopped 2/26/22 1514)     Drips infusing:  No  For the majority of the shift, the patient's behavior Green. Interventions performed were none.    Sepsis treatment initiated: No     Patient tested for COVID 19 prior to admission: YES    ED Nurse Name/Phone Number: Jocelin Manuel RN,   3:54 PM    RECEIVING UNIT ED HANDOFF REVIEW    Above ED Nurse Handoff Report was reviewed: Yes  Reviewed by: Anu Beck RN on February 26, 2022 at 5:35 PM

## 2022-02-26 NOTE — ED TRIAGE NOTES
A&O x4.  ABC's intact.      Pt arrives with c/o n/v/d x 36 hours was prescribed 2 medication over the night in the ER and still nauseated & vomiting.

## 2022-02-27 VITALS
HEART RATE: 91 BPM | WEIGHT: 142.5 LBS | RESPIRATION RATE: 18 BRPM | DIASTOLIC BLOOD PRESSURE: 85 MMHG | TEMPERATURE: 97.8 F | OXYGEN SATURATION: 97 % | HEIGHT: 77 IN | BODY MASS INDEX: 16.83 KG/M2 | SYSTOLIC BLOOD PRESSURE: 130 MMHG

## 2022-02-27 LAB
AMPHETAMINES UR QL SCN: ABNORMAL
ANION GAP SERPL CALCULATED.3IONS-SCNC: 6 MMOL/L (ref 3–14)
BARBITURATES UR QL: ABNORMAL
BENZODIAZ UR QL: ABNORMAL
BUN SERPL-MCNC: 8 MG/DL (ref 7–30)
CALCIUM SERPL-MCNC: 8 MG/DL (ref 8.5–10.1)
CANNABINOIDS UR QL SCN: ABNORMAL
CHLORIDE BLD-SCNC: 106 MMOL/L (ref 94–109)
CO2 SERPL-SCNC: 27 MMOL/L (ref 20–32)
COCAINE UR QL: ABNORMAL
CREAT SERPL-MCNC: 0.56 MG/DL (ref 0.66–1.25)
ERYTHROCYTE [DISTWIDTH] IN BLOOD BY AUTOMATED COUNT: 12.4 % (ref 10–15)
GFR SERPL CREATININE-BSD FRML MDRD: >90 ML/MIN/1.73M2
GLUCOSE BLD-MCNC: 129 MG/DL (ref 70–99)
GLUCOSE BLDC GLUCOMTR-MCNC: 109 MG/DL (ref 70–99)
GLUCOSE BLDC GLUCOMTR-MCNC: 126 MG/DL (ref 70–99)
GLUCOSE BLDC GLUCOMTR-MCNC: 129 MG/DL (ref 70–99)
GLUCOSE BLDC GLUCOMTR-MCNC: 91 MG/DL (ref 70–99)
HCT VFR BLD AUTO: 35.3 % (ref 40–53)
HGB BLD-MCNC: 12.1 G/DL (ref 13.3–17.7)
MCH RBC QN AUTO: 29.4 PG (ref 26.5–33)
MCHC RBC AUTO-ENTMCNC: 34.3 G/DL (ref 31.5–36.5)
MCV RBC AUTO: 86 FL (ref 78–100)
OPIATES UR QL SCN: ABNORMAL
PCP UR QL SCN: ABNORMAL
PLATELET # BLD AUTO: 188 10E3/UL (ref 150–450)
POTASSIUM BLD-SCNC: 3.4 MMOL/L (ref 3.4–5.3)
POTASSIUM BLD-SCNC: 3.7 MMOL/L (ref 3.4–5.3)
RBC # BLD AUTO: 4.12 10E6/UL (ref 4.4–5.9)
SODIUM SERPL-SCNC: 139 MMOL/L (ref 133–144)
WBC # BLD AUTO: 8.5 10E3/UL (ref 4–11)

## 2022-02-27 PROCEDURE — 250N000012 HC RX MED GY IP 250 OP 636 PS 637: Performed by: PHYSICIAN ASSISTANT

## 2022-02-27 PROCEDURE — 99217 PR OBSERVATION CARE DISCHARGE: CPT | Performed by: PHYSICIAN ASSISTANT

## 2022-02-27 PROCEDURE — G0378 HOSPITAL OBSERVATION PER HR: HCPCS

## 2022-02-27 PROCEDURE — 82962 GLUCOSE BLOOD TEST: CPT

## 2022-02-27 PROCEDURE — 85027 COMPLETE CBC AUTOMATED: CPT | Performed by: NURSE PRACTITIONER

## 2022-02-27 PROCEDURE — 36415 COLL VENOUS BLD VENIPUNCTURE: CPT | Performed by: PHYSICIAN ASSISTANT

## 2022-02-27 PROCEDURE — 250N000011 HC RX IP 250 OP 636: Performed by: INTERNAL MEDICINE

## 2022-02-27 PROCEDURE — 82310 ASSAY OF CALCIUM: CPT | Performed by: NURSE PRACTITIONER

## 2022-02-27 PROCEDURE — 250N000011 HC RX IP 250 OP 636: Performed by: NURSE PRACTITIONER

## 2022-02-27 PROCEDURE — 250N000013 HC RX MED GY IP 250 OP 250 PS 637: Performed by: PHYSICIAN ASSISTANT

## 2022-02-27 PROCEDURE — 96372 THER/PROPH/DIAG INJ SC/IM: CPT | Performed by: PHYSICIAN ASSISTANT

## 2022-02-27 PROCEDURE — 96375 TX/PRO/DX INJ NEW DRUG ADDON: CPT

## 2022-02-27 PROCEDURE — 96376 TX/PRO/DX INJ SAME DRUG ADON: CPT

## 2022-02-27 PROCEDURE — 84132 ASSAY OF SERUM POTASSIUM: CPT | Mod: 91 | Performed by: PHYSICIAN ASSISTANT

## 2022-02-27 PROCEDURE — 36415 COLL VENOUS BLD VENIPUNCTURE: CPT | Performed by: NURSE PRACTITIONER

## 2022-02-27 PROCEDURE — 80307 DRUG TEST PRSMV CHEM ANLYZR: CPT | Performed by: EMERGENCY MEDICINE

## 2022-02-27 PROCEDURE — 96361 HYDRATE IV INFUSION ADD-ON: CPT

## 2022-02-27 RX ORDER — PROCHLORPERAZINE 25 MG
25 SUPPOSITORY, RECTAL RECTAL EVERY 12 HOURS PRN
Qty: 2 SUPPOSITORY | Refills: 0 | Status: ON HOLD | OUTPATIENT
Start: 2022-02-27 | End: 2023-01-21

## 2022-02-27 RX ORDER — OXYCODONE HYDROCHLORIDE 5 MG/1
5 TABLET ORAL ONCE
Status: DISCONTINUED | OUTPATIENT
Start: 2022-02-27 | End: 2022-02-27

## 2022-02-27 RX ORDER — POTASSIUM CHLORIDE 1500 MG/1
20 TABLET, EXTENDED RELEASE ORAL ONCE
Status: COMPLETED | OUTPATIENT
Start: 2022-02-27 | End: 2022-02-27

## 2022-02-27 RX ORDER — ONDANSETRON 4 MG/1
4 TABLET, ORALLY DISINTEGRATING ORAL EVERY 6 HOURS PRN
Qty: 30 TABLET | Refills: 0 | Status: ON HOLD | OUTPATIENT
Start: 2022-02-27 | End: 2022-08-15

## 2022-02-27 RX ORDER — METOCLOPRAMIDE 10 MG/1
10 TABLET ORAL 3 TIMES DAILY PRN
Qty: 21 TABLET | Refills: 0 | Status: ON HOLD | OUTPATIENT
Start: 2022-02-27 | End: 2022-08-15

## 2022-02-27 RX ORDER — MORPHINE SULFATE 2 MG/ML
3 INJECTION, SOLUTION INTRAMUSCULAR; INTRAVENOUS ONCE
Status: COMPLETED | OUTPATIENT
Start: 2022-02-27 | End: 2022-02-27

## 2022-02-27 RX ADMIN — METOCLOPRAMIDE HYDROCHLORIDE 10 MG: 5 INJECTION INTRAMUSCULAR; INTRAVENOUS at 06:40

## 2022-02-27 RX ADMIN — LORAZEPAM 1 MG: 2 INJECTION INTRAMUSCULAR; INTRAVENOUS at 00:34

## 2022-02-27 RX ADMIN — ONDANSETRON 4 MG: 4 TABLET, ORALLY DISINTEGRATING ORAL at 15:09

## 2022-02-27 RX ADMIN — INSULIN GLARGINE 10 UNITS: 100 INJECTION, SOLUTION SUBCUTANEOUS at 11:21

## 2022-02-27 RX ADMIN — METOCLOPRAMIDE HYDROCHLORIDE 10 MG: 5 INJECTION INTRAMUSCULAR; INTRAVENOUS at 12:30

## 2022-02-27 RX ADMIN — POTASSIUM CHLORIDE 20 MEQ: 1500 TABLET, EXTENDED RELEASE ORAL at 09:31

## 2022-02-27 RX ADMIN — METOCLOPRAMIDE HYDROCHLORIDE 10 MG: 5 INJECTION INTRAMUSCULAR; INTRAVENOUS at 00:13

## 2022-02-27 RX ADMIN — MORPHINE SULFATE 3 MG: 2 INJECTION, SOLUTION INTRAMUSCULAR; INTRAVENOUS at 04:57

## 2022-02-27 RX ADMIN — ONDANSETRON 4 MG: 2 INJECTION INTRAMUSCULAR; INTRAVENOUS at 04:19

## 2022-02-27 NOTE — PHARMACY-ADMISSION MEDICATION HISTORY
Admission medication history interview status for this patient is complete. See [facility] admission navigator for allergy information, prior to admission medications and immunization status.     Medication history interview done, indicate source(s): Patient  Medication history resources (including written lists, pill bottles, clinic record):None    Changes made to PTA medication list:  Added: Ibuprofen  Changed: Novolog, insulin glargine  Reported as Not Taking: glucagon powder  Removed: duplicate Zofran, promethazine    Actions taken by pharmacist (provider contacted, etc):None     Additional medication history information:Patient states that he spoke with is provider regarding insulin aspart and his provider stated that it would be okay if he stopped taking insulin aspart. Patient states that he hasn't taken insulin glargine for several months. The exact dose of insulin glargine is unknown but the patient stated that 28 units did sound familiar. The patient hasn't used either insulin glargine or insulin aspart in several months.     Medication reconciliation/reorder completed by provider prior to medication history?  N   (Y/N)     For patients on insulin therapy:   Do you use sliding scale insulin based on blood sugars? No  What is your pre-meal insulin coverage?  hasn't used in a couple of months  How many times do you check your blood glucose per day? Checks BGs every couple of days (typically BGs are in the 150s)    Prior to Admission medications    Medication Sig Last Dose Taking? Auth Provider   glucose (BD GLUCOSE) 4 g chewable tablet Take 4 tablets by mouth every 15 minutes as needed for low blood sugar  at prn Yes Jackelin Perez, DO   ibuprofen (ADVIL/MOTRIN) 200 MG tablet Take 400 mg by mouth every 8 hours as needed for mild pain  at prn Yes Unknown, Entered By History   insulin aspart (NOVOLOG PEN) 100 UNIT/ML pen Sig 2-15 units with meals 2-4 times daily  Patient taking differently: 3 times daily (with  meals) 1 unit per 15 grams of carbohydrates. More than a month at Unknown time Yes Latoya Perez MD   insulin glargine (LANTUS PEN) 100 UNIT/ML pen Sig 28 units once daily  Patient taking differently: Daily (exact dose unknown) More than a month at Unknown time Yes Latoya Perez MD   metoclopramide (REGLAN) 10 MG tablet Take 1 tablet (10 mg) by mouth 3 times daily as needed (Nausea or Vomiting)  at prn Yes Nanette Cramer MD   ondansetron (ZOFRAN ODT) 4 MG ODT tab Take 1 tablet (4 mg) by mouth every 6 hours as needed for nausea  at prn Yes Nanette Cramer MD   Alcohol Swabs PADS Use to swab the area of the injection or migel as directed   Per insurance coverage   Jackelin Perez DO   B-D U/F 31G X 8 MM insulin pen needle    Reported, Patient   blood glucose (NO BRAND SPECIFIED) lancets standard To use to test glucose level in the blood  Use to test blood sugar  3  times daily as directed. To accompany glucose monitor brands per insurance coverage.   Jackelin Perez DO   blood glucose (NO BRAND SPECIFIED) test strip Use to test blood sugar three times daily as directed. To accompany glucose monitor brands per insurance coverage.   Latoya Perez MD   blood glucose calibration (NO BRAND SPECIFIED) solution Used to calibrate the blood glucose monitor as needed and as directed.  To accompany  blood glucose brands per insurance coverage   Jackelin Perez DO   blood glucose monitoring (CONTOUR NEXT MONITOR W/DEVICE KIT) meter device kit    Reported, Patient   blood glucose monitoring (NO BRAND SPECIFIED) meter device kit Use to test blood sugar 4 times daily or as directed.   Fuad Landeros MD   Continuous Blood Gluc Sensor (DEXCOM G6 SENSOR) MISC Change every 10 days.  Patient not taking: Reported on 6/7/2021   Latoya Perez MD   Continuous Blood Gluc Transmit (DEXCOM G6 TRANSMITTER) MISC Change every 3 months.  Patient not taking: Reported on 6/7/2021   Latoya Perez MD   Glucagon  (BAQSIMI ONE PACK) 3 MG/DOSE POWD Spray 1 each in nostril as needed (for severe low blood glucose)  Patient not taking: Reported on 2/26/2022 Not Taking at Unknown time  Latoya Perez MD   insulin pen needle (31G X 5 MM) 31G X 5 MM miscellaneous Use 4 pen needles daily or as directed.   Latoya Perez MD   Microlet Lancets MISC    Reported, Patient   Sharps Container MISC Use as directed to dispose of needles, lancets and other sharps  Per Insurance coverage   Jackelin Perez,

## 2022-02-27 NOTE — PLAN OF CARE
PRIMARY DIAGNOSIS: GASTROPARESIS    OUTPATIENT/OBSERVATION GOALS TO BE MET BEFORE DISCHARGE  1. Orthostatic performed: No    2. Tolerating PO fluid and/or antibiotics (if applicable): No    3. Nausea/Vomiting/Diarrhea symptoms improved: NO    4. Pain status: Pain free.    5. Return to near baseline physical activity: Yes    Discharge Planner Nurse   Safe discharge environment identified: Yes  Barriers to discharge: YES Can        Entered by: Nanette Johnson 02/27/2022 3:54 AM     Please review provider order for any additional goals.   Nurse to notify provider when observation goals have been met and patient is ready for discharge.Goal Outcome Evaluation:

## 2022-02-27 NOTE — PLAN OF CARE
Goal Outcome Evaluation:    Pt sleeping. Glucose 147 and not eating. Insulin held.  Will recheck at midnight.

## 2022-02-27 NOTE — PROGRESS NOTES
PRIMARY DIAGNOSIS: GASTROPARESIS     OUTPATIENT/OBSERVATION GOALS TO BE MET BEFORE DISCHARGE  1. Orthostatic performed: N/A     2. Tolerating PO fluid and/or antibiotics (if applicable): NO- had 3 emesis overnight      3. Nausea/Vomiting/Diarrhea symptoms improved: NO     4. Pain status: 3/10 Mid abd pain-heating pad in place      5. Return to near baseline physical activity: Yes     Discharge Planner Nurse   Safe discharge environment identified: Yes  Barriers to discharge: Yes not tolerating po       Entered by: Violeta Garcia [id number]   Please review provider order for any additional goals.   Nurse to notify provider when observation goals have been met and patient is ready for discharge.     Patient sleeping soundly all morning. No emesis since overnight. Denies nausea. Tolerating water. Given popsicle to try. Now SL. No consults.Up ind. Pot low- on potassium protocol. On scheduled reglan. Heating pad in place for 3/10 abd pain.

## 2022-02-27 NOTE — PLAN OF CARE
PRIMARY DIAGNOSIS: GASTROPARESIS    OUTPATIENT/OBSERVATION GOALS TO BE MET BEFORE DISCHARGE  1. Orthostatic performed: No    2. Tolerating PO fluid and/or antibiotics (if applicable): NO    3. Nausea/Vomiting/Diarrhea symptoms improved: NO    4. Pain status: Pain free. NO    5. Return to near baseline physical activity: Yes    Discharge Planner Nurse   Safe discharge environment identified: Yes  Barriers to discharge: Yes not tolerating po       Entered by: Nanette Johnson 02/27/2022 3:48 AM     Please review provider order for any additional goals.   Nurse to notify provider when observation goals have been met and patient is ready for discharge.    VSS. Denies pain. Pt c/o n/v and received 1mg IV Ativan PRN x1, IV Zofran x1, scheduled Reglan and a 1x order of Morphine for pain. Pt's nausea was mild/mod until about 0400 he took a sip of water and started vomiting. LUPIV: Infusing 1/2 NS 20K @ 125mL/hr. LLPIV: SL.  & 129, sliding scaleAmbulates IND in room. Pt makes needs known, will cont plan of care.

## 2022-02-27 NOTE — DISCHARGE SUMMARY
[facility]  Discharge Summary  Hospitalist    Date of Admission:  2/26/2022  Date of Discharge:  2/27/2022  Discharging Provider: Kendra Johnson PA-C  Date of Service (when I saw the patient): 02/27/22    Discharge Diagnoses   Cyclic vomiting, suspected cannabinoid hyperemesis syndrome   Type I DM complicated by medical non compliance   Respiratory alkalosis  Elevated lactic acid  Mild hypokalemia  Dehydration    History of Present Illness   Wally Avendano is a 31 year old male with PMH of T1DM who presents with cyclical nausea and vomiting.   Being admitted for IVF, symptomatic management, and observation. Improved with rehydration. Restarted on basal insulin. Needs close outpatient primary care follow up and abstinence from cannabinoids. Discharge with instruction to advance diet as tolerated, monitor blood sugars.     Please see admitting H & P  by Jaycob Tafoya CNP, on 2/26/2022 for full details of the encounter.     Hospital Course   Wally Avendano was admitted on 2/26/2022.  The following problems were addressed during his hospitalization:    Cyclic vomiting  Suspect cannabinoid hyperemesis  Resolved with antiemetics, antihistamine, ativan and fluid rehydration. Utox positive for cannabis. Needs to abstain to prevent recurrence. Abdominal exam benign, no diarrhea, fever or other e/o to support alternative diagnosis such as enteritis.  Did have reported mild hematemesis PTA, resolved. Likely mild esophageal etiology secondary to retching. No crepitus, anemia or GI bleeding.     Type 1 diabetes mellitus   Likely no evidence of DKA however ketonuria and glucosuria.  No hyperglycemia. Diabetes has been unmanaged for some amount of time his A1c was updated and returned 6.6 which is subjective of possible hypoglycemia in the outpatient setting. He has not been checking his blood sugars, at least over the past 48 to 72 hour leading up to admission    Has been restarted on insulin at 0.3 unit/kg,  "50% with 9 units Lantus in the morning. He will continue coverage with his meals, 1 unit per 15 g carb intake. Wife is to bring in glucometer if this is working properly we will need to continue to check blood sugars at home and make adjustments as needed with primary care.    Medical noncompliance  Noncompliant with insulin and blood sugar monitoring.  Educated of risks related to unmanaged diabetes. Does not appear to have understanding of these issues.  Needs close primary care follow-up.      Respiratory Alkalosis  secondary to hyperventilation and dry heaving.  Resolved with treatments of antiemetics    Lactic Acid elevation resolved with IVF.  Secondary to acute dehydration from cyclic vomiting.  No evidence of sepsis.    Mild hypokalemia - secondary to GI losses, replaced.       Pending Results   None.    Code Status   Full Code       Primary Care Physician   [facility]        /81 (BP Location: Right arm)   Pulse 77   Temp 98.3  F (36.8  C) (Oral)   Resp 18   Ht 1.956 m (6' 5\")   Wt 64.6 kg (142 lb 8 oz)   SpO2 98%   BMI 16.90 kg/m      Constitutional: Awake, alert, no apparent distress  Respiratory:  Normal work of breathing. Lungs clear to auscultation bilaterally, no crackles or wheezing.  Cardiovascular: Regular rate and rhythm, normal S1 and S2, and no murmur appreciated.   GI: Bowel sounds present. soft, non-distended, non-tender.   Skin/Integument: Warm, dry. no peripheral edema.  Neuro: No focal deficits. Moving all extremities with normal strength. Coordination and sensation grossly intact. Speech clear.   Psych: Appropriate affect.        Discharge Disposition   Discharged to home  Condition at discharge: Stable    Consultations This Hospital Stay   None    Time Spent on this Encounter   Kendra MACHADO PA-C, personally saw the patient today and spent greater than 30 minutes discharging this patient.    Discharge Orders      Hemoglobin A1c     Reason for your hospital " stay    You were admitted with intractable nausea and vomiting. Your symptoms improved with IV fluid resuscitation and antiemetics. Your urine tox was positive for opioids and cannabis which may be contributing to your symptoms. You were restarted on insulin to manage her diabetes and you need close follow-up with your primary care     Follow-up and recommended labs and tests     1. Follow up with primary care provider, [facility], within 7 days to evaluate medication change and for hospital follow- up.  The following labs/tests are recommended: review insulin regimen, blood sugars. Check BMP and A1c.     Activity    Your activity upon discharge: activity as tolerated     Monitor and record    blood glucose 4 times a day, before meals and at bedtime. Bring log to PCP follow up.     When to contact your care team    Call your primary care doctor if you have any of the following: temperature greater than 101 F, worsening shortness of breath, increased swelling, worsening pain, new or unrelenting diarrhea, or any other concerning symptoms. Call 911 or go to the emergency room if you need immediate assistance.     Diet    Follow this diet upon discharge: Orders Placed This Encounter      Advance Diet as Tolerated: Full liquid Diet, continue full liquid diet for the next 24-48 hours. If symptoms continue to advance yourself to a regular diet.     Discharge Medications   Current Discharge Medication List      START taking these medications    Details   prochlorperazine (COMPAZINE) 25 MG suppository Place 1 suppository (25 mg) rectally every 12 hours as needed for nausea or vomiting Give if nausea not resolved 15 minutes after giving ondansetron (ZOFRAN). If nausea/intractable vomiting not resolved in 15-30 minutes  Qty: 2 suppository, Refills: 0    Associated Diagnoses: Cyclic vomiting syndrome         CONTINUE these medications which have CHANGED    Details   insulin aspart (NOVOLOG PEN) 100 UNIT/ML pen Inject  1 Units Subcutaneous 3 times daily (with meals) 1 unit per 15 grams of carbohydrates.    Associated Diagnoses: Diabetic ketoacidosis without coma associated with type 1 diabetes mellitus (H); Type 1 diabetes mellitus with other specified complication (H)      insulin glargine (LANTUS PEN) 100 UNIT/ML pen Inject 9 Units Subcutaneous every morning  Qty: 15 mL, Refills: 11    Comments: If Lantus is not covered by insurance, may substitute Basaglar or Semglee or other insulin glargine product per insurance preference at same dose and frequency.    Associated Diagnoses: Type 1 diabetes mellitus with other specified complication (H); Diabetic ketoacidosis without coma associated with type 1 diabetes mellitus (H)      metoclopramide (REGLAN) 10 MG tablet Take 1 tablet (10 mg) by mouth 3 times daily as needed (Nausea or Vomiting)  Qty: 21 tablet, Refills: 0    Associated Diagnoses: Cyclic vomiting syndrome      ondansetron (ZOFRAN ODT) 4 MG ODT tab Take 1 tablet (4 mg) by mouth every 6 hours as needed for nausea  Qty: 30 tablet, Refills: 0    Associated Diagnoses: Cyclic vomiting syndrome         CONTINUE these medications which have NOT CHANGED    Details   glucose (BD GLUCOSE) 4 g chewable tablet Take 4 tablets by mouth every 15 minutes as needed for low blood sugar  Qty: 50 tablet, Refills: 0    Associated Diagnoses: Diabetic ketoacidosis without coma associated with type 1 diabetes mellitus (H); Type 1 diabetes mellitus with other specified complication (H)      ibuprofen (ADVIL/MOTRIN) 200 MG tablet Take 400 mg by mouth every 8 hours as needed for mild pain      Alcohol Swabs PADS Use to swab the area of the injection or migel as directed   Per insurance coverage  Qty: 100 each, Refills: 0    Associated Diagnoses: Diabetic ketoacidosis without coma associated with type 1 diabetes mellitus (H); Type 1 diabetes mellitus with other specified complication (H)      !! B-D U/F 31G X 8 MM insulin pen needle       blood  glucose (NO BRAND SPECIFIED) lancets standard To use to test glucose level in the blood  Use to test blood sugar  3  times daily as directed. To accompany glucose monitor brands per insurance coverage.  Qty: 100 each, Refills: 0    Associated Diagnoses: Diabetic ketoacidosis without coma associated with type 1 diabetes mellitus (H); Type 1 diabetes mellitus with other specified complication (H)      blood glucose (NO BRAND SPECIFIED) test strip Use to test blood sugar three times daily as directed. To accompany glucose monitor brands per insurance coverage.  Qty: 350 strip, Refills: 3    Associated Diagnoses: Diabetic ketoacidosis without coma associated with type 1 diabetes mellitus (H); Type 1 diabetes mellitus with other specified complication (H)      blood glucose calibration (NO BRAND SPECIFIED) solution Used to calibrate the blood glucose monitor as needed and as directed.  To accompany  blood glucose brands per insurance coverage  Qty: 1 each, Refills: 0    Associated Diagnoses: Diabetic ketoacidosis without coma associated with type 1 diabetes mellitus (H); Type 1 diabetes mellitus with other specified complication (H)      blood glucose monitoring (CONTOUR NEXT MONITOR W/DEVICE KIT) meter device kit       blood glucose monitoring (NO BRAND SPECIFIED) meter device kit Use to test blood sugar 4 times daily or as directed.  Qty: 1 kit, Refills: 0    Associated Diagnoses: Diabetic ketoacidosis without coma associated with type 1 diabetes mellitus (H)      Continuous Blood Gluc Sensor (DEXCOM G6 SENSOR) MISC Change every 10 days.  Qty: 3 each, Refills: 11    Associated Diagnoses: Type 1 diabetes mellitus without complication (H); Long term (current) use of insulin (H)      Continuous Blood Gluc Transmit (DEXCOM G6 TRANSMITTER) MISC Change every 3 months.  Qty: 1 each, Refills: 3    Associated Diagnoses: Type 1 diabetes mellitus without complication (H); Long term (current) use of insulin (H)      Glucagon  (BAQSIMI ONE PACK) 3 MG/DOSE POWD Spray 1 each in nostril as needed (for severe low blood glucose)  Qty: 1 each, Refills: 1    Associated Diagnoses: Type 1 diabetes mellitus with other specified complication (H)      !! insulin pen needle (31G X 5 MM) 31G X 5 MM miscellaneous Use 4 pen needles daily or as directed.  Qty: 120 each, Refills: 11    Comments: Please dispense as Insulin Pen Needle 31G X 5 MM MISC.  Associated Diagnoses: Type 1 diabetes mellitus without complication (H); Long term (current) use of insulin (H); Diabetic ketoacidosis without coma associated with type 1 diabetes mellitus (H); Type 1 diabetes mellitus with other specified complication (H)      Microlet Lancets MISC       Sharps Container MISC Use as directed to dispose of needles, lancets and other sharps  Per Insurance coverage  Qty: 1 each, Refills: 0    Associated Diagnoses: Diabetic ketoacidosis without coma associated with type 1 diabetes mellitus (H); Type 1 diabetes mellitus with other specified complication (H)       !! - Potential duplicate medications found. Please discuss with provider.        Allergies   No Known Allergies  Data   Most Recent 3 CBC's:Recent Labs   Lab Test 02/27/22  0515 02/26/22  1256 02/25/22  1556   WBC 8.5 10.8 19.8*   HGB 12.1* 13.5 15.1   MCV 86 86 84    243 316      Most Recent 3 BMP's:  Recent Labs   Lab Test 02/27/22  1404 02/27/22  1343 02/27/22  1127 02/27/22  0515 02/26/22 2003 02/26/22  1953 02/26/22  1801 02/26/22  1256   NA  --   --   --  139  --  139  --  140   POTASSIUM  --  3.7  --  3.4  --  3.6   < > 3.1*   CHLORIDE  --   --   --  106  --  106  --  104   CO2  --   --   --  27  --  30  --  27   BUN  --   --   --  8  --  9  --  10   CR  --   --   --  0.56*  --  0.52*  --  0.58*   ANIONGAP  --   --   --  6  --  3  --  9   MARY  --   --   --  8.0*  --  8.2*  --  9.1   GLC 91  --  109* 129*   < > 162*   < > 220*    < > = values in this interval not displayed.     Most Recent 2 LFT's:  Recent  Labs   Lab Test 02/26/22  1256 02/25/22  1556   AST 23 21   ALT 45 52   ALKPHOS 64 76   BILITOTAL 1.0 1.1     Most Recent INR's and Anticoagulation Dosing History:  Anticoagulation Dose History    There is no flowsheet data to display.       Most Recent 3 Troponin's:No lab results found.  Most Recent Cholesterol Panel:  Recent Labs   Lab Test 04/22/21  1755   CHOL 149   LDL 75   HDL 56   TRIG 88     Most Recent 6 Bacteria Isolates From Any Culture (See EPIC Reports for Culture Details):  Recent Labs   Lab Test 04/01/21 2043   CULT No growth  No growth     Most Recent TSH, T4 and A1c Labs:  Recent Labs   Lab Test 02/26/22  1256 07/17/21  1006 06/02/21  1607   TSH  --   --  1.72   A1C 6.6*   < >  --     < > = values in this interval not displayed.     Results for orders placed or performed during the hospital encounter of 02/26/22   XR Abdomen 2 Views    Narrative    EXAM: XR ABDOMEN 2VIEWS  LOCATION: [facility]  DATE/TIME: 2/26/2022 1:08 PM    INDICATION: Pain  COMPARISON: None.      Impression    IMPRESSION: Negative abdomen. Bowel gas pattern is normal. Nothing for obstruction or free air. No evidence for renal stones.        Kendra Johnson PA-C  [facility]

## 2022-02-27 NOTE — PROGRESS NOTES
ROOM # [id number]    Living Situation (if not independent, order SW consult): Home with wife and son  Facility name:  : [facility] [phone]    Activity level at baseline: Ind  Activity level on admit: Ind    Who will be transporting you at discharge:     Patient registered to observation; given Patient Bill of Rights; given the opportunity to ask questions about observation status and their plan of care.  Patient has been oriented to the observation room, bathroom and call light is in place.    Discussed discharge goals and expectations with patient/family.

## 2022-02-27 NOTE — PROGRESS NOTES
Patient's After Visit Summary was reviewed with patient   Patient verbalized understanding of After Visit Summary, recommended follow up and was given an opportunity to ask questions.   Discharge medications sent home with patient/family: YES-waiting from meds from pharmacy for discharge   Discharged with spouse

## 2022-02-27 NOTE — PROVIDER NOTIFICATION
glu 147 at 2000, nov and lantus held. nausea and voimiting. only niurka ice. will check glu every 4 hours.  Thanks

## 2022-02-27 NOTE — PROGRESS NOTES
PRIMARY DIAGNOSIS: GASTROPARESIS     OUTPATIENT/OBSERVATION GOALS TO BE MET BEFORE DISCHARGE  1. Orthostatic performed: N/A     2. Tolerating PO fluid and/or antibiotics (if applicable): NO- had 3 emesis overnight      3. Nausea/Vomiting/Diarrhea symptoms improved: NO     4. Pain status: 3/10 Mid abd pain-heating pad in place      5. Return to near baseline physical activity: Yes     Discharge Planner Nurse   Safe discharge environment identified: Yes  Barriers to discharge: Yes not tolerating po       Entered by: Violeta Garcia [phone]   Please review provider order for any additional goals.   Nurse to notify provider when observation goals have been met and patient is ready for discharge.     Patient sleeping soundly. No emesis since overnight. Denies nausea. IVF running. No consults.Up ind. Pot low- on potassium protocol. On scheduled reglan. Heating pad in place.

## 2022-02-28 ENCOUNTER — HOSPITAL ENCOUNTER (EMERGENCY)
Facility: CLINIC | Age: 32
Discharge: HOME | End: 2022-02-28
Attending: EMERGENCY MEDICINE | Admitting: EMERGENCY MEDICINE
Payer: COMMERCIAL

## 2022-02-28 VITALS
TEMPERATURE: 98.1 F | RESPIRATION RATE: 20 BRPM | WEIGHT: 140 LBS | HEIGHT: 77 IN | BODY MASS INDEX: 16.53 KG/M2 | HEART RATE: 84 BPM | SYSTOLIC BLOOD PRESSURE: 116 MMHG | DIASTOLIC BLOOD PRESSURE: 79 MMHG | OXYGEN SATURATION: 96 %

## 2022-02-28 DIAGNOSIS — R11.15 CYCLICAL VOMITING SYNDROME: ICD-10-CM

## 2022-02-28 LAB
ALBUMIN SERPL-MCNC: 3.2 G/DL (ref 3.4–5)
ALP SERPL-CCNC: 59 U/L (ref 40–150)
ALT SERPL W P-5'-P-CCNC: 36 U/L (ref 0–70)
ANION GAP SERPL CALCULATED.3IONS-SCNC: 12 MMOL/L (ref 3–14)
AST SERPL W P-5'-P-CCNC: 23 U/L (ref 0–45)
ATRIAL RATE - MUSE: 68 BPM
BASOPHILS # BLD AUTO: 0 10E3/UL (ref 0–0.2)
BASOPHILS NFR BLD AUTO: 0 %
BILIRUB SERPL-MCNC: 1.3 MG/DL (ref 0.2–1.3)
BUN SERPL-MCNC: 8 MG/DL (ref 7–30)
CALCIUM SERPL-MCNC: 8.3 MG/DL (ref 8.5–10.1)
CHLORIDE BLD-SCNC: 102 MMOL/L (ref 94–109)
CO2 SERPL-SCNC: 23 MMOL/L (ref 20–32)
CREAT SERPL-MCNC: 0.58 MG/DL (ref 0.66–1.25)
DIASTOLIC BLOOD PRESSURE - MUSE: NORMAL MMHG
EOSINOPHIL # BLD AUTO: 0 10E3/UL (ref 0–0.7)
EOSINOPHIL NFR BLD AUTO: 0 %
ERYTHROCYTE [DISTWIDTH] IN BLOOD BY AUTOMATED COUNT: 12 % (ref 10–15)
GFR SERPL CREATININE-BSD FRML MDRD: >90 ML/MIN/1.73M2
GLUCOSE BLD-MCNC: 121 MG/DL (ref 70–99)
HCT VFR BLD AUTO: 39.6 % (ref 40–53)
HGB BLD-MCNC: 13.8 G/DL (ref 13.3–17.7)
IMM GRANULOCYTES # BLD: 0 10E3/UL
IMM GRANULOCYTES NFR BLD: 0 %
INTERPRETATION ECG - MUSE: NORMAL
LIPASE SERPL-CCNC: 26 U/L (ref 73–393)
LYMPHOCYTES # BLD AUTO: 0.8 10E3/UL (ref 0.8–5.3)
LYMPHOCYTES NFR BLD AUTO: 8 %
MAGNESIUM SERPL-MCNC: 2.1 MG/DL (ref 1.6–2.3)
MCH RBC QN AUTO: 29.1 PG (ref 26.5–33)
MCHC RBC AUTO-ENTMCNC: 34.8 G/DL (ref 31.5–36.5)
MCV RBC AUTO: 84 FL (ref 78–100)
MONOCYTES # BLD AUTO: 0.4 10E3/UL (ref 0–1.3)
MONOCYTES NFR BLD AUTO: 4 %
NEUTROPHILS # BLD AUTO: 8 10E3/UL (ref 1.6–8.3)
NEUTROPHILS NFR BLD AUTO: 88 %
NRBC # BLD AUTO: 0 10E3/UL
NRBC BLD AUTO-RTO: 0 /100
P AXIS - MUSE: 74 DEGREES
PLATELET # BLD AUTO: 244 10E3/UL (ref 150–450)
POTASSIUM BLD-SCNC: 3.6 MMOL/L (ref 3.4–5.3)
PR INTERVAL - MUSE: 142 MS
PROT SERPL-MCNC: 6.6 G/DL (ref 6.8–8.8)
QRS DURATION - MUSE: 92 MS
QT - MUSE: 398 MS
QTC - MUSE: 423 MS
R AXIS - MUSE: 106 DEGREES
RBC # BLD AUTO: 4.74 10E6/UL (ref 4.4–5.9)
SODIUM SERPL-SCNC: 137 MMOL/L (ref 133–144)
SYSTOLIC BLOOD PRESSURE - MUSE: NORMAL MMHG
T AXIS - MUSE: 72 DEGREES
VENTRICULAR RATE- MUSE: 68 BPM
WBC # BLD AUTO: 9.3 10E3/UL (ref 4–11)

## 2022-02-28 PROCEDURE — 250N000013 HC RX MED GY IP 250 OP 250 PS 637: Performed by: EMERGENCY MEDICINE

## 2022-02-28 PROCEDURE — 83690 ASSAY OF LIPASE: CPT | Performed by: EMERGENCY MEDICINE

## 2022-02-28 PROCEDURE — 36415 COLL VENOUS BLD VENIPUNCTURE: CPT | Performed by: EMERGENCY MEDICINE

## 2022-02-28 PROCEDURE — 258N000003 HC RX IP 258 OP 636: Performed by: EMERGENCY MEDICINE

## 2022-02-28 PROCEDURE — 83735 ASSAY OF MAGNESIUM: CPT | Performed by: EMERGENCY MEDICINE

## 2022-02-28 PROCEDURE — 96374 THER/PROPH/DIAG INJ IV PUSH: CPT

## 2022-02-28 PROCEDURE — 96375 TX/PRO/DX INJ NEW DRUG ADDON: CPT

## 2022-02-28 PROCEDURE — 85025 COMPLETE CBC W/AUTO DIFF WBC: CPT | Performed by: EMERGENCY MEDICINE

## 2022-02-28 PROCEDURE — 99285 EMERGENCY DEPT VISIT HI MDM: CPT | Mod: 25

## 2022-02-28 PROCEDURE — 96361 HYDRATE IV INFUSION ADD-ON: CPT

## 2022-02-28 PROCEDURE — 84132 ASSAY OF SERUM POTASSIUM: CPT | Performed by: EMERGENCY MEDICINE

## 2022-02-28 PROCEDURE — 250N000011 HC RX IP 250 OP 636: Performed by: EMERGENCY MEDICINE

## 2022-02-28 RX ORDER — CAPSAICIN 0.75 MG/G
CREAM TOPICAL ONCE
Status: COMPLETED | OUTPATIENT
Start: 2022-02-28 | End: 2022-02-28

## 2022-02-28 RX ORDER — ONDANSETRON 2 MG/ML
4 INJECTION INTRAMUSCULAR; INTRAVENOUS ONCE
Status: COMPLETED | OUTPATIENT
Start: 2022-02-28 | End: 2022-02-28

## 2022-02-28 RX ORDER — LORAZEPAM 0.5 MG/1
0.5 TABLET ORAL EVERY 6 HOURS PRN
Qty: 10 TABLET | Refills: 0 | Status: SHIPPED | OUTPATIENT
Start: 2022-02-28 | End: 2022-02-28

## 2022-02-28 RX ORDER — CAPSAICIN 0.75 MG/G
CREAM TOPICAL 3 TIMES DAILY
Qty: 57 G | Refills: 0 | Status: ON HOLD | OUTPATIENT
Start: 2022-02-28 | End: 2022-08-12

## 2022-02-28 RX ORDER — CAPSAICIN 0.75 MG/G
CREAM TOPICAL 3 TIMES DAILY
Qty: 57 G | Refills: 0 | Status: SHIPPED | OUTPATIENT
Start: 2022-02-28 | End: 2022-02-28

## 2022-02-28 RX ORDER — LORAZEPAM 0.5 MG/1
0.5 TABLET ORAL EVERY 6 HOURS PRN
Qty: 10 TABLET | Refills: 0 | Status: SHIPPED | OUTPATIENT
Start: 2022-02-28 | End: 2022-11-02

## 2022-02-28 RX ORDER — METOCLOPRAMIDE HYDROCHLORIDE 5 MG/ML
10 INJECTION INTRAMUSCULAR; INTRAVENOUS ONCE
Status: COMPLETED | OUTPATIENT
Start: 2022-02-28 | End: 2022-02-28

## 2022-02-28 RX ORDER — LORAZEPAM 2 MG/ML
0.5 INJECTION INTRAMUSCULAR ONCE
Status: COMPLETED | OUTPATIENT
Start: 2022-02-28 | End: 2022-02-28

## 2022-02-28 RX ADMIN — LORAZEPAM 0.5 MG: 2 INJECTION INTRAMUSCULAR; INTRAVENOUS at 06:24

## 2022-02-28 RX ADMIN — ONDANSETRON 4 MG: 2 INJECTION INTRAMUSCULAR; INTRAVENOUS at 06:23

## 2022-02-28 RX ADMIN — SODIUM CHLORIDE 500 ML: 9 INJECTION, SOLUTION INTRAVENOUS at 06:23

## 2022-02-28 RX ADMIN — CAPSAICIN: 0.75 CREAM TOPICAL at 06:27

## 2022-02-28 RX ADMIN — METOCLOPRAMIDE HYDROCHLORIDE 10 MG: 5 INJECTION INTRAMUSCULAR; INTRAVENOUS at 06:23

## 2022-02-28 NOTE — ED NOTES
Was using thc  for back pain. Last time was Thursday is a diabetic. Brought in by ambulance for vomiting

## 2022-02-28 NOTE — ED TRIAGE NOTES
Here for n/v started Thursday. Was seen here twice on Thursday and yesterday. Was told n/v due to cyclical vomiting from cannabis. Last use marijuana was on Thursday. Ems gave zofran 4mg IV and morphine 5mg IV which did help with symptoms. ABCs intact.

## 2022-02-28 NOTE — ED PROVIDER NOTES
"  History   Chief Complaint:  Nausea & Vomiting     HPI   Wally Avendano is a 31 year old male with history of diabetes who presents with vomiting and abodminal pain      Patient is a 31-year-old male with a history of diabetes and celiac sprue gastroparesis who presents with vomiting and abdominal pain.  Patient was hospitalized recently was discharged yesterday 2:00.  Returns with ongoing vomiting states use Zofran and a suppository at home without relief.  Patient continues to have some mild abdominal pain.  He does admit to marijuana use.  Signs a history of gastroparesis.  No fever no chills no polyuria polydipsia.    .    Review of Systems  Positive vomiting positive abdominal pain positive for chronically loose stool negative for bloody diarrhea negative for fever chills all the systems negative except as above    Allergies:  The patient has no known allergies.     Medications:  Novolog Pen  Lantus Pen  Reglan  Zofran ODT  Compazine    Past Medical History:     Type 1 diabetes mellitus  DKA  Gastroparesis  Hypokalemia  Celiac sprue    Past Surgical History:    EGD, combined     Family History:    Mother: PBC    Social History:  The patient presents to the ED via ambulance.   PCP: [facility]    Physical Exam     Patient Vitals for the past 24 hrs:   BP Temp Temp src Pulse Resp SpO2 Height Weight   02/28/22 0600 114/73 -- -- 87 -- -- -- --   02/28/22 0545 127/80 -- -- 87 -- 92 % -- --   02/28/22 0534 -- 98.1  F (36.7  C) Oral 88 20 97 % 1.956 m (6' 5\") 63.5 kg (140 lb)   02/28/22 0532 132/76 -- -- -- -- -- -- --       Physical Exam  Vitals and nursing note reviewed.   HENT:      Head: Normocephalic.      Nose: Nose normal.      Mouth/Throat:      Mouth: Mucous membranes are moist.   Eyes:      Pupils: Pupils are equal, round, and reactive to light.   Cardiovascular:      Rate and Rhythm: Normal rate and regular rhythm.   Pulmonary:      Effort: Pulmonary effort is normal.      Breath sounds: Normal " breath sounds.   Abdominal:      Comments: Mild epigastric tenderness no distention bowel sounds intact no guarding or rebound.   Skin:     General: Skin is warm.      Capillary Refill: Capillary refill takes less than 2 seconds.   Neurological:      General: No focal deficit present.      Mental Status: He is alert and oriented to person, place, and time.   Psychiatric:         Mood and Affect: Mood normal.         Emergency Department Course       Laboratory:  Labs Ordered and Resulted from Time of ED Arrival to Time of ED Departure   COMPREHENSIVE METABOLIC PANEL - Abnormal       Result Value    Sodium 137      Potassium 3.6      Chloride 102      Carbon Dioxide (CO2) 23      Anion Gap 12      Urea Nitrogen 8      Creatinine 0.58 (*)     Calcium 8.3 (*)     Glucose 121 (*)     Alkaline Phosphatase 59      AST 23      ALT 36      Protein Total 6.6 (*)     Albumin 3.2 (*)     Bilirubin Total 1.3      GFR Estimate >90     LIPASE - Abnormal    Lipase 26 (*)    CBC WITH PLATELETS AND DIFFERENTIAL - Abnormal    WBC Count 9.3      RBC Count 4.74      Hemoglobin 13.8      Hematocrit 39.6 (*)     MCV 84      MCH 29.1      MCHC 34.8      RDW 12.0      Platelet Count 244      % Neutrophils 88      % Lymphocytes 8      % Monocytes 4      % Eosinophils 0      % Basophils 0      % Immature Granulocytes 0      NRBCs per 100 WBC 0      Absolute Neutrophils 8.0      Absolute Lymphocytes 0.8      Absolute Monocytes 0.4      Absolute Eosinophils 0.0      Absolute Basophils 0.0      Absolute Immature Granulocytes 0.0      Absolute NRBCs 0.0     MAGNESIUM - Normal    Magnesium 2.1            Emergency Department Course:          Reviewed:  I reviewed nursing notes, vitals, past medical history and Care Everywhere    Assessments:  0610 I obtained history and examined the patient as noted above.   0800 I rechecked the patient and explained findings.     Consults:      Interventions:  Medications   0.9% sodium chloride BOLUS (0 mLs  Intravenous Stopped 2/28/22 0723)   capsaicin (ZOSTRIX) cream ( Topical Given 2/28/22 0627)   ondansetron (ZOFRAN) injection 4 mg (4 mg Intravenous Given 2/28/22 0623)   metoclopramide (REGLAN) injection 10 mg (10 mg Intravenous Given 2/28/22 0623)   LORazepam (ATIVAN) injection 0.5 mg (0.5 mg Intravenous Given 2/28/22 0624)        Disposition:  The patient was discharged to home.     Impression & Plan       Medical Decision Making:  Patient presents with vomiting with a history of gastroparesis as well as suspected marijuana cyclical vomiting recently hospitalized and discharged within the last 24 hours and returns with ongoing vomiting patient is well-appearing vitals are stable lab work is unremarkable antiemetics were offered and feel safe for discharge patient had extensive recent hospitalization suspect ongoing vomiting secondary to cyclical vomiting cannot rule out gastroparesis patient is already on Reglan was offered Ativan and Zostrix cream to the abdomen and follow-up with primary care.  Patient was discharged in stable condition.      Diagnosis:    ICD-10-CM    1. Cyclical vomiting syndrome  R11.15        Discharge Medications:  Discharge Medication List as of 2/28/2022  9:12 AM          Scribe Disclosure:  I, Alondra Denton, am serving as a scribe at 5:31 AM on 2/28/2022 to document services personally performed byDr. Lisandro Dunbar based on my observations and the provider's statements to me.      Lisandro Dunbar MD  03/01/22 7572     125

## 2022-06-18 ENCOUNTER — HEALTH MAINTENANCE LETTER (OUTPATIENT)
Age: 32
End: 2022-06-18

## 2022-08-11 ENCOUNTER — APPOINTMENT (OUTPATIENT)
Dept: CT IMAGING | Facility: CLINIC | Age: 32
DRG: 639 | End: 2022-08-11
Attending: EMERGENCY MEDICINE
Payer: COMMERCIAL

## 2022-08-11 ENCOUNTER — HOSPITAL ENCOUNTER (INPATIENT)
Facility: CLINIC | Age: 32
LOS: 4 days | Discharge: HOME | DRG: 639 | End: 2022-08-15
Attending: EMERGENCY MEDICINE | Admitting: INTERNAL MEDICINE
Payer: COMMERCIAL

## 2022-08-11 DIAGNOSIS — E10.10 DIABETIC KETOACIDOSIS WITHOUT COMA ASSOCIATED WITH TYPE 1 DIABETES MELLITUS (H): ICD-10-CM

## 2022-08-11 DIAGNOSIS — R11.15 CYCLIC VOMITING SYNDROME: ICD-10-CM

## 2022-08-11 DIAGNOSIS — E10.69 TYPE 1 DIABETES MELLITUS WITH OTHER SPECIFIED COMPLICATION (H): Primary | ICD-10-CM

## 2022-08-11 LAB
ABO/RH(D): NORMAL
ALBUMIN SERPL BCG-MCNC: 4.6 G/DL (ref 3.5–5.2)
ALBUMIN UR-MCNC: NEGATIVE MG/DL
ALP SERPL-CCNC: 97 U/L (ref 40–129)
ALT SERPL W P-5'-P-CCNC: 31 U/L (ref 10–50)
ANION GAP SERPL CALCULATED.3IONS-SCNC: 25 MMOL/L (ref 7–15)
ANTIBODY SCREEN: NEGATIVE
APPEARANCE UR: CLEAR
AST SERPL W P-5'-P-CCNC: 26 U/L (ref 10–50)
BASE EXCESS BLDV CALC-SCNC: -7.5 MMOL/L (ref -7.7–1.9)
BASOPHILS # BLD AUTO: 0.1 10E3/UL (ref 0–0.2)
BASOPHILS NFR BLD AUTO: 1 %
BILIRUB SERPL-MCNC: 0.7 MG/DL
BILIRUB UR QL STRIP: NEGATIVE
BUN SERPL-MCNC: 10.3 MG/DL (ref 6–20)
BUN SERPL-MCNC: 9 MG/DL (ref 7–30)
CA-I BLD-MCNC: 4.3 MG/DL (ref 4.4–5.2)
CALCIUM SERPL-MCNC: 9.2 MG/DL (ref 8.6–10)
CHLORIDE BLD-SCNC: 105 MMOL/L (ref 94–109)
CHLORIDE SERPL-SCNC: 101 MMOL/L (ref 98–107)
CO2 BLD-SCNC: 16 MMOL/L (ref 20–32)
COLOR UR AUTO: ABNORMAL
CREAT BLD-MCNC: 0.5 MG/DL (ref 0.7–1.3)
CREAT SERPL-MCNC: 0.63 MG/DL (ref 0.67–1.17)
DEPRECATED HCO3 PLAS-SCNC: 14 MMOL/L (ref 22–29)
EOSINOPHIL # BLD AUTO: 0 10E3/UL (ref 0–0.7)
EOSINOPHIL NFR BLD AUTO: 0 %
ERYTHROCYTE [DISTWIDTH] IN BLOOD BY AUTOMATED COUNT: 11.8 % (ref 10–15)
GFR SERPL CREATININE-BSD FRML MDRD: >90 ML/MIN/1.73M2
GLUCOSE BLD-MCNC: 432 MG/DL (ref 70–99)
GLUCOSE BLDC GLUCOMTR-MCNC: 207 MG/DL (ref 70–99)
GLUCOSE BLDC GLUCOMTR-MCNC: 247 MG/DL (ref 70–99)
GLUCOSE BLDC GLUCOMTR-MCNC: 261 MG/DL (ref 70–99)
GLUCOSE BLDC GLUCOMTR-MCNC: 305 MG/DL (ref 70–99)
GLUCOSE BLDC GLUCOMTR-MCNC: 329 MG/DL (ref 70–99)
GLUCOSE BLDC GLUCOMTR-MCNC: 446 MG/DL (ref 70–99)
GLUCOSE SERPL-MCNC: 456 MG/DL (ref 70–99)
GLUCOSE UR STRIP-MCNC: >=1000 MG/DL
HBA1C MFR BLD: 13.3 %
HCO3 BLDV-SCNC: 16 MMOL/L (ref 21–28)
HCT VFR BLD AUTO: 43.9 % (ref 40–53)
HCT VFR BLD CALC: 44 % (ref 40–53)
HGB BLD-MCNC: 14.9 G/DL (ref 13.3–17.7)
HGB BLD-MCNC: 15 G/DL (ref 13.3–17.7)
HGB UR QL STRIP: NEGATIVE
HOLD SPECIMEN: NORMAL
IMM GRANULOCYTES # BLD: 0.1 10E3/UL
IMM GRANULOCYTES NFR BLD: 1 %
KETONES BLD-SCNC: 5.4 MMOL/L (ref 0–0.6)
KETONES UR STRIP-MCNC: >150 MG/DL
LACTATE SERPL-SCNC: 1.2 MMOL/L (ref 0.7–2)
LACTATE SERPL-SCNC: 3.3 MMOL/L (ref 0.7–2)
LEUKOCYTE ESTERASE UR QL STRIP: NEGATIVE
LIPASE SERPL-CCNC: 11 U/L (ref 13–60)
LYMPHOCYTES # BLD AUTO: 0.7 10E3/UL (ref 0.8–5.3)
LYMPHOCYTES NFR BLD AUTO: 4 %
MAGNESIUM SERPL-MCNC: 1.8 MG/DL (ref 1.7–2.3)
MCH RBC QN AUTO: 29.1 PG (ref 26.5–33)
MCHC RBC AUTO-ENTMCNC: 33.9 G/DL (ref 31.5–36.5)
MCV RBC AUTO: 86 FL (ref 78–100)
MONOCYTES # BLD AUTO: 0.5 10E3/UL (ref 0–1.3)
MONOCYTES NFR BLD AUTO: 3 %
MUCOUS THREADS #/AREA URNS LPF: PRESENT /LPF
NEUTROPHILS # BLD AUTO: 14.6 10E3/UL (ref 1.6–8.3)
NEUTROPHILS NFR BLD AUTO: 91 %
NITRATE UR QL: NEGATIVE
NRBC # BLD AUTO: 0 10E3/UL
NRBC BLD AUTO-RTO: 0 /100
O2/TOTAL GAS SETTING VFR VENT: 0 %
PCO2 BLDV: 29 MM HG (ref 40–50)
PH BLDV: 7.36 [PH] (ref 7.32–7.43)
PH UR STRIP: 5.5 [PH] (ref 5–7)
PHOSPHATE SERPL-MCNC: 3.6 MG/DL (ref 2.5–4.5)
PLATELET # BLD AUTO: 291 10E3/UL (ref 150–450)
PO2 BLDV: 60 MM HG (ref 25–47)
POTASSIUM BLD-SCNC: 3.4 MMOL/L (ref 3.4–5.3)
POTASSIUM SERPL-SCNC: 3.8 MMOL/L (ref 3.4–5.3)
PROT SERPL-MCNC: 7.2 G/DL (ref 6.4–8.3)
RBC # BLD AUTO: 5.12 10E6/UL (ref 4.4–5.9)
RBC URINE: <1 /HPF
SODIUM BLD-SCNC: 142 MMOL/L (ref 133–144)
SODIUM SERPL-SCNC: 140 MMOL/L (ref 136–145)
SP GR UR STRIP: >1.035 (ref 1–1.03)
SPECIMEN EXPIRATION DATE: NORMAL
UROBILINOGEN UR STRIP-MCNC: NORMAL MG/DL
WBC # BLD AUTO: 16 10E3/UL (ref 4–11)
WBC URINE: <1 /HPF

## 2022-08-11 PROCEDURE — 85025 COMPLETE CBC W/AUTO DIFF WBC: CPT | Performed by: EMERGENCY MEDICINE

## 2022-08-11 PROCEDURE — 81001 URINALYSIS AUTO W/SCOPE: CPT | Performed by: EMERGENCY MEDICINE

## 2022-08-11 PROCEDURE — C9803 HOPD COVID-19 SPEC COLLECT: HCPCS

## 2022-08-11 PROCEDURE — 96366 THER/PROPH/DIAG IV INF ADDON: CPT

## 2022-08-11 PROCEDURE — 250N000009 HC RX 250: Performed by: EMERGENCY MEDICINE

## 2022-08-11 PROCEDURE — 82803 BLOOD GASES ANY COMBINATION: CPT | Performed by: EMERGENCY MEDICINE

## 2022-08-11 PROCEDURE — 99285 EMERGENCY DEPT VISIT HI MDM: CPT | Mod: 25

## 2022-08-11 PROCEDURE — 250N000011 HC RX IP 250 OP 636: Performed by: EMERGENCY MEDICINE

## 2022-08-11 PROCEDURE — 86850 RBC ANTIBODY SCREEN: CPT | Performed by: EMERGENCY MEDICINE

## 2022-08-11 PROCEDURE — 84100 ASSAY OF PHOSPHORUS: CPT | Performed by: EMERGENCY MEDICINE

## 2022-08-11 PROCEDURE — 83036 HEMOGLOBIN GLYCOSYLATED A1C: CPT | Performed by: EMERGENCY MEDICINE

## 2022-08-11 PROCEDURE — 96375 TX/PRO/DX INJ NEW DRUG ADDON: CPT

## 2022-08-11 PROCEDURE — 74177 CT ABD & PELVIS W/CONTRAST: CPT

## 2022-08-11 PROCEDURE — 82010 KETONE BODYS QUAN: CPT | Performed by: EMERGENCY MEDICINE

## 2022-08-11 PROCEDURE — 83690 ASSAY OF LIPASE: CPT | Performed by: EMERGENCY MEDICINE

## 2022-08-11 PROCEDURE — 96365 THER/PROPH/DIAG IV INF INIT: CPT | Mod: 59

## 2022-08-11 PROCEDURE — 36415 COLL VENOUS BLD VENIPUNCTURE: CPT | Performed by: EMERGENCY MEDICINE

## 2022-08-11 PROCEDURE — 258N000003 HC RX IP 258 OP 636: Performed by: EMERGENCY MEDICINE

## 2022-08-11 PROCEDURE — 83735 ASSAY OF MAGNESIUM: CPT | Performed by: EMERGENCY MEDICINE

## 2022-08-11 PROCEDURE — U0003 INFECTIOUS AGENT DETECTION BY NUCLEIC ACID (DNA OR RNA); SEVERE ACUTE RESPIRATORY SYNDROME CORONAVIRUS 2 (SARS-COV-2) (CORONAVIRUS DISEASE [COVID-19]), AMPLIFIED PROBE TECHNIQUE, MAKING USE OF HIGH THROUGHPUT TECHNOLOGIES AS DESCRIBED BY CMS-2020-01-R: HCPCS | Performed by: EMERGENCY MEDICINE

## 2022-08-11 PROCEDURE — 83605 ASSAY OF LACTIC ACID: CPT | Performed by: EMERGENCY MEDICINE

## 2022-08-11 PROCEDURE — 86901 BLOOD TYPING SEROLOGIC RH(D): CPT | Performed by: EMERGENCY MEDICINE

## 2022-08-11 PROCEDURE — 99223 1ST HOSP IP/OBS HIGH 75: CPT | Mod: AI | Performed by: INTERNAL MEDICINE

## 2022-08-11 PROCEDURE — 258N000002 HC RX IP 258 OP 250: Performed by: EMERGENCY MEDICINE

## 2022-08-11 PROCEDURE — C9113 INJ PANTOPRAZOLE SODIUM, VIA: HCPCS | Performed by: EMERGENCY MEDICINE

## 2022-08-11 PROCEDURE — 96361 HYDRATE IV INFUSION ADD-ON: CPT

## 2022-08-11 PROCEDURE — 80053 COMPREHEN METABOLIC PANEL: CPT | Performed by: EMERGENCY MEDICINE

## 2022-08-11 PROCEDURE — 120N000001 HC R&B MED SURG/OB

## 2022-08-11 PROCEDURE — 80047 BASIC METABLC PNL IONIZED CA: CPT

## 2022-08-11 PROCEDURE — 93005 ELECTROCARDIOGRAM TRACING: CPT

## 2022-08-11 RX ORDER — HYDROMORPHONE HYDROCHLORIDE 1 MG/ML
0.5 INJECTION, SOLUTION INTRAMUSCULAR; INTRAVENOUS; SUBCUTANEOUS ONCE
Status: COMPLETED | OUTPATIENT
Start: 2022-08-11 | End: 2022-08-11

## 2022-08-11 RX ORDER — METOCLOPRAMIDE HYDROCHLORIDE 5 MG/ML
10 INJECTION INTRAMUSCULAR; INTRAVENOUS ONCE
Status: COMPLETED | OUTPATIENT
Start: 2022-08-11 | End: 2022-08-11

## 2022-08-11 RX ORDER — IOPAMIDOL 755 MG/ML
500 INJECTION, SOLUTION INTRAVASCULAR ONCE
Status: COMPLETED | OUTPATIENT
Start: 2022-08-11 | End: 2022-08-11

## 2022-08-11 RX ORDER — SODIUM CHLORIDE 450 MG/100ML
1000 INJECTION, SOLUTION INTRAVENOUS CONTINUOUS
Status: DISCONTINUED | OUTPATIENT
Start: 2022-08-11 | End: 2022-08-12

## 2022-08-11 RX ORDER — DIPHENHYDRAMINE HYDROCHLORIDE 50 MG/ML
12.5 INJECTION INTRAMUSCULAR; INTRAVENOUS ONCE
Status: COMPLETED | OUTPATIENT
Start: 2022-08-11 | End: 2022-08-11

## 2022-08-11 RX ORDER — DEXTROSE MONOHYDRATE 25 G/50ML
25-50 INJECTION, SOLUTION INTRAVENOUS
Status: DISCONTINUED | OUTPATIENT
Start: 2022-08-11 | End: 2022-08-12

## 2022-08-11 RX ORDER — ONDANSETRON 2 MG/ML
4 INJECTION INTRAMUSCULAR; INTRAVENOUS EVERY 30 MIN PRN
Status: COMPLETED | OUTPATIENT
Start: 2022-08-11 | End: 2022-08-12

## 2022-08-11 RX ORDER — POTASSIUM CHLORIDE 7.45 MG/ML
10 INJECTION INTRAVENOUS ONCE
Status: COMPLETED | OUTPATIENT
Start: 2022-08-11 | End: 2022-08-11

## 2022-08-11 RX ADMIN — SODIUM CHLORIDE 1000 ML: 9 INJECTION, SOLUTION INTRAVENOUS at 20:21

## 2022-08-11 RX ADMIN — Medication 5.5 UNITS/HR: at 20:19

## 2022-08-11 RX ADMIN — PANTOPRAZOLE SODIUM 80 MG: 40 INJECTION, POWDER, FOR SOLUTION INTRAVENOUS at 20:18

## 2022-08-11 RX ADMIN — METOCLOPRAMIDE HYDROCHLORIDE 10 MG: 5 INJECTION INTRAMUSCULAR; INTRAVENOUS at 20:52

## 2022-08-11 RX ADMIN — SODIUM CHLORIDE 1000 ML: 4.5 INJECTION, SOLUTION INTRAVENOUS at 22:33

## 2022-08-11 RX ADMIN — IOPAMIDOL 65 ML: 755 INJECTION, SOLUTION INTRAVENOUS at 22:04

## 2022-08-11 RX ADMIN — DIPHENHYDRAMINE HYDROCHLORIDE 12.5 MG: 50 INJECTION, SOLUTION INTRAMUSCULAR; INTRAVENOUS at 20:52

## 2022-08-11 RX ADMIN — SODIUM CHLORIDE 58 ML: 9 INJECTION, SOLUTION INTRAVENOUS at 22:04

## 2022-08-11 RX ADMIN — POTASSIUM CHLORIDE 10 MEQ: 7.46 INJECTION, SOLUTION INTRAVENOUS at 20:19

## 2022-08-11 RX ADMIN — ONDANSETRON 4 MG: 2 INJECTION INTRAMUSCULAR; INTRAVENOUS at 20:37

## 2022-08-11 RX ADMIN — HYDROMORPHONE HYDROCHLORIDE 0.5 MG: 1 INJECTION, SOLUTION INTRAMUSCULAR; INTRAVENOUS; SUBCUTANEOUS at 20:38

## 2022-08-11 ASSESSMENT — ENCOUNTER SYMPTOMS
VOMITING: 1
ABDOMINAL PAIN: 1

## 2022-08-11 ASSESSMENT — ACTIVITIES OF DAILY LIVING (ADL)
ADLS_ACUITY_SCORE: 35
ADLS_ACUITY_SCORE: 35

## 2022-08-12 PROBLEM — E10.10 DIABETIC KETOACIDOSIS WITHOUT COMA ASSOCIATED WITH TYPE 1 DIABETES MELLITUS (H): Status: ACTIVE | Noted: 2022-08-12

## 2022-08-12 LAB
ANION GAP SERPL CALCULATED.3IONS-SCNC: 10 MMOL/L (ref 7–15)
ANION GAP SERPL CALCULATED.3IONS-SCNC: 9 MMOL/L (ref 7–15)
ATRIAL RATE - MUSE: 93 BPM
BUN SERPL-MCNC: 7.7 MG/DL (ref 6–20)
BUN SERPL-MCNC: 9.4 MG/DL (ref 6–20)
CALCIUM SERPL-MCNC: 8.3 MG/DL (ref 8.6–10)
CALCIUM SERPL-MCNC: 8.5 MG/DL (ref 8.6–10)
CHLORIDE SERPL-SCNC: 108 MMOL/L (ref 98–107)
CHLORIDE SERPL-SCNC: 111 MMOL/L (ref 98–107)
CREAT SERPL-MCNC: 0.52 MG/DL (ref 0.67–1.17)
CREAT SERPL-MCNC: 0.54 MG/DL (ref 0.67–1.17)
DEPRECATED HCO3 PLAS-SCNC: 22 MMOL/L (ref 22–29)
DEPRECATED HCO3 PLAS-SCNC: 23 MMOL/L (ref 22–29)
DIASTOLIC BLOOD PRESSURE - MUSE: NORMAL MMHG
ERYTHROCYTE [DISTWIDTH] IN BLOOD BY AUTOMATED COUNT: 12.5 % (ref 10–15)
GFR SERPL CREATININE-BSD FRML MDRD: >90 ML/MIN/1.73M2
GFR SERPL CREATININE-BSD FRML MDRD: >90 ML/MIN/1.73M2
GLUCOSE BLDC GLUCOMTR-MCNC: 123 MG/DL (ref 70–99)
GLUCOSE BLDC GLUCOMTR-MCNC: 132 MG/DL (ref 70–99)
GLUCOSE BLDC GLUCOMTR-MCNC: 137 MG/DL (ref 70–99)
GLUCOSE BLDC GLUCOMTR-MCNC: 150 MG/DL (ref 70–99)
GLUCOSE BLDC GLUCOMTR-MCNC: 152 MG/DL (ref 70–99)
GLUCOSE BLDC GLUCOMTR-MCNC: 154 MG/DL (ref 70–99)
GLUCOSE BLDC GLUCOMTR-MCNC: 157 MG/DL (ref 70–99)
GLUCOSE BLDC GLUCOMTR-MCNC: 160 MG/DL (ref 70–99)
GLUCOSE BLDC GLUCOMTR-MCNC: 163 MG/DL (ref 70–99)
GLUCOSE BLDC GLUCOMTR-MCNC: 173 MG/DL (ref 70–99)
GLUCOSE BLDC GLUCOMTR-MCNC: 191 MG/DL (ref 70–99)
GLUCOSE BLDC GLUCOMTR-MCNC: 194 MG/DL (ref 70–99)
GLUCOSE BLDC GLUCOMTR-MCNC: 196 MG/DL (ref 70–99)
GLUCOSE BLDC GLUCOMTR-MCNC: 203 MG/DL (ref 70–99)
GLUCOSE BLDC GLUCOMTR-MCNC: 208 MG/DL (ref 70–99)
GLUCOSE SERPL-MCNC: 128 MG/DL (ref 70–99)
GLUCOSE SERPL-MCNC: 235 MG/DL (ref 70–99)
HCT VFR BLD AUTO: 39.3 % (ref 40–53)
HGB BLD-MCNC: 13.2 G/DL (ref 13.3–17.7)
INTERPRETATION ECG - MUSE: NORMAL
LACTATE SERPL-SCNC: 0.7 MMOL/L (ref 0.7–2)
MCH RBC QN AUTO: 28.8 PG (ref 26.5–33)
MCHC RBC AUTO-ENTMCNC: 33.6 G/DL (ref 31.5–36.5)
MCV RBC AUTO: 86 FL (ref 78–100)
P AXIS - MUSE: 83 DEGREES
PLATELET # BLD AUTO: 287 10E3/UL (ref 150–450)
POTASSIUM SERPL-SCNC: 3.6 MMOL/L (ref 3.4–5.3)
POTASSIUM SERPL-SCNC: 3.9 MMOL/L (ref 3.4–5.3)
PR INTERVAL - MUSE: 144 MS
QRS DURATION - MUSE: 100 MS
QT - MUSE: 386 MS
QTC - MUSE: 479 MS
R AXIS - MUSE: 109 DEGREES
RBC # BLD AUTO: 4.59 10E6/UL (ref 4.4–5.9)
SARS-COV-2 RNA RESP QL NAA+PROBE: NEGATIVE
SODIUM SERPL-SCNC: 140 MMOL/L (ref 136–145)
SODIUM SERPL-SCNC: 143 MMOL/L (ref 136–145)
SYSTOLIC BLOOD PRESSURE - MUSE: NORMAL MMHG
T AXIS - MUSE: 65 DEGREES
VENTRICULAR RATE- MUSE: 93 BPM
WBC # BLD AUTO: 19.5 10E3/UL (ref 4–11)

## 2022-08-12 PROCEDURE — 96375 TX/PRO/DX INJ NEW DRUG ADDON: CPT

## 2022-08-12 PROCEDURE — 250N000011 HC RX IP 250 OP 636: Performed by: HOSPITALIST

## 2022-08-12 PROCEDURE — 36415 COLL VENOUS BLD VENIPUNCTURE: CPT | Performed by: INTERNAL MEDICINE

## 2022-08-12 PROCEDURE — 250N000009 HC RX 250: Performed by: INTERNAL MEDICINE

## 2022-08-12 PROCEDURE — 99232 SBSQ HOSP IP/OBS MODERATE 35: CPT | Performed by: HOSPITALIST

## 2022-08-12 PROCEDURE — 250N000012 HC RX MED GY IP 250 OP 636 PS 637: Performed by: HOSPITALIST

## 2022-08-12 PROCEDURE — 258N000003 HC RX IP 258 OP 636: Performed by: INTERNAL MEDICINE

## 2022-08-12 PROCEDURE — 96366 THER/PROPH/DIAG IV INF ADDON: CPT

## 2022-08-12 PROCEDURE — 83605 ASSAY OF LACTIC ACID: CPT | Performed by: INTERNAL MEDICINE

## 2022-08-12 PROCEDURE — 250N000011 HC RX IP 250 OP 636: Performed by: EMERGENCY MEDICINE

## 2022-08-12 PROCEDURE — 80048 BASIC METABOLIC PNL TOTAL CA: CPT | Performed by: INTERNAL MEDICINE

## 2022-08-12 PROCEDURE — 120N000001 HC R&B MED SURG/OB

## 2022-08-12 PROCEDURE — 258N000001 HC RX 258: Performed by: EMERGENCY MEDICINE

## 2022-08-12 PROCEDURE — 85027 COMPLETE CBC AUTOMATED: CPT | Performed by: INTERNAL MEDICINE

## 2022-08-12 PROCEDURE — 250N000009 HC RX 250: Performed by: EMERGENCY MEDICINE

## 2022-08-12 PROCEDURE — 250N000011 HC RX IP 250 OP 636: Performed by: INTERNAL MEDICINE

## 2022-08-12 PROCEDURE — 96376 TX/PRO/DX INJ SAME DRUG ADON: CPT

## 2022-08-12 PROCEDURE — C9113 INJ PANTOPRAZOLE SODIUM, VIA: HCPCS | Performed by: INTERNAL MEDICINE

## 2022-08-12 RX ORDER — HYDROMORPHONE HYDROCHLORIDE 1 MG/ML
0.5 INJECTION, SOLUTION INTRAMUSCULAR; INTRAVENOUS; SUBCUTANEOUS ONCE
Status: COMPLETED | OUTPATIENT
Start: 2022-08-12 | End: 2022-08-12

## 2022-08-12 RX ORDER — ACETAMINOPHEN 325 MG/1
650 TABLET ORAL EVERY 6 HOURS PRN
Status: DISCONTINUED | OUTPATIENT
Start: 2022-08-12 | End: 2022-08-15 | Stop reason: HOSPADM

## 2022-08-12 RX ORDER — ONDANSETRON 2 MG/ML
4 INJECTION INTRAMUSCULAR; INTRAVENOUS EVERY 6 HOURS PRN
Status: DISCONTINUED | OUTPATIENT
Start: 2022-08-12 | End: 2022-08-15 | Stop reason: HOSPADM

## 2022-08-12 RX ORDER — DIPHENHYDRAMINE HYDROCHLORIDE 50 MG/ML
12.5 INJECTION INTRAMUSCULAR; INTRAVENOUS ONCE
Status: COMPLETED | OUTPATIENT
Start: 2022-08-12 | End: 2022-08-12

## 2022-08-12 RX ORDER — DEXTROSE MONOHYDRATE, SODIUM CHLORIDE, AND POTASSIUM CHLORIDE 50; 1.49; 4.5 G/1000ML; G/1000ML; G/1000ML
INJECTION, SOLUTION INTRAVENOUS CONTINUOUS
Status: DISCONTINUED | OUTPATIENT
Start: 2022-08-12 | End: 2022-08-12

## 2022-08-12 RX ORDER — ACETAMINOPHEN 650 MG/1
650 SUPPOSITORY RECTAL EVERY 6 HOURS PRN
Status: DISCONTINUED | OUTPATIENT
Start: 2022-08-12 | End: 2022-08-15 | Stop reason: HOSPADM

## 2022-08-12 RX ORDER — POLYETHYLENE GLYCOL 3350 17 G/17G
17 POWDER, FOR SOLUTION ORAL DAILY PRN
Status: DISCONTINUED | OUTPATIENT
Start: 2022-08-12 | End: 2022-08-15 | Stop reason: HOSPADM

## 2022-08-12 RX ORDER — AMOXICILLIN 250 MG
2 CAPSULE ORAL 2 TIMES DAILY PRN
Status: DISCONTINUED | OUTPATIENT
Start: 2022-08-12 | End: 2022-08-15 | Stop reason: HOSPADM

## 2022-08-12 RX ORDER — POTASSIUM CHLORIDE 7.45 MG/ML
10 INJECTION INTRAVENOUS ONCE
Status: COMPLETED | OUTPATIENT
Start: 2022-08-12 | End: 2022-08-12

## 2022-08-12 RX ORDER — DEXTROSE MONOHYDRATE 100 MG/ML
INJECTION, SOLUTION INTRAVENOUS CONTINUOUS PRN
Status: DISCONTINUED | OUTPATIENT
Start: 2022-08-12 | End: 2022-08-15 | Stop reason: HOSPADM

## 2022-08-12 RX ORDER — METOCLOPRAMIDE HYDROCHLORIDE 5 MG/ML
5 INJECTION INTRAMUSCULAR; INTRAVENOUS EVERY 6 HOURS
Status: DISCONTINUED | OUTPATIENT
Start: 2022-08-12 | End: 2022-08-12

## 2022-08-12 RX ORDER — SODIUM CHLORIDE AND POTASSIUM CHLORIDE 150; 900 MG/100ML; MG/100ML
INJECTION, SOLUTION INTRAVENOUS CONTINUOUS
Status: DISCONTINUED | OUTPATIENT
Start: 2022-08-12 | End: 2022-08-12

## 2022-08-12 RX ORDER — AMOXICILLIN 250 MG
1 CAPSULE ORAL 2 TIMES DAILY PRN
Status: DISCONTINUED | OUTPATIENT
Start: 2022-08-12 | End: 2022-08-15 | Stop reason: HOSPADM

## 2022-08-12 RX ORDER — METOCLOPRAMIDE HYDROCHLORIDE 5 MG/ML
5 INJECTION INTRAMUSCULAR; INTRAVENOUS EVERY 6 HOURS PRN
Status: DISCONTINUED | OUTPATIENT
Start: 2022-08-12 | End: 2022-08-14

## 2022-08-12 RX ORDER — NITROGLYCERIN 0.4 MG/1
0.4 TABLET SUBLINGUAL EVERY 5 MIN PRN
Status: DISCONTINUED | OUTPATIENT
Start: 2022-08-12 | End: 2022-08-15 | Stop reason: HOSPADM

## 2022-08-12 RX ORDER — PROCHLORPERAZINE 25 MG
25 SUPPOSITORY, RECTAL RECTAL EVERY 12 HOURS PRN
Status: DISCONTINUED | OUTPATIENT
Start: 2022-08-12 | End: 2022-08-15 | Stop reason: HOSPADM

## 2022-08-12 RX ORDER — PROCHLORPERAZINE MALEATE 5 MG
10 TABLET ORAL EVERY 6 HOURS PRN
Status: DISCONTINUED | OUTPATIENT
Start: 2022-08-12 | End: 2022-08-15 | Stop reason: HOSPADM

## 2022-08-12 RX ORDER — ONDANSETRON 4 MG/1
4 TABLET, ORALLY DISINTEGRATING ORAL EVERY 6 HOURS PRN
Status: DISCONTINUED | OUTPATIENT
Start: 2022-08-12 | End: 2022-08-15 | Stop reason: HOSPADM

## 2022-08-12 RX ORDER — DEXTROSE MONOHYDRATE 25 G/50ML
25-50 INJECTION, SOLUTION INTRAVENOUS
Status: DISCONTINUED | OUTPATIENT
Start: 2022-08-12 | End: 2022-08-15 | Stop reason: HOSPADM

## 2022-08-12 RX ORDER — LIDOCAINE 40 MG/G
CREAM TOPICAL
Status: DISCONTINUED | OUTPATIENT
Start: 2022-08-12 | End: 2022-08-15 | Stop reason: HOSPADM

## 2022-08-12 RX ORDER — LIDOCAINE 40 MG/G
CREAM TOPICAL
Status: DISCONTINUED | OUTPATIENT
Start: 2022-08-12 | End: 2022-08-12

## 2022-08-12 RX ORDER — NICOTINE POLACRILEX 4 MG
15-30 LOZENGE BUCCAL
Status: DISCONTINUED | OUTPATIENT
Start: 2022-08-12 | End: 2022-08-15 | Stop reason: HOSPADM

## 2022-08-12 RX ADMIN — INSULIN GLARGINE 10 UNITS: 100 INJECTION, SOLUTION SUBCUTANEOUS at 10:03

## 2022-08-12 RX ADMIN — Medication 4 UNITS/HR: at 05:30

## 2022-08-12 RX ADMIN — Medication: at 01:22

## 2022-08-12 RX ADMIN — METOCLOPRAMIDE HYDROCHLORIDE 5 MG: 5 INJECTION INTRAMUSCULAR; INTRAVENOUS at 18:05

## 2022-08-12 RX ADMIN — DIPHENHYDRAMINE HYDROCHLORIDE 12.5 MG: 50 INJECTION, SOLUTION INTRAMUSCULAR; INTRAVENOUS at 04:38

## 2022-08-12 RX ADMIN — POTASSIUM CHLORIDE 10 MEQ: 7.46 INJECTION, SOLUTION INTRAVENOUS at 00:31

## 2022-08-12 RX ADMIN — DEXTROSE AND SODIUM CHLORIDE 1000 ML: 5; 450 INJECTION, SOLUTION INTRAVENOUS at 00:27

## 2022-08-12 RX ADMIN — METOCLOPRAMIDE HYDROCHLORIDE 5 MG: 5 INJECTION INTRAMUSCULAR; INTRAVENOUS at 09:59

## 2022-08-12 RX ADMIN — Medication 1 UNITS/HR: at 07:29

## 2022-08-12 RX ADMIN — DEXTROSE AND SODIUM CHLORIDE 1000 ML: 5; 450 INJECTION, SOLUTION INTRAVENOUS at 04:28

## 2022-08-12 RX ADMIN — ONDANSETRON 4 MG: 2 INJECTION INTRAMUSCULAR; INTRAVENOUS at 04:20

## 2022-08-12 RX ADMIN — PANTOPRAZOLE SODIUM 40 MG: 40 INJECTION, POWDER, FOR SOLUTION INTRAVENOUS at 07:40

## 2022-08-12 RX ADMIN — PANTOPRAZOLE SODIUM 40 MG: 40 INJECTION, POWDER, FOR SOLUTION INTRAVENOUS at 19:10

## 2022-08-12 RX ADMIN — PROCHLORPERAZINE EDISYLATE 10 MG: 5 INJECTION INTRAMUSCULAR; INTRAVENOUS at 16:13

## 2022-08-12 RX ADMIN — HYDROMORPHONE HYDROCHLORIDE 0.5 MG: 1 INJECTION, SOLUTION INTRAMUSCULAR; INTRAVENOUS; SUBCUTANEOUS at 04:38

## 2022-08-12 RX ADMIN — PROCHLORPERAZINE EDISYLATE 10 MG: 5 INJECTION INTRAMUSCULAR; INTRAVENOUS at 07:53

## 2022-08-12 RX ADMIN — INSULIN ASPART 1 UNITS: 100 INJECTION, SOLUTION INTRAVENOUS; SUBCUTANEOUS at 19:10

## 2022-08-12 RX ADMIN — POTASSIUM CHLORIDE, DEXTROSE MONOHYDRATE AND SODIUM CHLORIDE: 150; 5; 450 INJECTION, SOLUTION INTRAVENOUS at 05:43

## 2022-08-12 ASSESSMENT — ACTIVITIES OF DAILY LIVING (ADL)
FALL_HISTORY_WITHIN_LAST_SIX_MONTHS: NO
ADLS_ACUITY_SCORE: 20
ADLS_ACUITY_SCORE: 35
DRESSING/BATHING_DIFFICULTY: NO
WEAR_GLASSES_OR_BLIND: NO
ADLS_ACUITY_SCORE: 35
ADLS_ACUITY_SCORE: 35
ADLS_ACUITY_SCORE: 20
ADLS_ACUITY_SCORE: 35
TOILETING_ISSUES: NO
CHANGE_IN_FUNCTIONAL_STATUS_SINCE_ONSET_OF_CURRENT_ILLNESS/INJURY: NO
ADLS_ACUITY_SCORE: 18
DOING_ERRANDS_INDEPENDENTLY_DIFFICULTY: NO
ADLS_ACUITY_SCORE: 20
ADLS_ACUITY_SCORE: 35
ADLS_ACUITY_SCORE: 20
CONCENTRATING,_REMEMBERING_OR_MAKING_DECISIONS_DIFFICULTY: NO
ADLS_ACUITY_SCORE: 35
ADLS_ACUITY_SCORE: 35
DIFFICULTY_EATING/SWALLOWING: NO
WALKING_OR_CLIMBING_STAIRS_DIFFICULTY: NO

## 2022-08-12 NOTE — H&P
[facility]  History and Physical  Hospitalist - Sagar Beltran DO       Date of Admission:  8/11/2022    Chief Complaint   Nausea and vomiting    History is obtained from the patient, the emergency department physician, as well as the electronic medical record.    History of Present Illness   Wally Avendano is a 31 year old male with past medical history of uncontrolled type 1 diabetes mellitus, history of cyclic vomiting who presented on 8/11/2022 with chief complaint of nausea and vomiting.  The patient states that yesterday he developed some nausea and vomiting and abdominal pain.  It worsened today and he also had some bloody emesis so he decided to come to the emergency department.  He reports that he was diagnosed with type 1 diabetes last year.  He stopped taking his insulin approximately 1 year ago because he did not like to poke himself.  He does report a few episodes of nausea and vomiting over the past several weeks and months but never to this extent.  He previously followed with an endocrinologist, however stopped seeing them as they were located in [place] and he did not want to drive that far.  Currently, the patient reports he lives with his wife and 2-year-old child in a house.  He denies any fevers or chills, cough.    In the emergency department, the patient was found of a temperature of 97.9  F, heart rate 91, blood pressure 135/75, respiratory rate 24, SPO2 100% on room air.  Initial blood work showed potassium 3.8, bicarb 14, BUN/creatinine 10.3/0.63, anion gap 25, A1c 13.3, quantitative ketone 5.4, lactic acid 3.3, , lipase 11, WBC 16.0, hemoglobin 14.9.  Urinalysis was negative for signs of infection however did show glucosuria and ketonuria.  CT abdomen and pelvis showed mild mural thickening of the distal esophagus representing esophagitis, otherwise unremarkable.  The patient was started on aggressive IV fluids and an insulin drip for the treatment of diabetic  ketoacidosis    ASSESSMENT/PLAN    Acute Intractable Nausea and Vomiting  DKA  - Secondary to noncompliance with meds  - A1C = 13.3  - Insulin drip  - Aggressive IVF - 0.9% with 20 mEq KCl, switch to D5/0.45% with 20 mEq KCl once BS less than 250  - BMP q4h  - NPO with sips of water ok  - Discussed following up with a new Endocrinologist closer to the pt to be considered for CGM and insulin pump.  Referral placed.    Lactic Acidosis  - Likely secondary to dehydration and n/v  - Repeat LA in AM      Hematemesis  Esophagitis  - PPI IV BID  - Hgb appears stable  - Daily CBC    Leukocytosis  - Likely reactive  - Daily CBC    PLAN: Resume home medications as appropriate once confirmed by pharmacy.     DVT Prophylaxis: Pneumatic Compression Devices  Code Status: Full Code  Discharge Plan: 1-2 days to home pending BS improvement, symptom improvement.    Sagar Beltran DO    Primary Care Physician   [facility]    -----------------------------------------------------------------------------------------------------------------------------------------------------------------------------------------------------    Past Medical History    I have reviewed this patient's medical history and updated it with pertinent information if needed.   Past Medical History:   Diagnosis Date     Diabetes (H)     type 1     Known health problems: none        Past Surgical History   I have reviewed this patient's surgical history and updated it with pertinent information if needed.  Past Surgical History:   Procedure Laterality Date     ESOPHAGOSCOPY, GASTROSCOPY, DUODENOSCOPY (EGD), COMBINED N/A 7/28/2021    Procedure: ESOPHAGOGASTRODUODENOSCOPY, WITH BIOPSY with duodenum biopsies for celiac sprue and gastric biopsies for h.Pylori by cold biopsy forceps;  Surgeon: Mikel Restrepo MD;  Location:  GI     NO HISTORY OF SURGERY         Prior to Admission Medications   Prior to Admission Medications   Prescriptions Last  Dose Informant Patient Reported? Taking?   Alcohol Swabs PADS   No No   Sig: Use to swab the area of the injection or migel as directed   Per insurance coverage   B-D U/F 31G X 8 MM insulin pen needle   Yes No   Continuous Blood Gluc Sensor (DEXCOM G6 SENSOR) MISC   No No   Sig: Change every 10 days.   Patient not taking: Reported on 6/7/2021   Continuous Blood Gluc Transmit (DEXCOM G6 TRANSMITTER) MISC   No No   Sig: Change every 3 months.   Patient not taking: Reported on 6/7/2021   Glucagon (BAQSIMI ONE PACK) 3 MG/DOSE POWD   No No   Sig: Spray 1 each in nostril as needed (for severe low blood glucose)   Patient not taking: Reported on 2/26/2022   LORazepam (ATIVAN) 0.5 MG tablet   No No   Sig: Take 1 tablet (0.5 mg) by mouth every 6 hours as needed for nausea or vomiting   Microlet Lancets MISC   Yes No   Sharps Container MISC   No No   Sig: Use as directed to dispose of needles, lancets and other sharps  Per Insurance coverage   blood glucose (NO BRAND SPECIFIED) lancets standard   No No   Sig: To use to test glucose level in the blood  Use to test blood sugar  3  times daily as directed. To accompany glucose monitor brands per insurance coverage.   blood glucose (NO BRAND SPECIFIED) test strip   No No   Sig: Use to test blood sugar three times daily as directed. To accompany glucose monitor brands per insurance coverage.   blood glucose calibration (NO BRAND SPECIFIED) solution   No No   Sig: Used to calibrate the blood glucose monitor as needed and as directed.  To accompany  blood glucose brands per insurance coverage   blood glucose monitoring (CONTOUR NEXT MONITOR W/DEVICE KIT) meter device kit   Yes No   blood glucose monitoring (NO BRAND SPECIFIED) meter device kit   No No   Sig: Use to test blood sugar 4 times daily or as directed.   capsaicin (ZOSTRIX) 0.075 % cream   No No   Sig: Apply topically 3 times daily   glucose (BD GLUCOSE) 4 g chewable tablet   No No   Sig: Take 4 tablets by mouth every 15  minutes as needed for low blood sugar   ibuprofen (ADVIL/MOTRIN) 200 MG tablet   Yes No   Sig: Take 400 mg by mouth every 8 hours as needed for mild pain   insulin aspart (NOVOLOG PEN) 100 UNIT/ML pen   No No   Sig: Inject 1 Units Subcutaneous 3 times daily (with meals) 1 unit per 15 grams of carbohydrates.   insulin glargine (LANTUS PEN) 100 UNIT/ML pen   No No   Sig: Inject 9 Units Subcutaneous every morning   insulin pen needle (31G X 5 MM) 31G X 5 MM miscellaneous   No No   Sig: Use 4 pen needles daily or as directed.   metoclopramide (REGLAN) 10 MG tablet   No No   Sig: Take 1 tablet (10 mg) by mouth 3 times daily as needed (Nausea or Vomiting)   ondansetron (ZOFRAN ODT) 4 MG ODT tab   No No   Sig: Take 1 tablet (4 mg) by mouth every 6 hours as needed for nausea   prochlorperazine (COMPAZINE) 25 MG suppository   No No   Sig: Place 1 suppository (25 mg) rectally every 12 hours as needed for nausea or vomiting Give if nausea not resolved 15 minutes after giving ondansetron (ZOFRAN). If nausea/intractable vomiting not resolved in 15-30 minutes      Facility-Administered Medications: None     Allergies   No Known Allergies    Social History   I have reviewed this patient's social history and updated it with pertinent information if needed. Wally CINDY Aevndano  reports that he has never smoked. He has never used smokeless tobacco. He reports current alcohol use. He reports that he does not use drugs.    Family History   I have reviewed this patient's family history and updated it with pertinent information if needed.   Family History   Problem Relation Age of Onset     Other - See Comments Mother         PBC       -----------------------------------------------------------------------------------------------------------------------------------------------------------------------------------------------------    Review of Systems   The 10 point Review of Systems is negative other than noted in the HPI or here.     Physical  Exam   Temp: 97.9  F (36.6  C) Temp src: Oral BP: 129/81 Pulse: 100   Resp: 12 SpO2: 91 %      Vital Signs with Ranges  Temp:  [97.9  F (36.6  C)] 97.9  F (36.6  C)  Pulse:  [] 100  Resp:  [6-34] 12  BP: (125-148)/(67-87) 129/81  SpO2:  [91 %-100 %] 91 %  0 lbs 0 oz    Constitutional: Awake, alert, cooperative, no apparent distress.  Eyes: Conjunctiva and pupils examined and normal.  HEENT: dry mucous membranes, cracked front tooth  Respiratory: Clear to auscultation bilaterally, no crackles or wheezing.  Cardiovascular: Regular rate and rhythm, normal S1 and S2, and no murmur noted.  GI: Soft, non-distended, non-tender, normal bowel sounds.  Lymph/Hematologic: No anterior cervical or supraclavicular adenopathy.  Skin: No rashes, no cyanosis, no edema.  Musculoskeletal: No joint swelling, erythema or tenderness.  Neurologic: Cranial nerves 2-12 intact, normal strength and sensation.  Psychiatric: Alert, oriented to person, place and time, no obvious anxiety or depression.     Data     Recent Labs   Lab 08/11/22 2257 08/11/22 2234 08/11/22 2156 08/11/22 2121 08/11/22 2004 08/11/22 2000   WBC  --   --   --   --  16.0*  --    HGB  --   --   --   --  14.9 15.0   MCV  --   --   --   --  86  --    PLT  --   --   --   --  291  --    NA  --   --   --   --  140 142   POTASSIUM  --   --   --   --  3.8 3.4   CHLORIDE  --   --   --   --  101 105   CO2  --   --   --   --  14*  --    BUN  --   --   --   --  10.3 9   CR  --   --   --   --  0.63* 0.5*   ANIONGAP  --   --   --   --  25*  --    MARY  --   --   --   --  9.2  --    * 261* 305*   < > 456* 432*   ALBUMIN  --   --   --   --  4.6  --    PROTTOTAL  --   --   --   --  7.2  --    BILITOTAL  --   --   --   --  0.7  --    ALKPHOS  --   --   --   --  97  --    ALT  --   --   --   --  31  --    AST  --   --   --   --  26  --    LIPASE  --   --   --   --  11*  --     < > = values in this interval not displayed.       Recent Results (from the past 24 hour(s))   CT  Abdomen Pelvis w Contrast    Narrative    EXAM: CT ABDOMEN PELVIS W CONTRAST  LOCATION: [facility]  DATE/TIME: 8/11/2022 10:03 PM    INDICATION: abdominal pain, vomiting, DKA  COMPARISON: Prior study dated 7/16/2021  TECHNIQUE: CT scan of the abdomen and pelvis was performed following injection of IV contrast. Multiplanar reformats were obtained. Dose reduction techniques were used.  CONTRAST: 65mL Isovue 370    FINDINGS:   LOWER CHEST: Unremarkable    HEPATOBILIARY: No significant mass or bile duct dilatation. No calcified gallstones.     PANCREAS: No significant mass, duct dilatation, or inflammatory change.    SPLEEN: Normal.    ADRENAL GLANDS: No significant nodules.    KIDNEYS/BLADDER: No significant mass, stone, or hydronephrosis.    BOWEL: There is mild mural thickening of the distal esophagus. The stomach is collapsed, the duodenum is unremarkable. The small bowel is normal in size and caliber. The colon is normal in size and caliber. The appendix is unremarkable.    LYMPH NODES: No lymphadenopathy.    VASCULATURE: No abdominal aortic aneurysm.    PELVIC ORGANS: No pelvic masses.    MUSCULOSKELETAL: Unremarkable.      Impression    IMPRESSION:   1.  Mild mural thickening of the distal esophagus, may reflect esophagitis secondary to recent history of vomiting.  2.  Normal Appendix

## 2022-08-12 NOTE — PHARMACY-ADMISSION MEDICATION HISTORY
Admission medication history interview status for this patient is complete. See [facility] admission navigator for allergy information, prior to admission medications and immunization status.     Medication history interview done, indicate source(s): Patient  Medication history resources:None  Pharmacy: [facility] [address], [place]     Changes made to PTA medication list:  Added: Lantus 25 units Q HS     Changed: none  Reported as Not Taking: Capsaicin 0.075% cream, glucose tablet PRN, Lantus 9 units every am, lorazepam 0.5 mg q 6h PRN, Reglan 10 mg TID PRN, Compazine 25 mg suppository q 12h PRN     Removed: Capsacin 0.075 % cream     Actions taken by pharmacist (provider contacted, etc): Left the provider a note to reminding them to review patient's med history     Additional medication history information: Patient states that he does not have continuous glucose monitor and he does not check his BG regularly. He states that he has not been taking his insulin (both Novolog and Lantus) for several weeks.     Medication reconciliation/reorder completed by provider prior to medication history? No       Prior to Admission medications    Medication Sig Last Dose Taking? Auth Provider Long Term End Date   ibuprofen (ADVIL/MOTRIN) 200 MG tablet Take 400 mg by mouth every 8 hours as needed for mild pain  Yes Unknown, Entered By History     insulin aspart (NOVOLOG PEN) 100 UNIT/ML pen Inject 1 Units Subcutaneous 3 times daily (with meals) 1 unit per 15 grams of carbohydrates. More than a month at Unknown time Yes Kendra Johnson PA-C Yes    insulin glargine (LANTUS PEN) 100 UNIT/ML pen Inject 25 Units Subcutaneous At Bedtime More than a month at Unknown time Yes Unknown, Entered By History Yes    ondansetron (ZOFRAN ODT) 4 MG ODT tab Take 1 tablet (4 mg) by mouth every 6 hours as needed for nausea Past Week at Unknown time Yes Kendra Johnson PA-C     Alcohol Swabs PADS Use to swab the area of the injection or  migel as directed   Per insurance coverage   Jackelin Perez DO     B-D U/F 31G X 8 MM insulin pen needle    Reported, Patient     blood glucose (NO BRAND SPECIFIED) lancets standard To use to test glucose level in the blood  Use to test blood sugar  3  times daily as directed. To accompany glucose monitor brands per insurance coverage.   Jackelin Perez DO     blood glucose (NO BRAND SPECIFIED) test strip Use to test blood sugar three times daily as directed. To accompany glucose monitor brands per insurance coverage.   Latoya Perez MD Yes    blood glucose calibration (NO BRAND SPECIFIED) solution Used to calibrate the blood glucose monitor as needed and as directed.  To accompany  blood glucose brands per insurance coverage   Jackelin Perez DO     blood glucose monitoring (CONTOUR NEXT MONITOR W/DEVICE KIT) meter device kit    Reported, Patient     blood glucose monitoring (NO BRAND SPECIFIED) meter device kit Use to test blood sugar 4 times daily or as directed.   Fuad Landeros MD     Continuous Blood Gluc Sensor (DEXCOM G6 SENSOR) MISC Change every 10 days.  Patient not taking: Reported on 6/7/2021   Latoya Perez MD     Continuous Blood Gluc Transmit (DEXCOM G6 TRANSMITTER) MISC Change every 3 months.  Patient not taking: Reported on 6/7/2021   Latoya Perez MD     Glucagon (BAQSIMI ONE PACK) 3 MG/DOSE POWD Spray 1 each in nostril as needed (for severe low blood glucose)  Patient not taking: Reported on 2/26/2022   Latoya Perez MD Yes    glucose (BD GLUCOSE) 4 g chewable tablet Take 4 tablets by mouth every 15 minutes as needed for low blood sugar  Patient not taking: Reported on 8/12/2022 Not Taking at Unknown time  Jackelin Perez DO     insulin glargine (LANTUS PEN) 100 UNIT/ML pen Inject 9 Units Subcutaneous every morning  Patient not taking: Reported on 8/12/2022 Not Taking at Unknown time  Kendra Johnson PA-C Yes    insulin pen needle (31G X 5 MM) 31G X 5 MM miscellaneous Use 4 pen  needles daily or as directed.   Latoya Perez MD     LORazepam (ATIVAN) 0.5 MG tablet Take 1 tablet (0.5 mg) by mouth every 6 hours as needed for nausea or vomiting  Patient not taking: Reported on 8/12/2022 Not Taking at Unknown time  Lisandro Dunbar MD     metoclopramide (REGLAN) 10 MG tablet Take 1 tablet (10 mg) by mouth 3 times daily as needed (Nausea or Vomiting)  Patient not taking: Reported on 8/12/2022 Not Taking at Unknown time  Kendra Johnson PA-C     Microlet Lancets MISC    Reported, Patient     prochlorperazine (COMPAZINE) 25 MG suppository Place 1 suppository (25 mg) rectally every 12 hours as needed for nausea or vomiting Give if nausea not resolved 15 minutes after giving ondansetron (ZOFRAN). If nausea/intractable vomiting not resolved in 15-30 minutes  Patient not taking: Reported on 8/12/2022 Not Taking at Unknown time  Kendra Johnson PA-C     Sharps Container MISC Use as directed to dispose of needles, lancets and other sharps  Per Insurance coverage   Jackelin Perez,

## 2022-08-12 NOTE — ED TRIAGE NOTES
Pt brought in by EMS from home. Type 1 diabetic not taking insulin for a long time or monitoring glucose. Vomiting for the last 5 hours or so. Abd pain. Pt has coffee ground emesis. Glucose for medics was 514. 8mg zofran and 4mg morphine given enroute.      Triage Assessment     Row Name 08/11/22 1955       Triage Assessment (Adult)    Airway WDL WDL       Respiratory WDL    Respiratory WDL WDL       Cognitive/Neuro/Behavioral WDL    Cognitive/Neuro/Behavioral WDL WDL

## 2022-08-12 NOTE — ED NOTES
Bed: ED11  Expected date: 8/11/22  Expected time: 7:40 PM  Means of arrival: Ambulance  Comments:  [facility] 32 yo

## 2022-08-12 NOTE — PROVIDER NOTIFICATION
Provider Notified : patient has been having repeated vomiting. Clear yellow emesis. complained of abd pain rating of 7/10. I gave Compazine, but no relief. Advice?

## 2022-08-12 NOTE — PROGRESS NOTES
[facility]    Hospitalist Progress Note             Date of Admission:  8/11/2022                   Day of hospitalization: 1    Assessment and Plan:   Wally Avendano is a 31 year old male with past medical history of uncontrolled type 1 diabetes mellitus, history of cyclic vomiting who presented on 8/11/2022 with chief complaint of nausea and vomiting.  The patient states that yesterday he developed some nausea and vomiting and abdominal pain.  It worsened today and he also had some bloody emesis so he decided to come to the emergency department.  He reports that he was diagnosed with type 1 diabetes last year.  He stopped taking his insulin approximately 1 year ago because he did not like to poke himself.  He does report a few episodes of nausea and vomiting over the past several weeks and months but never to this extent.  He previously followed with an endocrinologist, however stopped seeing them as they were located in [place] and he did not want to drive that far.  Currently, the patient reports he lives with his wife and 2-year-old child in a house.  He denies any fevers or chills, cough.     In the emergency department, the patient was found of a temperature of 97.9  F, heart rate 91, blood pressure 135/75, respiratory rate 24, SPO2 100% on room air.  Initial blood work showed potassium 3.8, bicarb 14, BUN/creatinine 10.3/0.63, anion gap 25, A1c 13.3, quantitative ketone 5.4, lactic acid 3.3, , lipase 11, WBC 16.0, hemoglobin 14.9.  Urinalysis was negative for signs of infection however did show glucosuria and ketonuria.  CT abdomen and pelvis showed mild mural thickening of the distal esophagus representing esophagitis, otherwise unremarkable.  The patient was started on aggressive IV fluids and an insulin drip for the treatment of diabetic ketoacidosis     Acute Intractable Nausea and Vomiting  DKA  - Secondary to noncompliance with meds  - A1C = 13.3  -Insulin drip has been  stopped patient has been transitioned to Lantus 10 units with carb counting  - Reglan ordered for nausea vomiting as needed.  He does use marijuana at home  - Discussed following up with a new Endocrinologist closer to the pt to be considered for CGM and insulin pump.  Referral placed.     Lactic Acidosis  - Likely secondary to dehydration and n/v.  Does have persistent leukocytosis however suspect this is just an acute phase reactant less likely infectious process.  We will monitor and trend  -Resolved     Hematemesis  Esophagitis  - PPI IV BID  - Hgb appears stable  - Daily CBC, if develops symptoms in the hospital will get GI consult     Leukocytosis  - Likely reactive  - Daily CBC      ---------     # Code status: Full   # Anticipated discharge date and Disposition:1-2 days  # DVT: SCDs  # IVF: none                        Filomena Tompkins MD  Text Page (7am - 6pm, M-F)               Subjective   Chief Complaint:nausea, vomitting  Subjective: patient at bedside complains of fatigue, + nausea. No other complaints, no fevers, chills, chest pain, sob, dysuria. +MJ usage    Objective   /58 (BP Location: Right arm)   Pulse 67   Temp 97.5  F (36.4  C) (Oral)   Resp 16   SpO2 98%      Physical Exam  General: Pt in NAD, normal appearance  HEENT: OP clear MMM, no JVD  Lungs: Clear to Auscultation Bilateral, normal breathing  without accessory muscle usage, no wheezing, rhonchi or crackles  Cardiac: +S1, S2, RRR, no MRG, no edema  Abdominal: normal bowel sounds, NT/ND, no hepatosplenomegaly  Skin: warm, dry, normal turgor, no rash  Psyche: A& O x3, appropriate affect             Intake/Output Summary (Last 24 hours) at 8/12/2022 1522  Last data filed at 8/12/2022 0747  Gross per 24 hour   Intake --   Output 100 ml   Net -100 ml           Labs and Imaging Results:      Recent Labs   Lab 08/12/22 0752 08/11/22 2004   WBC 19.5* 16.0*   HGB 13.2* 14.9    291        Recent Labs   Lab 08/12/22 0752  08/11/22  2329    143   CO2 23 22   BUN 7.7 9.4      No results for input(s): INR, PTT in the last 168 hours.   No results for input(s): CKMB in the last 168 hours.    Invalid input(s): TROPONINT     Recent Labs   Lab 08/11/22 2004   ALBUMIN 4.6   AST 26   ALT 31   ALKPHOS 97        Micro:     Radio:  CT Abdomen Pelvis w Contrast   Final Result   IMPRESSION:    1.  Mild mural thickening of the distal esophagus, may reflect esophagitis secondary to recent history of vomiting.   2.  Normal Appendix              Medications:      Scheduled Meds:      insulin aspart   Subcutaneous TID w/meals     insulin glargine  10 Units Subcutaneous QAM AC     pantoprazole (PROTONIX) IV  40 mg Intravenous BID     sodium chloride (PF)  3 mL Intracatheter Q8H     Continuous Infusions:      dextrose       PRN Meds:  acetaminophen **OR** acetaminophen, dextrose, glucose **OR** dextrose **OR** glucagon, lidocaine 4%, lidocaine (buffered or not buffered), melatonin, metoclopramide, nitroGLYcerin, ondansetron **OR** ondansetron, polyethylene glycol, prochlorperazine **OR** prochlorperazine **OR** prochlorperazine, senna-docusate **OR** senna-docusate, sodium chloride (PF)

## 2022-08-12 NOTE — ED NOTES
"[facility]  ED Nurse Handoff Report    Wally Avendano is a 31 year old male   ED Chief complaint: Blood Sugar Problem and Hematemesis  . ED Diagnosis:   Final diagnoses:   Diabetic ketoacidosis without coma associated with type 1 diabetes mellitus (H)     Allergies: No Known Allergies    Code Status: Full Code  Activity level - Baseline/Home:  Independent. Activity Level - Current:   Independent. Lift room needed: No. Bariatric: No   Needed: No   Isolation: No. Infection: Not Applicable.     Vital Signs:   Vitals:    08/11/22 2130 08/11/22 2145 08/11/22 2215 08/11/22 2230   BP: 128/83 (!) 148/67 137/87 129/81   Pulse: 104 (!) 140 102 100   Resp: 14 26 (!) 34 12   Temp:       TempSrc:       SpO2: 97% 93% 96% 91%       Cardiac Rhythm:  ,      Pain level:    Patient confused: No. Patient Falls Risk: No.   Elimination Status: Has voided   Patient Report - Initial Complaint: Vomiting/High glucose. Focused Assessment: Type 1 Diabetic brought in by EMS from home for vomiting. Arrived with coffee ground emesis. Pt admits to not monitoring his glucose for months or taking his insulin. Pt reports he was only diagnosed this year with diabetes and his glucoses have \"not been that bad\". Pt says he has been vomiting for 4 days.    Tests Performed: labs and CT. Abnormal Results:   Labs Ordered and Resulted from Time of ED Arrival to Time of ED Departure   HEMOGLOBIN A1C - Abnormal       Result Value    Hemoglobin A1C 13.3 (*)    COMPREHENSIVE METABOLIC PANEL - Abnormal    Sodium 140      Potassium 3.8      Creatinine 0.63 (*)     Urea Nitrogen 10.3      Chloride 101      Carbon Dioxide (CO2) 14 (*)     Anion Gap 25 (*)     Glucose 456 (*)     Calcium 9.2      Protein Total 7.2      Albumin 4.6      Bilirubin Total 0.7      Alkaline Phosphatase 97      AST 26      ALT 31      GFR Estimate >90     LIPASE - Abnormal    Lipase 11 (*)    LACTIC ACID WHOLE BLOOD - Abnormal    Lactic Acid 3.3 (*)    BLOOD GAS " VENOUS - Abnormal    pH Venous 7.36      pCO2 Venous 29 (*)     pO2 Venous 60 (*)     Bicarbonate Venous 16 (*)     Base Excess/Deficit (+/-) -7.5      FIO2 0     KETONE BETA-HYDROXYBUTYRATE QUANTITATIVE, RAPID - Abnormal    Ketone (Beta-Hydroxybutyrate) Quantitative 5.4 (*)    CBC WITH PLATELETS AND DIFFERENTIAL - Abnormal    WBC Count 16.0 (*)     RBC Count 5.12      Hemoglobin 14.9      Hematocrit 43.9      MCV 86      MCH 29.1      MCHC 33.9      RDW 11.8      Platelet Count 291      % Neutrophils 91      % Lymphocytes 4      % Monocytes 3      % Eosinophils 0      % Basophils 1      % Immature Granulocytes 1      NRBCs per 100 WBC 0      Absolute Neutrophils 14.6 (*)     Absolute Lymphocytes 0.7 (*)     Absolute Monocytes 0.5      Absolute Eosinophils 0.0      Absolute Basophils 0.1      Absolute Immature Granulocytes 0.1      Absolute NRBCs 0.0     ISTAT BASIC CHEM ICA HEMATOCRIT POCT - Abnormal    TOTAL CO2 POCT 16 (*)     Creatinine POCT 0.5 (*)     Hematocrit POCT 44      Calcium, Ionized Whole Blood POCT 4.3 (*)     UREA NITROGEN POCT 9      Glucose Whole Blood POCT 432 (*)     Potassium POCT 3.4      Sodium POCT 142      Hemoglobin POCT 15.0      Chloride POCT 105     GLUCOSE BY METER - Abnormal    GLUCOSE BY METER POCT 446 (*)    ROUTINE UA WITH MICROSCOPIC - Abnormal    Color Urine Light Yellow      Appearance Urine Clear      Glucose Urine >=1000 (*)     Bilirubin Urine Negative      Ketones Urine >150 (*)     Specific Gravity Urine >1.035 (*)     Blood Urine Negative      pH Urine 5.5      Protein Albumin Urine Negative      Urobilinogen Urine Normal      Nitrite Urine Negative      Leukocyte Esterase Urine Negative      Mucus Urine Present (*)     RBC Urine <1      WBC Urine <1     GLUCOSE BY METER - Abnormal    GLUCOSE BY METER POCT 329 (*)    GLUCOSE BY METER - Abnormal    GLUCOSE BY METER POCT 305 (*)    GLUCOSE BY METER - Abnormal    GLUCOSE BY METER POCT 261 (*)    GLUCOSE BY METER - Abnormal     GLUCOSE BY METER POCT 247 (*)    GLUCOSE BY METER - Abnormal    GLUCOSE BY METER POCT 207 (*)    PHOSPHORUS - Normal    Phosphorus 3.6     MAGNESIUM - Normal    Magnesium 1.8     LACTIC ACID WHOLE BLOOD - Normal    Lactic Acid 1.2     GLUCOSE MONITOR NURSING POCT   COVID-19 VIRUS (CORONAVIRUS) BY PCR   BASIC METABOLIC PANEL   TYPE AND SCREEN, ADULT    ABO/RH(D) O NEG      Antibody Screen Negative      SPECIMEN EXPIRATION DATE [id number]     ABO/RH TYPE AND SCREEN     CT Abdomen Pelvis w Contrast   Final Result   IMPRESSION:    1.  Mild mural thickening of the distal esophagus, may reflect esophagitis secondary to recent history of vomiting.   2.  Normal Appendix        .   Treatments provided: using DKA insulin algorithm See MAR  Family Comments: Wife Ananya can be reached by phone [phone], she was updated at 2300  OBS brochure/video discussed/provided to patient:  N/A  ED Medications:   Medications   dextrose 50 % injection 25-50 mL (has no administration in time range)   0.9% sodium chloride BOLUS (0 mLs Intravenous Stopped 8/11/22 2222)     Followed by   0.45% sodium chloride infusion (1,000 mLs Intravenous New Bag 8/11/22 2233)   insulin 1 unit/1 mL in NS (NovoLIN, HumuLIN Regular) drip -ED DKA algorithm (4 Units/hr Intravenous Rate/Dose Change 8/11/22 2157)   ondansetron (ZOFRAN) injection 4 mg (4 mg Intravenous Given 8/11/22 2037)   pantoprazole (PROTONIX) IV push injection 80 mg (80 mg Intravenous Given 8/11/22 2018)   potassium chloride 10 mEq in 100 mL sterile water intermittent infusion (premix) (0 mEq Intravenous Stopped 8/11/22 2223)   HYDROmorphone (PF) (DILAUDID) injection 0.5 mg (0.5 mg Intravenous Given 8/11/22 2038)   metoclopramide (REGLAN) injection 10 mg (10 mg Intravenous Given 8/11/22 2052)   diphenhydrAMINE (BENADRYL) injection 12.5 mg (12.5 mg Intravenous Given 8/11/22 2052)   CT Scan Flush (58 mLs Intravenous Given 8/11/22 2204)   iopamidol (ISOVUE-370) solution 500 mL (65 mLs  Intravenous Given 8/11/22 2204)     Drips infusing:  Yes  For the majority of the shift, the patient's behavior Green. Interventions performed were na.    Sepsis treatment initiated: No     Patient tested for COVID 19 prior to admission: YES    ED Nurse Name/Phone Number: Chloe Tafoya RN,   11:56 PM      RECEIVING UNIT ED HANDOFF REVIEW    Above ED Nurse Handoff Report was reviewed: Yes  Reviewed by: Elaine Sandoval RN on August 12, 2022 at 8:42 AM

## 2022-08-12 NOTE — PLAN OF CARE
/58 (BP Location: Right arm)   Pulse 67   Temp 97.5  F (36.4  C) (Oral)   Resp 16   SpO2 98%     A&O. Up ad stefano. Tele is SR. Denies pain. C/o N/V. Insulin gtt discontinued. PIV SL. Poor appetite. Will continue POC.

## 2022-08-12 NOTE — PLAN OF CARE
Transferred to room 351 with flyer RN, spouse Ananya notified. N/V relieved with PRN compazine.  shortly before departure potassium 3.9. Will keep monitoring.

## 2022-08-12 NOTE — ED NOTES
DATE:  8/11/2022   TIME OF RECEIPT FROM LAB:  2041  LAB TEST:  Ketone  LAB VALUE:  5.4  RESULTS GIVEN WITH READ-BACK TO (PROVIDER):  Hortensia  TIME LAB VALUE REPORTED TO PROVIDER:   2041

## 2022-08-13 ENCOUNTER — HEALTH MAINTENANCE LETTER (OUTPATIENT)
Age: 32
End: 2022-08-13

## 2022-08-13 LAB
ANION GAP SERPL CALCULATED.3IONS-SCNC: 14 MMOL/L (ref 7–15)
BUN SERPL-MCNC: 8.5 MG/DL (ref 6–20)
CALCIUM SERPL-MCNC: 8.5 MG/DL (ref 8.6–10)
CHLORIDE SERPL-SCNC: 101 MMOL/L (ref 98–107)
CREAT SERPL-MCNC: 0.51 MG/DL (ref 0.67–1.17)
DEPRECATED HCO3 PLAS-SCNC: 22 MMOL/L (ref 22–29)
ERYTHROCYTE [DISTWIDTH] IN BLOOD BY AUTOMATED COUNT: 12.4 % (ref 10–15)
GFR SERPL CREATININE-BSD FRML MDRD: >90 ML/MIN/1.73M2
GLUCOSE BLDC GLUCOMTR-MCNC: 192 MG/DL (ref 70–99)
GLUCOSE BLDC GLUCOMTR-MCNC: 200 MG/DL (ref 70–99)
GLUCOSE BLDC GLUCOMTR-MCNC: 242 MG/DL (ref 70–99)
GLUCOSE BLDC GLUCOMTR-MCNC: 258 MG/DL (ref 70–99)
GLUCOSE BLDC GLUCOMTR-MCNC: 259 MG/DL (ref 70–99)
GLUCOSE SERPL-MCNC: 243 MG/DL (ref 70–99)
HCT VFR BLD AUTO: 41.8 % (ref 40–53)
HGB BLD-MCNC: 13.9 G/DL (ref 13.3–17.7)
MCH RBC QN AUTO: 29.1 PG (ref 26.5–33)
MCHC RBC AUTO-ENTMCNC: 33.3 G/DL (ref 31.5–36.5)
MCV RBC AUTO: 88 FL (ref 78–100)
PLATELET # BLD AUTO: 240 10E3/UL (ref 150–450)
POTASSIUM SERPL-SCNC: 4.2 MMOL/L (ref 3.4–5.3)
RBC # BLD AUTO: 4.77 10E6/UL (ref 4.4–5.9)
SODIUM SERPL-SCNC: 137 MMOL/L (ref 136–145)
WBC # BLD AUTO: 12.8 10E3/UL (ref 4–11)

## 2022-08-13 PROCEDURE — 82248 BILIRUBIN DIRECT: CPT | Performed by: HOSPITALIST

## 2022-08-13 PROCEDURE — 250N000011 HC RX IP 250 OP 636: Performed by: HOSPITALIST

## 2022-08-13 PROCEDURE — 99232 SBSQ HOSP IP/OBS MODERATE 35: CPT | Performed by: HOSPITALIST

## 2022-08-13 PROCEDURE — 120N000001 HC R&B MED SURG/OB

## 2022-08-13 PROCEDURE — 36415 COLL VENOUS BLD VENIPUNCTURE: CPT | Performed by: HOSPITALIST

## 2022-08-13 PROCEDURE — 85027 COMPLETE CBC AUTOMATED: CPT | Performed by: HOSPITALIST

## 2022-08-13 PROCEDURE — 250N000011 HC RX IP 250 OP 636: Performed by: INTERNAL MEDICINE

## 2022-08-13 PROCEDURE — C9113 INJ PANTOPRAZOLE SODIUM, VIA: HCPCS | Performed by: INTERNAL MEDICINE

## 2022-08-13 PROCEDURE — 82310 ASSAY OF CALCIUM: CPT | Performed by: HOSPITALIST

## 2022-08-13 PROCEDURE — 258N000003 HC RX IP 258 OP 636: Performed by: HOSPITALIST

## 2022-08-13 PROCEDURE — 83690 ASSAY OF LIPASE: CPT | Performed by: HOSPITALIST

## 2022-08-13 RX ORDER — HYDROMORPHONE HYDROCHLORIDE 1 MG/ML
0.5 INJECTION, SOLUTION INTRAMUSCULAR; INTRAVENOUS; SUBCUTANEOUS ONCE
Status: COMPLETED | OUTPATIENT
Start: 2022-08-13 | End: 2022-08-13

## 2022-08-13 RX ORDER — SODIUM CHLORIDE 9 MG/ML
INJECTION, SOLUTION INTRAVENOUS CONTINUOUS
Status: DISCONTINUED | OUTPATIENT
Start: 2022-08-13 | End: 2022-08-15 | Stop reason: HOSPADM

## 2022-08-13 RX ORDER — LORAZEPAM 2 MG/ML
.5-1 INJECTION INTRAMUSCULAR EVERY 4 HOURS PRN
Status: DISCONTINUED | OUTPATIENT
Start: 2022-08-13 | End: 2022-08-15 | Stop reason: HOSPADM

## 2022-08-13 RX ORDER — LORAZEPAM 0.5 MG/1
0.5 TABLET ORAL ONCE
Status: DISCONTINUED | OUTPATIENT
Start: 2022-08-13 | End: 2022-08-13

## 2022-08-13 RX ADMIN — ONDANSETRON 4 MG: 2 INJECTION INTRAMUSCULAR; INTRAVENOUS at 11:28

## 2022-08-13 RX ADMIN — SODIUM CHLORIDE 1000 ML: 9 INJECTION, SOLUTION INTRAVENOUS at 11:35

## 2022-08-13 RX ADMIN — INSULIN GLARGINE 10 UNITS: 100 INJECTION, SOLUTION SUBCUTANEOUS at 08:04

## 2022-08-13 RX ADMIN — HYDROMORPHONE HYDROCHLORIDE 0.5 MG: 1 INJECTION, SOLUTION INTRAMUSCULAR; INTRAVENOUS; SUBCUTANEOUS at 03:04

## 2022-08-13 RX ADMIN — ONDANSETRON 4 MG: 2 INJECTION INTRAMUSCULAR; INTRAVENOUS at 21:33

## 2022-08-13 RX ADMIN — LORAZEPAM 1 MG: 2 INJECTION INTRAMUSCULAR at 19:28

## 2022-08-13 RX ADMIN — METOCLOPRAMIDE HYDROCHLORIDE 5 MG: 5 INJECTION INTRAMUSCULAR; INTRAVENOUS at 16:15

## 2022-08-13 RX ADMIN — PANTOPRAZOLE SODIUM 40 MG: 40 INJECTION, POWDER, FOR SOLUTION INTRAVENOUS at 07:59

## 2022-08-13 RX ADMIN — LORAZEPAM 0.5 MG: 2 INJECTION INTRAMUSCULAR at 15:26

## 2022-08-13 RX ADMIN — ONDANSETRON 4 MG: 2 INJECTION INTRAMUSCULAR; INTRAVENOUS at 00:56

## 2022-08-13 RX ADMIN — PROCHLORPERAZINE EDISYLATE 10 MG: 5 INJECTION INTRAMUSCULAR; INTRAVENOUS at 02:37

## 2022-08-13 RX ADMIN — PROCHLORPERAZINE EDISYLATE 10 MG: 5 INJECTION INTRAMUSCULAR; INTRAVENOUS at 19:25

## 2022-08-13 RX ADMIN — METOCLOPRAMIDE HYDROCHLORIDE 5 MG: 5 INJECTION INTRAMUSCULAR; INTRAVENOUS at 07:35

## 2022-08-13 RX ADMIN — PANTOPRAZOLE SODIUM 40 MG: 40 INJECTION, POWDER, FOR SOLUTION INTRAVENOUS at 21:33

## 2022-08-13 ASSESSMENT — ACTIVITIES OF DAILY LIVING (ADL)
ADLS_ACUITY_SCORE: 20
ADLS_ACUITY_SCORE: 18
ADLS_ACUITY_SCORE: 20
ADLS_ACUITY_SCORE: 18
ADLS_ACUITY_SCORE: 20
ADLS_ACUITY_SCORE: 20
ADLS_ACUITY_SCORE: 18

## 2022-08-13 NOTE — PLAN OF CARE
Patient is Alert and Oriented x4. Ambulates SBA/Ind.  Pt is a mod cho diet.  C/o abd pain.  Unable to tolerate PO med d/t nausea, 1x dose Dilaudid given.  Zofran and Compazine given for nausea/vomiting. Patient is Saline locked. Blood sugar 242. Tele SR. Will continue to monitor.

## 2022-08-13 NOTE — PLAN OF CARE
Goal Outcome Evaluation:        A&OX4. LS clear. VSS  oxygen 97% on room air. Continuous N/V.  Emesis 6 times. No blood noted in the emesis.  Gave anti nausea meds with little or no relief. Very poor appetite. did not have any vomiting after supper.Up with SBA to bathroom.  and 203.  Wife called and was updated on patient's progress. Tele SR/Peaked T waves.  Continue POC and monitoring.

## 2022-08-13 NOTE — PROGRESS NOTES
[facility]    Hospitalist Progress Note             Date of Admission:  8/11/2022                   Day of hospitalization: 2    Assessment and Plan:   Wally Avendano is a 31 year old male with past medical history of uncontrolled type 1 diabetes mellitus, history of cyclic vomiting who presented on 8/11/2022 with chief complaint of nausea and vomiting.  The patient states that yesterday he developed some nausea and vomiting and abdominal pain.  It worsened today and he also had some bloody emesis so he decided to come to the emergency department.  He reports that he was diagnosed with type 1 diabetes last year.  He stopped taking his insulin approximately 1 year ago because he did not like to poke himself.  He does report a few episodes of nausea and vomiting over the past several weeks and months but never to this extent.  He previously followed with an endocrinologist, however stopped seeing them as they were located in [place] and he did not want to drive that far.  Currently, the patient reports he lives with his wife and 2-year-old child in a house.  He denies any fevers or chills, cough.     In the emergency department, the patient was found of a temperature of 97.9  F, heart rate 91, blood pressure 135/75, respiratory rate 24, SPO2 100% on room air.  Initial blood work showed potassium 3.8, bicarb 14, BUN/creatinine 10.3/0.63, anion gap 25, A1c 13.3, quantitative ketone 5.4, lactic acid 3.3, , lipase 11, WBC 16.0, hemoglobin 14.9.  Urinalysis was negative for signs of infection however did show glucosuria and ketonuria.  CT abdomen and pelvis showed mild mural thickening of the distal esophagus representing esophagitis, otherwise unremarkable.  The patient was started on aggressive IV fluids and an insulin drip for the treatment of diabetic ketoacidosis     Acute Intractable Nausea and Vomiting  DKA  - Secondary to noncompliance with meds, suspect there is a component of  marijuana use patient is using marijuana currently but states his symptoms improved with marijuana.  No hematemesis melena or hematochezia  - A1C = 13.3  - Patient Lantus has been increased to 13 units from 10 units, continue carb counting  -Patient CT abdomen does show distal mural thickening of the esophagus may reflect esophagitis.  If symptoms do not improve in the next 24 hours we will get GIs input  -Continue PPI twice daily  - Reglan ordered for nausea vomiting as needed.  He does use marijuana at home  - Discussed following up with a new Endocrinologist closer to the pt to be considered for CGM and insulin pump.  Referral placed.     Lactic Acidosis  - Likely secondary to dehydration and n/v.  Does have persistent leukocytosis however suspect this is just an acute phase reactant less likely infectious process.  We will monitor and trend  -Resolved     Hematemesis  Esophagitis  - PPI IV BID  - Hgb appears stable  -See above     Leukocytosis  - Likely reactive  -Improved monitor     ---------     # Code status: Full   # Anticipated discharge date and Disposition:1-2 days  # DVT: SCDs  # IVF: none                        Filomena Tompkins MD  Text Page (7am - 6pm, M-F)               Subjective   Chief Complaint:nausea, vomitting  Subjective: patient at bedside complains of fatigue, + nausea and vomiting but able to take in clears but not solids. No other complaints, no fevers, chills, chest pain, sob, dysuria    Objective   /81 (BP Location: Right arm, Patient Position: Supine, Cuff Size: Adult Regular)   Pulse 101   Temp 98.5  F (36.9  C) (Oral)   Resp 16   SpO2 98%      Physical Exam  General: Pt in NAD, normal appearance  HEENT: OP clear MMM, no JVD  Lungs: Clear to Auscultation Bilateral, normal breathing  without accessory muscle usage, no wheezing, rhonchi or crackles  Cardiac: +S1, S2, RRR, no MRG, no edema  Abdominal: normal bowel sounds, NT/ND, no hepatosplenomegaly  Skin: warm, dry, normal  turgor, no rash  Psyche: A& O x3, appropriate affect             Intake/Output Summary (Last 24 hours) at 8/12/2022 1522  Last data filed at 8/12/2022 0747  Gross per 24 hour   Intake --   Output 100 ml   Net -100 ml           Labs and Imaging Results:      Recent Labs   Lab 08/13/22  0707 08/12/22  0752   WBC 12.8* 19.5*   HGB 13.9 13.2*    287        Recent Labs   Lab 08/13/22  0707 08/12/22  0752    140   CO2 22 23   BUN 8.5 7.7      No results for input(s): INR, PTT in the last 168 hours.   No results for input(s): CKMB in the last 168 hours.    Invalid input(s): TROPONINT     Recent Labs   Lab 08/11/22 2004   ALBUMIN 4.6   AST 26   ALT 31   ALKPHOS 97        Micro:     Radio:  CT Abdomen Pelvis w Contrast   Final Result   IMPRESSION:    1.  Mild mural thickening of the distal esophagus, may reflect esophagitis secondary to recent history of vomiting.   2.  Normal Appendix              Medications:      Scheduled Meds:      insulin aspart   Subcutaneous TID w/meals     [START ON 8/14/2022] insulin glargine  13 Units Subcutaneous QAM AC     pantoprazole (PROTONIX) IV  40 mg Intravenous BID     sodium chloride (PF)  3 mL Intracatheter Q8H     Continuous Infusions:      dextrose       sodium chloride       PRN Meds:  acetaminophen **OR** acetaminophen, dextrose, glucose **OR** dextrose **OR** glucagon, lidocaine 4%, lidocaine (buffered or not buffered), melatonin, metoclopramide, nitroGLYcerin, ondansetron **OR** ondansetron, polyethylene glycol, prochlorperazine **OR** prochlorperazine **OR** prochlorperazine, senna-docusate **OR** senna-docusate, sodium chloride (PF)

## 2022-08-13 NOTE — PLAN OF CARE
VSS, A/O x 4. LS clear on RA. C/O pain in abdomen with nausea and dry heaving, reglan and zofran given with little effect. 0.5 mg of IV Ativan given with relief. Not tolerating anything PO. NS infusing @ 75 mL/hr in R PIV. ,258 during this shift covering with sliding scale. Clear liquid diet. Independent in room. Continue to monitor

## 2022-08-14 LAB
ALBUMIN SERPL BCG-MCNC: 4 G/DL (ref 3.5–5.2)
ALP SERPL-CCNC: 69 U/L (ref 40–129)
ALT SERPL W P-5'-P-CCNC: 21 U/L (ref 10–50)
AST SERPL W P-5'-P-CCNC: 23 U/L (ref 10–50)
BILIRUB DIRECT SERPL-MCNC: <0.2 MG/DL (ref 0–0.3)
BILIRUB SERPL-MCNC: 0.6 MG/DL
GLUCOSE BLDC GLUCOMTR-MCNC: 146 MG/DL (ref 70–99)
GLUCOSE BLDC GLUCOMTR-MCNC: 167 MG/DL (ref 70–99)
GLUCOSE BLDC GLUCOMTR-MCNC: 174 MG/DL (ref 70–99)
GLUCOSE BLDC GLUCOMTR-MCNC: 198 MG/DL (ref 70–99)
GLUCOSE BLDC GLUCOMTR-MCNC: 204 MG/DL (ref 70–99)
LIPASE SERPL-CCNC: 8 U/L (ref 13–60)
PROT SERPL-MCNC: 6.3 G/DL (ref 6.4–8.3)

## 2022-08-14 PROCEDURE — C9113 INJ PANTOPRAZOLE SODIUM, VIA: HCPCS | Performed by: INTERNAL MEDICINE

## 2022-08-14 PROCEDURE — 93010 ELECTROCARDIOGRAM REPORT: CPT | Performed by: INTERNAL MEDICINE

## 2022-08-14 PROCEDURE — 99232 SBSQ HOSP IP/OBS MODERATE 35: CPT | Performed by: HOSPITALIST

## 2022-08-14 PROCEDURE — 250N000011 HC RX IP 250 OP 636: Performed by: HOSPITALIST

## 2022-08-14 PROCEDURE — 250N000011 HC RX IP 250 OP 636: Performed by: INTERNAL MEDICINE

## 2022-08-14 PROCEDURE — 258N000003 HC RX IP 258 OP 636: Performed by: HOSPITALIST

## 2022-08-14 PROCEDURE — 120N000001 HC R&B MED SURG/OB

## 2022-08-14 PROCEDURE — 250N000013 HC RX MED GY IP 250 OP 250 PS 637: Performed by: INTERNAL MEDICINE

## 2022-08-14 RX ORDER — METOCLOPRAMIDE HYDROCHLORIDE 5 MG/ML
5 INJECTION INTRAMUSCULAR; INTRAVENOUS EVERY 6 HOURS PRN
Status: DISCONTINUED | OUTPATIENT
Start: 2022-08-14 | End: 2022-08-14

## 2022-08-14 RX ORDER — METOCLOPRAMIDE HYDROCHLORIDE 5 MG/ML
5 INJECTION INTRAMUSCULAR; INTRAVENOUS 4 TIMES DAILY
Status: DISCONTINUED | OUTPATIENT
Start: 2022-08-14 | End: 2022-08-14

## 2022-08-14 RX ORDER — METOCLOPRAMIDE HYDROCHLORIDE 5 MG/ML
5 INJECTION INTRAMUSCULAR; INTRAVENOUS EVERY 6 HOURS
Status: DISCONTINUED | OUTPATIENT
Start: 2022-08-14 | End: 2022-08-15 | Stop reason: HOSPADM

## 2022-08-14 RX ADMIN — INSULIN ASPART 4 UNITS: 100 INJECTION, SOLUTION INTRAVENOUS; SUBCUTANEOUS at 13:20

## 2022-08-14 RX ADMIN — SODIUM CHLORIDE 1000 ML: 9 INJECTION, SOLUTION INTRAVENOUS at 13:39

## 2022-08-14 RX ADMIN — PROCHLORPERAZINE EDISYLATE 10 MG: 5 INJECTION INTRAMUSCULAR; INTRAVENOUS at 21:54

## 2022-08-14 RX ADMIN — METOCLOPRAMIDE HYDROCHLORIDE 5 MG: 5 INJECTION INTRAMUSCULAR; INTRAVENOUS at 13:14

## 2022-08-14 RX ADMIN — PANTOPRAZOLE SODIUM 40 MG: 40 INJECTION, POWDER, FOR SOLUTION INTRAVENOUS at 21:54

## 2022-08-14 RX ADMIN — METOCLOPRAMIDE HYDROCHLORIDE 5 MG: 5 INJECTION INTRAMUSCULAR; INTRAVENOUS at 10:20

## 2022-08-14 RX ADMIN — METOCLOPRAMIDE HYDROCHLORIDE 5 MG: 5 INJECTION INTRAMUSCULAR; INTRAVENOUS at 18:58

## 2022-08-14 RX ADMIN — SODIUM CHLORIDE: 9 INJECTION, SOLUTION INTRAVENOUS at 00:27

## 2022-08-14 RX ADMIN — LORAZEPAM 0.5 MG: 2 INJECTION INTRAMUSCULAR at 01:55

## 2022-08-14 RX ADMIN — PANTOPRAZOLE SODIUM 40 MG: 40 INJECTION, POWDER, FOR SOLUTION INTRAVENOUS at 10:25

## 2022-08-14 RX ADMIN — ONDANSETRON 4 MG: 2 INJECTION INTRAMUSCULAR; INTRAVENOUS at 18:19

## 2022-08-14 RX ADMIN — LORAZEPAM 0.5 MG: 2 INJECTION INTRAMUSCULAR at 18:19

## 2022-08-14 RX ADMIN — METOCLOPRAMIDE HYDROCHLORIDE 5 MG: 5 INJECTION INTRAMUSCULAR; INTRAVENOUS at 01:55

## 2022-08-14 RX ADMIN — ACETAMINOPHEN 650 MG: 325 TABLET, FILM COATED ORAL at 17:43

## 2022-08-14 ASSESSMENT — ACTIVITIES OF DAILY LIVING (ADL)
ADLS_ACUITY_SCORE: 18

## 2022-08-14 NOTE — PLAN OF CARE
Goal Outcome Evaluation:    Temp: 98.1  F (36.7  C) Temp src: Oral BP: (!) 143/93 Pulse: 102   Resp: 16 SpO2: 98 % O2 Device: None (Room air)       A/O4. Up Indp. Tele SR. Clear liquid diet. Pt still nausea, dry heaving, yellow watery emesis. PRN compazine, reglan, zofran and ativan given. Some/little relief. Not eating. Abd pain, ativan helped. NS 75mL/hr. , 200. 2 PIV. Updated wife over phone. Needs GI consult, symptoms not improving.

## 2022-08-14 NOTE — H&P
GASTROENTEROLOGY CONSULTATION     Wally Avendano  [address]  [place] [address]  32 year old male    Admission Date/Time: 8/11/2022  Primary Care Provider: Rita - MISAEL [facility]    We were asked to see the patient in consultation by Dr. Tompkins for evaluation of Nausea and vomiting.        HPI:  Wally Avendano is a 32 year old male with a past medical history significant for uncontrolled type 1 diabetes who was admitted on August 11 for diabetic ketoacidosis with nausea and vomiting.  He uses marijuana.  He reported hematemesis.  He tells me that his n/v has improved and believes he has not thrown up since yesterday.  Feeling hungry and is going to order some clear liquids.    ROS: A comprehensive ten point review of systems was negative aside from those in mentioned in the HPI.      MEDICATIONS: No current facility-administered medications on file prior to encounter.  ibuprofen (ADVIL/MOTRIN) 200 MG tablet, Take 400 mg by mouth every 8 hours as needed for mild pain  insulin aspart (NOVOLOG PEN) 100 UNIT/ML pen, Inject 1 Units Subcutaneous 3 times daily (with meals) 1 unit per 15 grams of carbohydrates.  insulin glargine (LANTUS PEN) 100 UNIT/ML pen, Inject 25 Units Subcutaneous At Bedtime  ondansetron (ZOFRAN ODT) 4 MG ODT tab, Take 1 tablet (4 mg) by mouth every 6 hours as needed for nausea  Alcohol Swabs PADS, Use to swab the area of the injection or migel as directed   Per insurance coverage  B-D U/F 31G X 8 MM insulin pen needle,   blood glucose (NO BRAND SPECIFIED) lancets standard, To use to test glucose level in the blood  Use to test blood sugar  3  times daily as directed. To accompany glucose monitor brands per insurance coverage.  blood glucose (NO BRAND SPECIFIED) test strip, Use to test blood sugar three times daily as directed. To accompany glucose monitor brands per insurance coverage.  blood glucose calibration (NO BRAND SPECIFIED) solution, Used to calibrate the blood glucose monitor as  needed and as directed.  To accompany  blood glucose brands per insurance coverage  blood glucose monitoring (CONTOUR NEXT MONITOR W/DEVICE KIT) meter device kit,   blood glucose monitoring (NO BRAND SPECIFIED) meter device kit, Use to test blood sugar 4 times daily or as directed.  Continuous Blood Gluc Sensor (DEXCOM G6 SENSOR) MISC, Change every 10 days. (Patient not taking: Reported on 6/7/2021)  Continuous Blood Gluc Transmit (DEXCOM G6 TRANSMITTER) MISC, Change every 3 months. (Patient not taking: Reported on 6/7/2021)  Glucagon (BAQSIMI ONE PACK) 3 MG/DOSE POWD, Spray 1 each in nostril as needed (for severe low blood glucose) (Patient not taking: Reported on 2/26/2022)  glucose (BD GLUCOSE) 4 g chewable tablet, Take 4 tablets by mouth every 15 minutes as needed for low blood sugar (Patient not taking: Reported on 8/12/2022)  insulin glargine (LANTUS PEN) 100 UNIT/ML pen, Inject 9 Units Subcutaneous every morning (Patient not taking: Reported on 8/12/2022)  insulin pen needle (31G X 5 MM) 31G X 5 MM miscellaneous, Use 4 pen needles daily or as directed.  LORazepam (ATIVAN) 0.5 MG tablet, Take 1 tablet (0.5 mg) by mouth every 6 hours as needed for nausea or vomiting (Patient not taking: Reported on 8/12/2022)  metoclopramide (REGLAN) 10 MG tablet, Take 1 tablet (10 mg) by mouth 3 times daily as needed (Nausea or Vomiting) (Patient not taking: Reported on 8/12/2022)  Microlet Lancets MISC,   prochlorperazine (COMPAZINE) 25 MG suppository, Place 1 suppository (25 mg) rectally every 12 hours as needed for nausea or vomiting Give if nausea not resolved 15 minutes after giving ondansetron (ZOFRAN). If nausea/intractable vomiting not resolved in 15-30 minutes (Patient not taking: Reported on 8/12/2022)  Sharps Container MISC, Use as directed to dispose of needles, lancets and other sharps  Per Insurance coverage        ALLERGIES: No Known Allergies    Past Medical History:   Diagnosis Date     Diabetes (H)     type 1      Known health problems: none        Past Surgical History:   Procedure Laterality Date     ESOPHAGOSCOPY, GASTROSCOPY, DUODENOSCOPY (EGD), COMBINED N/A 7/28/2021    Procedure: ESOPHAGOGASTRODUODENOSCOPY, WITH BIOPSY with duodenum biopsies for celiac sprue and gastric biopsies for h.Pylori by cold biopsy forceps;  Surgeon: Mikel Restrepo MD;  Location:  GI     NO HISTORY OF SURGERY           SOCIAL HISTORY:  Social History     Tobacco Use     Smoking status: Never Smoker     Smokeless tobacco: Never Used   Substance Use Topics     Alcohol use: Yes     Drug use: Never       FAMILY HISTORY:  Reviewed in chart    PHYSICAL EXAM:   /62 (BP Location: Right arm)   Pulse 101   Temp 98.1  F (36.7  C) (Oral)   Resp 16   Wt 65.8 kg (145 lb)   SpO2 97%   BMI 17.19 kg/m      Constitutional: NAD, comfortable  Cardiovascular: RRR  Respiratory: CTAB  Psychiatric: mentation appears normal and affect normal/bright  Head: Normocephalic. Atraumatic.    Neck: Neck supple.  Eyes: no icterus  ENT:  hearing adequate  Abdomen:   Soft,nt/nd  NEURO: grossly negative  SKIN: no suspicious lesions or rashes            ADDITIONAL COMMENTS:   I reviewed the patient's new clinical lab test results.   Recent Labs   Lab Test 08/13/22  0707 08/12/22  0752 08/11/22 2004   WBC 12.8* 19.5* 16.0*   HGB 13.9 13.2* 14.9   MCV 88 86 86    287 291     Recent Labs   Lab Test 08/14/22  0140 08/13/22  2115 08/13/22  1720 08/13/22  0756 08/13/22  0707 08/12/22  0838 08/12/22  0752 08/11/22  2333 08/11/22  2329   NA  --   --   --   --  137  --  140  --  143   POTASSIUM  --   --   --   --  4.2  --  3.9  --  3.6   CHLORIDE  --   --   --   --  101  --  108*  --  111*   CO2  --   --   --   --  22  --  23 --  22   BUN  --   --   --   --  8.5  --  7.7  --  9.4   CR  --   --   --   --  0.51*  --  0.54*  --  0.52*   ANIONGAP  --   --   --   --  14  --  9  --  10   MARY  --   --   --   --  8.5*  --  8.5*  --  8.3*   * 200* 192*   < >  243*   < > 128*   < > 235*    < > = values in this interval not displayed.     Recent Labs   Lab Test 08/11/22 2047 08/11/22 2004 02/28/22  0619 02/26/22  1256 04/03/21  0648 04/01/21 2043   ALBUMIN  --  4.6 3.2* 3.5   < >  --    BILITOTAL  --  0.7 1.3 1.0   < >  --    ALT  --  31 36 45   < >  --    AST  --  26 23 23   < >  --    ALKPHOS  --  97 59 64   < >  --    PROTEIN Negative  --   --   --   --  10*   LIPASE  --  11* 26* 23*   < >  --     < > = values in this interval not displayed.             .    CONSULTATION ASSESSMENT AND PLAN:    Uncontrolled type 1 diabetes, use of marijuana, admitted with diabetic ketoacidosis associated with nausea and vomiting.  His nausea and vomiting is likely a combination of his uncontrolled diabetes causing gastroparesis, nausea and vomiting from the diabetic ketoacidosis, and cannabis hyperemesis syndrome.  At this time I recommend scheduled Reglan QID rather than as needed.  Once his nausea fully resolves you can change this reglan back to PRN.  Continue clear liquid diet and advance as tolerated.  Obtain a consultation from the nutritionist for a gastroparesis diet.  He needs to stop using marijuana.    He reported to me that his vomiting has now resolved and his nausea has improved.      His hgb is normal.    Signing off, but please call with questions or concerns.    Parviz Rivera MD  [facility]

## 2022-08-14 NOTE — PLAN OF CARE
A/Ox4. Ind. R PIV infusing 75 ml/hr. RA 97%. VSS. PRN Reglan and ativan given for severe N/V. Emesis output: 100 ml. B. Tele: SR. Symptoms unresolved. Continue POC.

## 2022-08-14 NOTE — PROGRESS NOTES
[facility]    Hospitalist Progress Note             Date of Admission:  8/11/2022                   Day of hospitalization: 3    Assessment and Plan:   Wally Avendano is a 31 year old male with past medical history of uncontrolled type 1 diabetes mellitus, history of cyclic vomiting who presented on 8/11/2022 with chief complaint of nausea and vomiting.  The patient states that yesterday he developed some nausea and vomiting and abdominal pain.  It worsened today and he also had some bloody emesis so he decided to come to the emergency department.  He reports that he was diagnosed with type 1 diabetes last year.  He stopped taking his insulin approximately 1 year ago because he did not like to poke himself.  He does report a few episodes of nausea and vomiting over the past several weeks and months but never to this extent.  He previously followed with an endocrinologist, however stopped seeing them as they were located in [place] and he did not want to drive that far.  Currently, the patient reports he lives with his wife and 2-year-old child in a house.  He denies any fevers or chills, cough.     In the emergency department, the patient was found of a temperature of 97.9  F, heart rate 91, blood pressure 135/75, respiratory rate 24, SPO2 100% on room air.  Initial blood work showed potassium 3.8, bicarb 14, BUN/creatinine 10.3/0.63, anion gap 25, A1c 13.3, quantitative ketone 5.4, lactic acid 3.3, , lipase 11, WBC 16.0, hemoglobin 14.9.  Urinalysis was negative for signs of infection however did show glucosuria and ketonuria.  CT abdomen and pelvis showed mild mural thickening of the distal esophagus representing esophagitis, otherwise unremarkable.  The patient was started on aggressive IV fluids and an insulin drip for the treatment of diabetic ketoacidosis     Acute Intractable Nausea and Vomiting  DKA  - Secondary to noncompliance with meds, possibly gastroparesis, suspect there  is a component of marijuana use patient is using marijuana currently but states his symptoms improved with marijuana.   - No hematemesis melena or hematochezia while in the hospital  - Patient Lantus has been increased to 13 units from 10 units, continue carb counting  -Patient CT abdomen does show distal mural thickening of the esophagus may reflect esophagitis.  Symptoms seem to be improving at bedside today,  - GI was consulted   -Continue PPI twice daily  - Reglan ordered for nausea vomiting as needed.  Ativan IV seems to be improving his symptoms.  GI has changed his Reglan from as needed to scheduled  -Full liquid trial today, nutritionist consulted for gastroparesis diet  - Discussed following up with a new Endocrinologist closer to the pt to be considered for CGM and insulin pump.  Referral placed.       QT prolongation  -We will order screening ECG today    Lactic Acidosis  - Likely secondary to dehydration and n/v.  Does have persistent leukocytosis however suspect this is just an acute phase reactant less likely infectious process.  We will monitor and trend  -Resolved     Hematemesis  Esophagitis  -Resolved no episodes in the hospital  - PPI IV BID  - Hgb appears stable  -See above     Leukocytosis  - Likely reactive  -Improved monitor     ---------     # Code status: Full   # Anticipated discharge date and Disposition: In 1 day we will try a trial of full liquids today and consider regular diet tomorrow if oral intake continues to improve  # DVT: SCDs  # IVF: none                        Filomena Tmopkins MD  Text Page (7am - 6pm, M-F)               Subjective   Chief Complaint:nausea, vomitting  Subjective: States his nausea and vomiting improved today is able to take clears down now without any vomiting we will try a trial of full liquids today.  She is abdominal pain has resolved as well.  Otherwise no chest pain shortness of breath coughs or fevers    Objective   /74 (BP Location: Right arm)    Pulse 91   Temp 98  F (36.7  C) (Oral)   Resp 20   Wt 65.8 kg (145 lb)   SpO2 100%   BMI 17.19 kg/m       Physical Exam  General: Pt in NAD, normal appearance  HEENT: OP clear MMM, no JVD  Lungs: Clear to Auscultation Bilateral, normal breathing  without accessory muscle usage, no wheezing, rhonchi or crackles  Cardiac: +S1, S2, RRR, no MRG, no edema  Abdominal: normal bowel sounds, NT/ND, no hepatosplenomegaly  Skin: warm, dry, normal turgor, no rash  Psyche: A& O x3, appropriate affect             Intake/Output Summary (Last 24 hours) at 8/12/2022 1522  Last data filed at 8/12/2022 0747  Gross per 24 hour   Intake --   Output 100 ml   Net -100 ml           Labs and Imaging Results:      Recent Labs   Lab 08/13/22  0707 08/12/22  0752   WBC 12.8* 19.5*   HGB 13.9 13.2*    287        Recent Labs   Lab 08/13/22  0707 08/12/22  0752    140   CO2 22 23   BUN 8.5 7.7      No results for input(s): INR, PTT in the last 168 hours.   No results for input(s): CKMB in the last 168 hours.    Invalid input(s): TROPONINT     Recent Labs   Lab 08/11/22 2004   ALBUMIN 4.6   AST 26   ALT 31   ALKPHOS 97        Micro:     Radio:  CT Abdomen Pelvis w Contrast   Final Result   IMPRESSION:    1.  Mild mural thickening of the distal esophagus, may reflect esophagitis secondary to recent history of vomiting.   2.  Normal Appendix              Medications:      Scheduled Meds:      insulin aspart  1-3 Units Subcutaneous TID AC     insulin aspart  1-3 Units Subcutaneous At Bedtime     insulin aspart   Subcutaneous TID w/meals     insulin glargine  13 Units Subcutaneous QAM AC     metoclopramide  5 mg Intravenous 4x Daily     pantoprazole (PROTONIX) IV  40 mg Intravenous BID     sodium chloride (PF)  3 mL Intracatheter Q8H     Continuous Infusions:      dextrose       sodium chloride 75 mL/hr at 08/14/22 0027     PRN Meds:  acetaminophen **OR** acetaminophen, dextrose, glucose **OR** dextrose **OR** glucagon, lidocaine  4%, lidocaine (buffered or not buffered), LORazepam, melatonin, nitroGLYcerin, ondansetron **OR** ondansetron, polyethylene glycol, prochlorperazine **OR** prochlorperazine **OR** prochlorperazine, senna-docusate **OR** senna-docusate, sodium chloride (PF)

## 2022-08-14 NOTE — PLAN OF CARE
VSS, A/O x 4. LS clear on RA. Denies pain and nausea. , 167 . PIV in R infusing NS  @ 75 mL/hr. PIV in L arm no longer patent , removed. Tele SR. Had diet advanced to full liquids, if tolerated advance to regular this evening or tomorrow. Possible discharge tomorrow. Continue to monitor

## 2022-08-15 VITALS
SYSTOLIC BLOOD PRESSURE: 123 MMHG | DIASTOLIC BLOOD PRESSURE: 68 MMHG | TEMPERATURE: 98.4 F | OXYGEN SATURATION: 97 % | HEART RATE: 94 BPM | BODY MASS INDEX: 17.19 KG/M2 | WEIGHT: 145 LBS | RESPIRATION RATE: 18 BRPM

## 2022-08-15 LAB
GLUCOSE BLDC GLUCOMTR-MCNC: 197 MG/DL (ref 70–99)
GLUCOSE BLDC GLUCOMTR-MCNC: 219 MG/DL (ref 70–99)
GLUCOSE BLDC GLUCOMTR-MCNC: 223 MG/DL (ref 70–99)

## 2022-08-15 PROCEDURE — 258N000003 HC RX IP 258 OP 636: Performed by: HOSPITALIST

## 2022-08-15 PROCEDURE — 99239 HOSP IP/OBS DSCHRG MGMT >30: CPT | Performed by: INTERNAL MEDICINE

## 2022-08-15 PROCEDURE — 250N000011 HC RX IP 250 OP 636: Performed by: HOSPITALIST

## 2022-08-15 PROCEDURE — C9113 INJ PANTOPRAZOLE SODIUM, VIA: HCPCS | Performed by: INTERNAL MEDICINE

## 2022-08-15 PROCEDURE — 250N000011 HC RX IP 250 OP 636: Performed by: INTERNAL MEDICINE

## 2022-08-15 RX ORDER — METOCLOPRAMIDE 10 MG/1
10 TABLET ORAL 3 TIMES DAILY PRN
Qty: 21 TABLET | Refills: 0 | Status: ON HOLD | OUTPATIENT
Start: 2022-08-15 | End: 2023-01-21

## 2022-08-15 RX ORDER — ONDANSETRON 4 MG/1
4 TABLET, ORALLY DISINTEGRATING ORAL EVERY 6 HOURS PRN
Qty: 30 TABLET | Refills: 0 | Status: ON HOLD | OUTPATIENT
Start: 2022-08-15 | End: 2023-01-21

## 2022-08-15 RX ADMIN — INSULIN ASPART 3 UNITS: 100 INJECTION, SOLUTION INTRAVENOUS; SUBCUTANEOUS at 08:22

## 2022-08-15 RX ADMIN — ONDANSETRON 4 MG: 2 INJECTION INTRAMUSCULAR; INTRAVENOUS at 00:53

## 2022-08-15 RX ADMIN — LORAZEPAM 0.5 MG: 2 INJECTION INTRAMUSCULAR at 00:54

## 2022-08-15 RX ADMIN — INSULIN ASPART 3 UNITS: 100 INJECTION, SOLUTION INTRAVENOUS; SUBCUTANEOUS at 12:50

## 2022-08-15 RX ADMIN — METOCLOPRAMIDE HYDROCHLORIDE 5 MG: 5 INJECTION INTRAMUSCULAR; INTRAVENOUS at 09:12

## 2022-08-15 RX ADMIN — PANTOPRAZOLE SODIUM 40 MG: 40 INJECTION, POWDER, FOR SOLUTION INTRAVENOUS at 09:11

## 2022-08-15 RX ADMIN — SODIUM CHLORIDE: 9 INJECTION, SOLUTION INTRAVENOUS at 02:08

## 2022-08-15 RX ADMIN — METOCLOPRAMIDE HYDROCHLORIDE 5 MG: 5 INJECTION INTRAMUSCULAR; INTRAVENOUS at 02:08

## 2022-08-15 ASSESSMENT — ACTIVITIES OF DAILY LIVING (ADL)
ADLS_ACUITY_SCORE: 18
DEPENDENT_IADLS:: INDEPENDENT

## 2022-08-15 NOTE — PLAN OF CARE
Goal Outcome Evaluation:    A/O4. VSS. Up indp. NS 75ml/hr. 4+ emesis, 1st threw up food. Other frothy yellow. Zofran, compazine, scheduled reglan, and ativan given for nausea. Tolerated full liquid diet for only an hr. Abd pain 5/10, PRN tylenol given, pt threw it up. , 204. LFTs normal. Continue POC.

## 2022-08-15 NOTE — CONSULTS
Care Management Initial Consult    General Information  Assessment completed with: Patient,    Type of CM/SW Visit: Initial Assessment    Primary Care Provider verified and updated as needed: Yes   Readmission within the last 30 days:        Reason for Consult: care coordination/care conference  Advance Care Planning:            Communication Assessment  Patient's communication style: spoken language (English or Bilingual)    Hearing Difficulty or Deaf: no   Wear Glasses or Blind: no    Cognitive  Cognitive/Neuro/Behavioral: WDL                      Living Environment:   People in home: child(brian), dependent, spouse     Current living Arrangements: apartment      Able to return to prior arrangements: yes       Family/Social Support:  Care provided by: self  Provides care for: child(brian)  Marital Status:   Wife          Description of Support System: Supportive, Involved    Support Assessment: Adequate family and caregiver support    Current Resources:   Patient receiving home care services: No     Community Resources: None  Equipment currently used at home: none  Supplies currently used at home: None    Employment/Financial:  Employment Status: employed full-time        Financial Concerns:             Lifestyle & Psychosocial Needs:  Social Determinants of Health     Tobacco Use: Low Risk      Smoking Tobacco Use: Never Smoker     Smokeless Tobacco Use: Never Used   Alcohol Use: Unknown     Frequency of Alcohol Consumption: Monthly or less     Average Number of Drinks: Not asked     Frequency of Binge Drinking: Not asked   Financial Resource Strain: Not on file   Food Insecurity: Not on file   Transportation Needs: Not on file   Physical Activity: Not on file   Stress: Not on file   Social Connections: Not on file   Intimate Partner Violence: Not on file   Depression: Not at risk     PHQ-2 Score: 0   Housing Stability: Not on file       Functional Status:  Prior to admission patient needed assistance:    Dependent ADLs:: Independent  Dependent IADLs:: Independent       Mental Health Status:  Mental Health Status: No Current Concerns       Chemical Dependency Status:  Chemical Dependency Status: No Current Concerns             Values/Beliefs:  Spiritual, Cultural Beliefs, Moravian Practices, Values that affect care: no               Additional Information:  CM consulted to help coordinate appointments. Met with pt at bedside to discuss needs .  Pt said he would like apt in the afternoon. He wanted help using his continuous glucose meter. He said he wanted Endocrinologist closer to his home.  CM called Endocrinology [phone] and the earliest appointment anywhere in the system is his current apt Nov 2 which is a virtual apt.  The earliest hospital follow up clinic in the [facility] is 8/23/22 which is on his discharge instructions. Pt left before I could explain appointments.    Yeni Guajardo RN, BSN, PHN, [facility]  Care Coordinator  [facility]  [phone]

## 2022-08-15 NOTE — PLAN OF CARE
VSS. A/O x 4. LS clear on RA. Denies pain, sob, and nausea. Tolerated breakfast well. Will be discharging this afternoon. Continue to monitor.

## 2022-08-15 NOTE — PLAN OF CARE
A/Ox4. Ind. R PIV infusing 75 ml/hr. RA 97%. VSS. Ativan and Zofran given for N/V. Reglan scheduled. B. Tele: . Seen by CHIKIS. Continue POC.

## 2022-08-15 NOTE — DISCHARGE SUMMARY
[facility]  Hospitalist Discharge Summary      Date of Admission:  8/11/2022  Date of Discharge:  8/15/2022  Discharging Provider: Sp Rogers MD  Discharge Service: Hospitalist Service    Discharge Diagnoses         Follow-ups Needed After Discharge   Follow-up Appointments     Follow-up and recommended labs and tests       Follow up with primary care provider, [facility] - [facility],   within 7 days for hospital follow- up.  The following labs/tests are   recommended: referral to endocrinology.             Unresulted Labs Ordered in the Past 30 Days of this Admission     No orders found from 7/12/2022 to 8/12/2022.          Discharge Disposition   Discharged to home  Condition at discharge: Satisfactory      Hospital Course      Wally Avendano is a 31 year old male with past medical history of uncontrolled type 1 diabetes mellitus, history of cyclic vomiting who presented on 8/11/2022 with chief complaint of nausea and vomiting.  The patient states that yesterday he developed some nausea and vomiting and abdominal pain.  It worsened today and he also had some bloody emesis so he decided to come to the emergency department.  He reports that he was diagnosed with type 1 diabetes last year.  He stopped taking his insulin approximately 1 year ago because he did not like to poke himself.  He does report a few episodes of nausea and vomiting over the past several weeks and months but never to this extent.  He previously followed with an endocrinologist, however stopped seeing them as they were located in [place] and he did not want to drive that far.  Currently, the patient reports he lives with his wife and 2-year-old child in a house.  He denies any fevers or chills, cough.     In the emergency department, the patient was found of a temperature of 97.9  F, heart rate 91, blood pressure 135/75, respiratory rate 24, SPO2 100% on room air.  Initial blood work showed potassium 3.8,  bicarb 14, BUN/creatinine 10.3/0.63, anion gap 25, A1c 13.3, quantitative ketone 5.4, lactic acid 3.3, , lipase 11, WBC 16.0, hemoglobin 14.9.  Urinalysis was negative for signs of infection however did show glucosuria and ketonuria.  CT abdomen and pelvis showed mild mural thickening of the distal esophagus representing esophagitis, otherwise unremarkable.  The patient was started on aggressive IV fluids and an insulin drip for the treatment of diabetic ketoacidosis     Acute Intractable Nausea and Vomiting  DKA  - Secondary to noncompliance with meds, possibly gastroparesis, suspect there is a component of marijuana use patient is using marijuana currently but states his symptoms improved with marijuana.   - No hematemesis melena or hematochezia while in the hospital  - Patient Lantus has been increased to 13 units from 10 units, continue carb counting  -Patient CT abdomen does show distal mural thickening of the esophagus may reflect esophagitis.  Symptoms seem to be improving at bedside today,  - GI was consulted   -Continue PPI twice daily  - Reglan ordered for nausea vomiting as needed.  Ativan IV seems to be improving his symptoms.  GI has changed his Reglan from as needed to scheduled  -Full liquid trial today, nutritionist consulted for gastroparesis diet  - Discussed following up with a new Endocrinologist closer to the pt to be considered for CGM and insulin pump.  Referral placed.        QT prolongation  -We will order screening ECG today     Lactic Acidosis  - Likely secondary to dehydration and n/v.  Does have persistent leukocytosis however suspect this is just an acute phase reactant less likely infectious process.  We will monitor and trend  -Resolved     Hematemesis  Esophagitis  -Resolved no episodes in the hospital  - PPI IV BID  - Hgb appears stable  -See above     Leukocytosis  - Likely reactive  -Improved monitor      Admitted as inpatient. Afebrile. Tolerating PO. Wants to go home  today. Understands importance of checking CBG's and insulin compliance. Emphasized importance of marijuana cessation.      Consultations This Hospital Stay   GASTROENTEROLOGY IP CONSULT  NUTRITION SERVICES ADULT IP CONSULT    Code Status   Full Code    Time Spent on this Encounter   I, Sp Rogers MD, personally saw the patient today and spent greater than 30 minutes discharging this patient.       Sp Rogers MD  [facility] MEDICAL SURGICAL  [address]  [facility] [address]  Phone: [phone]  Fax: [phone]  ______________________________________________________________________    Physical Exam   Vital Signs: Temp: 98.4  F (36.9  C) Temp src: Oral BP: 123/68 Pulse: 94   Resp: 18 SpO2: 97 % O2 Device: None (Room air)    Weight: 145 lbs 0 oz    Gen - AAO x 3 in NAD.  Lungs - CTA B.  HEart - RR,S1+S2 nml, no m/g/r.  Abd - soft, NT, ND, + BS.  Ext - no edema.       Primary Care Physician   [facility] Rayray Cooper    Discharge Orders      Adult Endocrinology  Referral      Follow-up and recommended labs and tests     Follow up with primary care provider, [facility] Rayray [place], within 7 days for hospital follow- up.  The following labs/tests are recommended: referral to endocrinology.     Activity    Your activity upon discharge: activity as tolerated     Diet    Follow this diet upon discharge: Orders Placed This Encounter      Full Liquid Diet, advance to ADA diet if no nausea/vomiting.       Significant Results and Procedures   Results for orders placed or performed during the hospital encounter of 08/11/22   CT Abdomen Pelvis w Contrast    Narrative    EXAM: CT ABDOMEN PELVIS W CONTRAST  LOCATION: [facility]  DATE/TIME: 8/11/2022 10:03 PM    INDICATION: abdominal pain, vomiting, DKA  COMPARISON: Prior study dated 7/16/2021  TECHNIQUE: CT scan of the abdomen and pelvis was performed following injection of IV contrast.  Multiplanar reformats were obtained. Dose reduction techniques were used.  CONTRAST: 65mL Isovue 370    FINDINGS:   LOWER CHEST: Unremarkable    HEPATOBILIARY: No significant mass or bile duct dilatation. No calcified gallstones.     PANCREAS: No significant mass, duct dilatation, or inflammatory change.    SPLEEN: Normal.    ADRENAL GLANDS: No significant nodules.    KIDNEYS/BLADDER: No significant mass, stone, or hydronephrosis.    BOWEL: There is mild mural thickening of the distal esophagus. The stomach is collapsed, the duodenum is unremarkable. The small bowel is normal in size and caliber. The colon is normal in size and caliber. The appendix is unremarkable.    LYMPH NODES: No lymphadenopathy.    VASCULATURE: No abdominal aortic aneurysm.    PELVIC ORGANS: No pelvic masses.    MUSCULOSKELETAL: Unremarkable.      Impression    IMPRESSION:   1.  Mild mural thickening of the distal esophagus, may reflect esophagitis secondary to recent history of vomiting.  2.  Normal Appendix       Discharge Medications   Current Discharge Medication List      CONTINUE these medications which have CHANGED    Details   insulin aspart (NOVOLOG PEN) 100 UNIT/ML pen Inject 1 Units Subcutaneous 3 times daily (with meals) 1 unit per 15 grams of carbohydrates.  Qty: 15 mL, Refills: 0    Associated Diagnoses: Diabetic ketoacidosis without coma associated with type 1 diabetes mellitus (H); Type 1 diabetes mellitus with other specified complication (H)      insulin glargine (LANTUS PEN) 100 UNIT/ML pen Inject 15 Units Subcutaneous every morning  Qty: 15 mL, Refills: 0    Comments: If Lantus is not covered by insurance, may substitute Basaglar or Semglee or other insulin glargine product per insurance preference at same dose and frequency.    Associated Diagnoses: Diabetic ketoacidosis without coma associated with type 1 diabetes mellitus (H)      metoclopramide (REGLAN) 10 MG tablet Take 1 tablet (10 mg) by mouth 3 times daily as needed  (Nausea or Vomiting)  Qty: 21 tablet, Refills: 0    Associated Diagnoses: Cyclic vomiting syndrome      ondansetron (ZOFRAN ODT) 4 MG ODT tab Take 1 tablet (4 mg) by mouth every 6 hours as needed for nausea  Qty: 30 tablet, Refills: 0    Associated Diagnoses: Cyclic vomiting syndrome         CONTINUE these medications which have NOT CHANGED    Details   ibuprofen (ADVIL/MOTRIN) 200 MG tablet Take 400 mg by mouth every 8 hours as needed for mild pain      Alcohol Swabs PADS Use to swab the area of the injection or migel as directed   Per insurance coverage  Qty: 100 each, Refills: 0    Associated Diagnoses: Diabetic ketoacidosis without coma associated with type 1 diabetes mellitus (H); Type 1 diabetes mellitus with other specified complication (H)      !! B-D U/F 31G X 8 MM insulin pen needle       blood glucose (NO BRAND SPECIFIED) lancets standard To use to test glucose level in the blood  Use to test blood sugar  3  times daily as directed. To accompany glucose monitor brands per insurance coverage.  Qty: 100 each, Refills: 0    Associated Diagnoses: Diabetic ketoacidosis without coma associated with type 1 diabetes mellitus (H); Type 1 diabetes mellitus with other specified complication (H)      blood glucose (NO BRAND SPECIFIED) test strip Use to test blood sugar three times daily as directed. To accompany glucose monitor brands per insurance coverage.  Qty: 350 strip, Refills: 3    Associated Diagnoses: Diabetic ketoacidosis without coma associated with type 1 diabetes mellitus (H); Type 1 diabetes mellitus with other specified complication (H)      blood glucose calibration (NO BRAND SPECIFIED) solution Used to calibrate the blood glucose monitor as needed and as directed.  To accompany  blood glucose brands per insurance coverage  Qty: 1 each, Refills: 0    Associated Diagnoses: Diabetic ketoacidosis without coma associated with type 1 diabetes mellitus (H); Type 1 diabetes mellitus with other specified  complication (H)      blood glucose monitoring (CONTOUR NEXT MONITOR W/DEVICE KIT) meter device kit       blood glucose monitoring (NO BRAND SPECIFIED) meter device kit Use to test blood sugar 4 times daily or as directed.  Qty: 1 kit, Refills: 0    Associated Diagnoses: Diabetic ketoacidosis without coma associated with type 1 diabetes mellitus (H)      Continuous Blood Gluc Sensor (DEXCOM G6 SENSOR) MISC Change every 10 days.  Qty: 3 each, Refills: 11    Associated Diagnoses: Type 1 diabetes mellitus without complication (H); Long term (current) use of insulin (H)      Continuous Blood Gluc Transmit (DEXCOM G6 TRANSMITTER) MISC Change every 3 months.  Qty: 1 each, Refills: 3    Associated Diagnoses: Type 1 diabetes mellitus without complication (H); Long term (current) use of insulin (H)      Glucagon (BAQSIMI ONE PACK) 3 MG/DOSE POWD Spray 1 each in nostril as needed (for severe low blood glucose)  Qty: 1 each, Refills: 1    Associated Diagnoses: Type 1 diabetes mellitus with other specified complication (H)      glucose (BD GLUCOSE) 4 g chewable tablet Take 4 tablets by mouth every 15 minutes as needed for low blood sugar  Qty: 50 tablet, Refills: 0    Associated Diagnoses: Diabetic ketoacidosis without coma associated with type 1 diabetes mellitus (H); Type 1 diabetes mellitus with other specified complication (H)      !! insulin pen needle (31G X 5 MM) 31G X 5 MM miscellaneous Use 4 pen needles daily or as directed.  Qty: 120 each, Refills: 11    Comments: Please dispense as Insulin Pen Needle 31G X 5 MM MISC.  Associated Diagnoses: Type 1 diabetes mellitus without complication (H); Long term (current) use of insulin (H); Diabetic ketoacidosis without coma associated with type 1 diabetes mellitus (H); Type 1 diabetes mellitus with other specified complication (H)      LORazepam (ATIVAN) 0.5 MG tablet Take 1 tablet (0.5 mg) by mouth every 6 hours as needed for nausea or vomiting  Qty: 10 tablet, Refills: 0       Microlet Lancets MISC       prochlorperazine (COMPAZINE) 25 MG suppository Place 1 suppository (25 mg) rectally every 12 hours as needed for nausea or vomiting Give if nausea not resolved 15 minutes after giving ondansetron (ZOFRAN). If nausea/intractable vomiting not resolved in 15-30 minutes  Qty: 2 suppository, Refills: 0    Associated Diagnoses: Cyclic vomiting syndrome      Sharps Container MISC Use as directed to dispose of needles, lancets and other sharps  Per Insurance coverage  Qty: 1 each, Refills: 0    Associated Diagnoses: Diabetic ketoacidosis without coma associated with type 1 diabetes mellitus (H); Type 1 diabetes mellitus with other specified complication (H)       !! - Potential duplicate medications found. Please discuss with provider.        Allergies   No Known Allergies

## 2022-08-16 ENCOUNTER — PATIENT OUTREACH (OUTPATIENT)
Dept: CARE COORDINATION | Facility: CLINIC | Age: 32
End: 2022-08-16

## 2022-08-16 DIAGNOSIS — Z71.89 OTHER SPECIFIED COUNSELING: ICD-10-CM

## 2022-08-16 LAB
ATRIAL RATE - MUSE: 91 BPM
DIASTOLIC BLOOD PRESSURE - MUSE: NORMAL MMHG
INTERPRETATION ECG - MUSE: NORMAL
P AXIS - MUSE: 74 DEGREES
PR INTERVAL - MUSE: 130 MS
QRS DURATION - MUSE: 96 MS
QT - MUSE: 344 MS
QTC - MUSE: 423 MS
R AXIS - MUSE: 110 DEGREES
SYSTOLIC BLOOD PRESSURE - MUSE: NORMAL MMHG
T AXIS - MUSE: 74 DEGREES
VENTRICULAR RATE- MUSE: 91 BPM

## 2022-08-16 NOTE — PROGRESS NOTES
Clinic Care Coordination Contact  [facility]: Post-Discharge Note  SITUATION                                                      Admission:    Admission Date: 08/11/22   Reason for Admission: Blood Sugar Problem and Hematemesis  Discharge:   Discharge Date: 08/15/22  Discharge Diagnosis: DKA    BACKGROUND                                                      Per hospital discharge summary and inpatient provider notes:Wally Avendano is a 31 year old male with past medical history of uncontrolled type 1 diabetes mellitus, history of cyclic vomiting who presented on 8/11/2022 with chief complaint of nausea and vomiting.  The patient states that yesterday he developed some nausea and vomiting and abdominal pain.  It worsened today and he also had some bloody emesis so he decided to come to the emergency department.  He reports that he was diagnosed with type 1 diabetes last year.  He stopped taking his insulin approximately 1 year ago because he did not like to poke himself.  He does report a few episodes of nausea and vomiting over the past several weeks and months but never to this extent.  He previously followed with an endocrinologist, however stopped seeing them as they were located in [place] and he did not want to drive that far.  Currently, the patient reports he lives with his wife and 2-year-old child in a house.  He denies any fevers or chills, cough.     In the emergency department, the patient was found of a temperature of 97.9  F, heart rate 91, blood pressure 135/75, respiratory rate 24, SPO2 100% on room air.  Initial blood work showed potassium 3.8, bicarb 14, BUN/creatinine 10.3/0.63, anion gap 25, A1c 13.3, quantitative ketone 5.4, lactic acid 3.3, , lipase 11, WBC 16.0, hemoglobin 14.9.  Urinalysis was negative for signs of infection however did show glucosuria and ketonuria.  CT abdomen and pelvis showed mild mural thickening of the distal esophagus representing esophagitis, otherwise  "unremarkable.  The patient was started on aggressive IV fluids and an insulin drip for the treatment of diabetic ketoacidosis     Acute Intractable Nausea and Vomiting  DKA  - Secondary to noncompliance with meds, possibly gastroparesis, suspect there is a component of marijuana use patient is using marijuana currently but states his symptoms improved with marijuana.   - No hematemesis melena or hematochezia while in the hospital  - Patient Lantus has been increased to 13 units from 10 units, continue carb counting  -Patient CT abdomen does show distal mural thickening of the esophagus may reflect esophagitis.  Symptoms seem to be improving at bedside today,  - GI was consulted   -Continue PPI twice daily  - Reglan ordered for nausea vomiting as needed.  Ativan IV seems to be improving his symptoms.  GI has changed his Reglan from as needed to scheduled  -Full liquid trial today, nutritionist consulted for gastroparesis diet  - Discussed following up with a new Endocrinologist closer to the pt to be considered for CGM and insulin pump.  Referral placed.        QT prolongation  -We will order screening ECG today     Lactic Acidosis  - Likely secondary to dehydration and n/v.  Does have persistent leukocytosis however suspect this is just an acute phase reactant less likely infectious process.  We will monitor and trend  -Resolved     Hematemesis  Esophagitis  -Resolved no episodes in the hospital  - PPI IV BID  - Hgb appears stable  -See above     Leukocytosis  - Likely reactive  -Improved monitor        Admitted as inpatient. Afebrile. Tolerating PO. Wants to go home today. Understands importance of checking CBG's and insulin compliance. Emphasized importance of marijuana cessation.      ASSESSMENT           Discharge Assessment  How are you doing now that you are home?: \" I am doing ok \"  How are your symptoms? (Red Flag symptoms escalate to triage hotline per guidelines): Improved  Do you feel your condition is " stable enough to be safe at home until your provider visit?: Yes  Does the patient have their discharge instructions? : Yes  Does the patient have questions regarding their discharge instructions? : No  Were you started on any new medications or were there changes to any of your previous medications? : Yes  Does the patient have all of their medications?: Yes  Do you have questions regarding any of your medications? : No  Do you have all of your needed medical supplies or equipment (DME)?  (i.e. oxygen tank, CPAP, cane, etc.): Yes  Discharge follow-up appointment scheduled within 14 calendar days? : Yes  Discharge Follow Up Appointment Date: 11/02/22  Discharge Follow Up Appointment Scheduled with?: Specialty Care Provider    Post-op ([facility] Only)  If the patient had a surgery or procedure, do they have any questions for a nurse?: No             PLAN                                                      Outpatient Plan: Follow up with primary care provider, [facility], within 7 days for hospital follow- up.  The following labs/tests are recommended: referral to endocrinology.    Future Appointments   Date Time Provider Department Center   8/23/2022  3:00 PM Deonna Salazar, CNP [facility]   11/2/2022  1:30 PM [facility]os [facility]         For any urgent concerns, please contact our 24 hour nurse triage line: [phone] ([phone])         Tata Sauceda

## 2022-08-17 ENCOUNTER — HOSPITAL ENCOUNTER (EMERGENCY)
Facility: CLINIC | Age: 32
Discharge: HOME | End: 2022-08-17
Attending: EMERGENCY MEDICINE | Admitting: EMERGENCY MEDICINE
Payer: COMMERCIAL

## 2022-08-17 VITALS
DIASTOLIC BLOOD PRESSURE: 89 MMHG | SYSTOLIC BLOOD PRESSURE: 121 MMHG | OXYGEN SATURATION: 98 % | TEMPERATURE: 97.5 F | RESPIRATION RATE: 20 BRPM | HEART RATE: 100 BPM

## 2022-08-17 DIAGNOSIS — E87.6 HYPOKALEMIA: ICD-10-CM

## 2022-08-17 DIAGNOSIS — R79.89 KETONEMIA: ICD-10-CM

## 2022-08-17 DIAGNOSIS — E10.65 HYPERGLYCEMIA DUE TO TYPE 1 DIABETES MELLITUS (H): ICD-10-CM

## 2022-08-17 DIAGNOSIS — R10.84 DIFFUSE ABDOMINAL PAIN: ICD-10-CM

## 2022-08-17 DIAGNOSIS — R11.2 NON-INTRACTABLE VOMITING WITH NAUSEA, UNSPECIFIED VOMITING TYPE: ICD-10-CM

## 2022-08-17 LAB
ALBUMIN SERPL BCG-MCNC: 4.2 G/DL (ref 3.5–5.2)
ALP SERPL-CCNC: 77 U/L (ref 40–129)
ALT SERPL W P-5'-P-CCNC: 23 U/L (ref 10–50)
ANION GAP SERPL CALCULATED.3IONS-SCNC: 19 MMOL/L (ref 7–15)
AST SERPL W P-5'-P-CCNC: 24 U/L (ref 10–50)
BASE EXCESS BLDV CALC-SCNC: 3.6 MMOL/L (ref -7.7–1.9)
BASOPHILS # BLD AUTO: 0 10E3/UL (ref 0–0.2)
BASOPHILS NFR BLD AUTO: 1 %
BILIRUB DIRECT SERPL-MCNC: 0.22 MG/DL (ref 0–0.3)
BILIRUB SERPL-MCNC: 0.8 MG/DL
BUN SERPL-MCNC: 3 MG/DL (ref 6–20)
CALCIUM SERPL-MCNC: 8.8 MG/DL (ref 8.6–10)
CHLORIDE SERPL-SCNC: 91 MMOL/L (ref 98–107)
CREAT SERPL-MCNC: 0.44 MG/DL (ref 0.67–1.17)
DEPRECATED HCO3 PLAS-SCNC: 22 MMOL/L (ref 22–29)
EOSINOPHIL # BLD AUTO: 0 10E3/UL (ref 0–0.7)
EOSINOPHIL NFR BLD AUTO: 0 %
ERYTHROCYTE [DISTWIDTH] IN BLOOD BY AUTOMATED COUNT: 11.8 % (ref 10–15)
GFR SERPL CREATININE-BSD FRML MDRD: >90 ML/MIN/1.73M2
GLUCOSE BLDC GLUCOMTR-MCNC: 270 MG/DL (ref 70–99)
GLUCOSE SERPL-MCNC: 282 MG/DL (ref 70–99)
HCO3 BLDV-SCNC: 24 MMOL/L (ref 21–28)
HCT VFR BLD AUTO: 40.9 % (ref 40–53)
HGB BLD-MCNC: 14.4 G/DL (ref 13.3–17.7)
HOLD SPECIMEN: NORMAL
HOLD SPECIMEN: NORMAL
IMM GRANULOCYTES # BLD: 0 10E3/UL
IMM GRANULOCYTES NFR BLD: 0 %
KETONES BLD-SCNC: 4.7 MMOL/L (ref 0–0.6)
LACTATE SERPL-SCNC: 1.1 MMOL/L (ref 0.7–2)
LIPASE SERPL-CCNC: 12 U/L (ref 13–60)
LYMPHOCYTES # BLD AUTO: 1 10E3/UL (ref 0.8–5.3)
LYMPHOCYTES NFR BLD AUTO: 14 %
MCH RBC QN AUTO: 28.9 PG (ref 26.5–33)
MCHC RBC AUTO-ENTMCNC: 35.2 G/DL (ref 31.5–36.5)
MCV RBC AUTO: 82 FL (ref 78–100)
MONOCYTES # BLD AUTO: 0.4 10E3/UL (ref 0–1.3)
MONOCYTES NFR BLD AUTO: 6 %
NEUTROPHILS # BLD AUTO: 5.7 10E3/UL (ref 1.6–8.3)
NEUTROPHILS NFR BLD AUTO: 79 %
NRBC # BLD AUTO: 0 10E3/UL
NRBC BLD AUTO-RTO: 0 /100
O2/TOTAL GAS SETTING VFR VENT: 0 %
OXYHGB MFR BLDV: 91 % (ref 70–75)
PCO2 BLDV: 25 MM HG (ref 40–50)
PH BLDV: 7.59 [PH] (ref 7.32–7.43)
PLATELET # BLD AUTO: 318 10E3/UL (ref 150–450)
PO2 BLDV: 49 MM HG (ref 25–47)
POTASSIUM SERPL-SCNC: 3.1 MMOL/L (ref 3.4–5.3)
PROT SERPL-MCNC: 6.8 G/DL (ref 6.4–8.3)
RBC # BLD AUTO: 4.99 10E6/UL (ref 4.4–5.9)
SODIUM SERPL-SCNC: 132 MMOL/L (ref 136–145)
WBC # BLD AUTO: 7.2 10E3/UL (ref 4–11)

## 2022-08-17 PROCEDURE — 36415 COLL VENOUS BLD VENIPUNCTURE: CPT | Performed by: EMERGENCY MEDICINE

## 2022-08-17 PROCEDURE — 82805 BLOOD GASES W/O2 SATURATION: CPT | Performed by: EMERGENCY MEDICINE

## 2022-08-17 PROCEDURE — 80053 COMPREHEN METABOLIC PANEL: CPT | Performed by: EMERGENCY MEDICINE

## 2022-08-17 PROCEDURE — 258N000003 HC RX IP 258 OP 636: Performed by: EMERGENCY MEDICINE

## 2022-08-17 PROCEDURE — 250N000011 HC RX IP 250 OP 636: Performed by: EMERGENCY MEDICINE

## 2022-08-17 PROCEDURE — 96374 THER/PROPH/DIAG INJ IV PUSH: CPT

## 2022-08-17 PROCEDURE — 96375 TX/PRO/DX INJ NEW DRUG ADDON: CPT

## 2022-08-17 PROCEDURE — 99285 EMERGENCY DEPT VISIT HI MDM: CPT | Mod: 25

## 2022-08-17 PROCEDURE — 85025 COMPLETE CBC W/AUTO DIFF WBC: CPT | Performed by: EMERGENCY MEDICINE

## 2022-08-17 PROCEDURE — 82010 KETONE BODYS QUAN: CPT | Performed by: EMERGENCY MEDICINE

## 2022-08-17 PROCEDURE — 83605 ASSAY OF LACTIC ACID: CPT | Performed by: EMERGENCY MEDICINE

## 2022-08-17 PROCEDURE — 250N000013 HC RX MED GY IP 250 OP 250 PS 637: Performed by: EMERGENCY MEDICINE

## 2022-08-17 PROCEDURE — 83690 ASSAY OF LIPASE: CPT | Performed by: EMERGENCY MEDICINE

## 2022-08-17 PROCEDURE — 82248 BILIRUBIN DIRECT: CPT | Performed by: EMERGENCY MEDICINE

## 2022-08-17 PROCEDURE — 96361 HYDRATE IV INFUSION ADD-ON: CPT

## 2022-08-17 RX ORDER — METOCLOPRAMIDE HYDROCHLORIDE 5 MG/ML
5 INJECTION INTRAMUSCULAR; INTRAVENOUS ONCE
Status: COMPLETED | OUTPATIENT
Start: 2022-08-17 | End: 2022-08-17

## 2022-08-17 RX ORDER — DIPHENHYDRAMINE HYDROCHLORIDE 50 MG/ML
25 INJECTION INTRAMUSCULAR; INTRAVENOUS ONCE
Status: COMPLETED | OUTPATIENT
Start: 2022-08-17 | End: 2022-08-17

## 2022-08-17 RX ORDER — HYDROMORPHONE HYDROCHLORIDE 1 MG/ML
0.3 INJECTION, SOLUTION INTRAMUSCULAR; INTRAVENOUS; SUBCUTANEOUS ONCE
Status: COMPLETED | OUTPATIENT
Start: 2022-08-17 | End: 2022-08-17

## 2022-08-17 RX ORDER — KETOROLAC TROMETHAMINE 15 MG/ML
15 INJECTION, SOLUTION INTRAMUSCULAR; INTRAVENOUS ONCE
Status: COMPLETED | OUTPATIENT
Start: 2022-08-17 | End: 2022-08-17

## 2022-08-17 RX ORDER — POTASSIUM CHLORIDE 1500 MG/1
40 TABLET, EXTENDED RELEASE ORAL ONCE
Status: COMPLETED | OUTPATIENT
Start: 2022-08-17 | End: 2022-08-17

## 2022-08-17 RX ADMIN — KETOROLAC TROMETHAMINE 15 MG: 15 INJECTION, SOLUTION INTRAMUSCULAR; INTRAVENOUS at 18:33

## 2022-08-17 RX ADMIN — SODIUM CHLORIDE 1000 ML: 9 INJECTION, SOLUTION INTRAVENOUS at 16:51

## 2022-08-17 RX ADMIN — DIPHENHYDRAMINE HYDROCHLORIDE 25 MG: 50 INJECTION, SOLUTION INTRAMUSCULAR; INTRAVENOUS at 18:34

## 2022-08-17 RX ADMIN — POTASSIUM CHLORIDE 40 MEQ: 1500 TABLET, EXTENDED RELEASE ORAL at 19:37

## 2022-08-17 RX ADMIN — HYDROMORPHONE HYDROCHLORIDE 0.3 MG: 1 INJECTION, SOLUTION INTRAMUSCULAR; INTRAVENOUS; SUBCUTANEOUS at 18:31

## 2022-08-17 RX ADMIN — SODIUM CHLORIDE 1000 ML: 9 INJECTION, SOLUTION INTRAVENOUS at 18:29

## 2022-08-17 RX ADMIN — METOCLOPRAMIDE HYDROCHLORIDE 5 MG: 5 INJECTION INTRAMUSCULAR; INTRAVENOUS at 18:35

## 2022-08-17 ASSESSMENT — ENCOUNTER SYMPTOMS
ABDOMINAL PAIN: 1
ROS GI COMMENTS: HEMATEMESIS -
NAUSEA: 1
FEVER: 0
VOMITING: 1

## 2022-08-17 ASSESSMENT — ACTIVITIES OF DAILY LIVING (ADL)
ADLS_ACUITY_SCORE: 35
ADLS_ACUITY_SCORE: 35

## 2022-08-17 NOTE — ED TRIAGE NOTES
Pt admitted for DKA on Thursday. Discharged Monday felt improved. Woke up this morning and has been unable to keep any liquid down. Constantly vomiting. Denies bloody emesis. C/o abdominal pain.  Bg 270 in triage.    ABCs intact, A&Ox4     Triage Assessment     Row Name 08/17/22 [id number]       Triage Assessment (Adult)    Airway WDL WDL       Respiratory WDL    Respiratory WDL WDL

## 2022-08-17 NOTE — ED PROVIDER NOTES
History     Chief Complaint:  Abdominal pain and Vomiting     HPI:  The history is provided by the patient.      Wally Avendano is a 32 year old male with a history of type I diabetes mellitus and gastroparesis presenting with abdominal pain and vomiting. Wally was admitted to this hospital 8/11 to 8/15 for DKA, CT abdomen and pelvis below. He was feeling well at discharge and yesterday but about 14 hours prior to arrival in the night developed generalized abdominal pain, nausea, and vomiting. He estimates approximately 15 episodes of non-bloody emesis since onset, not improved with Zofran or Ativan at home. He has had associated abdominal pain as well as diarrhea, but notes diarrhea is a typical symptom of his gastroparesis. He has had no fever.    He has not been using insulin or frequently monitoring his blood glucose since his discharge from the hospital. He uses alcohol (most recent was prior to hospitalization) and cannabis (most recent use was yesterday).     CT Abdomen Pelvis w Contrast (8/11/22):  1.  Mild mural thickening of the distal esophagus, may reflect esophagitis secondary to recent history of vomiting.  2.  Normal Appendix    Review of Systems   Constitutional: Negative for fever.   Gastrointestinal: Positive for abdominal pain, nausea and vomiting.        Hematemesis -   All other systems reviewed and are negative.    Allergies:  The patient has no known allergies.    Medications:  Insulin  Reglan  Zofran  Ativan  Compazine    Past Medical History:     Type I diabetes mellitus   Diabetic ketoacidosis  Gastroparesis  Hypokalemia    Past Surgical History:    EGD     Family History:    Mother: PBC    Social History:  The patient presents to the ED with a family member.  The patient presents to the ED via car.  Patient uses alcohol and cannabis.  Patient does not smoke tobacco.    Physical Exam     Patient Vitals for the past 24 hrs:   BP Temp Temp src Pulse Resp SpO2   08/17/22 1945 121/89 -- -- 100  -- --   08/17/22 1940 116/80 -- -- 85 -- 98 %   08/17/22 1640 (!) 130/93 97.5  F (36.4  C) Temporal 92 20 100 %     Physical Exam  General: Well-developed and well-nourished. Well appearing young  man. Cooperative.  Head:  Atraumatic.  Eyes:  Conjunctivae, lids, and sclerae are normal.  ENT:    Normal nose. Poor dentition.  Neck:  Supple. Normal range of motion.  CV:  Regular rate and rhythm. Normal heart sounds with no murmurs, rubs, or gallops detected.  Resp:  No respiratory distress. Clear to auscultation bilaterally without decreased breath sounds, wheezing, rales, or rhonchi.  GI:  Soft. Non-distended. Mild epigastric tenderness.    MS:  Normal ROM.   Skin:  Warm. Non-diaphoretic. No pallor.  Neuro:  Awake. A&Ox3. Normal strength.  Psych: Normal mood and affect. Normal speech.  Vitals reviewed.    Emergency Department Course     Laboratory:  Labs Ordered and Resulted from Time of ED Arrival to Time of ED Departure   GLUCOSE BY METER - Abnormal       Result Value    GLUCOSE BY METER POCT 270 (*)    BASIC METABOLIC PANEL - Abnormal    Creatinine 0.44 (*)     Sodium 132 (*)     Potassium 3.1 (*)     Urea Nitrogen 3.0 (*)     Chloride 91 (*)     Carbon Dioxide (CO2) 22      Anion Gap 19 (*)     Glucose 282 (*)     GFR Estimate >90      Calcium 8.8     BLOOD GAS VENOUS WITH OXYHEMOGLOBIN - Abnormal    pH Venous 7.59 (*)     pCO2 Venous 25 (*)     pO2 Venous 49 (*)     Bicarbonate Venous 24      FIO2 0      Oxyhemoglobin Venous 91 (*)     Base Excess/Deficit (+/-) 3.6 (*)    KETONE BETA-HYDROXYBUTYRATE QUANTITATIVE, RAPID - Abnormal    Ketone (Beta-Hydroxybutyrate) Quantitative 4.7 (*)    LIPASE - Abnormal    Lipase 12 (*)    LACTIC ACID WHOLE BLOOD - Normal    Lactic Acid 1.1     HEPATIC FUNCTION PANEL - Normal    Protein Total 6.8      Albumin 4.2      Bilirubin Total 0.8      Alkaline Phosphatase 77      AST 24      ALT 23      Bilirubin Direct 0.22     CBC WITH PLATELETS AND DIFFERENTIAL    WBC Count  "7.2      RBC Count 4.99      Hemoglobin 14.4      Hematocrit 40.9      MCV 82      MCH 28.9      MCHC 35.2      RDW 11.8      Platelet Count 318      % Neutrophils 79      % Lymphocytes 14      % Monocytes 6      % Eosinophils 0      % Basophils 1      % Immature Granulocytes 0      NRBCs per 100 WBC 0      Absolute Neutrophils 5.7      Absolute Lymphocytes 1.0      Absolute Monocytes 0.4      Absolute Eosinophils 0.0      Absolute Basophils 0.0      Absolute Immature Granulocytes 0.0      Absolute NRBCs 0.0        Emergency Department Course:  Reviewed:  I reviewed nursing notes, vitals, past medical history, and Care Everywhere.    Assessments:  1806 I obtained history and examined the patient as noted above.   2108 I rechecked the patient and explained findings. He feels overall improved but did vomit after drinking water, remarking, \"It's not continuous or anything.\" We discussed admission versus discharge and we will plan to revisit this after another PO trial.  2130 The patient was rechecked and updated. He drank a full glass of water successfully and is requesting discharge.    Interventions:  1651 NS 1 L IV  1829 NS 1 L IV  1831 Dilaudid 0.3 mg IV  1833 Toradol 15 mg IV  1834 Benadryl 25 mg IV  1835 Reglan 5 mg IV  1937 Potassium chloride 40 mEq PO    Disposition:  The patient was discharged home.     Impression & Plan   Medical Decision Making:  Wally is a 32 year old man with type 1 diabetes who was recently discharged after a hospitalization for DKA.  Although he was feeling generally well after discharge, he did not take his insulin appropriately and used marijuana.  About 14 hours prior to arrival he developed generalized abdominal pain with nausea and vomiting prompting his return visit.  He appears generally well on exam.  He has some mild epigastric tenderness but no peritonitis.  He had a CT scan at his recent visit which was unremarkable aside from evidence of esophagitis and repeat imaging is not " indicated.  He has known history of gastroparesis and feels his symptoms today are similar. They are likely exacerbated by his marijuana use. He was given 2 L of IV fluids, Dilaudid, Toradol, Benadryl, and Reglan.  Fortunately, there is no evidence of DKA today with bicarb of 22 and pH is actually elevated at 7.59.  He does have ketonemia to 4.7 similar to prior value at hospitalization.  There is no lactic acidosis, leukocytosis, anemia, hepatitis, biliary obstruction, or pancreatitis.  Mild hypokalemia to 3.1 was repleted with oral potassium.  Glucose of 282 as well as the aforementioned ketonemia will likely improve with the IV fluids.  After the aforementioned interventions he was reevaluated and felt improved although he told me he did fail his PO challenge, vomiting in the bathroom.  I discussed observation admission with him given inability to tolerate oral.  However, he felt overall improved and wanted to try an oral challenge again.  He was then able to drink an entire glass of water quite quickly and preferred discharge.  He has antiemetics at home and did not need a refill of any of these.  He will try these if his emesis returns.  I counseled him to take his insulin to prevent hyperglycemia and DKA and to stop using marijuana.  I encouraged him to increase dietary potassium and follow-up with primary care but he does understand to return with recurrence of symptoms or new concerns.  All questions answered.    Diagnosis:    ICD-10-CM    1. Non-intractable vomiting with nausea, unspecified vomiting type  R11.2    2. Hyperglycemia due to type 1 diabetes mellitus (H)  E10.65    3. Hypokalemia  E87.6    4. Ketonemia  R79.89    5. Diffuse abdominal pain  R10.84      Scribe Disclosure:  I, Catie Anil, am serving as a scribe at 5:54 PM on 8/17/2022 to document services personally performed by Dorinda Up MD based on my observations and the provider's statements to me.      Dorinda Up MD  08/23/22  [id number]

## 2022-08-18 NOTE — DISCHARGE INSTRUCTIONS
If your vomiting returns, use your home treatments.  Make sure you are taking your insulin even if you are not feeling well as you do not want to go into DKA.  Stop using marijuana.  Increase dietary potassium.  Return with uncontrolled vomiting, fever, severe abdominal pain, or any other new or concerning symptoms.  Follow-up with your primary care provider to ensure you are improving as expected.

## 2022-08-19 ENCOUNTER — NURSE TRIAGE (OUTPATIENT)
Dept: NURSING | Facility: CLINIC | Age: 32
End: 2022-08-19

## 2022-08-19 NOTE — TELEPHONE ENCOUNTER
Pt's wife calling with concerns about;    States Pt has had violent vomiting since 8/11/22 and having mid abdominal pain that keeps him in bed- intermittent but frequent  On 8/17 to ED with Stomach pain    Still vomiting today and feeling weak  Has not worked since 8/16 d/t not feeling well  Going on for 1 1/2 year to a lesser degree than now.  Ate broth today - stomach pain is the same as when he was in ED on 8/11 - still vomiting  And wanting to get into hot water bath to help with pain.  Has lost 'a lot of weight in last year'  Went from 170 lb to 155 today.    Has been taking his insulin  Wife states Pt does Not look good at all.  Diagnosed approx. 1 1/2 year ago with DM?  Insulin was at 300 today - without eating!    8/23 appt 2:45 PM at [place] with Dr. Salazar     Reviewed need to establish care with a PCP.  Wife reports that Pt will try to establish care with Dr. Salazar at next appt.    Denies;  Signs/symptoms of dehydration at time of this call  Fever  Lower abdominal pain    According to the protocol, patient should see a physician or HCP within 24 hours.  Care advice given. Patient verbalizes understanding and agrees with plan of care.    Anum Wright RN, Nurse Advisor 8:02 PM 8/19/2022  COVID 19 Nurse Triage Plan/Patient Instructions    Please be aware that novel coronavirus (COVID-19) may be circulating in the community. If you develop symptoms such as fever, cough, or SOB or if you have concerns about the presence of another infection including coronavirus (COVID-19), please contact your health care provider or visit [email or web address].     Disposition/Instructions    In-Person Visit with provider recommended. Reference Visit Selection Guide.    Thank you for taking steps to prevent the spread of this virus.  o Limit your contact with others.  o Wear a simple mask to cover your cough.  o Wash your hands well and often.      Reason for Disposition    [1] MODERATE pain (e.g., interferes with  "normal activities) AND [2] pain comes and goes (cramps) AND [3] present > 24 hours  (Exception: pain with Vomiting or Diarrhea - see that Guideline)    Additional Information    Negative: [1] SEVERE pain (e.g., excruciating) AND [2] present > 1 hour    Negative: [1] SEVERE pain AND [2] age > 60 years    Negative: [1] Vomiting AND [2] contains red blood or black (\"coffee ground\") material  (Exception: few red streaks in vomit that only happened once)    Negative: Blood in bowel movements  (Exception: Blood on surface of BM with constipation)    Negative: Black or tarry bowel movements (Exception: chronic-unchanged black-grey bowel movements AND is taking iron pills or Pepto-bismol)    Negative: [1] Unable to urinate (or only a few drops) > 4 hours AND [2] bladder feels very full (e.g., palpable bladder or strong urge to urinate)    Negative: Chest pain    Negative: Pain is mainly in upper abdomen  (if needed ask: \"is it mainly above the belly button?\")    Negative: Followed an abdomen (stomach) injury    Negative: Shock suspected (e.g., cold/pale/clammy skin, too weak to stand, low BP, rapid pulse)    Negative: Difficult to awaken or acting confused (e.g., disoriented, slurred speech)    Negative: Passed out (i.e., lost consciousness, collapsed and was not responding)    Negative: Sounds like a life-threatening emergency to the triager    Negative: [1] Pain in the scrotum or testicle AND [2] present > 1 hour    Negative: Patient sounds very sick or weak to the triager    Negative: [1] MILD-MODERATE pain AND [2] constant AND [3] present > 2 hours    Negative: [1] Vomiting AND [2] abdomen looks much more swollen than usual    Negative: [1] Vomiting AND [2] contains bile (green color)    Negative: White of the eyes have turned yellow (i.e., jaundice)    Negative: Fever > 103 F (39.4 C)    Negative: [1] Fever > 101 F (38.3 C) AND [2] age > 60 years    Negative: [1] Fever > 100.0 F (37.8 C) AND [2] bedridden (e.g., " nursing home patient, CVA, chronic illness, recovering from surgery)    Negative: [1] Fever > 100.0 F (37.8 C) AND [2] diabetes mellitus or weak immune system (e.g., HIV positive, cancer chemo, splenectomy, organ transplant, chronic steroids)    Negative: [1] SEVERE pain AND [2] present < 1 hour    Protocols used: ABDOMINAL PAIN - MALE-A-AH

## 2022-08-23 ENCOUNTER — OFFICE VISIT (OUTPATIENT)
Dept: FAMILY MEDICINE | Facility: CLINIC | Age: 32
End: 2022-08-23
Payer: COMMERCIAL

## 2022-08-23 VITALS
TEMPERATURE: 97.6 F | RESPIRATION RATE: 18 BRPM | WEIGHT: 147 LBS | SYSTOLIC BLOOD PRESSURE: 108 MMHG | HEART RATE: 96 BPM | DIASTOLIC BLOOD PRESSURE: 72 MMHG | BODY MASS INDEX: 17.36 KG/M2 | OXYGEN SATURATION: 100 % | HEIGHT: 77 IN

## 2022-08-23 DIAGNOSIS — R19.7 DIARRHEA, UNSPECIFIED TYPE: ICD-10-CM

## 2022-08-23 DIAGNOSIS — E10.69 TYPE 1 DIABETES MELLITUS WITH OTHER SPECIFIED COMPLICATION (H): Primary | ICD-10-CM

## 2022-08-23 DIAGNOSIS — E87.6 HYPOKALEMIA: ICD-10-CM

## 2022-08-23 DIAGNOSIS — K31.84 GASTROPARESIS: ICD-10-CM

## 2022-08-23 DIAGNOSIS — E10.10 DIABETIC KETOACIDOSIS WITHOUT COMA ASSOCIATED WITH TYPE 1 DIABETES MELLITUS (H): ICD-10-CM

## 2022-08-23 DIAGNOSIS — M25.50 MULTIPLE JOINT PAIN: ICD-10-CM

## 2022-08-23 LAB — ERYTHROCYTE [SEDIMENTATION RATE] IN BLOOD BY WESTERGREN METHOD: 5 MM/HR (ref 0–15)

## 2022-08-23 PROCEDURE — 82306 VITAMIN D 25 HYDROXY: CPT | Performed by: NURSE PRACTITIONER

## 2022-08-23 PROCEDURE — 86431 RHEUMATOID FACTOR QUANT: CPT | Performed by: NURSE PRACTITIONER

## 2022-08-23 PROCEDURE — 83690 ASSAY OF LIPASE: CPT | Performed by: NURSE PRACTITIONER

## 2022-08-23 PROCEDURE — 80053 COMPREHEN METABOLIC PANEL: CPT | Performed by: NURSE PRACTITIONER

## 2022-08-23 PROCEDURE — 86038 ANTINUCLEAR ANTIBODIES: CPT | Performed by: NURSE PRACTITIONER

## 2022-08-23 PROCEDURE — 83735 ASSAY OF MAGNESIUM: CPT | Performed by: NURSE PRACTITIONER

## 2022-08-23 PROCEDURE — 86200 CCP ANTIBODY: CPT | Performed by: NURSE PRACTITIONER

## 2022-08-23 PROCEDURE — 99214 OFFICE O/P EST MOD 30 MIN: CPT | Performed by: NURSE PRACTITIONER

## 2022-08-23 PROCEDURE — 85652 RBC SED RATE AUTOMATED: CPT | Performed by: NURSE PRACTITIONER

## 2022-08-23 PROCEDURE — 82043 UR ALBUMIN QUANTITATIVE: CPT | Performed by: NURSE PRACTITIONER

## 2022-08-23 PROCEDURE — 36415 COLL VENOUS BLD VENIPUNCTURE: CPT | Performed by: NURSE PRACTITIONER

## 2022-08-23 PROCEDURE — 82746 ASSAY OF FOLIC ACID SERUM: CPT | Performed by: NURSE PRACTITIONER

## 2022-08-23 PROCEDURE — 86140 C-REACTIVE PROTEIN: CPT | Performed by: NURSE PRACTITIONER

## 2022-08-23 PROCEDURE — 82607 VITAMIN B-12: CPT | Performed by: NURSE PRACTITIONER

## 2022-08-23 NOTE — PROGRESS NOTES
Assessment & Plan      Type 1 diabetes mellitus with other specified complication (H)  Long discussion about management. Needs CGM, has one but hasn't put on. Willing to meet with staff to get started on this.     Diabetic ketoacidosis without coma associated with type 1 diabetes mellitus (H)  Update labs to make sure improved. Refills as needed provided.   - Albumin Random Urine Quantitative with Creat Ratio  - Comprehensive metabolic panel (BMP + Alb, Alk Phos, ALT, AST, Total. Bili, TP)  - blood glucose (NO BRAND SPECIFIED) lancets standard  Dispense: 200 lancet; Refill: 3  - Albumin Random Urine Quantitative with Creat Ratio  - Comprehensive metabolic panel (BMP + Alb, Alk Phos, ALT, AST, Total. Bili, TP)    Gastroparesis  Ongoing issue. Labs and nutrition, recommend GI follow up. Suspect slow movement is affecting sugars and absorbtion.   - Nutrition Referral  - Lipase  - Lipase    Hypokalemia  Recheck labs  - Comprehensive metabolic panel (BMP + Alb, Alk Phos, ALT, AST, Total. Bili, TP)  - Comprehensive metabolic panel (BMP + Alb, Alk Phos, ALT, AST, Total. Bili, TP)    Diarrhea, unspecified type  Loose stools, work up labs. Work with nutrition to make plan for both diabetes and gastroparesis  - Comprehensive metabolic panel (BMP + Alb, Alk Phos, ALT, AST, Total. Bili, TP)  - Nutrition Referral  - Lipase  - Comprehensive metabolic panel (BMP + Alb, Alk Phos, ALT, AST, Total. Bili, TP)  - Lipase    Multiple joint pain  Work up joint pain, likely due to uncontrolled diabetes.   - ESR: Erythrocyte sedimentation rate  - CRP, inflammation  - Rheumatoid factor  - Cyclic Citrullinated Peptide Antibody IgG  - Anti Nuclear Alejandra IgG by IFA with Reflex  - Vitamin D Deficiency  - Magnesium  - Folate  - Vitamin B12  - ESR: Erythrocyte sedimentation rate  - CRP, inflammation  - Rheumatoid factor  - Cyclic Citrullinated Peptide Antibody IgG  - Anti Nuclear Alejandra IgG by IFA with Reflex  - Vitamin D Deficiency  - Magnesium  -  "Folate  - Vitamin B12      Prescription drug management    No follow-ups on file.       Deonna Salazar CNP  [facility]         Lety Lo is a 32 year old, presenting for the following health issues:  Hospital F/U      HPI       ED/UC Followup:    Facility:  [facility] ED   Date of visit: 8/17/22  Reason for visit: Hyperglycemia, Vomiting   Current Status: Stomach pain is better, has been able to hold food down and is eating.      Able to tolerate PO well. Last nausea medication was 3 days ago approximately.     Diagnosed with gastroparesis previously with testing done last year. Needs GI follow up if ongoing issues.    Diabetes management:    Has been taking long acting insulin daily. Has been using sliding scale according to blood sugars with meals. BG seems to be responding to that. Lately with meals he has had more consistent BG in the 180s. Has CGM but nervous to put on himself.     Mom with history of PBC-    Joint pain chronically.       Review of Systems   Constitutional, HEENT, cardiovascular, pulmonary, GI, , musculoskeletal, neuro, skin, endocrine and psych systems are negative, except as otherwise noted.      Objective    /72   Pulse 96   Temp 97.6  F (36.4  C)   Resp 18   Ht 1.956 m (6' 5\")   Wt 66.7 kg (147 lb)   SpO2 100%   BMI 17.43 kg/m    Body mass index is 17.43 kg/m .  Physical Exam   GENERAL: alert, no distress and frail  NECK: no adenopathy, no asymmetry, masses, or scars and thyroid normal to palpation  RESP: lungs clear to auscultation - no rales, rhonchi or wheezes  CV: regular rate and rhythm, normal S1 S2, no S3 or S4, no murmur, click or rub, no peripheral edema and peripheral pulses strong  ABDOMEN: soft, nontender, no hepatosplenomegaly, no masses and bowel sounds normal  MS: no gross musculoskeletal defects noted, no edema              SAMUEL Mahan-JOHN     [address] " [address]  [email or web address]  [email or web address]   Office: [phone]                     .  ..

## 2022-08-24 ENCOUNTER — TELEPHONE (OUTPATIENT)
Dept: FAMILY MEDICINE | Facility: CLINIC | Age: 32
End: 2022-08-24

## 2022-08-24 DIAGNOSIS — E10.69 TYPE 1 DIABETES MELLITUS WITH OTHER SPECIFIED COMPLICATION (H): Primary | ICD-10-CM

## 2022-08-24 LAB
ALBUMIN SERPL-MCNC: 3.8 G/DL (ref 3.4–5)
ALP SERPL-CCNC: 81 U/L (ref 40–150)
ALT SERPL W P-5'-P-CCNC: 31 U/L (ref 0–70)
ANION GAP SERPL CALCULATED.3IONS-SCNC: 10 MMOL/L (ref 3–14)
AST SERPL W P-5'-P-CCNC: 24 U/L (ref 0–45)
BILIRUB SERPL-MCNC: 0.5 MG/DL (ref 0.2–1.3)
BUN SERPL-MCNC: 3 MG/DL (ref 7–30)
CALCIUM SERPL-MCNC: 9 MG/DL (ref 8.5–10.1)
CHLORIDE BLD-SCNC: 98 MMOL/L (ref 94–109)
CO2 SERPL-SCNC: 28 MMOL/L (ref 20–32)
CREAT SERPL-MCNC: 0.8 MG/DL (ref 0.66–1.25)
CREAT UR-MCNC: 105 MG/DL
CRP SERPL-MCNC: <3 MG/L
DEPRECATED CALCIDIOL+CALCIFEROL SERPL-MC: 13 UG/L (ref 20–75)
FOLATE SERPL-MCNC: 10.9 NG/ML (ref 4.6–34.8)
GFR SERPL CREATININE-BSD FRML MDRD: >90 ML/MIN/1.73M2
GLUCOSE BLD-MCNC: 245 MG/DL (ref 70–99)
LIPASE SERPL-CCNC: 21 U/L (ref 13–60)
MAGNESIUM SERPL-MCNC: 2.3 MG/DL (ref 1.6–2.3)
MICROALBUMIN UR-MCNC: 19 MG/L
MICROALBUMIN/CREAT UR: 18.1 MG/G CR (ref 0–17)
POTASSIUM BLD-SCNC: 3.8 MMOL/L (ref 3.4–5.3)
PROT SERPL-MCNC: 6.8 G/DL (ref 6.8–8.8)
SODIUM SERPL-SCNC: 136 MMOL/L (ref 133–144)
VIT B12 SERPL-MCNC: 719 PG/ML (ref 232–1245)

## 2022-08-24 RX ORDER — PROCHLORPERAZINE 25 MG/1
1 SUPPOSITORY RECTAL
Qty: 1 EACH | Refills: 1 | Status: CANCELLED | OUTPATIENT
Start: 2022-08-24

## 2022-08-24 RX ORDER — PROCHLORPERAZINE 25 MG/1
1 SUPPOSITORY RECTAL ONCE
Qty: 1 EACH | Refills: 0 | Status: CANCELLED | OUTPATIENT
Start: 2022-08-24 | End: 2022-08-24

## 2022-08-24 RX ORDER — PROCHLORPERAZINE 25 MG/1
1 SUPPOSITORY RECTAL
Qty: 3 EACH | Refills: 5 | Status: CANCELLED | OUTPATIENT
Start: 2022-08-24

## 2022-08-24 NOTE — TELEPHONE ENCOUNTER
Routing to Deonna Salazar NP to advise/approve on Diabetes Educator referral for placement of CGM & CGM orders.     Patient is on the [facility] RN schedule for CGM placement.   This is something that our Diabetic Educators assist with.   Cancelled the appointment and left voicemail updating the patient.     Ok to place CDE Referral then route to your Care Team to outreach and update the patient.   The CDE Scheduling Team will call him or he can call himself.   Patient will need CGM orders placed & bring to the appointment.   Teed up the common brands.     FYI - Patient establishes with new Endo on 11/2/2022    Jocelin Price, RN Patient Advocate Liaison for Dr. Mandy Reyes & Dr. Wally Banegas  [facility]  Phone: [phone] Fax: [phone]  [email or web address]

## 2022-08-25 LAB
ANA SER QL IF: NEGATIVE
CCP AB SER IA-ACNC: 0.7 U/ML
RHEUMATOID FACT SER NEPH-ACNC: <6 IU/ML

## 2022-08-25 NOTE — TELEPHONE ENCOUNTER
Patient already has CGM at home just uncomfortable and wants help getting started using. Placed diabetic ed referral.    Deonna Salazar, CNP

## 2022-08-26 ENCOUNTER — TELEPHONE (OUTPATIENT)
Dept: FAMILY MEDICINE | Facility: CLINIC | Age: 32
End: 2022-08-26

## 2022-08-26 DIAGNOSIS — R79.89 LOW SERUM VITAMIN D: Primary | ICD-10-CM

## 2022-08-26 RX ORDER — ERGOCALCIFEROL 1.25 MG/1
50000 CAPSULE, LIQUID FILLED ORAL WEEKLY
Qty: 12 CAPSULE | Refills: 0 | Status: SHIPPED | OUTPATIENT
Start: 2022-08-26 | End: 2022-11-17

## 2022-08-26 NOTE — TELEPHONE ENCOUNTER
Diabetes Education Scheduling Outreach #1:    Call to patient to schedule. Left message with phone number to call to schedule.    Also sent [employer] message for second attempt. Requested patient to call to schedule.    Josefa Rizzo OnCall  Diabetes and Nutrition Scheduling

## 2022-08-31 NOTE — TELEPHONE ENCOUNTER
RECORDS RECEIVED FROM: Diabetic Ketoacidosis without coma associated with type 1 DM; referred by Dr. Sagar Beltran   DATE RECEIVED: 2022   NOTES (FOR ALL VISITS) STATUS DETAILS   OFFICE NOTES from referring provider N/A    OFFICE NOTES from other specialist [facility] [place] - [place]:  22 - PCC OV with Deonna Salazar NP    [facility] - [facility], 21 - ENDO OV with Dr. Ana Rizzo - [place]:  21 - PCC OV with Gemma Johnson NP   ED NOTES [facility] [place] - [facility]:  22 - ED OV with Dr. Up  22 - Admission with Dr. Perez  22 - Admission with Dr. Gibson   OPERATIVE REPORT  (thyroid, pituitary, adrenal, parathyroid) N/A    MEDICATION LIST Internal    IMAGING      CT (HEAD/NECK/CHEST/ABDOMEN) Internal [facility]:  22 - CT Abd/Pelvis  21 - CT Abd/Pelvis  21 - CT Abd/Pelvis   LABS     DIABETES: HBGA1C, CREATININE, FASTING LIPIDS, MICROALBUMIN URINE, POTASSIUM, TSH, T4    THYROID: TSH, T4, CBC, THYRODLONULIN, TOTAL T3, FREE T4, CALCITONIN, CEA Internal   [facility]:  22 - CMP  22 - Vitamin D  22 - BMP  22 - Glucose  22 - CBC  22 - HBGA1C  22 - Potassium  21 - TSH  21 - Lipid

## 2022-09-06 ENCOUNTER — ALLIED HEALTH/NURSE VISIT (OUTPATIENT)
Dept: EDUCATION SERVICES | Facility: CLINIC | Age: 32
End: 2022-09-06
Payer: COMMERCIAL

## 2022-09-06 DIAGNOSIS — E10.69 TYPE 1 DIABETES MELLITUS WITH OTHER SPECIFIED COMPLICATION (H): ICD-10-CM

## 2022-09-06 DIAGNOSIS — E10.10 DIABETIC KETOACIDOSIS WITHOUT COMA ASSOCIATED WITH TYPE 1 DIABETES MELLITUS (H): ICD-10-CM

## 2022-09-06 PROCEDURE — G0108 DIAB MANAGE TRN  PER INDIV: HCPCS | Mod: AE

## 2022-09-06 RX ORDER — PROCHLORPERAZINE 25 MG/1
SUPPOSITORY RECTAL
Qty: 3 EACH | Refills: 11 | Status: SHIPPED | OUTPATIENT
Start: 2022-09-06 | End: 2023-04-20

## 2022-09-06 RX ORDER — PROCHLORPERAZINE 25 MG/1
SUPPOSITORY RECTAL
Qty: 1 EACH | Refills: 3 | Status: SHIPPED | OUTPATIENT
Start: 2022-09-06 | End: 2022-09-13

## 2022-09-06 RX ORDER — URINE ACETONE TEST STRIPS
STRIP MISCELLANEOUS
Qty: 50 STRIP | Refills: 3 | Status: SHIPPED | OUTPATIENT
Start: 2022-09-06 | End: 2023-06-15

## 2022-09-06 NOTE — LETTER
2022         RE: Wally Avendano  [address]  [place] [address]        Dear Colleague,    Thank you for referring your patient, Wally Avendano, to the [facility]. Please see a copy of my visit note below.    Diabetes Self-Management Education & Support    Presents for: Personal CGM Start    CDE VISIT MODE: In Person    Assessment Type:   Sensor started:: Started today in clinic  CGM Initial Settings: Dexcom  Dexcom Initial Settings: Low Glucose Alarm: On (fixed at 55 mg/dL and can't be changed), High Glucose Alert, Low Glucose Alert    Sensor was inserted with no resistance or bleeding at insertion site.    CGM-specific education:   Dexcom sensor: insertion technique, sensor site location and rotation, insulin administration in relation to sensor placement, Use of trends and graphs for pattern management and problem solving, Dosing insulin based on sensor glucose results, Dexcom Mobile sheree and Dexcom Clarity software         ASSESSMENT:  I met with Wally today to help him get started on his DexCom G6 CGM. He brought his transmitter and sensors, but the transmitter  in 2022. The sensors are still good through the month of September, so we used a dexcom G6 sample kit to be sure he had a transmitter that worked. I helped him apply his first sensor and it went well. He was hospitalized for DKA in August, but has never checked ketones at home. I sent in an RX and reviewed this process. We reviewed the importance of insulin in helping prevent ketones and unwanted weight-loss. He will try the auto-shield pen needles like they used in the hospital as that did help with his fear of needles. He is interested in an insulin pump in the future. He has an endo appt in November to get re-established with them and I shared that we won't be able to send in a pump prescription until he is re-established with an Endo who will help manage his insulin pump. His A1C came down to the 6% range after  diagnosis, but is now up to 13%. He reports stopping insulin because his blood sugars were good. I'm thinking he probably was in his honeymoon period, but that has ended.    Pt verbalized understanding of concepts discussed and recommendations provided today.    Continue education with the following diabetes management concepts: Healthy Eating, Being Active, Monitoring, Taking Medication, Problem Solving, Reducing Risks and Healthy Coping    PLAN  -See Patient Instructions for co-developed, patient-stated behavior change goals.  -Continue Lantus 20 units each morning.  -Continue Novolog 1 per 15 grams of carbs.  -Order refills through Pharmacy sheree instead of MyChart.  - Check insurance coverage for OmniPod 5 (check phone compatability) andTandem T-Slim with Control IQ pumps.  -Schedule virtual appt for discussing insulin pump further- [phone].  -Send [email or web address] message in about a week to review the dexcom data.  -Website: [employer] Type 1    Topics to cover at upcoming visits: Monitoring, Taking Medication, Problem Solving, Reducing Risks and Insulin Pump Concepts Balancing glucose and insulin  Carbohydrate counting  Calculating boluses  Problem solving with insulin pump therapy (BG monitoring; hypoglycemia signs/symptoms, treatment (glucagon) and prevention; hyperglycemia signs/symptoms, treatment and prevention; ketones, DKA signs/symptoms and prevention)  Hands on practice with basic pump button use    Follow-up: Pt will call to schedule a virtual visit to discuss insulin pump therapy further. Will ask schedulers to reach out to him as well.    See Care Plan for co-developed, patient-state behavior change goals.  AVS provided for patient today.    Education Materials Provided:  No new materials provided today      SUBJECTIVE/OBJECTIVE:  Presents for: Personal CGM Start  Accompanied by: Self  Diabetes education in the past 24mo: Yes (Last appt 5/20/21)  Focus of Visit: CGM, Taking Medication,  "Monitoring  Type of CGM visit: Personal CGM Start  Diabetes type: Type 1  Date of diagnosis: April 2021  Disease course: Stable  How confident are you filling out medical forms by yourself:: Extremely  Diabetes management related comments/concerns: I wish I didn t have to stab myself  Other concerns:: None  Cultural Influences/Ethnic Background:  Not  or       Diabetes Symptoms & Complications:  Fatigue: Sometimes  Neuropathy: Sometimes  Polydipsia: No  Polyphagia: Sometimes  Polyuria: No  Visual change: No  Slow healing wounds: No  Symptom course: Improving  Weight trend: Decreasing  Complications assessed today?: No  Autonomic neuropathy: Yes  CVA: No  Heart disease: No  Nephropathy: No  Peripheral neuropathy: No  Peripheral Vascular Disease: No  Retinopathy: No  Sexual dysfunction: Other    Patient Problem List and Family Medical History reviewed for relevant medical history, current medical status, and diabetes risk factors.    Vitals:  There were no vitals taken for this visit.  Estimated body mass index is 17.43 kg/m  as calculated from the following:    Height as of 8/23/22: 1.956 m (6' 5\").    Weight as of 8/23/22: 66.7 kg (147 lb).   Last 3 BP:   BP Readings from Last 3 Encounters:   08/23/22 108/72   08/17/22 121/89   08/15/22 123/68       History   Smoking Status     Never Smoker   Smokeless Tobacco     Never Used       Labs:  Lab Results   Component Value Date    A1C 13.3 08/11/2022    A1C 12.0 04/01/2021     Lab Results   Component Value Date     08/23/2022     06/03/2021     Lab Results   Component Value Date    LDL 75 04/22/2021     HDL Cholesterol   Date Value Ref Range Status   04/22/2021 56 >39 mg/dL Final   ]  GFR Estimate   Date Value Ref Range Status   08/23/2022 >90 >60 mL/min/1.73m2 Final     Comment:     Effective December 21, 2021 eGFRcr in adults is calculated using the 2021 CKD-EPI creatinine equation which includes age and gender (Pierre et al., NEJ, DOI: " 10.1056/AXGJsx4728184)   06/03/2021 >90 >60 mL/min/[1.73_m2] Final     Comment:     Non  GFR Calc  Starting 12/18/2018, serum creatinine based estimated GFR (eGFR) will be   calculated using the Chronic Kidney Disease Epidemiology Collaboration   (CKD-EPI) equation.       GFR Estimate If Black   Date Value Ref Range Status   06/03/2021 >90 >60 mL/min/[1.73_m2] Final     Comment:      GFR Calc  Starting 12/18/2018, serum creatinine based estimated GFR (eGFR) will be   calculated using the Chronic Kidney Disease Epidemiology Collaboration   (CKD-EPI) equation.       Lab Results   Component Value Date    CR 0.80 08/23/2022    CR 0.73 06/03/2021     No results found for: MICROALBUMIN    Healthy Eating:  Healthy Eating Assessed Today: Yes (Also has gastroparesis)  Cultural/Mormon diet restrictions?: No  Meal planning/habits: Avoiding sweets, Carb counting, Low carb, Smaller portions, Frequent snacking  How many times a week on average do you eat food made away from home (restaurant/take-out)?: 1  Meals include: Lunch, Dinner, Morning Snack, Afternoon Snack, Evening Snack  Beverages: Water, Tea, Coffee, Juice, Energy drinks, Alcohol    Being Active:  Being Active Assessed Today: No  Barrier to exercise: Time    Monitoring:  Monitoring Assessed Today: Yes  Did patient bring glucose meter to appointment? : Yes  Blood Glucose Meter: Accu-chek  Times checking blood sugar at home (number): 3  Times checking blood sugar at home (per): Day  Blood glucose trend: Decreasing            Taking Medications:  Diabetes Medication(s)     Diabetic Other       Glucagon (BAQSIMI ONE PACK) 3 MG/DOSE POWD    Spray 1 each in nostril as needed (for severe low blood glucose)     Patient not taking: Reported on 2/26/2022     glucose (BD GLUCOSE) 4 g chewable tablet    Take 4 tablets by mouth every 15 minutes as needed for low blood sugar     Patient not taking: Reported on 8/12/2022    Insulin       insulin  aspart (NOVOLOG PEN) 100 UNIT/ML pen    Inject 1 Units Subcutaneous 3 times daily (with meals) 1 unit per 15 grams of carbohydrates.     insulin glargine (LANTUS PEN) 100 UNIT/ML pen    Inject 15 Units Subcutaneous every morning          Taking Medication Assessed Today: Yes  Current Treatments: Diet, Insulin Injections  Problems taking diabetes medications regularly?: Yes (Doesn't always take his novolog)  Diabetes medication side effects?: No    Problem Solving:  Problem Solving Assessed Today: Yes  Is the patient at risk for hypoglycemia?: Yes  Hypoglycemia Frequency: Never  Does patient have glucagon emergency kit?: Yes  Is the patient at risk for DKA?: Yes  Does patient have ketone test strips?: No (Sent in Rx today for urine ketone test strips.)  Does patient have DKA prevention plan?: No (Discussed today)              Reducing Risks:  Reducing Risks Assessed Today: No  CAD Risks: Diabetes Mellitus, Male sex  Has dilated eye exam at least once a year?: No  Sees dentist every 6 months?: No  Feet checked by healthcare provider in the last year?: No    Healthy Coping:  Healthy Coping Assessed Today: Yes  Emotional response to diabetes: Ready to learn, Concern for health and well-being, Shock/Denial/Bargaining  Informal Support system:: Children, Family, Friends, Parent, Spouse  Stage of change: ACTION (Actively working towards change)  Support resources: [employer]  Patient Activation Measure Survey Score:  JANAE Score (Last Two) 4/22/2021   JANAE Raw Score 34   Activation Score 68.9   JANAE Level 3         Care Plan and Education Provided:  Care Plan: Diabetes   Updates made by Nathalia Mendez RN since 9/6/2022 12:00 AM      Problem: HbA1C Not In Goal       Goal: Establish Regular Follow-Ups with PCP       Task: Discuss with PCP the recommended timing for patient's next follow up visit(s)    Responsible User: Nathalia Mendez RN      Task: Discuss schedule for PCP visits with patient    Responsible User: Nathalia Mendez  RN      Goal: Get HbA1C Level in Goal       Task: Educate patient on diabetes education self-management topics Completed 9/6/2022   Responsible User: Nathalia Mendez RN      Task: Educate patient on benefits of regular glucose monitoring Completed 9/6/2022   Responsible User: Nathalia Mendez RN      Task: Refer patient to appropriate extended care team member, as needed (Medication Therapy Management, Behavioral Health, Physical Therapy, etc.)    Responsible User: Nathalia Mendez RN      Task: Discuss diabetes treatment plan with patient Completed 9/6/2022   Responsible User: Nathalia Mendez RN      Problem: Diabetes Self-Management Education Needed to Optimize Self-Care Behaviors       Goal: Understand diabetes pathophysiology and disease progression       Task: Provide education on diabetes pathophysiology and disease progression specfic to patient's diabetes type Completed 9/6/2022   Responsible User: Nathalia Mendez RN      Goal: Healthy Eating - follow a healthy eating pattern for diabetes       Task: Provide education on portion control and consistency in amount, composition and timing of food intake    Responsible User: Nathalia Mendez RN      Task: Provide education on managing carbohydrate intake (carbohydrate counting, plate planning method, etc.)    Responsible User: Nathalia Mendez RN      Task: Provide education on weight management    Responsible User: Nathalia Mendez RN      Task: Provide education on heart healthy eating    Responsible User: Nathalia Mendez RN      Task: Provide education on eating out    Responsible User: Nathalia Mendez RN      Task: Develop individualized healthy eating plan with patient    Responsible User: Nathalia Mendez RN      Goal: Being Active - get regular physical activity, working up to at least 150 minutes per week       Task: Provide education on relationship of activity to glucose and precautions to take if at risk for low glucose    Responsible User: Nathalia Mendez  RN      Task: Discuss barriers to physical activity with patient    Responsible User: Nathalia Mendez RN      Task: Develop physical activity plan with patient    Responsible User: Nathalia Mendez RN      Task: Explore community resources including walking groups, assistance programs, and home videos    Responsible User: Nathalia Mendez RN      Goal: Monitoring - monitor glucose and ketones as directed    This Visit's Progress: 0%   Note:    Wally would like to use the dexcom G6 CGM for ease in monitoring his blood glucose levels. He plans to wear it daily.     Task: Provide education on blood glucose monitoring (purpose, proper technique, frequency, glucose targets, interpreting results, when to use glucose control solution, sharps disposal)    Responsible User: Nathalia Mendez RN      Task: Provide education on continuous glucose monitoring (sensor placement, use of sheree or /reader, understanding glucose trends, alerts and alarms, differences between sensor glucose and blood glucose) Completed 9/6/2022   Responsible User: Nathalia Mendez RN      Task: Provide education on ketone monitoring (when to monitor, frequency, etc.) Completed 9/6/2022   Responsible User: Nathalia Mendez RN      Goal: Taking Medication - patient is consistently taking medications as directed    This Visit's Progress: 50%   Note:    Wally will try to take his novolog before or close to the start of a meal instead of after the meal.      Task: Provide education on action of prescribed medication, including when to take and possible side effects Completed 9/6/2022   Responsible User: Nathalia Mendez RN      Task: Provide education on insulin and injectable diabetes medications, including administration, storage, site selection and rotation for injection sites Completed 9/6/2022   Responsible User: Nathalia Mendez RN      Task: Discuss barriers to medication adherence with patient and provide management technique ideas as appropriate  Completed 9/6/2022   Responsible User: Nathalia Mendez RN      Task: Provide education on frequency and refill details of medications Completed 9/6/2022   Responsible User: Nathalia Mendez RN      Goal: Problem Solving - know how to prevent and manage short-term diabetes complications       Task: Provide education on high blood glucose - causes, signs/symptoms, prevention and treatment Completed 9/6/2022   Responsible User: Nathalia Mendez RN      Task: Provide education on low blood glucose - causes, signs/symptoms, prevention, treatment, carrying a carbohydrate source at all times, and medical identification Completed 9/6/2022   Responsible User: Nathalia Mendez RN      Task: Provide education on safe travel with diabetes    Responsible User: Nathalia Mendez RN      Task: Provide education on how to care for diabetes on sick days    Responsible User: Nathalia Mendez RN      Task: Provide education on when to call a health care provider    Responsible User: Nathalia Mendez RN      Goal: Reducing Risks - know how to prevent and treat long-term diabetes complications       Task: Provide education on major complications of diabetes, prevention, early diagnostic measures and treatment of complications    Responsible User: Nathalia Mendez RN      Task: Provide education on recommended care for dental, eye and foot health    Responsible User: Nathalia Mendez RN      Task: Provide education on Hemoglobin A1c - goals and relationship to blood glucose levels Completed 9/6/2022   Responsible User: Nathalia Mendez RN      Task: Provide education on recommendations for heart health - lipid levels and goals, blood pressure and goals, and aspirin therapy, if indicated    Responsible User: Nathalia Mendez RN      Task: Provide education on tobacco cessation    Responsible User: Nathalia Mendez RN      Goal: Healthy Coping - use available resources to cope with the challenges of managing diabetes       Task: Discuss recognizing  feelings about having diabetes Completed 9/6/2022   Responsible User: Nathalia Mendez RN      Task: Provide education on the benefits of making appropriate lifestyle changes Completed 9/6/2022   Responsible User: Nathalia Mendez RN      Task: Provide education on benefits of utilizing support systems Completed 9/6/2022   Responsible User: Nathalia Mendez RN      Task: Discuss methods for coping with stress    Responsible User: Nathalia Mendez RN      Task: Provide education on when to seek professional counseling Completed 9/6/2022   Responsible User: Nathalia Mendez RN Lindsay Nicol RN, [place]      Time Spent: 60 minutes  Encounter Type: Individual    Any diabetes medication dose changes were made via the CDE Protocol per the patient's referring provider and primary care provider. A copy of this encounter was shared with the provider.

## 2022-09-06 NOTE — PROGRESS NOTES
Diabetes Self-Management Education & Support    Presents for: Personal CGM Start    CDE VISIT MODE: In Person    Assessment Type:   Sensor started:: Started today in clinic  CGM Initial Settings: Dexcom  Dexcom Initial Settings: Low Glucose Alarm: On (fixed at 55 mg/dL and can't be changed), High Glucose Alert, Low Glucose Alert    Sensor was inserted with no resistance or bleeding at insertion site.    CGM-specific education:   Dexcom sensor: insertion technique, sensor site location and rotation, insulin administration in relation to sensor placement, Use of trends and graphs for pattern management and problem solving, Dosing insulin based on sensor glucose results, Dexcom Mobile sheree and Dexcom Clarity software         ASSESSMENT:  I met with Wally today to help him get started on his DexCom G6 CGM. He brought his transmitter and sensors, but the transmitter  in 2022. The sensors are still good through the month of September, so we used a dexcom G6 sample kit to be sure he had a transmitter that worked. I helped him apply his first sensor and it went well. He was hospitalized for DKA in August, but has never checked ketones at home. I sent in an RX and reviewed this process. We reviewed the importance of insulin in helping prevent ketones and unwanted weight-loss. He will try the auto-shield pen needles like they used in the hospital as that did help with his fear of needles. He is interested in an insulin pump in the future. He has an endo appt in November to get re-established with them and I shared that we won't be able to send in a pump prescription until he is re-established with an Endo who will help manage his insulin pump. His A1C came down to the 6% range after diagnosis, but is now up to 13%. He reports stopping insulin because his blood sugars were good. I'm thinking he probably was in his honeymoon period, but that has ended.    Pt verbalized understanding of concepts discussed and  recommendations provided today.    Continue education with the following diabetes management concepts: Healthy Eating, Being Active, Monitoring, Taking Medication, Problem Solving, Reducing Risks and Healthy Coping    PLAN  -See Patient Instructions for co-developed, patient-stated behavior change goals.  -Continue Lantus 20 units each morning.  -Continue Novolog 1 per 15 grams of carbs.  -Order refills through Pharmacy sheree instead of MyChart.  - Check insurance coverage for OmniPod 5 (check phone compatability) andTandem T-Slim with Control IQ pumps.  -Schedule virtual appt for discussing insulin pump further- [phone].  -Send Nathalia a Scann[employer] message in about a week to review the dexcom data.  -Website: [employer]    Topics to cover at upcoming visits: Monitoring, Taking Medication, Problem Solving, Reducing Risks and Insulin Pump Concepts Balancing glucose and insulin  Carbohydrate counting  Calculating boluses  Problem solving with insulin pump therapy (BG monitoring; hypoglycemia signs/symptoms, treatment (glucagon) and prevention; hyperglycemia signs/symptoms, treatment and prevention; ketones, DKA signs/symptoms and prevention)  Hands on practice with basic pump button use    Follow-up: Pt will call to schedule a virtual visit to discuss insulin pump therapy further. Will ask schedulers to reach out to him as well.    See Care Plan for co-developed, patient-state behavior change goals.  AVS provided for patient today.    Education Materials Provided:  No new materials provided today      SUBJECTIVE/OBJECTIVE:  Presents for: Personal CGM Start  Accompanied by: Self  Diabetes education in the past 24mo: Yes (Last appt 5/20/21)  Focus of Visit: CGM, Taking Medication, Monitoring  Type of CGM visit: Personal CGM Start  Diabetes type: Type 1  Date of diagnosis: April 2021  Disease course: Stable  How confident are you filling out medical forms by yourself:: Extremely  Diabetes management related  "comments/concerns: I wish I didn t have to stab myself  Other concerns:: None  Cultural Influences/Ethnic Background:  Not  or       Diabetes Symptoms & Complications:  Fatigue: Sometimes  Neuropathy: Sometimes  Polydipsia: No  Polyphagia: Sometimes  Polyuria: No  Visual change: No  Slow healing wounds: No  Symptom course: Improving  Weight trend: Decreasing  Complications assessed today?: No  Autonomic neuropathy: Yes  CVA: No  Heart disease: No  Nephropathy: No  Peripheral neuropathy: No  Peripheral Vascular Disease: No  Retinopathy: No  Sexual dysfunction: Other    Patient Problem List and Family Medical History reviewed for relevant medical history, current medical status, and diabetes risk factors.    Vitals:  There were no vitals taken for this visit.  Estimated body mass index is 17.43 kg/m  as calculated from the following:    Height as of 8/23/22: 1.956 m (6' 5\").    Weight as of 8/23/22: 66.7 kg (147 lb).   Last 3 BP:   BP Readings from Last 3 Encounters:   08/23/22 108/72   08/17/22 121/89   08/15/22 123/68       History   Smoking Status     Never Smoker   Smokeless Tobacco     Never Used       Labs:  Lab Results   Component Value Date    A1C 13.3 08/11/2022    A1C 12.0 04/01/2021     Lab Results   Component Value Date     08/23/2022     06/03/2021     Lab Results   Component Value Date    LDL 75 04/22/2021     HDL Cholesterol   Date Value Ref Range Status   04/22/2021 56 >39 mg/dL Final   ]  GFR Estimate   Date Value Ref Range Status   08/23/2022 >90 >60 mL/min/1.73m2 Final     Comment:     Effective December 21, 2021 eGFRcr in adults is calculated using the 2021 CKD-EPI creatinine equation which includes age and gender (Pierre diaz al., NEJM, DOI: 10.1056/ILEFyn1269546)   06/03/2021 >90 >60 mL/min/[1.73_m2] Final     Comment:     Non  GFR Calc  Starting 12/18/2018, serum creatinine based estimated GFR (eGFR) will be   calculated using the Chronic Kidney Disease " Epidemiology Collaboration   (CKD-EPI) equation.       GFR Estimate If Black   Date Value Ref Range Status   06/03/2021 >90 >60 mL/min/[1.73_m2] Final     Comment:      GFR Calc  Starting 12/18/2018, serum creatinine based estimated GFR (eGFR) will be   calculated using the Chronic Kidney Disease Epidemiology Collaboration   (CKD-EPI) equation.       Lab Results   Component Value Date    CR 0.80 08/23/2022    CR 0.73 06/03/2021     No results found for: MICROALBUMIN    Healthy Eating:  Healthy Eating Assessed Today: Yes (Also has gastroparesis)  Cultural/Buddhism diet restrictions?: No  Meal planning/habits: Avoiding sweets, Carb counting, Low carb, Smaller portions, Frequent snacking  How many times a week on average do you eat food made away from home (restaurant/take-out)?: 1  Meals include: Lunch, Dinner, Morning Snack, Afternoon Snack, Evening Snack  Beverages: Water, Tea, Coffee, Juice, Energy drinks, Alcohol    Being Active:  Being Active Assessed Today: No  Barrier to exercise: Time    Monitoring:  Monitoring Assessed Today: Yes  Did patient bring glucose meter to appointment? : Yes  Blood Glucose Meter: Accu-chek  Times checking blood sugar at home (number): 3  Times checking blood sugar at home (per): Day  Blood glucose trend: Decreasing            Taking Medications:  Diabetes Medication(s)     Diabetic Other       Glucagon (BAQSIMI ONE PACK) 3 MG/DOSE POWD    Spray 1 each in nostril as needed (for severe low blood glucose)     Patient not taking: Reported on 2/26/2022     glucose (BD GLUCOSE) 4 g chewable tablet    Take 4 tablets by mouth every 15 minutes as needed for low blood sugar     Patient not taking: Reported on 8/12/2022    Insulin       insulin aspart (NOVOLOG PEN) 100 UNIT/ML pen    Inject 1 Units Subcutaneous 3 times daily (with meals) 1 unit per 15 grams of carbohydrates.     insulin glargine (LANTUS PEN) 100 UNIT/ML pen    Inject 15 Units Subcutaneous every morning           Taking Medication Assessed Today: Yes  Current Treatments: Diet, Insulin Injections  Problems taking diabetes medications regularly?: Yes (Doesn't always take his novolog)  Diabetes medication side effects?: No    Problem Solving:  Problem Solving Assessed Today: Yes  Is the patient at risk for hypoglycemia?: Yes  Hypoglycemia Frequency: Never  Does patient have glucagon emergency kit?: Yes  Is the patient at risk for DKA?: Yes  Does patient have ketone test strips?: No (Sent in Rx today for urine ketone test strips.)  Does patient have DKA prevention plan?: No (Discussed today)              Reducing Risks:  Reducing Risks Assessed Today: No  CAD Risks: Diabetes Mellitus, Male sex  Has dilated eye exam at least once a year?: No  Sees dentist every 6 months?: No  Feet checked by healthcare provider in the last year?: No    Healthy Coping:  Healthy Coping Assessed Today: Yes  Emotional response to diabetes: Ready to learn, Concern for health and well-being, Shock/Denial/Bargaining  Informal Support system:: Children, Family, Friends, Parent, Spouse  Stage of change: ACTION (Actively working towards change)  Support resources: Websites  Patient Activation Measure Survey Score:  JANAE Score (Last Two) 4/22/2021   JANAE Raw Score 34   Activation Score 68.9   JANAE Level 3         Care Plan and Education Provided:  Care Plan: Diabetes   Updates made by Nathalia Mendez RN since 9/6/2022 12:00 AM      Problem: HbA1C Not In Goal       Goal: Establish Regular Follow-Ups with PCP       Task: Discuss with PCP the recommended timing for patient's next follow up visit(s)    Responsible User: Nathalia Mendez RN      Task: Discuss schedule for PCP visits with patient    Responsible User: Nathalia Mendez RN      Goal: Get HbA1C Level in Goal       Task: Educate patient on diabetes education self-management topics Completed 9/6/2022   Responsible User: Nathalia Mendez RN      Task: Educate patient on benefits of regular glucose  monitoring Completed 9/6/2022   Responsible User: Nathalia Mendez RN      Task: Refer patient to appropriate extended care team member, as needed (Medication Therapy Management, Behavioral Health, Physical Therapy, etc.)    Responsible User: Nathalia Mendez RN      Task: Discuss diabetes treatment plan with patient Completed 9/6/2022   Responsible User: Nathalia Mendez RN      Problem: Diabetes Self-Management Education Needed to Optimize Self-Care Behaviors       Goal: Understand diabetes pathophysiology and disease progression       Task: Provide education on diabetes pathophysiology and disease progression specfic to patient's diabetes type Completed 9/6/2022   Responsible User: Nathalia Mendez RN      Goal: Healthy Eating - follow a healthy eating pattern for diabetes       Task: Provide education on portion control and consistency in amount, composition and timing of food intake    Responsible User: Nathalia Mendez RN      Task: Provide education on managing carbohydrate intake (carbohydrate counting, plate planning method, etc.)    Responsible User: Nathalia Mendez RN      Task: Provide education on weight management    Responsible User: Nathalia Mendez RN      Task: Provide education on heart healthy eating    Responsible User: Nathalia Mendez RN      Task: Provide education on eating out    Responsible User: Nathalia Mendez RN      Task: Develop individualized healthy eating plan with patient    Responsible User: Nathalia Mendez RN      Goal: Being Active - get regular physical activity, working up to at least 150 minutes per week       Task: Provide education on relationship of activity to glucose and precautions to take if at risk for low glucose    Responsible User: Nathalia Mendez RN      Task: Discuss barriers to physical activity with patient    Responsible User: Nathalia Mendez RN      Task: Develop physical activity plan with patient    Responsible User: Nathalia Mendez RN      Task: Explore  community resources including walking groups, assistance programs, and home videos    Responsible User: Nathalia Mendez RN      Goal: Monitoring - monitor glucose and ketones as directed    This Visit's Progress: 0%   Note:    Wally would like to use the dexcom G6 CGM for ease in monitoring his blood glucose levels. He plans to wear it daily.     Task: Provide education on blood glucose monitoring (purpose, proper technique, frequency, glucose targets, interpreting results, when to use glucose control solution, sharps disposal)    Responsible User: Nathalia Mendez RN      Task: Provide education on continuous glucose monitoring (sensor placement, use of sheree or /reader, understanding glucose trends, alerts and alarms, differences between sensor glucose and blood glucose) Completed 9/6/2022   Responsible User: Nathalia Mendez RN      Task: Provide education on ketone monitoring (when to monitor, frequency, etc.) Completed 9/6/2022   Responsible User: Nathalia Mendez RN      Goal: Taking Medication - patient is consistently taking medications as directed    This Visit's Progress: 50%   Note:    Wally will try to take his novolog before or close to the start of a meal instead of after the meal.      Task: Provide education on action of prescribed medication, including when to take and possible side effects Completed 9/6/2022   Responsible User: Nathalia Mendez RN      Task: Provide education on insulin and injectable diabetes medications, including administration, storage, site selection and rotation for injection sites Completed 9/6/2022   Responsible User: Nathalia Mendez RN      Task: Discuss barriers to medication adherence with patient and provide management technique ideas as appropriate Completed 9/6/2022   Responsible User: Nathalia Mendez RN      Task: Provide education on frequency and refill details of medications Completed 9/6/2022   Responsible User: Nathalia Mendez RN      Goal: Problem Solving -  know how to prevent and manage short-term diabetes complications       Task: Provide education on high blood glucose - causes, signs/symptoms, prevention and treatment Completed 9/6/2022   Responsible User: Nathalia Mendez RN      Task: Provide education on low blood glucose - causes, signs/symptoms, prevention, treatment, carrying a carbohydrate source at all times, and medical identification Completed 9/6/2022   Responsible User: Nathalia Mendez RN      Task: Provide education on safe travel with diabetes    Responsible User: Nathalia Mendez RN      Task: Provide education on how to care for diabetes on sick days    Responsible User: Nathalia Mendez RN      Task: Provide education on when to call a health care provider    Responsible User: Nathalia Mendez RN      Goal: Reducing Risks - know how to prevent and treat long-term diabetes complications       Task: Provide education on major complications of diabetes, prevention, early diagnostic measures and treatment of complications    Responsible User: Nathalia Mendez RN      Task: Provide education on recommended care for dental, eye and foot health    Responsible User: Nathalia Mendez RN      Task: Provide education on Hemoglobin A1c - goals and relationship to blood glucose levels Completed 9/6/2022   Responsible User: Nathalia Mendez RN      Task: Provide education on recommendations for heart health - lipid levels and goals, blood pressure and goals, and aspirin therapy, if indicated    Responsible User: Nathalia Mendez RN      Task: Provide education on tobacco cessation    Responsible User: Nathalia Mendez RN      Goal: Healthy Coping - use available resources to cope with the challenges of managing diabetes       Task: Discuss recognizing feelings about having diabetes Completed 9/6/2022   Responsible User: Nathalia Mendez RN      Task: Provide education on the benefits of making appropriate lifestyle changes Completed 9/6/2022   Responsible User: Vanessa  CHESTER Sloan      Task: Provide education on benefits of utilizing support systems Completed 9/6/2022   Responsible User: Nathalia Mendez RN      Task: Discuss methods for coping with stress    Responsible User: Nathalia Mendez RN      Task: Provide education on when to seek professional counseling Completed 9/6/2022   Responsible User: Nathalia Mendez, CHESTER Mendez RN, [facility]      Time Spent: 60 minutes  Encounter Type: Individual    Any diabetes medication dose changes were made via the CDE Protocol per the patient's referring provider and primary care provider. A copy of this encounter was shared with the provider.

## 2022-09-06 NOTE — PATIENT INSTRUCTIONS
MY DIABETES TODAY:    1)  Goal A1C is under <7.0  Mine is:      Lab Results   Component Value Date    A1C 13.3 08/11/2022    A1C 12.0 04/01/2021       2)  Goal LDL (bad cholesterol) under 70  (measured at least yearly)- I am currently at:   Lab Results   Component Value Date    LDL 75 04/22/2021       3)  Goal blood pressure under 130/80- mine was Data Unavailable today    Care Plan:  See Patient Instructions for co-developed, patient-stated behavior change goals.  Continue Lantus 20 units each morning.  Continue Novolog 1 per 15 grams of carbs.  Order refills through Pharmacy sheree instead of MyChart.        Follow up:  OmniPod 5 (check phone compatability)  Tandem T-Slim with Control IQ  Schedule virtual appt for discussing insulin pump further- [phone].  Send Nathalia a [employer] message in about a week to review the dexcom data.  Website: [employer]     Bring blood glucose meter and logbook with you to all doctor and follow-up appointments.     [facility] for the [facility]:  For Your Diabetes or Nutrition Education Appointments Call:  [phone]   For Diabetes or Nutrition Related Questions:   [phone]  Send [employer] Message   If you need a medication refill please contact your pharmacy. Please allow 3 business days for your refills to be completed.

## 2022-09-14 ENCOUNTER — TELEPHONE (OUTPATIENT)
Dept: FAMILY MEDICINE | Facility: CLINIC | Age: 32
End: 2022-09-14

## 2022-09-14 NOTE — TELEPHONE ENCOUNTER
Prior Authorization Retail Medication Request    Medication/Dose: DEXCOM G6 TRANSMITTER  ICD code (if different than what is on RX):    Previously Tried and Failed:    Rationale:      Insurance Name:  In chart  Insurance ID:        Pharmacy Information (if different than what is on RX)  Name:  [facility]   Phone:      Plan does not cover, please change RX or advise to start  a DIANA Kaur

## 2022-09-14 NOTE — TELEPHONE ENCOUNTER
Reason for Call:  Form, our goal is to have forms completed with 72 hours, however, some forms may require a visit or additional information.    Type of letter, form or note:  Prior authorization    Who is the form from?: [facility] (if other please explain)    Where did the form come from: form was faxed in    What clinic location was the form placed at?: [facility]    Where the form was placed: Deonna Salazar Box/Folder    What number is listed as a contact on the form?: [phone]    Call taken on 9/14/2022 at 10:08 AM by Ananya Ewing

## 2022-09-15 ENCOUNTER — MYC MEDICAL ADVICE (OUTPATIENT)
Dept: EDUCATION SERVICES | Facility: CLINIC | Age: 32
End: 2022-09-15

## 2022-09-15 NOTE — TELEPHONE ENCOUNTER
Prior Authorization Approval    Authorization Effective Date: 9/15/2022  Authorization Expiration Date: 9/15/2023  Medication: DEXCOM G6 TRANSMITTER  Approved Dose/Quantity:   Reference #: [id number]   Insurance Company: [employer] ([facility]) - Phone [phone] Fax [phone]  Which Pharmacy is filling the prescription (Not needed for infusion/clinic administered): [id number] [place] - [address] AT [facility]  Pharmacy Notified: Yes  Patient Notified: Yes

## 2022-09-15 NOTE — TELEPHONE ENCOUNTER
PA Initiation    Medication: DEXCOM G6 TRANSMITTER  Insurance Company: [facility] ([facility]) - Phone [phone] Fax [phone]  Pharmacy Filling the Rx: [id number] - [place], [place] - [address] AT [facility]  Filling Pharmacy Phone: [phone]  Filling Pharmacy Fax: [phone]  Start Date: 9/15/2022

## 2022-09-15 NOTE — CONFIDENTIAL NOTE
Sent Wally a [employer] message to see if he would like to schedule an appt to discuss insulin pumps further. Schedulers were not able to reach him by phone last week and could not leave a message as his VM was full.    Nathalia Mendez RN, [facility]

## 2022-09-15 NOTE — TELEPHONE ENCOUNTER
Start PA-patient with multiple episodes of DKA, better outcomes with CGM.    Deonna Salazar, CNP

## 2022-10-17 DIAGNOSIS — E10.69 TYPE 1 DIABETES MELLITUS WITH OTHER SPECIFIED COMPLICATION (H): Primary | ICD-10-CM

## 2022-10-17 NOTE — TELEPHONE ENCOUNTER
Pt has followed up with Deonna Salazar and that is who placed his diabetes education referral. I was able to send in for the 4mm (lisa) pen needles per request.

## 2022-10-17 NOTE — TELEPHONE ENCOUNTER
Patient called stating that he is in need of insulin pen needles that are 30g.4mm. Patient states at his last diabetic education visit a specialized needed was ordered that his pharmacy was unable to fill. The 5mm needles are too long and will cause bleeding an pain when he injects himself. He does not have a thick enough fat layer to use the 5mm needles.     Patient currently does not have a PCP and was wondering if the diabetic educator could help him. Patient is establishing care with a new endocrinologist on 11/2/22.     Radha Parry RN on 10/17/2022 at 12:26 PM

## 2022-10-20 ENCOUNTER — MYC MEDICAL ADVICE (OUTPATIENT)
Dept: FAMILY MEDICINE | Facility: CLINIC | Age: 32
End: 2022-10-20

## 2022-10-25 NOTE — TELEPHONE ENCOUNTER
Completed form received and faxed [phone]  Copy also made and left in the front for pt. Kenisha.    Filiberto Ramirez

## 2022-10-25 NOTE — TELEPHONE ENCOUNTER
Completed forms, placed in [facility] folder to return fax.    Deonna Salazar, CNP

## 2022-10-26 NOTE — PROGRESS NOTES
Outcome for 10/26/22 8:46 AM: IQ Elite message sent  YOLANDA Delvalle  Outcome for 10/31/22 5:32 PM: Data uploaded on Dexcom  YOLANDA Ascencio  Outcome for 11/01/22 9:46 AM: Dexcom emailed to provider  YOLANDA Delvalle  Wally Avendano is being evaluated via a billable video visit.        How would you like to obtain your AVS? [employer]  For the video visit, send the invitation by: Text to cell phone: [phone]  Will anyone else be joining your video visit? No

## 2022-10-30 ENCOUNTER — HEALTH MAINTENANCE LETTER (OUTPATIENT)
Age: 32
End: 2022-10-30

## 2022-11-02 ENCOUNTER — VIRTUAL VISIT (OUTPATIENT)
Dept: ENDOCRINOLOGY | Facility: CLINIC | Age: 32
End: 2022-11-02
Attending: INTERNAL MEDICINE
Payer: COMMERCIAL

## 2022-11-02 ENCOUNTER — TELEPHONE (OUTPATIENT)
Dept: ENDOCRINOLOGY | Facility: CLINIC | Age: 32
End: 2022-11-02

## 2022-11-02 ENCOUNTER — PRE VISIT (OUTPATIENT)
Dept: ENDOCRINOLOGY | Facility: CLINIC | Age: 32
End: 2022-11-02

## 2022-11-02 DIAGNOSIS — E10.10 DIABETIC KETOACIDOSIS WITHOUT COMA ASSOCIATED WITH TYPE 1 DIABETES MELLITUS (H): ICD-10-CM

## 2022-11-02 DIAGNOSIS — E10.69 TYPE 1 DIABETES MELLITUS WITH OTHER SPECIFIED COMPLICATION (H): ICD-10-CM

## 2022-11-02 PROCEDURE — 99215 OFFICE O/P EST HI 40 MIN: CPT | Mod: GT | Performed by: PHYSICIAN ASSISTANT

## 2022-11-02 RX ORDER — GLUCAGON 3 MG/1
POWDER NASAL
Qty: 1 EACH | Refills: 1 | Status: SHIPPED | OUTPATIENT
Start: 2022-11-02

## 2022-11-02 ASSESSMENT — ENCOUNTER SYMPTOMS
NAUSEA: 1
RECTAL PAIN: 0
BLOATING: 1
HEARTBURN: 0
CONSTIPATION: 1
DIARRHEA: 1
VOMITING: 1
BLOOD IN STOOL: 0
ABDOMINAL PAIN: 1
BOWEL INCONTINENCE: 0
JAUNDICE: 0

## 2022-11-02 NOTE — LETTER
11/2/2022       RE: Wally Avendano  [address] [facility] [address]     Dear Colleague,    Thank you for referring your patient, Wally Avendano, to the [facility] [place] at [facility]. Please see a copy of my visit note below.    Outcome for 10/26/22 8:46 AM: [employer] message sent  YOLANDA Delvalle  Outcome for 10/31/22 5:32 PM: Data uploaded on Dexcom  YOLANDA Ascencio  Outcome for 11/01/22 9:46 AM: Dexcom emailed to provider  YOLANDA Delvalle  Wally Avendano is being evaluated via a billable video visit.        How would you like to obtain your AVS? [employer]  For the video visit, send the invitation by: Text to cell phone: [phone]  Will anyone else be joining your video visit? No          Due to the COVID 19 pandemic this visit was converted to a video visit in order to help prevent spread of infection in this patient and the general population.    Time of start: 1:36 pm  Time of end: 2:01 pm  Total duration of video visit: 25 minutes.  Provider's location: offsite.    HPI  Wally Avendano is a 32 year old male with type 1 diabetes mellitus dx in June 2021. Video visit for diabetes follow up today.  Pt last seen by Dr. Perez in July 2021.  He was admitted in Aug 2022 with DKA.  Hx of gastroparesis.  Pt denies known hx of retinopathy or neuropathy.  For his diabetes, he is currently taking Lantus 20 units subcutaneous each am and Novolog 1 unit/15 gms CHO with meals.  Most recent A1C was 13.3 % on 8/11/2022.  I reviewed and scanned his DexcomG6 sensor download data below.  His blood sugar is good at this time and his average glucose is 156 with SD 43 and estimated A1C of 7.0 % on his DexcomG6 sensor download data.  His blood sugar is in target 75 % of the time.  On ROS today, he continues to have intermittent nausea, vomiting and abd pain and is taking Reglan as needed.  Her reports having diarrhea daily. Intermittent n/v and constipation  as above.  No blood in stool.  He has a mid URI at this time. His 2 year old has a cold.  Pt denies blurred vision, SOB at rest, cough or fever.  No chest pain, dysuria or hematuria.  He denies sx of neuropathy or foot ulcers.    Diabetes Care  Retinopathy: none per patient. Referred to [facility] for diabetic eye exam.  Nephropathy:none.  Neuropathy: none.  Foot Exam: no exam today.  Taking aspirin:no.  Lipids: LDL 75 in 4/2021.  Insulin: Basal and meal time insulin.  Testing: DexcomG6 sensor.  Hypoglycemia tx: RX for Baqsimi sent to pharmacy today to use in case of severe hypoglycemia.           ROS  See under HPI.      Allergies  No Known Allergies    Medications  Current Outpatient Medications   Medication Sig Dispense Refill     acetone urine (KETOSTIX) test strip Test urine ketones when BG above 300 or when sick. Use up 2 strips a day. 50 strip 3     blood glucose (NO BRAND SPECIFIED) lancets standard To use to test glucose level in the blood  Use to test blood sugar  3  times daily as directed. To accompany glucose monitor brands per insurance coverage. 100 each 0     Glucagon (BAQSIMI ONE PACK) 3 MG/DOSE POWD Use only for severe hypoglycemia. 1 each 1     insulin glargine (LANTUS PEN) 100 UNIT/ML pen Inject 20 Units Subcutaneous every morning 15 mL 1     insulin pen needle (32G X 4 MM) 32G X 4 MM miscellaneous Use 6 pen needles daily or as directed. 200 each 6     metoclopramide (REGLAN) 10 MG tablet Take 1 tablet (10 mg) by mouth 3 times daily as needed (Nausea or Vomiting) 21 tablet 0     vitamin D2 (ERGOCALCIFEROL) 75296 units (1250 mcg) capsule Take 1 capsule (50,000 Units) by mouth once a week for 12 doses 12 capsule 0     Alcohol Swabs PADS Use to swab the area of the injection or migel as directed   Per insurance coverage (Patient not taking: Reported on 11/2/2022) 100 each 0     B-D U/F 31G X 8 MM insulin pen needle        blood glucose (NO BRAND SPECIFIED) lancets standard Use to test blood sugar 4 times  daily or as directed. 200 lancet 3     blood glucose (NO BRAND SPECIFIED) test strip Use to test blood sugar three times daily as directed. To accompany glucose monitor brands per insurance coverage. 350 strip 3     blood glucose calibration (NO BRAND SPECIFIED) solution Used to calibrate the blood glucose monitor as needed and as directed.  To accompany  blood glucose brands per insurance coverage 1 each 0     blood glucose monitoring (CONTOUR NEXT MONITOR W/DEVICE KIT) meter device kit        blood glucose monitoring (NO BRAND SPECIFIED) meter device kit Use to test blood sugar 4 times daily or as directed. 1 kit 0     Continuous Blood Gluc Sensor (DEXCOM G6 SENSOR) MISC Change every 10 days. 3 each 11     Continuous Blood Gluc Transmit (DEXCOM G6 TRANSMITTER) MISC CHANGE EVERY 3 MONTHS 1 each 3     glucose (BD GLUCOSE) 4 g chewable tablet Take 4 tablets by mouth every 15 minutes as needed for low blood sugar (Patient not taking: Reported on 8/12/2022) 50 tablet 0     ibuprofen (ADVIL/MOTRIN) 200 MG tablet Take 400 mg by mouth every 8 hours as needed for mild pain (Patient not taking: Reported on 11/2/2022)       insulin aspart (NOVOLOG PEN) 100 UNIT/ML pen Inject 1 Units Subcutaneous 3 times daily (with meals) 1 unit per 15 grams of carbohydrates. (Patient not taking: Reported on 11/2/2022) 15 mL 0     insulin pen needle (31G X 5 MM) 31G X 5 MM miscellaneous Use 4 pen needles daily or as directed. 120 each 11     insulin pen needle 30G X 5 MM Use 6 pen needles daily or as directed. 200 each 11     LORazepam (ATIVAN) 0.5 MG tablet Take 1 tablet (0.5 mg) by mouth every 6 hours as needed for nausea or vomiting (Patient not taking: Reported on 8/12/2022) 10 tablet 0     Microlet Lancets MISC  (Patient not taking: Reported on 11/2/2022)       ondansetron (ZOFRAN ODT) 4 MG ODT tab Take 1 tablet (4 mg) by mouth every 6 hours as needed for nausea (Patient not taking: Reported on 11/2/2022) 30 tablet 0      prochlorperazine (COMPAZINE) 25 MG suppository Place 1 suppository (25 mg) rectally every 12 hours as needed for nausea or vomiting Give if nausea not resolved 15 minutes after giving ondansetron (ZOFRAN). If nausea/intractable vomiting not resolved in 15-30 minutes (Patient not taking: Reported on 8/12/2022) 2 suppository 0     Sharps Container MISC Use as directed to dispose of needles, lancets and other sharps  Per Insurance coverage (Patient not taking: Reported on 11/2/2022) 1 each 0       Family History  family history includes Other - See Comments in his mother.    Social History   reports that he has never smoked. He has never used smokeless tobacco. He reports current alcohol use. He reports that he does not use drugs.     Past Medical History  Past Medical History:   Diagnosis Date     Diabetes (H)     type 1     Known health problems: none    Gastroparesis.    Past Surgical History:   Procedure Laterality Date     ESOPHAGOSCOPY, GASTROSCOPY, DUODENOSCOPY (EGD), COMBINED N/A 7/28/2021    Procedure: ESOPHAGOGASTRODUODENOSCOPY, WITH BIOPSY with duodenum biopsies for celiac sprue and gastric biopsies for h.Pylori by cold biopsy forceps;  Surgeon: Mikel Restrepo MD;  Location:  GI     NO HISTORY OF SURGERY         Physical Exam    No exam today.    RESULTS  Creatinine   Date Value Ref Range Status   08/23/2022 0.80 0.66 - 1.25 mg/dL Final   06/03/2021 0.73 0.66 - 1.25 mg/dL Final     GFR Estimate   Date Value Ref Range Status   08/23/2022 >90 >60 mL/min/1.73m2 Final     Comment:     Effective December 21, 2021 eGFRcr in adults is calculated using the 2021 CKD-EPI creatinine equation which includes age and gender (Pierre et al., NEJM, DOI: 10.1056/YRAKcg6154166)   06/03/2021 >90 >60 mL/min/[1.73_m2] Final     Comment:     Non  GFR Calc  Starting 12/18/2018, serum creatinine based estimated GFR (eGFR) will be   calculated using the Chronic Kidney Disease Epidemiology Collaboration    (CKD-EPI) equation.       Hemoglobin A1C   Date Value Ref Range Status   08/11/2022 13.3 (H) <5.7 % Final     Comment:     Normal <5.7%   Prediabetes 5.7-6.4%    Diabetes 6.5% or higher     Note: Adopted from ADA consensus guidelines.   04/01/2021 12.0 (H) 0 - 5.6 % Final     Comment:     Normal <5.7% Prediabetes 5.7-6.4%  Diabetes 6.5% or higher - adopted from ADA   consensus guidelines.       Potassium   Date Value Ref Range Status   08/23/2022 3.8 3.4 - 5.3 mmol/L Final   06/03/2021 3.5 3.4 - 5.3 mmol/L Final     ALT   Date Value Ref Range Status   08/23/2022 31 0 - 70 U/L Final   06/03/2021 18 0 - 70 U/L Final     AST   Date Value Ref Range Status   08/23/2022 24 0 - 45 U/L Final   06/03/2021 9 0 - 45 U/L Final     TSH   Date Value Ref Range Status   06/02/2021 1.72 0.40 - 4.00 mU/L Final       Cholesterol   Date Value Ref Range Status   04/22/2021 149 <200 mg/dL Final     HDL Cholesterol   Date Value Ref Range Status   04/22/2021 56 >39 mg/dL Final     LDL Cholesterol Calculated   Date Value Ref Range Status   04/22/2021 75 <100 mg/dL Final     Comment:     Desirable:       <100 mg/dl     Triglycerides   Date Value Ref Range Status   04/22/2021 88 <150 mg/dL Final     Comment:     Non Fasting         ASSESSMENT/PLAN:      1.  TYPE 1 DIABETES MELLITUS: Estimated A1C is 7.0 % on his DexcomG6 sensor download for the past 2 weeks.  Pt's blood sugar values are good at this time.  Continue current insulin doses.  Pt wears his DexcomG6 sensor full time.  Referred pt to [facility] for diabetic eye exam.  No nephropathy.  No sx of neuropathy.  RX for Baqsimi sent to pharmacy today to use in case of severe hypoglycemia.  BP was 108/72 in Aug 2022.    2.  GASTROPARESIS: I asked Wally to follow up with [facility].    3. FOLLOW UP: [place] me virtually in 4 months and [place] [facility] [place]    Time spent reviewing chart, labs and DexcomG6 sensor data today = 8 minutes.  Time for video visit today = 25 minutes.  Time  for documentation today = 15 minutes.    Total time for visit today = 48 minutes.       Answers for HPI/ROS submitted by the patient on 11/2/2022  General Symptoms: No  Skin Symptoms: No  HENT Symptoms: No  EYE SYMPTOMS: No  HEART SYMPTOMS: No  LUNG SYMPTOMS: No  INTESTINAL SYMPTOMS: Yes  URINARY SYMPTOMS: No  REPRODUCTIVE SYMPTOMS: No  SKELETAL SYMPTOMS: No  BLOOD SYMPTOMS: No  NERVOUS SYSTEM SYMPTOMS: No  MENTAL HEALTH SYMPTOMS: No  Heart burn or indigestion: No  Nausea: Yes  Vomiting: Yes  Abdominal pain: Yes  Bloating: Yes  Constipation: Yes  Diarrhea: Yes  Blood in stool: No  Black stools: No  Rectal or Anal pain: No  Fecal incontinence: No  Yellowing of skin or eyes: No  Vomit with blood: No  Change in stools: No

## 2022-11-02 NOTE — TELEPHONE ENCOUNTER
Cole already called pt. Scheduled for 1/17/2023  Anoop scheduled for 2/9/2023  Ana in clinic 7/5/2023  Labs 11/12/2022  Taylor Myhre,VVF

## 2022-11-02 NOTE — PROGRESS NOTES
Due to the COVID 19 pandemic this visit was converted to a video visit in order to help prevent spread of infection in this patient and the general population.    Time of start: 1:36 pm  Time of end: 2:01 pm  Total duration of video visit: 25 minutes.  Provider's location: offsite.    HPI  Wally Avendano is a 32 year old male with type 1 diabetes mellitus dx in June 2021. Video visit for diabetes follow up today.  Pt last seen by Dr. Perez in July 2021.  He was admitted in Aug 2022 with DKA.  Hx of gastroparesis.  Pt denies known hx of retinopathy or neuropathy.  For his diabetes, he is currently taking Lantus 20 units subcutaneous each am and Novolog 1 unit/15 gms CHO with meals.  Most recent A1C was 13.3 % on 8/11/2022.  I reviewed and scanned his DexcomG6 sensor download data below.  His blood sugar is good at this time and his average glucose is 156 with SD 43 and estimated A1C of 7.0 % on his DexcomG6 sensor download data.  His blood sugar is in target 75 % of the time.  On ROS today, he continues to have intermittent nausea, vomiting and abd pain and is taking Reglan as needed.  Her reports having diarrhea daily. Intermittent n/v and constipation as above.  No blood in stool.  He has a mid URI at this time. His 2 year old has a cold.  Pt denies blurred vision, SOB at rest, cough or fever.  No chest pain, dysuria or hematuria.  He denies sx of neuropathy or foot ulcers.    Diabetes Care  Retinopathy: none per patient. Referred to [facility] for diabetic eye exam.  Nephropathy:none.  Neuropathy: none.  Foot Exam: no exam today.  Taking aspirin:no.  Lipids: LDL 75 in 4/2021.  Insulin: Basal and meal time insulin.  Testing: DexcomG6 sensor.  Hypoglycemia tx: RX for Baqsimi sent to pharmacy today to use in case of severe hypoglycemia.           ROS  See under HPI.      Allergies  No Known Allergies    Medications  Current Outpatient Medications   Medication Sig Dispense Refill     acetone urine (KETOSTIX) test strip Test  urine ketones when BG above 300 or when sick. Use up 2 strips a day. 50 strip 3     blood glucose (NO BRAND SPECIFIED) lancets standard To use to test glucose level in the blood  Use to test blood sugar  3  times daily as directed. To accompany glucose monitor brands per insurance coverage. 100 each 0     Glucagon (BAQSIMI ONE PACK) 3 MG/DOSE POWD Use only for severe hypoglycemia. 1 each 1     insulin glargine (LANTUS PEN) 100 UNIT/ML pen Inject 20 Units Subcutaneous every morning 15 mL 1     insulin pen needle (32G X 4 MM) 32G X 4 MM miscellaneous Use 6 pen needles daily or as directed. 200 each 6     metoclopramide (REGLAN) 10 MG tablet Take 1 tablet (10 mg) by mouth 3 times daily as needed (Nausea or Vomiting) 21 tablet 0     vitamin D2 (ERGOCALCIFEROL) 69720 units (1250 mcg) capsule Take 1 capsule (50,000 Units) by mouth once a week for 12 doses 12 capsule 0     Alcohol Swabs PADS Use to swab the area of the injection or migel as directed   Per insurance coverage (Patient not taking: Reported on 11/2/2022) 100 each 0     B-D U/F 31G X 8 MM insulin pen needle        blood glucose (NO BRAND SPECIFIED) lancets standard Use to test blood sugar 4 times daily or as directed. 200 lancet 3     blood glucose (NO BRAND SPECIFIED) test strip Use to test blood sugar three times daily as directed. To accompany glucose monitor brands per insurance coverage. 350 strip 3     blood glucose calibration (NO BRAND SPECIFIED) solution Used to calibrate the blood glucose monitor as needed and as directed.  To accompany  blood glucose brands per insurance coverage 1 each 0     blood glucose monitoring (CONTOUR NEXT MONITOR W/DEVICE KIT) meter device kit        blood glucose monitoring (NO BRAND SPECIFIED) meter device kit Use to test blood sugar 4 times daily or as directed. 1 kit 0     Continuous Blood Gluc Sensor (DEXCOM G6 SENSOR) MISC Change every 10 days. 3 each 11     Continuous Blood Gluc Transmit (DEXCOM G6 TRANSMITTER) MISC  CHANGE EVERY 3 MONTHS 1 each 3     glucose (BD GLUCOSE) 4 g chewable tablet Take 4 tablets by mouth every 15 minutes as needed for low blood sugar (Patient not taking: Reported on 8/12/2022) 50 tablet 0     ibuprofen (ADVIL/MOTRIN) 200 MG tablet Take 400 mg by mouth every 8 hours as needed for mild pain (Patient not taking: Reported on 11/2/2022)       insulin aspart (NOVOLOG PEN) 100 UNIT/ML pen Inject 1 Units Subcutaneous 3 times daily (with meals) 1 unit per 15 grams of carbohydrates. (Patient not taking: Reported on 11/2/2022) 15 mL 0     insulin pen needle (31G X 5 MM) 31G X 5 MM miscellaneous Use 4 pen needles daily or as directed. 120 each 11     insulin pen needle 30G X 5 MM Use 6 pen needles daily or as directed. 200 each 11     LORazepam (ATIVAN) 0.5 MG tablet Take 1 tablet (0.5 mg) by mouth every 6 hours as needed for nausea or vomiting (Patient not taking: Reported on 8/12/2022) 10 tablet 0     Microlet Lancets MISC  (Patient not taking: Reported on 11/2/2022)       ondansetron (ZOFRAN ODT) 4 MG ODT tab Take 1 tablet (4 mg) by mouth every 6 hours as needed for nausea (Patient not taking: Reported on 11/2/2022) 30 tablet 0     prochlorperazine (COMPAZINE) 25 MG suppository Place 1 suppository (25 mg) rectally every 12 hours as needed for nausea or vomiting Give if nausea not resolved 15 minutes after giving ondansetron (ZOFRAN). If nausea/intractable vomiting not resolved in 15-30 minutes (Patient not taking: Reported on 8/12/2022) 2 suppository 0     Sharps Container MISC Use as directed to dispose of needles, lancets and other sharps  Per Insurance coverage (Patient not taking: Reported on 11/2/2022) 1 each 0       Family History  family history includes Other - See Comments in his mother.    Social History   reports that he has never smoked. He has never used smokeless tobacco. He reports current alcohol use. He reports that he does not use drugs.     Past Medical History  Past Medical History:    Diagnosis Date     Diabetes (H)     type 1     Known health problems: none    Gastroparesis.    Past Surgical History:   Procedure Laterality Date     ESOPHAGOSCOPY, GASTROSCOPY, DUODENOSCOPY (EGD), COMBINED N/A 7/28/2021    Procedure: ESOPHAGOGASTRODUODENOSCOPY, WITH BIOPSY with duodenum biopsies for celiac sprue and gastric biopsies for h.Pylori by cold biopsy forceps;  Surgeon: Mikel Restrepo MD;  Location:  GI     NO HISTORY OF SURGERY         Physical Exam    No exam today.    RESULTS  Creatinine   Date Value Ref Range Status   08/23/2022 0.80 0.66 - 1.25 mg/dL Final   06/03/2021 0.73 0.66 - 1.25 mg/dL Final     GFR Estimate   Date Value Ref Range Status   08/23/2022 >90 >60 mL/min/1.73m2 Final     Comment:     Effective December 21, 2021 eGFRcr in adults is calculated using the 2021 CKD-EPI creatinine equation which includes age and gender (Pierre et al., NE, DOI: 10.1056/INTHep9420067)   06/03/2021 >90 >60 mL/min/[1.73_m2] Final     Comment:     Non  GFR Calc  Starting 12/18/2018, serum creatinine based estimated GFR (eGFR) will be   calculated using the Chronic Kidney Disease Epidemiology Collaboration   (CKD-EPI) equation.       Hemoglobin A1C   Date Value Ref Range Status   08/11/2022 13.3 (H) <5.7 % Final     Comment:     Normal <5.7%   Prediabetes 5.7-6.4%    Diabetes 6.5% or higher     Note: Adopted from ADA consensus guidelines.   04/01/2021 12.0 (H) 0 - 5.6 % Final     Comment:     Normal <5.7% Prediabetes 5.7-6.4%  Diabetes 6.5% or higher - adopted from ADA   consensus guidelines.       Potassium   Date Value Ref Range Status   08/23/2022 3.8 3.4 - 5.3 mmol/L Final   06/03/2021 3.5 3.4 - 5.3 mmol/L Final     ALT   Date Value Ref Range Status   08/23/2022 31 0 - 70 U/L Final   06/03/2021 18 0 - 70 U/L Final     AST   Date Value Ref Range Status   08/23/2022 24 0 - 45 U/L Final   06/03/2021 9 0 - 45 U/L Final     TSH   Date Value Ref Range Status   06/02/2021 1.72 0.40 - 4.00  mU/L Final       Cholesterol   Date Value Ref Range Status   04/22/2021 149 <200 mg/dL Final     HDL Cholesterol   Date Value Ref Range Status   04/22/2021 56 >39 mg/dL Final     LDL Cholesterol Calculated   Date Value Ref Range Status   04/22/2021 75 <100 mg/dL Final     Comment:     Desirable:       <100 mg/dl     Triglycerides   Date Value Ref Range Status   04/22/2021 88 <150 mg/dL Final     Comment:     Non Fasting         ASSESSMENT/PLAN:      1.  TYPE 1 DIABETES MELLITUS: Estimated A1C is 7.0 % on his DexcomG6 sensor download for the past 2 weeks.  Pt's blood sugar values are good at this time.  Continue current insulin doses.  Pt wears his DexcomG6 sensor full time.  Referred pt to [facility] for diabetic eye exam.  No nephropathy.  No sx of neuropathy.  RX for Baqsimi sent to pharmacy today to use in case of severe hypoglycemia.  BP was 108/72 in Aug 2022.    2.  GASTROPARESIS: I asked Wally to follow up with [facility].    3. FOLLOW UP: With me virtually in 4 months and Dr. Perez in 8 months at [facility] [place].    Time spent reviewing chart, labs and DexcomG6 sensor data today = 8 minutes.  Time for video visit today = 25 minutes.  Time for documentation today = 15 minutes.    Total time for visit today = 48 minutes.       Answers for HPI/ROS submitted by the patient on 11/2/2022  General Symptoms: No  Skin Symptoms: No  HENT Symptoms: No  EYE SYMPTOMS: No  HEART SYMPTOMS: No  LUNG SYMPTOMS: No  INTESTINAL SYMPTOMS: Yes  URINARY SYMPTOMS: No  REPRODUCTIVE SYMPTOMS: No  SKELETAL SYMPTOMS: No  BLOOD SYMPTOMS: No  NERVOUS SYSTEM SYMPTOMS: No  MENTAL HEALTH SYMPTOMS: No  Heart burn or indigestion: No  Nausea: Yes  Vomiting: Yes  Abdominal pain: Yes  Bloating: Yes  Constipation: Yes  Diarrhea: Yes  Blood in stool: No  Black stools: No  Rectal or Anal pain: No  Fecal incontinence: No  Yellowing of skin or eyes: No  Vomit with blood: No  Change in stools: No

## 2022-11-12 DIAGNOSIS — R79.89 LOW SERUM VITAMIN D: ICD-10-CM

## 2022-11-14 NOTE — TELEPHONE ENCOUNTER
Routing refill request to provider for review/approval because:  Drug does not have a [facility] refill protocol     Nancy CARVAJAL RN    [facility]

## 2022-11-17 RX ORDER — ERGOCALCIFEROL 1.25 MG/1
50000 CAPSULE, LIQUID FILLED ORAL WEEKLY
Qty: 12 CAPSULE | Refills: 0 | Status: ON HOLD | OUTPATIENT
Start: 2022-11-17 | End: 2023-01-21

## 2022-12-17 ENCOUNTER — HEALTH MAINTENANCE LETTER (OUTPATIENT)
Age: 32
End: 2022-12-17

## 2023-01-20 ENCOUNTER — APPOINTMENT (OUTPATIENT)
Dept: GENERAL RADIOLOGY | Facility: CLINIC | Age: 33
End: 2023-01-20
Attending: INTERNAL MEDICINE

## 2023-01-20 ENCOUNTER — NURSE TRIAGE (OUTPATIENT)
Dept: NURSING | Facility: CLINIC | Age: 33
End: 2023-01-20

## 2023-01-20 ENCOUNTER — HOSPITAL ENCOUNTER (OUTPATIENT)
Facility: CLINIC | Age: 33
Setting detail: OBSERVATION
Discharge: HOME | End: 2023-01-21
Attending: EMERGENCY MEDICINE | Admitting: HOSPITALIST

## 2023-01-20 DIAGNOSIS — K31.84 GASTROPARESIS: ICD-10-CM

## 2023-01-20 DIAGNOSIS — E10.10 DIABETIC KETOACIDOSIS WITHOUT COMA ASSOCIATED WITH TYPE 1 DIABETES MELLITUS (H): Primary | ICD-10-CM

## 2023-01-20 DIAGNOSIS — E88.89 KETOSIS (H): ICD-10-CM

## 2023-01-20 DIAGNOSIS — R10.13 EPIGASTRIC PAIN: ICD-10-CM

## 2023-01-20 DIAGNOSIS — R73.9 HYPERGLYCEMIA: ICD-10-CM

## 2023-01-20 DIAGNOSIS — R11.2 NAUSEA AND VOMITING, UNSPECIFIED VOMITING TYPE: ICD-10-CM

## 2023-01-20 LAB
ALBUMIN SERPL BCG-MCNC: 4.7 G/DL (ref 3.5–5.2)
ALBUMIN UR-MCNC: NEGATIVE MG/DL
ALP SERPL-CCNC: 72 U/L (ref 40–129)
ALT SERPL W P-5'-P-CCNC: 23 U/L (ref 10–50)
ANION GAP SERPL CALCULATED.3IONS-SCNC: 11 MMOL/L (ref 7–15)
ANION GAP SERPL CALCULATED.3IONS-SCNC: 12 MMOL/L (ref 7–15)
ANION GAP SERPL CALCULATED.3IONS-SCNC: 18 MMOL/L (ref 7–15)
APPEARANCE UR: CLEAR
AST SERPL W P-5'-P-CCNC: 21 U/L (ref 10–50)
ATRIAL RATE - MUSE: 103 BPM
BASOPHILS # BLD AUTO: 0.1 10E3/UL (ref 0–0.2)
BASOPHILS NFR BLD AUTO: 0 %
BILIRUB SERPL-MCNC: 0.5 MG/DL
BILIRUB UR QL STRIP: NEGATIVE
BUN SERPL-MCNC: 8.3 MG/DL (ref 6–20)
BUN SERPL-MCNC: 8.9 MG/DL (ref 6–20)
BUN SERPL-MCNC: 9.5 MG/DL (ref 6–20)
CALCIUM SERPL-MCNC: 8.2 MG/DL (ref 8.6–10)
CALCIUM SERPL-MCNC: 8.5 MG/DL (ref 8.6–10)
CALCIUM SERPL-MCNC: 8.9 MG/DL (ref 8.6–10)
CHLORIDE SERPL-SCNC: 102 MMOL/L (ref 98–107)
CHLORIDE SERPL-SCNC: 103 MMOL/L (ref 98–107)
CHLORIDE SERPL-SCNC: 103 MMOL/L (ref 98–107)
COLOR UR AUTO: ABNORMAL
CREAT SERPL-MCNC: 0.66 MG/DL (ref 0.67–1.17)
CREAT SERPL-MCNC: 0.72 MG/DL (ref 0.67–1.17)
CREAT SERPL-MCNC: 0.76 MG/DL (ref 0.67–1.17)
DEPRECATED HCO3 PLAS-SCNC: 22 MMOL/L (ref 22–29)
DEPRECATED HCO3 PLAS-SCNC: 25 MMOL/L (ref 22–29)
DEPRECATED HCO3 PLAS-SCNC: 25 MMOL/L (ref 22–29)
DIASTOLIC BLOOD PRESSURE - MUSE: NORMAL MMHG
EOSINOPHIL # BLD AUTO: 0.1 10E3/UL (ref 0–0.7)
EOSINOPHIL NFR BLD AUTO: 1 %
ERYTHROCYTE [DISTWIDTH] IN BLOOD BY AUTOMATED COUNT: 12.2 % (ref 10–15)
GFR SERPL CREATININE-BSD FRML MDRD: >90 ML/MIN/1.73M2
GLUCOSE BLDC GLUCOMTR-MCNC: 141 MG/DL (ref 70–99)
GLUCOSE BLDC GLUCOMTR-MCNC: 152 MG/DL (ref 70–99)
GLUCOSE BLDC GLUCOMTR-MCNC: 182 MG/DL (ref 70–99)
GLUCOSE BLDC GLUCOMTR-MCNC: 308 MG/DL (ref 70–99)
GLUCOSE BLDC GLUCOMTR-MCNC: 321 MG/DL (ref 70–99)
GLUCOSE BLDC GLUCOMTR-MCNC: 326 MG/DL (ref 70–99)
GLUCOSE BLDC GLUCOMTR-MCNC: 337 MG/DL (ref 70–99)
GLUCOSE SERPL-MCNC: 217 MG/DL (ref 70–99)
GLUCOSE SERPL-MCNC: 266 MG/DL (ref 70–99)
GLUCOSE SERPL-MCNC: 372 MG/DL (ref 70–99)
GLUCOSE UR STRIP-MCNC: >=1000 MG/DL
HBA1C MFR BLD: 7.2 %
HCO3 BLDV-SCNC: 25 MMOL/L (ref 21–28)
HCT VFR BLD AUTO: 42.3 % (ref 40–53)
HGB BLD-MCNC: 14.5 G/DL (ref 13.3–17.7)
HGB UR QL STRIP: NEGATIVE
IMM GRANULOCYTES # BLD: 0.1 10E3/UL
IMM GRANULOCYTES NFR BLD: 1 %
INTERPRETATION ECG - MUSE: NORMAL
KETONES BLD-SCNC: 1.1 MMOL/L (ref 0–0.6)
KETONES UR STRIP-MCNC: 10 MG/DL
LACTATE BLD-SCNC: 3.4 MMOL/L
LACTATE SERPL-SCNC: 1.9 MMOL/L (ref 0.7–2)
LEUKOCYTE ESTERASE UR QL STRIP: NEGATIVE
LIPASE SERPL-CCNC: 84 U/L (ref 13–60)
LYMPHOCYTES # BLD AUTO: 1.2 10E3/UL (ref 0.8–5.3)
LYMPHOCYTES NFR BLD AUTO: 8 %
MCH RBC QN AUTO: 29.6 PG (ref 26.5–33)
MCHC RBC AUTO-ENTMCNC: 34.3 G/DL (ref 31.5–36.5)
MCV RBC AUTO: 86 FL (ref 78–100)
MONOCYTES # BLD AUTO: 0.7 10E3/UL (ref 0–1.3)
MONOCYTES NFR BLD AUTO: 5 %
MUCOUS THREADS #/AREA URNS LPF: PRESENT /LPF
NEUTROPHILS # BLD AUTO: 13.2 10E3/UL (ref 1.6–8.3)
NEUTROPHILS NFR BLD AUTO: 85 %
NITRATE UR QL: NEGATIVE
NRBC # BLD AUTO: 0 10E3/UL
NRBC BLD AUTO-RTO: 0 /100
P AXIS - MUSE: 76 DEGREES
PCO2 BLDV: 47 MM HG (ref 40–50)
PH BLDV: 7.33 [PH] (ref 7.32–7.43)
PH UR STRIP: 7.5 [PH] (ref 5–7)
PLATELET # BLD AUTO: 257 10E3/UL (ref 150–450)
PO2 BLDV: 39 MM HG (ref 25–47)
POTASSIUM SERPL-SCNC: 3.5 MMOL/L (ref 3.4–5.3)
POTASSIUM SERPL-SCNC: 3.6 MMOL/L (ref 3.4–5.3)
POTASSIUM SERPL-SCNC: 3.9 MMOL/L (ref 3.4–5.3)
PR INTERVAL - MUSE: 150 MS
PROT SERPL-MCNC: 7.5 G/DL (ref 6.4–8.3)
QRS DURATION - MUSE: 102 MS
QT - MUSE: 364 MS
QTC - MUSE: 476 MS
R AXIS - MUSE: 107 DEGREES
RBC # BLD AUTO: 4.9 10E6/UL (ref 4.4–5.9)
RBC URINE: <1 /HPF
SAO2 % BLDV: 69 % (ref 94–100)
SODIUM SERPL-SCNC: 138 MMOL/L (ref 136–145)
SODIUM SERPL-SCNC: 140 MMOL/L (ref 136–145)
SODIUM SERPL-SCNC: 143 MMOL/L (ref 136–145)
SP GR UR STRIP: 1.02 (ref 1–1.03)
SYSTOLIC BLOOD PRESSURE - MUSE: NORMAL MMHG
T AXIS - MUSE: 67 DEGREES
UROBILINOGEN UR STRIP-MCNC: NORMAL MG/DL
VENTRICULAR RATE- MUSE: 103 BPM
WBC # BLD AUTO: 15.4 10E3/UL (ref 4–11)
WBC URINE: 1 /HPF

## 2023-01-20 PROCEDURE — 250N000011 HC RX IP 250 OP 636: Performed by: HOSPITALIST

## 2023-01-20 PROCEDURE — 82010 KETONE BODYS QUAN: CPT | Performed by: EMERGENCY MEDICINE

## 2023-01-20 PROCEDURE — 96372 THER/PROPH/DIAG INJ SC/IM: CPT | Performed by: HOSPITALIST

## 2023-01-20 PROCEDURE — 82962 GLUCOSE BLOOD TEST: CPT

## 2023-01-20 PROCEDURE — 99207 PR NO BILLABLE SERVICE THIS VISIT: CPT | Performed by: INTERNAL MEDICINE

## 2023-01-20 PROCEDURE — 250N000012 HC RX MED GY IP 250 OP 636 PS 637: Performed by: HOSPITALIST

## 2023-01-20 PROCEDURE — 96374 THER/PROPH/DIAG INJ IV PUSH: CPT

## 2023-01-20 PROCEDURE — 93005 ELECTROCARDIOGRAM TRACING: CPT

## 2023-01-20 PROCEDURE — 83605 ASSAY OF LACTIC ACID: CPT | Performed by: HOSPITALIST

## 2023-01-20 PROCEDURE — 258N000003 HC RX IP 258 OP 636: Performed by: EMERGENCY MEDICINE

## 2023-01-20 PROCEDURE — 96375 TX/PRO/DX INJ NEW DRUG ADDON: CPT

## 2023-01-20 PROCEDURE — 96361 HYDRATE IV INFUSION ADD-ON: CPT

## 2023-01-20 PROCEDURE — 83690 ASSAY OF LIPASE: CPT | Performed by: EMERGENCY MEDICINE

## 2023-01-20 PROCEDURE — 99223 1ST HOSP IP/OBS HIGH 75: CPT | Performed by: HOSPITALIST

## 2023-01-20 PROCEDURE — 99285 EMERGENCY DEPT VISIT HI MDM: CPT | Mod: 25

## 2023-01-20 PROCEDURE — G0378 HOSPITAL OBSERVATION PER HR: HCPCS

## 2023-01-20 PROCEDURE — 258N000003 HC RX IP 258 OP 636: Performed by: INTERNAL MEDICINE

## 2023-01-20 PROCEDURE — 80048 BASIC METABOLIC PNL TOTAL CA: CPT | Performed by: INTERNAL MEDICINE

## 2023-01-20 PROCEDURE — 81001 URINALYSIS AUTO W/SCOPE: CPT | Performed by: HOSPITALIST

## 2023-01-20 PROCEDURE — 96376 TX/PRO/DX INJ SAME DRUG ADON: CPT

## 2023-01-20 PROCEDURE — 36415 COLL VENOUS BLD VENIPUNCTURE: CPT | Performed by: INTERNAL MEDICINE

## 2023-01-20 PROCEDURE — 71045 X-RAY EXAM CHEST 1 VIEW: CPT

## 2023-01-20 PROCEDURE — 80053 COMPREHEN METABOLIC PANEL: CPT | Performed by: EMERGENCY MEDICINE

## 2023-01-20 PROCEDURE — 258N000003 HC RX IP 258 OP 636: Performed by: HOSPITALIST

## 2023-01-20 PROCEDURE — 85025 COMPLETE CBC W/AUTO DIFF WBC: CPT | Performed by: EMERGENCY MEDICINE

## 2023-01-20 PROCEDURE — 82803 BLOOD GASES ANY COMBINATION: CPT

## 2023-01-20 PROCEDURE — 36415 COLL VENOUS BLD VENIPUNCTURE: CPT | Performed by: EMERGENCY MEDICINE

## 2023-01-20 PROCEDURE — 250N000011 HC RX IP 250 OP 636: Performed by: EMERGENCY MEDICINE

## 2023-01-20 PROCEDURE — 83036 HEMOGLOBIN GLYCOSYLATED A1C: CPT | Performed by: HOSPITALIST

## 2023-01-20 PROCEDURE — 36415 COLL VENOUS BLD VENIPUNCTURE: CPT | Performed by: HOSPITALIST

## 2023-01-20 RX ORDER — BISACODYL 10 MG
10 SUPPOSITORY, RECTAL RECTAL DAILY PRN
Status: DISCONTINUED | OUTPATIENT
Start: 2023-01-20 | End: 2023-01-21 | Stop reason: HOSPADM

## 2023-01-20 RX ORDER — KETOROLAC TROMETHAMINE 15 MG/ML
10 INJECTION, SOLUTION INTRAMUSCULAR; INTRAVENOUS ONCE
Status: COMPLETED | OUTPATIENT
Start: 2023-01-20 | End: 2023-01-20

## 2023-01-20 RX ORDER — PROCHLORPERAZINE 25 MG
25 SUPPOSITORY, RECTAL RECTAL EVERY 12 HOURS PRN
Status: DISCONTINUED | OUTPATIENT
Start: 2023-01-20 | End: 2023-01-21 | Stop reason: HOSPADM

## 2023-01-20 RX ORDER — SODIUM CHLORIDE, SODIUM LACTATE, POTASSIUM CHLORIDE, CALCIUM CHLORIDE 600; 310; 30; 20 MG/100ML; MG/100ML; MG/100ML; MG/100ML
INJECTION, SOLUTION INTRAVENOUS CONTINUOUS
Status: DISCONTINUED | OUTPATIENT
Start: 2023-01-20 | End: 2023-01-21 | Stop reason: HOSPADM

## 2023-01-20 RX ORDER — METOCLOPRAMIDE HYDROCHLORIDE 5 MG/ML
10 INJECTION INTRAMUSCULAR; INTRAVENOUS EVERY 6 HOURS PRN
Status: DISCONTINUED | OUTPATIENT
Start: 2023-01-20 | End: 2023-01-21 | Stop reason: HOSPADM

## 2023-01-20 RX ORDER — AMOXICILLIN 250 MG
2 CAPSULE ORAL 2 TIMES DAILY PRN
Status: DISCONTINUED | OUTPATIENT
Start: 2023-01-20 | End: 2023-01-21 | Stop reason: HOSPADM

## 2023-01-20 RX ORDER — DIPHENHYDRAMINE HYDROCHLORIDE 50 MG/ML
25 INJECTION INTRAMUSCULAR; INTRAVENOUS ONCE
Status: COMPLETED | OUTPATIENT
Start: 2023-01-20 | End: 2023-01-20

## 2023-01-20 RX ORDER — POLYETHYLENE GLYCOL 3350 17 G/17G
17 POWDER, FOR SOLUTION ORAL DAILY PRN
Status: DISCONTINUED | OUTPATIENT
Start: 2023-01-20 | End: 2023-01-21 | Stop reason: HOSPADM

## 2023-01-20 RX ORDER — ONDANSETRON 4 MG/1
4 TABLET, ORALLY DISINTEGRATING ORAL EVERY 6 HOURS PRN
Status: DISCONTINUED | OUTPATIENT
Start: 2023-01-20 | End: 2023-01-21 | Stop reason: HOSPADM

## 2023-01-20 RX ORDER — ONDANSETRON 2 MG/ML
4 INJECTION INTRAMUSCULAR; INTRAVENOUS EVERY 6 HOURS PRN
Status: DISCONTINUED | OUTPATIENT
Start: 2023-01-20 | End: 2023-01-21 | Stop reason: HOSPADM

## 2023-01-20 RX ORDER — AMOXICILLIN 250 MG
1 CAPSULE ORAL 2 TIMES DAILY PRN
Status: DISCONTINUED | OUTPATIENT
Start: 2023-01-20 | End: 2023-01-21 | Stop reason: HOSPADM

## 2023-01-20 RX ORDER — ONDANSETRON 2 MG/ML
4 INJECTION INTRAMUSCULAR; INTRAVENOUS EVERY 30 MIN PRN
Status: COMPLETED | OUTPATIENT
Start: 2023-01-20 | End: 2023-01-20

## 2023-01-20 RX ORDER — METOCLOPRAMIDE HYDROCHLORIDE 5 MG/ML
10 INJECTION INTRAMUSCULAR; INTRAVENOUS ONCE
Status: COMPLETED | OUTPATIENT
Start: 2023-01-20 | End: 2023-01-20

## 2023-01-20 RX ORDER — ACETAMINOPHEN 325 MG/1
650 TABLET ORAL EVERY 6 HOURS PRN
Status: DISCONTINUED | OUTPATIENT
Start: 2023-01-20 | End: 2023-01-21 | Stop reason: HOSPADM

## 2023-01-20 RX ORDER — PROCHLORPERAZINE MALEATE 5 MG
10 TABLET ORAL EVERY 6 HOURS PRN
Status: DISCONTINUED | OUTPATIENT
Start: 2023-01-20 | End: 2023-01-21 | Stop reason: HOSPADM

## 2023-01-20 RX ORDER — DEXTROSE MONOHYDRATE 25 G/50ML
25-50 INJECTION, SOLUTION INTRAVENOUS
Status: DISCONTINUED | OUTPATIENT
Start: 2023-01-20 | End: 2023-01-21 | Stop reason: HOSPADM

## 2023-01-20 RX ORDER — NICOTINE POLACRILEX 4 MG
15-30 LOZENGE BUCCAL
Status: DISCONTINUED | OUTPATIENT
Start: 2023-01-20 | End: 2023-01-21 | Stop reason: HOSPADM

## 2023-01-20 RX ADMIN — METOCLOPRAMIDE HYDROCHLORIDE 10 MG: 5 INJECTION INTRAMUSCULAR; INTRAVENOUS at 02:55

## 2023-01-20 RX ADMIN — KETOROLAC TROMETHAMINE 10 MG: 15 INJECTION, SOLUTION INTRAMUSCULAR; INTRAVENOUS at 02:59

## 2023-01-20 RX ADMIN — SODIUM CHLORIDE, POTASSIUM CHLORIDE, SODIUM LACTATE AND CALCIUM CHLORIDE: 600; 310; 30; 20 INJECTION, SOLUTION INTRAVENOUS at 06:56

## 2023-01-20 RX ADMIN — DIPHENHYDRAMINE HYDROCHLORIDE 25 MG: 50 INJECTION, SOLUTION INTRAMUSCULAR; INTRAVENOUS at 02:56

## 2023-01-20 RX ADMIN — METOCLOPRAMIDE HYDROCHLORIDE 10 MG: 5 INJECTION INTRAMUSCULAR; INTRAVENOUS at 23:48

## 2023-01-20 RX ADMIN — INSULIN GLARGINE 20 UNITS: 100 INJECTION, SOLUTION SUBCUTANEOUS at 09:12

## 2023-01-20 RX ADMIN — SODIUM CHLORIDE, POTASSIUM CHLORIDE, SODIUM LACTATE AND CALCIUM CHLORIDE 2000 ML: 600; 310; 30; 20 INJECTION, SOLUTION INTRAVENOUS at 04:11

## 2023-01-20 RX ADMIN — ONDANSETRON HYDROCHLORIDE 4 MG: 2 INJECTION, SOLUTION INTRAMUSCULAR; INTRAVENOUS at 13:08

## 2023-01-20 RX ADMIN — ONDANSETRON HYDROCHLORIDE 4 MG: 2 INJECTION, SOLUTION INTRAMUSCULAR; INTRAVENOUS at 22:09

## 2023-01-20 RX ADMIN — FAMOTIDINE 20 MG: 10 INJECTION, SOLUTION INTRAVENOUS at 02:57

## 2023-01-20 RX ADMIN — SODIUM CHLORIDE 1000 ML: 9 INJECTION, SOLUTION INTRAVENOUS at 02:50

## 2023-01-20 RX ADMIN — SODIUM CHLORIDE, POTASSIUM CHLORIDE, SODIUM LACTATE AND CALCIUM CHLORIDE: 600; 310; 30; 20 INJECTION, SOLUTION INTRAVENOUS at 22:03

## 2023-01-20 RX ADMIN — ONDANSETRON 4 MG: 2 INJECTION INTRAMUSCULAR; INTRAVENOUS at 02:54

## 2023-01-20 RX ADMIN — PROCHLORPERAZINE EDISYLATE 10 MG: 5 INJECTION INTRAMUSCULAR; INTRAVENOUS at 23:12

## 2023-01-20 RX ADMIN — PROCHLORPERAZINE EDISYLATE 10 MG: 5 INJECTION INTRAMUSCULAR; INTRAVENOUS at 13:42

## 2023-01-20 RX ADMIN — SODIUM CHLORIDE, POTASSIUM CHLORIDE, SODIUM LACTATE AND CALCIUM CHLORIDE: 600; 310; 30; 20 INJECTION, SOLUTION INTRAVENOUS at 13:42

## 2023-01-20 ASSESSMENT — ACTIVITIES OF DAILY LIVING (ADL)
WEAR_GLASSES_OR_BLIND: NO
ADLS_ACUITY_SCORE: 31
ADLS_ACUITY_SCORE: 35
ADLS_ACUITY_SCORE: 35
DIFFICULTY_EATING/SWALLOWING: NO
HEARING_DIFFICULTY_OR_DEAF: NO
DIFFICULTY_COMMUNICATING: NO
CONCENTRATING,_REMEMBERING_OR_MAKING_DECISIONS_DIFFICULTY: NO
DRESSING/BATHING_DIFFICULTY: NO
ADLS_ACUITY_SCORE: 35
ADLS_ACUITY_SCORE: 31
ADLS_ACUITY_SCORE: 35
WALKING_OR_CLIMBING_STAIRS_DIFFICULTY: NO
ADLS_ACUITY_SCORE: 31

## 2023-01-20 ASSESSMENT — ENCOUNTER SYMPTOMS
ABDOMINAL PAIN: 1
NAUSEA: 1
VOMITING: 1

## 2023-01-20 NOTE — PROGRESS NOTES
Care Management Note    Discharge Disposition:  Home    Handoff Referral Completed: Yes    Additional Information:  Pt identified as a Service Bundle #3 with [facility] PCP. No needs or assessment needed at this time. Please consult CM/SW  if discharge needs should arise.    ERIK Kaiser, [facility]   Inpatient Care Coordination  [facility]   [phone]

## 2023-01-20 NOTE — ED NOTES
[facility]  Expected date:   Expected time:   Means of arrival:   Comments:   32M Hyperglycemia

## 2023-01-20 NOTE — ED TRIAGE NOTES
Pt comes from home. Pt called EMS because at 2200 he started to have uncontrollable N/V. Per the Pt, his BG lever has been in the 300's today. Hematemesis noted in emesis bag upon Pt arrival from ems

## 2023-01-20 NOTE — TELEPHONE ENCOUNTER
"Call received from spouse, Ananya  Verbal Consent from pt to speak to spouse    Wally has been vomiting repeatedly for the past 2 hours  He is a Type 1 diabetic and has Gastroparesis    He has been seen in the ER previously due to recurrent vomiting    - \"In a lot of pain\" - abdominal - started ~7 pm after dinner  - Abdominal pain is continuous  - Vomiting started about 10 pm  - Zofran taken ~11:30 pm - no change in N/V  - Diarrhea started ~11 pm - not recurrent  - Hot shower for abdominal pain relief  - Regular cannabis use    ER advised or PCP triage. Notified that on-call provider would be paged & RN will call back    12:58 am - Spoke to page Yari  1:01 am - Connected to on-call provider, Dr. Betsy Diaz  - ER advised due to risk of DKA    1:05 am - Notified spouse of MD advice to be evaluated & treated in ER due to risk of DKA    Mary Pnatoja, RN  [facility]      Reason for Disposition    High-risk adult (e.g., diabetes mellitus, brain tumor, V-P shunt, hernia)    Additional Information    Negative: Shock suspected (e.g., cold/pale/clammy skin, too weak to stand, low BP, rapid pulse)    Negative: Difficult to awaken or acting confused (e.g., disoriented, slurred speech)    Negative: Sounds like a life-threatening emergency to the triager    Negative: [1] Vomiting AND [2] contains red blood or black (\"coffee ground\") material  (Exception: few red streaks in vomit that only happened once)    Negative: Severe pain in one eye    Negative: Recent head injury (within last 3 days)    Negative: Recent abdominal injury (within last 3 days)    Negative: [1] Insulin-dependent diabetes (Type I) AND [2] glucose > 400 mg/dl (22 mmol/l)    Negative: [1] Vomiting AND [2] hernia is more painful or swollen than usual    Negative: [1] SEVERE vomiting (e.g., 6 or more times/day) AND [2] present > 8 hours (Exception: patient sounds well, is drinking liquids, does not sound dehydrated, and " vomiting has lasted less than 24 hours)    Negative: [1] MODERATE vomiting (e.g., 3 - 5 times/day) AND [2] age > 60 years    Negative: Severe headache (e.g., excruciating) (Exception: similar to previous migraines)    Protocols used: VOMITING-A-AH

## 2023-01-20 NOTE — PROGRESS NOTES
Patient seen and examined.  Chart reviewed.  Please see admission history and physical by Dr. Beltran from earlier today for details.    I did update wife at bedside on 1/20.  Multiple questions answered.

## 2023-01-20 NOTE — H&P
[facility]  Hospitalist H&P    Name: Wally Avendano      MRN: [id number]  YOB: 1990    Age: 32 year old  Date of admission: 1/20/2023  Primary care provider: Rita - [facility]            Assessment and Plan:     Wally Avendano is a 32-year-old male with a history of poorly controlled type 1 diabetes mellitus and gastroparesis, presents to [facility] for evaluation of intractable nausea and vomiting.    1.  Intractable nausea and vomiting:  I suspect this is due to cyclic nausea and vomiting secondary to his cannabis use.  We will continue aggressive IV fluid resuscitation as well as antiemetics as needed.  He does appear more comfortable since arrival in the Emergency Department.    2.  Lactic acidosis:  Likely due to profound volume depletion in the face of diuresis related to hyperglycemia as well as poor oral intake and numerous episodes of vomiting.  We will check lactic acid level following fluid resuscitation and continue fluids as outlined above.  He does have mild leukocytosis, but no fever.  There is no indication of ongoing bacterial infection, but we will check urinalysis for the sake of completion.  He denies any urinary symptoms.    3.  Type 1 diabetes mellitus:  Labs show no acidosis, but he does have a mild anion gap.  Ketones only 1.1.  Lactic acid is elevated.  Hopefully, we can fend off diabetic ketoacidosis by being aggressive with glycemic control and fluid resuscitation.  He received 1 liter normal saline in the Emergency Department.  We will continue lactated Ringer at 150 mL per hour for now.  He tells me that he took his Lantus 24 hours ago and typically takes 20 units every morning.  We will resume that dose at this time.  We will check hemoglobin A1c.  He will have a medium sliding scale insulin ordered.  We will give him 8 units of NovoLog subcutaneous x1 now.  He does have a Dexcom G6 CGM in place and he is being evaluated in  the outpatient setting for appropriateness of an insulin pump.    4.  Gastroparesis:  He did have some relief of his symptoms after Reglan use.  This will be made available.  Some of his symptoms certainly could be due to gastroparesis flare.    5.  The patient will be registered to observation status.    CODE STATUS:  Full code.            Chief Complaint:     Nausea.         History of Present Illness:   Wally Avendano is a 32-year-old poorly controlled type 1 diabetic, who presents to [facility] for evaluation of nausea and vomiting.  History is obtained from my discussion with the patient at the bedside.  I also discussed the case with the ED physician.  The electronic medical record was also reviewed.    The patient indicates that he began experiencing epigastric abdominal pain earlier in the day.  He has had several episodes of nausea and intractable vomiting.  His pain is mainly located in the epigastric area, but also fairly diffuse throughout the abdomen.  It is worse after episodes of vomiting.  He indicates that he has been compliant recently with his insulin.  His blood sugars have been running in the 300s.  Notes from his endocrinology team indicate fairly poor control of his hemoglobin A1c, often times around the range of 13%.  There have also been some notes reporting issues with noncompliance historically with his insulin regimen.  The patient denies any significant alcohol use, although did report drinking a bottle of beer or two several days of the week.  He also reports a fairly regular marijuana use, but denies any other drug use.  He has a history of gastroparesis as well.  Due to these issues, he ultimately came to the Emergency Department for evaluation.    Here in the hospital, temperature is 96, heart rate 113, blood pressure 113/84, respiratory rate 18, and oxygen saturation 100% on room air.  Laboratory work shows normal basic metabolic panel except for an anion gap of 18.   Lactic acid was 3.4.  Ketones 1.1.  Blood sugar 372.  Liver function tests are normal and lipase is 84.  CBC notable for white blood cells of 15.4, otherwise unremarkable.  The patient was given 8 units of NovoLog and IV fluids.  He received multiple antiemetics.  He will be admitted for further management.            Past Medical History:     Past Medical History:   Diagnosis Date     Diabetes (H)     type 1     Known health problems: none              Past Surgical History:     Past Surgical History:   Procedure Laterality Date     ESOPHAGOSCOPY, GASTROSCOPY, DUODENOSCOPY (EGD), COMBINED N/A 7/28/2021    Procedure: ESOPHAGOGASTRODUODENOSCOPY, WITH BIOPSY with duodenum biopsies for celiac sprue and gastric biopsies for h.Pylori by cold biopsy forceps;  Surgeon: Mikel Restrepo MD;  Location:  GI     NO HISTORY OF SURGERY               Social History:     Social History     Tobacco Use     Smoking status: Never     Smokeless tobacco: Never   Substance Use Topics     Alcohol use: Yes             Family History:   The family history was fully reviewed and non-contributory in this case.         Allergies:   No Known Allergies          Medications:     Prior to Admission medications    Medication Sig Last Dose Taking? Auth Provider Long Term End Date   acetone urine (KETOSTIX) test strip Test urine ketones when BG above 300 or when sick. Use up 2 strips a day.   Deonna Salazar CNP     blood glucose (NO BRAND SPECIFIED) lancets standard Use to test blood sugar 4 times daily or as directed.   Deonna Salazar CNP     blood glucose (NO BRAND SPECIFIED) lancets standard To use to test glucose level in the blood  Use to test blood sugar  3  times daily as directed. To accompany glucose monitor brands per insurance coverage.   Jackelin Perez DO     blood glucose (NO BRAND SPECIFIED) test strip Use to test blood sugar three times daily as directed. To accompany glucose monitor brands per insurance coverage.    Latoya Perez MD Yes    blood glucose calibration (NO BRAND SPECIFIED) solution Used to calibrate the blood glucose monitor as needed and as directed.  To accompany  blood glucose brands per insurance coverage   Jackelin Perez, DO     blood glucose monitoring (CONTOUR NEXT MONITOR W/DEVICE KIT) meter device kit    Reported, Patient     blood glucose monitoring (NO BRAND SPECIFIED) meter device kit Use to test blood sugar 4 times daily or as directed.   Fuad Landeros MD     Continuous Blood Gluc Sensor (DEXCOM G6 SENSOR) MISC Change every 10 days.   Deonna Salazar CNP     Continuous Blood Gluc Transmit (DEXCOM G6 TRANSMITTER) MISC CHANGE EVERY 3 MONTHS   Deonna Salazar CNP     Glucagon (BAQSIMI ONE PACK) 3 MG/DOSE POWD Use only for severe hypoglycemia.   Radha Davalos PA-C Yes    insulin glargine (LANTUS PEN) 100 UNIT/ML pen Inject 20 Units Subcutaneous every morning   Deonna Salazar CNP Yes    insulin pen needle (32G X 4 MM) 32G X 4 MM miscellaneous Use 6 pen needles daily or as directed.   Deonna Salazar CNP     insulin pen needle 30G X 5 MM Use 6 pen needles daily or as directed.   Deonna Salazar CNP     metoclopramide (REGLAN) 10 MG tablet Take 1 tablet (10 mg) by mouth 3 times daily as needed (Nausea or Vomiting)   Sp Rogers MD, MD     ondansetron (ZOFRAN ODT) 4 MG ODT tab Take 1 tablet (4 mg) by mouth every 6 hours as needed for nausea  Patient not taking: Reported on 11/2/2022   Sp Rogers MD, MD     prochlorperazine (COMPAZINE) 25 MG suppository Place 1 suppository (25 mg) rectally every 12 hours as needed for nausea or vomiting Give if nausea not resolved 15 minutes after giving ondansetron (ZOFRAN). If nausea/intractable vomiting not resolved in 15-30 minutes  Patient not taking: Reported on 8/12/2022   Kendra Johnson PA-C     vitamin D2 (ERGOCALCIFEROL) 76863 units (1250 mcg) capsule TAKE 1 CAPSULE (50,000 UNITS) BY MOUTH ONCE A WEEK FOR 12  Deonna Mercer, CNP  2/3/23             Review of Systems:     A Comprehensive greater than 10 system review of systems was carried out.  Pertinent positives and negatives are noted above.  Otherwise negative for contributory information.           Physical Exam:   Blood pressure 115/78, pulse 111, temperature (!) 96  F (35.6  C), temperature source Temporal, resp. rate 19, weight 66.7 kg (147 lb), SpO2 97 %.  Wt Readings from Last 1 Encounters:   01/20/23 66.7 kg (147 lb)     Exam:  GENERAL: No apparent distress. Awake, alert, and fully oriented.  HEENT: Normocephalic, atraumatic. Extraocular movements intact. Poor dentition.   CARDIOVASCULAR: Regular rhythm but tachycardic without murmurs or rubs. No S3.  PULMONARY: Clear to auscultation bilaterally.  ABDOMINAL: Soft, non-tender, non-distended. Bowel sounds normoactive.   EXTREMITIES: No cyanosis or clubbing. No appreciable edema.  NEUROLOGICAL: CN 2-12 grossly intact, no focal neurological deficits.  DERMATOLOGICAL: No rash, ulcer, bruising, nor jaundice.          Data:   EKG:  Personally reviewed.     Laboratory:  Recent Labs   Lab 01/20/23  0246   WBC 15.4*   HGB 14.5   HCT 42.3   MCV 86        Recent Labs   Lab 01/20/23  0517 01/20/23  0340 01/20/23  0246   NA  --   --  143   POTASSIUM  --   --  3.6   CHLORIDE  --   --  103   CO2  --   --  22   ANIONGAP  --   --  18*   * 337* 372*   BUN  --   --  9.5   CR  --   --  0.76   GFRESTIMATED  --   --  >90   MARY  --   --  8.9     Recent Labs   Lab 01/20/23  0246   AST 21   ALT 23   ALKPHOS 72   BILITOTAL 0.5     Recent Labs   Lab 01/20/23  0251   LACT 3.4*     Recent Labs   Lab 01/20/23  0246   LIPASE 84*     No results for input(s): CULT in the last 168 hours.    Imaging:  No results found for this or any previous visit (from the past 24 hour(s)).    Trevor Beltran DO MPH  [facility] Hospitalist  [address] [place] [address]  01/20/2023    D: 01/20/2023   T: 01/20/2023   MT:  [id number]    Name:     GENEVIEVE ARCHERWandy  MRN:      [id number]        Account:     [id number]   :      1990           Admitted:    2023       Document: [id number]

## 2023-01-20 NOTE — PHARMACY-ADMISSION MEDICATION HISTORY
Admission medication history interview status for this patient is complete. See [facility] admission navigator for allergy information, prior to admission medications and immunization status.     Medication history interview done, indicate source(s): Patient  Medication history resources (including written lists, pill bottles, clinic record): Sure [employer]  Pharm[facility]: [id number]    Changes made to PTA medication list:  Added: none  Changed: none  Reported as Not Taking: Vit D  Removed: none    Actions taken by pharmacist (provider contacted, etc):None     Additional medication history information:None    Medication reconciliation/reorder completed by provider prior to medication history?  Y   (Y/N)     For patients on insulin therapy:   Do you use sliding scale insulin based on blood sugars? no  What is your pre-meal insulin coverage?  none  Do you typically eat three meals a day? No, two meals a day.  How many times do you check your blood glucose per day? CGM  How many episodes of hypoglycemia do you typically have per month? -  Do you have a Continuous Glucose Monitor (CGM)?  yes    Prior to Admission medications    Medication Sig Last Dose Taking? Auth Provider Long Term End Date   insulin glargine (LANTUS PEN) 100 UNIT/ML pen Inject 20 Units Subcutaneous every morning 1/19/2023 Yes Deonna Salazar CNP Yes    acetone urine (KETOSTIX) test strip Test urine ketones when BG above 300 or when sick. Use up 2 strips a day.   Deonna Salazar CNP     blood glucose (NO BRAND SPECIFIED) lancets standard Use to test blood sugar 4 times daily or as directed.   Deonna Salazar CNP     blood glucose (NO BRAND SPECIFIED) lancets standard To use to test glucose level in the blood  Use to test blood sugar  3  times daily as directed. To accompany glucose monitor brands per insurance coverage.   Jackelin Perez, DO     blood glucose (NO BRAND SPECIFIED) test strip Use to test blood sugar three times daily as  directed. To accompany glucose monitor brands per insurance coverage.   Latoya Perez MD Yes    blood glucose calibration (NO BRAND SPECIFIED) solution Used to calibrate the blood glucose monitor as needed and as directed.  To accompany  blood glucose brands per insurance coverage   Jackelin Perez, DO     blood glucose monitoring (CONTOUR NEXT MONITOR W/DEVICE KIT) meter device kit    Reported, Patient     blood glucose monitoring (NO BRAND SPECIFIED) meter device kit Use to test blood sugar 4 times daily or as directed.   Fuad Landeros MD     Continuous Blood Gluc Sensor (DEXCOM G6 SENSOR) MISC Change every 10 days.   Deonna Salazar CNP     Continuous Blood Gluc Transmit (DEXCOM G6 TRANSMITTER) MISC CHANGE EVERY 3 MONTHS   Deonna Salazar CNP     Glucagon (BAQSIMI ONE PACK) 3 MG/DOSE POWD Use only for severe hypoglycemia.   Radha Davalos PA-C Yes    insulin pen needle (32G X 4 MM) 32G X 4 MM miscellaneous Use 6 pen needles daily or as directed.   Deonna Salazar CNP     insulin pen needle 30G X 5 MM Use 6 pen needles daily or as directed.   Deonna Salazar CNP     metoclopramide (REGLAN) 10 MG tablet Take 1 tablet (10 mg) by mouth 3 times daily as needed (Nausea or Vomiting)   Sp Rogers MD, MD     ondansetron (ZOFRAN ODT) 4 MG ODT tab Take 1 tablet (4 mg) by mouth every 6 hours as needed for nausea  Patient not taking: Reported on 11/2/2022   Sp Rogers MD, MD     prochlorperazine (COMPAZINE) 25 MG suppository Place 1 suppository (25 mg) rectally every 12 hours as needed for nausea or vomiting Give if nausea not resolved 15 minutes after giving ondansetron (ZOFRAN). If nausea/intractable vomiting not resolved in 15-30 minutes  Patient not taking: Reported on 8/12/2022   Kendra Johnson PA-C     vitamin D2 (ERGOCALCIFEROL) 07487 units (1250 mcg) capsule TAKE 1 CAPSULE (50,000 UNITS) BY MOUTH ONCE A WEEK FOR 12 DOSES  Patient not taking: Reported on 1/20/2023 Not  Taking  Deonna Salazar, CNP  2/3/23

## 2023-01-20 NOTE — ED NOTES
[facility]  ED Nurse Handoff Report    Wally Avendano is a 32 year old male   ED Chief complaint: Nausea & Vomiting  . ED Diagnosis:   Final diagnoses:   Nausea and vomiting, unspecified vomiting type   Epigastric pain   Hyperglycemia   Ketosis (H)     Allergies: No Known Allergies    Code Status: Full Code  Activity level - Baseline/Home:  Independent. Activity Level - Current:   Assist X 1. Lift room needed: No. Bariatric: No   Needed: No   Isolation: No. Infection: Not Applicable.     Vital Signs:   Vitals:    01/20/23 0257 01/20/23 0303 01/20/23 0344 01/20/23 0400   BP:    139/85   BP Location:       Pulse: 113 101  107   Resp: 18 19     Temp:       TempSrc:       SpO2:  98%     Weight:   66.7 kg (147 lb)        Cardiac Rhythm:  ,      Pain level:    Patient confused: Yes. Patient Falls Risk: Yes.   Elimination Status: Has voided   Patient Report - Initial Complaint: Nausea & Vomiting. Focused Assessment:    ROS:  Review of Systems   Gastrointestinal: Positive for abdominal pain, nausea and vomiting.   All other systems reviewed and are negative.   Tests Performed: Labs. Abnormal Results:   Labs Ordered and Resulted from Time of ED Arrival to Time of ED Departure   COMPREHENSIVE METABOLIC PANEL - Abnormal       Result Value    Sodium 143      Potassium 3.6      Chloride 103      Carbon Dioxide (CO2) 22      Anion Gap 18 (*)     Urea Nitrogen 9.5      Creatinine 0.76      Calcium 8.9      Glucose 372 (*)     Alkaline Phosphatase 72      AST 21      ALT 23      Protein Total 7.5      Albumin 4.7      Bilirubin Total 0.5      GFR Estimate >90     LIPASE - Abnormal    Lipase 84 (*)    KETONE BETA-HYDROXYBUTYRATE QUANTITATIVE, RAPID - Abnormal    Ketone (Beta-Hydroxybutyrate) Quantitative 1.1 (*)    GLUCOSE BY METER - Abnormal    GLUCOSE BY METER POCT 321 (*)    CBC WITH PLATELETS AND DIFFERENTIAL - Abnormal    WBC Count 15.4 (*)     RBC Count 4.90      Hemoglobin 14.5      Hematocrit 42.3       MCV 86      MCH 29.6      MCHC 34.3      RDW 12.2      Platelet Count 257      % Neutrophils 85      % Lymphocytes 8      % Monocytes 5      % Eosinophils 1      % Basophils 0      % Immature Granulocytes 1      NRBCs per 100 WBC 0      Absolute Neutrophils 13.2 (*)     Absolute Lymphocytes 1.2      Absolute Monocytes 0.7      Absolute Eosinophils 0.1      Absolute Basophils 0.1      Absolute Immature Granulocytes 0.1      Absolute NRBCs 0.0     ISTAT GASES LACTATE VENOUS POCT - Abnormal    Lactic Acid POCT 3.4 (*)     Bicarbonate Venous POCT 25      O2 Sat, Venous POCT 69 (*)     pCO2V Venous POCT 47      pH Venous POCT 7.33      pO2 Venous POCT 39     GLUCOSE BY METER - Abnormal    GLUCOSE BY METER POCT 337 (*)    GLUCOSE MONITOR NURSING POCT   GLUCOSE MONITOR NURSING POCT   HEMOGLOBIN A1C   GLUCOSE MONITOR NURSING POCT   GLUCOSE MONITOR NURSING POCT   GLUCOSE MONITOR NURSING POCT   GLUCOSE MONITOR NURSING POCT     .   Treatments provided: See MAR. Frequent monitoring of pt.  Family Comments: N/a.  OBS brochure/video discussed/provided to patient:  Yes  ED Medications:   Medications   ondansetron (ZOFRAN) injection 4 mg (4 mg Intravenous Given 1/20/23 0254)   lactated ringers BOLUS 2,001 mL (2,000 mLs Intravenous New Bag 1/20/23 0411)   acetaminophen (TYLENOL) tablet 650 mg (has no administration in time range)     Or   acetaminophen (TYLENOL) Suppository 650 mg (has no administration in time range)   melatonin tablet 1 mg (has no administration in time range)   senna-docusate (SENOKOT-S/PERICOLACE) 8.6-50 MG per tablet 1 tablet (has no administration in time range)     Or   senna-docusate (SENOKOT-S/PERICOLACE) 8.6-50 MG per tablet 2 tablet (has no administration in time range)   polyethylene glycol (MIRALAX) Packet 17 g (has no administration in time range)   bisacodyl (DULCOLAX) suppository 10 mg (has no administration in time range)   ondansetron (ZOFRAN ODT) ODT tab 4 mg (has no administration in time range)      Or   ondansetron (ZOFRAN) injection 4 mg (has no administration in time range)   glucose gel 15-30 g (has no administration in time range)     Or   dextrose 50 % injection 25-50 mL (has no administration in time range)     Or   glucagon injection 1 mg (has no administration in time range)   lactated ringers infusion (has no administration in time range)   prochlorperazine (COMPAZINE) injection 10 mg (has no administration in time range)     Or   prochlorperazine (COMPAZINE) tablet 10 mg (has no administration in time range)     Or   prochlorperazine (COMPAZINE) suppository 25 mg (has no administration in time range)   insulin aspart (NovoLOG) injection (RAPID ACTING) (has no administration in time range)   insulin aspart (NovoLOG) injection (RAPID ACTING) (1 Units Subcutaneous Not Given 1/20/23 0414)   insulin aspart (NovoLOG) injection (RAPID ACTING) (has no administration in time range)   metoclopramide (REGLAN) injection 10 mg (has no administration in time range)   insulin glargine (LANTUS PEN) injection 20 Units (has no administration in time range)   0.9% sodium chloride BOLUS (1,000 mLs Intravenous New Bag 1/20/23 0250)   ketorolac (TORADOL) injection 10 mg (10 mg Intravenous Given 1/20/23 0259)   famotidine (PEPCID) injection 20 mg (20 mg Intravenous Given 1/20/23 0257)   diphenhydrAMINE (BENADRYL) injection 25 mg (25 mg Intravenous Given 1/20/23 0256)   metoclopramide (REGLAN) injection 10 mg (10 mg Intravenous Given 1/20/23 0255)   insulin aspart (NovoLOG) injection (RAPID ACTING) (8 Units Subcutaneous Given 1/20/23 0414)     Drips infusing:  Yes  For the majority of the shift, the patient's behavior Green. Interventions performed were N/a.    Sepsis treatment initiated: No     Patient tested for COVID 19 prior to admission: NO    ED Nurse Name/Phone Number: Jacqueline Trujillo RN,   4:21 AM    RECEIVING UNIT ED HANDOFF REVIEW    Above ED Nurse Handoff Report was reviewed: Yes  Reviewed by: Oxana  CHESTER Alonzo on January 20, 2023 at 6:18 AM

## 2023-01-20 NOTE — ED PROVIDER NOTES
History     Chief Complaint:  Nausea & Vomiting       The history is provided by the patient.      Wally Avendano is a 32 year old male with a history of type 1 diabetes mellitus who presents via EMS with a few hours of nausea, vomiting, and epigastric abdominal pain. The patient states that he has thrown up many times. He reports epigastric abdominal pain associated with the nausea and vomiting, but denies lower abdominal pain. He states that he is unsure if this feels like DKA, and he is unsure what is going on. He notes that his blood sugars have been all over the place in the last 24 hours, reaching the 300s at the highest. He reports marijuana use as well as alcohol use of 1-2 beers a day. He denies ever experiencing shakes when he stops drinking.     Independent Historian: Yes.      Review of External Notes: I reviewed last discharge summary and ED notes from August.    ROS:  Review of Systems   Gastrointestinal: Positive for abdominal pain, nausea and vomiting.   All other systems reviewed and are negative.      Allergies:  No Known Allergies     Medications:    Glucagon  Lantus    Past Medical History:    Type 1 diabetes mellitus  DKA (diabetic ketoacidoses)  Hypokalemia  Gastroparesis     Past Surgical History:    Esophagogastroduodenoscopy      Social History:  Presents to the ED unaccompanied  Presents via EMS   Alcohol use: positive (1-2 beers daily)  Marijuana use: positive  PCP: MISAEL Cao Shushan     Physical Exam     Patient Vitals for the past 24 hrs:   BP Temp Temp src Pulse Resp SpO2   01/20/23 0303 -- -- -- 101 19 98 %   01/20/23 0257 -- -- -- 113 18 --   01/20/23 0220 113/84 (!) 96  F (35.6  C) Temporal 111 22 100 %        Physical Exam  Constitutional:  Oriented to person, place, and time. Appears to be in minor distress.   HENT:   Head:    Normocephalic.   Mouth/Throat:   Dry mucous membranes. Oropharynx is clear and moist.   Eyes:    EOM are normal. Pupils are equal, round, and  reactive to light.   Neck:    Neck supple.   Cardiovascular:  Normal rate, regular rhythm and normal heart sounds.      Exam reveals no gallop and no friction rub.       No murmur heard.  Pulmonary/Chest:  Effort normal and breath sounds normal.      No respiratory distress. No wheezes. No rales.      No reproducible chest wall pain.  Abdominal:   Epigastric tenderness. No guarding or rebound. Negative Li's sign.Soft. No distension.   Musculoskeletal:  Normal range of motion.   Neurological:   Alert and oriented to person, place, and time.           Moves all 4 extremities spontaneously    Skin:    No rash noted. No pallor.     Emergency Department Course   ECG:  ECG results from 01/20/23   EKG 12-lead, tracing only     Value    Systolic Blood Pressure     Diastolic Blood Pressure     Ventricular Rate 103    Atrial Rate 103    TX Interval 150    QRS Duration 102        QTc 476    P Axis 76    R AXIS 107    T Axis 67    Interpretation ECG      Sinus tachycardia  Rightward axis  Borderline ECG  When compared with ECG of 14-AUG-2022 08:32,  Criteria for Anterior infarct are no longer Present  Criteria for Anterolateral infarct are no longer Present  QT has lengthened       Laboratory:  Labs Ordered and Resulted from Time of ED Arrival to Time of ED Departure   COMPREHENSIVE METABOLIC PANEL - Abnormal       Result Value    Sodium 143      Potassium 3.6      Chloride 103      Carbon Dioxide (CO2) 22      Anion Gap 18 (*)     Urea Nitrogen 9.5      Creatinine 0.76      Calcium 8.9      Glucose 372 (*)     Alkaline Phosphatase 72      AST 21      ALT 23      Protein Total 7.5      Albumin 4.7      Bilirubin Total 0.5      GFR Estimate >90     LIPASE - Abnormal    Lipase 84 (*)    KETONE BETA-HYDROXYBUTYRATE QUANTITATIVE, RAPID - Abnormal    Ketone (Beta-Hydroxybutyrate) Quantitative 1.1 (*)    GLUCOSE BY METER - Abnormal    GLUCOSE BY METER POCT 321 (*)    CBC WITH PLATELETS AND DIFFERENTIAL - Abnormal    WBC  Count 15.4 (*)     RBC Count 4.90      Hemoglobin 14.5      Hematocrit 42.3      MCV 86      MCH 29.6      MCHC 34.3      RDW 12.2      Platelet Count 257      % Neutrophils 85      % Lymphocytes 8      % Monocytes 5      % Eosinophils 1      % Basophils 0      % Immature Granulocytes 1      NRBCs per 100 WBC 0      Absolute Neutrophils 13.2 (*)     Absolute Lymphocytes 1.2      Absolute Monocytes 0.7      Absolute Eosinophils 0.1      Absolute Basophils 0.1      Absolute Immature Granulocytes 0.1      Absolute NRBCs 0.0     ISTAT GASES LACTATE VENOUS POCT - Abnormal    Lactic Acid POCT 3.4 (*)     Bicarbonate Venous POCT 25      O2 Sat, Venous POCT 69 (*)     pCO2V Venous POCT 47      pH Venous POCT 7.33      pO2 Venous POCT 39     GLUCOSE MONITOR NURSING POCT        Procedures   None    Emergency Department Course & Assessments:             Interventions:  Medications   ondansetron (ZOFRAN) injection 4 mg (4 mg Intravenous Given 1/20/23 0254)   acetaminophen (TYLENOL) tablet 650 mg (has no administration in time range)     Or   acetaminophen (TYLENOL) Suppository 650 mg (has no administration in time range)   melatonin tablet 1 mg (has no administration in time range)   senna-docusate (SENOKOT-S/PERICOLACE) 8.6-50 MG per tablet 1 tablet (has no administration in time range)     Or   senna-docusate (SENOKOT-S/PERICOLACE) 8.6-50 MG per tablet 2 tablet (has no administration in time range)   polyethylene glycol (MIRALAX) Packet 17 g (has no administration in time range)   bisacodyl (DULCOLAX) suppository 10 mg (has no administration in time range)   ondansetron (ZOFRAN ODT) ODT tab 4 mg (has no administration in time range)     Or   ondansetron (ZOFRAN) injection 4 mg (has no administration in time range)   glucose gel 15-30 g (has no administration in time range)     Or   dextrose 50 % injection 25-50 mL (has no administration in time range)     Or   glucagon injection 1 mg (has no administration in time range)    lactated ringers infusion (has no administration in time range)   prochlorperazine (COMPAZINE) injection 10 mg (has no administration in time range)     Or   prochlorperazine (COMPAZINE) tablet 10 mg (has no administration in time range)     Or   prochlorperazine (COMPAZINE) suppository 25 mg (has no administration in time range)   insulin aspart (NovoLOG) injection (RAPID ACTING) (has no administration in time range)   insulin aspart (NovoLOG) injection (RAPID ACTING) (1 Units Subcutaneous Not Given 234)   insulin aspart (NovoLOG) injection (RAPID ACTING) (has no administration in time range)   metoclopramide (REGLAN) injection 10 mg (has no administration in time range)   insulin glargine (LANTUS PEN) injection 20 Units (has no administration in time range)   0.9% sodium chloride BOLUS (1,000 mLs Intravenous New Bag 23 0250)   ketorolac (TORADOL) injection 10 mg (10 mg Intravenous Given 23 0259)   famotidine (PEPCID) injection 20 mg (20 mg Intravenous Given 23 0257)   diphenhydrAMINE (BENADRYL) injection 25 mg (25 mg Intravenous Given 23 0256)   metoclopramide (REGLAN) injection 10 mg (10 mg Intravenous Given 23 0255)   lactated ringers BOLUS 2,001 mL (2,000 mLs Intravenous New Bag 23 0411)   insulin aspart (NovoLOG) injection (RAPID ACTING) (8 Units Subcutaneous Given 234)            Consultations/Discussion of Management or Tests:  0215 I obtained history and examined the patient as noted above.   0326 I spoke with Dr. Beltran of the hospitalist services who is in agreement to accept the patient for admission.            Disposition:  The patient was admitted to the hospital under the care of Dr. Beltran.     Impression & Plan        Medical Decision Makin-year-old male type I diabetic who is here complaining of vomiting and sugars being off.  Differential includes DKA, gastritis, cyclic vomiting, cannabinoid induced hyperemesis, acute appendicitis,  gastroenteritis, or other causes.  Work-up here shows mild anion gap with mild ketosis but no acidosis.  Lactic acid is elevated likely due to dehydration.  He otherwise has benign abdomen and I would doubt acute appendicitis surgical process or need for CT imaging.  Upon reevaluation he is currently feeling improved and is slowly tolerating p.o.  But due to his lactic acidosis ketosis and elevated glucose I do believe he will need further monitoring and sliding scale.  We will be admitted for continued management.  At this time with no acidosis and mild elevation in glucose I do not believe he needs insulin drip all this has been considered    Diagnosis:    ICD-10-CM    1. Nausea and vomiting, unspecified vomiting type  R11.2       2. Epigastric pain  R10.13       3. Hyperglycemia  R73.9       4. Ketosis (H)  E88.89          Scribe Disclosure:  I, Colette Lynn, am serving as a scribe at 2:12 AM on 1/20/2023 to document services personally performed by Maurisio Myers MD based on my observations and the provider's statements to me.    1/20/2023   Maurisio Myers MD Shapin, Ryan P, MD  01/20/23 0536

## 2023-01-21 VITALS
TEMPERATURE: 98.1 F | BODY MASS INDEX: 17.43 KG/M2 | SYSTOLIC BLOOD PRESSURE: 146 MMHG | HEART RATE: 88 BPM | OXYGEN SATURATION: 97 % | DIASTOLIC BLOOD PRESSURE: 76 MMHG | RESPIRATION RATE: 16 BRPM | WEIGHT: 147 LBS

## 2023-01-21 LAB
ANION GAP SERPL CALCULATED.3IONS-SCNC: 11 MMOL/L (ref 7–15)
BUN SERPL-MCNC: 8.5 MG/DL (ref 6–20)
CALCIUM SERPL-MCNC: 8.5 MG/DL (ref 8.6–10)
CHLORIDE SERPL-SCNC: 101 MMOL/L (ref 98–107)
CREAT SERPL-MCNC: 0.62 MG/DL (ref 0.67–1.17)
DEPRECATED HCO3 PLAS-SCNC: 26 MMOL/L (ref 22–29)
ERYTHROCYTE [DISTWIDTH] IN BLOOD BY AUTOMATED COUNT: 12.7 % (ref 10–15)
GFR SERPL CREATININE-BSD FRML MDRD: >90 ML/MIN/1.73M2
GLUCOSE BLDC GLUCOMTR-MCNC: 176 MG/DL (ref 70–99)
GLUCOSE BLDC GLUCOMTR-MCNC: 182 MG/DL (ref 70–99)
GLUCOSE SERPL-MCNC: 190 MG/DL (ref 70–99)
HCT VFR BLD AUTO: 37.2 % (ref 40–53)
HGB BLD-MCNC: 12.7 G/DL (ref 13.3–17.7)
MAGNESIUM SERPL-MCNC: 1.7 MG/DL (ref 1.7–2.3)
MCH RBC QN AUTO: 29.6 PG (ref 26.5–33)
MCHC RBC AUTO-ENTMCNC: 34.1 G/DL (ref 31.5–36.5)
MCV RBC AUTO: 87 FL (ref 78–100)
PHOSPHATE SERPL-MCNC: 2.5 MG/DL (ref 2.5–4.5)
PLATELET # BLD AUTO: 221 10E3/UL (ref 150–450)
POTASSIUM SERPL-SCNC: 3.3 MMOL/L (ref 3.4–5.3)
RBC # BLD AUTO: 4.29 10E6/UL (ref 4.4–5.9)
SODIUM SERPL-SCNC: 138 MMOL/L (ref 136–145)
WBC # BLD AUTO: 13 10E3/UL (ref 4–11)

## 2023-01-21 PROCEDURE — C9113 INJ PANTOPRAZOLE SODIUM, VIA: HCPCS | Performed by: INTERNAL MEDICINE

## 2023-01-21 PROCEDURE — 85027 COMPLETE CBC AUTOMATED: CPT | Performed by: INTERNAL MEDICINE

## 2023-01-21 PROCEDURE — 83735 ASSAY OF MAGNESIUM: CPT | Performed by: INTERNAL MEDICINE

## 2023-01-21 PROCEDURE — 82962 GLUCOSE BLOOD TEST: CPT

## 2023-01-21 PROCEDURE — 96376 TX/PRO/DX INJ SAME DRUG ADON: CPT

## 2023-01-21 PROCEDURE — 99239 HOSP IP/OBS DSCHRG MGMT >30: CPT | Performed by: INTERNAL MEDICINE

## 2023-01-21 PROCEDURE — 250N000011 HC RX IP 250 OP 636: Performed by: INTERNAL MEDICINE

## 2023-01-21 PROCEDURE — 84100 ASSAY OF PHOSPHORUS: CPT | Performed by: INTERNAL MEDICINE

## 2023-01-21 PROCEDURE — 250N000013 HC RX MED GY IP 250 OP 250 PS 637: Performed by: HOSPITALIST

## 2023-01-21 PROCEDURE — 250N000013 HC RX MED GY IP 250 OP 250 PS 637: Performed by: INTERNAL MEDICINE

## 2023-01-21 PROCEDURE — 250N000011 HC RX IP 250 OP 636: Performed by: HOSPITALIST

## 2023-01-21 PROCEDURE — 80048 BASIC METABOLIC PNL TOTAL CA: CPT | Performed by: INTERNAL MEDICINE

## 2023-01-21 PROCEDURE — 258N000003 HC RX IP 258 OP 636: Performed by: INTERNAL MEDICINE

## 2023-01-21 PROCEDURE — 96375 TX/PRO/DX INJ NEW DRUG ADDON: CPT

## 2023-01-21 PROCEDURE — G0378 HOSPITAL OBSERVATION PER HR: HCPCS

## 2023-01-21 PROCEDURE — 36415 COLL VENOUS BLD VENIPUNCTURE: CPT | Performed by: INTERNAL MEDICINE

## 2023-01-21 RX ORDER — KETOROLAC TROMETHAMINE 30 MG/ML
30 INJECTION, SOLUTION INTRAMUSCULAR; INTRAVENOUS EVERY 6 HOURS PRN
Status: DISCONTINUED | OUTPATIENT
Start: 2023-01-21 | End: 2023-01-21 | Stop reason: HOSPADM

## 2023-01-21 RX ORDER — METOCLOPRAMIDE 10 MG/1
10 TABLET ORAL
Status: DISCONTINUED | OUTPATIENT
Start: 2023-01-21 | End: 2023-01-21 | Stop reason: HOSPADM

## 2023-01-21 RX ORDER — POTASSIUM CHLORIDE 1.5 G/1.58G
40 POWDER, FOR SOLUTION ORAL ONCE
Status: COMPLETED | OUTPATIENT
Start: 2023-01-21 | End: 2023-01-21

## 2023-01-21 RX ORDER — METOCLOPRAMIDE 10 MG/1
10 TABLET ORAL
Status: DISCONTINUED | OUTPATIENT
Start: 2023-01-21 | End: 2023-01-21

## 2023-01-21 RX ORDER — POTASSIUM CHLORIDE 1500 MG/1
40 TABLET, EXTENDED RELEASE ORAL ONCE
Status: COMPLETED | OUTPATIENT
Start: 2023-01-21 | End: 2023-01-21

## 2023-01-21 RX ORDER — METOCLOPRAMIDE 10 MG/1
10 TABLET ORAL
Qty: 90 TABLET | Refills: 0 | Status: SHIPPED | OUTPATIENT
Start: 2023-01-21 | End: 2023-03-05

## 2023-01-21 RX ORDER — ONDANSETRON 4 MG/1
4 TABLET, ORALLY DISINTEGRATING ORAL EVERY 8 HOURS PRN
Qty: 21 TABLET | Refills: 0 | Status: SHIPPED | OUTPATIENT
Start: 2023-01-21 | End: 2023-03-05

## 2023-01-21 RX ADMIN — METOCLOPRAMIDE 10 MG: 10 TABLET ORAL at 16:58

## 2023-01-21 RX ADMIN — KETOROLAC TROMETHAMINE 30 MG: 30 INJECTION, SOLUTION INTRAMUSCULAR; INTRAVENOUS at 11:45

## 2023-01-21 RX ADMIN — PROCHLORPERAZINE EDISYLATE 10 MG: 5 INJECTION INTRAMUSCULAR; INTRAVENOUS at 08:04

## 2023-01-21 RX ADMIN — PROCHLORPERAZINE EDISYLATE 10 MG: 5 INJECTION INTRAMUSCULAR; INTRAVENOUS at 14:05

## 2023-01-21 RX ADMIN — POTASSIUM CHLORIDE 40 MEQ: 1.5 POWDER, FOR SOLUTION ORAL at 12:36

## 2023-01-21 RX ADMIN — PANTOPRAZOLE SODIUM 40 MG: 40 INJECTION, POWDER, FOR SOLUTION INTRAVENOUS at 10:30

## 2023-01-21 RX ADMIN — METOCLOPRAMIDE HYDROCHLORIDE 10 MG: 5 INJECTION INTRAMUSCULAR; INTRAVENOUS at 10:30

## 2023-01-21 RX ADMIN — SODIUM CHLORIDE, POTASSIUM CHLORIDE, SODIUM LACTATE AND CALCIUM CHLORIDE: 600; 310; 30; 20 INJECTION, SOLUTION INTRAVENOUS at 07:56

## 2023-01-21 RX ADMIN — INSULIN GLARGINE 20 UNITS: 100 INJECTION, SOLUTION SUBCUTANEOUS at 09:14

## 2023-01-21 RX ADMIN — ONDANSETRON HYDROCHLORIDE 4 MG: 2 INJECTION, SOLUTION INTRAMUSCULAR; INTRAVENOUS at 06:53

## 2023-01-21 ASSESSMENT — ACTIVITIES OF DAILY LIVING (ADL)
ADLS_ACUITY_SCORE: 22

## 2023-01-21 NOTE — PLAN OF CARE
Goal Outcome Evaluation:    BG mid-200s early am lowering throughout the day; lowest 141.  Corrected per orders.  Periods of cyclical vomiting with large amounts this afternoon.  Zofran given first followed by compazine providing relief.  Tolerating tiny sips of water only.  LR at 100 ml/hr.  Plant to with IV fluids overnight and antiemetics as needed.

## 2023-01-21 NOTE — PLAN OF CARE
Goal Outcome Evaluation:    Vital Signs: A &O x4. VSS. Afebrile. SpO2 >95% on RA  Pain/Comfort: pain 6/10 with stomach pain. Toradol given x1.  Assessment: LS clear. Intermittent N/V. Emesis x3. Reglan given x2. Compazine given x2. K+ 3.3. Potassium protocol initiated. Tried to take the pills and could not take with emesis episodes. Given potassium packets in water x1. , 176. Corrected based on orders.  Diet: Tolerating clear liquids.  Output: adequate output this shift  Activity/Ambulation: Ambulating to bathroom and up to chair independently.    Pt discharged home via uber. Pt educated on when to follow up with provider, how to take discharge medications, and at home care. Pt verbalized understanding.

## 2023-01-21 NOTE — PROGRESS NOTES
[facility]    Hospitalist Progress Note  Name: Wally Avendano    MRN: [id number]  Provider:  Sagar Beltran DO  Date of Service: 01/21/2023    Summary of Stay: Wally Avendano is a 32 year old male with a history of type 1 diabetes mellitus, gastroparesis admitted on 1/20/2023 with nausea and vomiting.  In the emergency department, patient was found to have a blood pressure of 115/78, heart rate 111, temperature 96.0  F, respiratory rate 19, SPO2 97% on room air.  Initial lab work showed leukocytosis of 15.4, glucose 372, anion gap 18, bicarb 22, hepatic panel within normal limits, lactic acid 3.4, lipase 84.  Chest x-ray showed no acute abnormalities.  The patient was started on IV fluids and provided with antiemetics for the treatment of possible gastroparesis flare versus cannabis hyperemesis syndrome.  The patient's symptoms persisted so gastroenterology was consulted to see the patient.    TODAY'S PLAN: Patient still nauseous and vomiting.  Most recent emesis was approximately 30 minutes ago (7:30 AM).  Patient still with some mild abdominal discomfort with his episodes of emesis.  He states the episodes are less frequent but still has not been able to keep anything down.  Patient states he has seen a gastroenterologist before and they did not have much to offer him at that time.  Given his ongoing episodes of nausea and vomiting we will ask for [facility] evaluation.  Would recommend pretreatment with Reglan and then trialing some clear liquid diet.  If able to keep that down and no further interventions with [facility], would consider discharge later today versus tomorrow.  If patient discharges today, recommended to patient that he avoid any alcohol use or marijuana use.  ADDENDUM:  Appreciate [facility] recommendations.  Discussed with pt.  Pt tolerating broth.  States he is feeling better.  Scheduled reglan 10 mg TID.  Will discharge home today.  Discussed with pt his symptoms are likely caused by  gastroparesis.  We discussed that small and more frequent meals are best.  Recommended f/u with gastroenterology in 3-4 weeks if symptoms do not improve.  With pt's permission, updated wife Ananya via phone.  All questions answered.    Problem List:   Acute Intractable Nausea and Vomiting  Possible Gastroparesis Flare vs Cannabis Hyperemesis Syndrome  Lactic Acidosis  - IVF  - Advance diet as tolerated  - Recommended alcohol and marijuana cessation  - Appreciate [facility] recommendations  - IV PPI  - Antiemetics  - LA improved with IVF    Type 1 Diabetes Mellitus  - A1C = 7.2  - Continue PTA Lantus 20 units qAM  - Continue ISS with carb coverage insulin  - Has CGM and follows with Endocrinology as an outpatient    I spent 35 minutes in reviewing this patient's labs, imaging, medications, medical history.  In addition time was spent interviewing the patient, communicating with family, and medical decision making.     DVT Prophylaxis: Pneumatic Compression Devices  Code Status: Full Code  Diet: Regular Diet Adult    Fuentes Catheter: Not present  Disposition: Expected discharge today vs tomorrow to home. Goals prior to discharge include symptoms improving, tolerating diet.   Family updated today: No     Interval History   Pt seen and examined.  Pt admits to episode of emesis this morning.  No BM.  No PO intake other than water since arrival to ED.    -Data reviewed today: I personally reviewed all new labs and imaging results over the last 24 hours.     Physical Exam   Temp: 98.9  F (37.2  C) Temp src: Oral BP: 120/65 Pulse: 119   Resp: 16 SpO2: 98 % O2 Device: None (Room air)    Vitals:    01/20/23 0344   Weight: 66.7 kg (147 lb)     Vital Signs with Ranges  Temp:  [98.9  F (37.2  C)-99.6  F (37.6  C)] 98.9  F (37.2  C)  Pulse:  [] 119  Resp:  [12-16] 16  BP: (108-125)/(55-76) 120/65  SpO2:  [98 %-99 %] 98 %  I/O last 3 completed shifts:  In: -   Out: 400 [Emesis/NG output:400]    GENERAL: No apparent distress.  Awake, alert, and fully oriented.  HEENT: Normocephalic, atraumatic. Extraocular movements intact.  CARDIOVASCULAR: Tachycardic. No S3.  PULMONARY: Clear bilaterally.  GASTROINTESTINAL: Soft, non-tender, non-distended. Bowel sounds normoactive.   EXTREMITIES: No cyanosis or clubbing. No edema.  NEUROLOGICAL: CN 2-12 grossly intact, no focal neurological deficits.  DERMATOLOGICAL: No rash, ulcer, bruising, nor jaundice.    Medications     lactated ringers 100 mL/hr at 01/21/23 0756       insulin aspart  1-7 Units Subcutaneous TID AC     insulin aspart  1-5 Units Subcutaneous At Bedtime     insulin aspart   Subcutaneous TID AC     insulin glargine  20 Units Subcutaneous QAM AC     Data     Laboratory:  Recent Labs   Lab 01/20/23  0246   WBC 15.4*   HGB 14.5   HCT 42.3   MCV 86        Recent Labs   Lab 01/21/23  0236 01/20/23  2206 01/20/23  1710 01/20/23  1422 01/20/23  1240 01/20/23  0756 01/20/23  0340 01/20/23  0246   NA  --   --   --  138  --  140  --  143   POTASSIUM  --   --   --  3.5  --  3.9  --  3.6   CHLORIDE  --   --   --  102  --  103  --  103   CO2  --   --   --  25  --  25  --  22   ANIONGAP  --   --   --  11  --  12  --  18*   * 141* 152* 217*   < > 266*   < > 372*   BUN  --   --   --  8.9  --  8.3  --  9.5   CR  --   --   --  0.66*  --  0.72  --  0.76   GFRESTIMATED  --   --   --  >90  --  >90  --  >90   MARY  --   --   --  8.2*  --  8.5*  --  8.9   PROTTOTAL  --   --   --   --   --   --   --  7.5   ALBUMIN  --   --   --   --   --   --   --  4.7   BILITOTAL  --   --   --   --   --   --   --  0.5   ALKPHOS  --   --   --   --   --   --   --  72   AST  --   --   --   --   --   --   --  21   ALT  --   --   --   --   --   --   --  23    < > = values in this interval not displayed.     No results for input(s): CULT in the last 168 hours.    Imaging:  Recent Results (from the past 24 hour(s))   XR Chest Port 1 View    Narrative    CHEST ONE VIEW PORTABLE    1/20/2023 10:56 AM     HISTORY:  Leukocytosis    COMPARISON: Chest x-ray on 4/1/2021      Impression    IMPRESSION: AP views of the chest were obtained. Cardiomediastinal  silhouette is within normal limits. No suspicious focal pulmonary  opacities. No significant pleural effusion or pneumothorax.    HUGH KRAUSE MD         SYSTEM ID:  [id number]         Sagar Beltran DO  [facility] Hospitalist  [address] [facility] [place] [address]  01/21/2023

## 2023-01-21 NOTE — PLAN OF CARE
Orientation: Alert and oriented x4, flat affect  VSS.   LS: Clear, equal bilaterally  GI: N/V. Emesis x3, all antiemetics given around 2300  : Adequate urine output.  Activity: Independent in the room.    Pain: 7/10 stomach pain while vomiting. Declined heating pad. No pain medications ordered.  Patient slept comfortably after antiemetics given.  Updates/Plan: Antiemetics, BS checks, IVF. Continue with current cares.

## 2023-01-21 NOTE — CONSULTS
[facility] Consultation Note     Patient Name: Wally Avendano      YOB: 1990 (Age: 32 year old)   Medical Record #: [id number]   Primary Physician: [facility] [place]   Admit Date/Time: 1/20/2023  2:11 AM     I was asked to see this patient by Dr. Beltran for evaluation of gastroparesis .     Impression & Plan     32 year old male with insulin dependent diabetes complicated by gastroparesis presenting with nausea, vomiting consistent with flare of gastroparesis in setting of loosing his job, increased stress, and increased blood sugars. Unlikely to be cannibus hyperemesis syndrome as he uses marijuana once per day and reports not using more than usual.     Schedule reglan tid    If not improved, would add compazine     Encourage small frequent stools    Management of blood sugars per primary team     Slow advancement of diet - would stick with clears for now     Plan was communicated with primary care team. Thank you for allowing us to participate in the care of Wally Avendano. Please call with any questions or pertinent change in clinical status.     Priscilla Wiggins MD MS....................  1/21/2023     Cell [phone]  After 5 PM call [phone]    [facility]          History of Presenting Illness:    Wally Avendano is a 32 year old male with history of type 1 diabetes complicated by gastroparesis admitted 1/20/2023 for nausea, vomiting, inability to take oral intake.  He has struggled with intermittent nausea and vomiting since being diagnosed with diabetes several years ago.  A gastric emptying study showed 30% retention at 4 hours, consistent with severe gastroparesis.  He has Reglan and Zofran at home, and takes for flare.  Zofran does not work well for him.  Recently, he lost his job.  With his increased stress his blood sugars have been elevated, and his gastroparesis has been less well controlled.  He is admitted with persistent nausea and  vomiting.    In regards to his marijuana use, he vapes marijuana once daily in the evening.  He reports this is the amount of marijuana he has used for some time, and has not increased his marijuana use.    Review of Systems:   Review of Systems: Negative    Past Medical History:   Diagnosis Date     Diabetes (H)     type 1     Known health problems: none        Past Surgical History:   Procedure Laterality Date     ESOPHAGOSCOPY, GASTROSCOPY, DUODENOSCOPY (EGD), COMBINED N/A 7/28/2021    Procedure: ESOPHAGOGASTRODUODENOSCOPY, WITH BIOPSY with duodenum biopsies for celiac sprue and gastric biopsies for h.Pylori by cold biopsy forceps;  Surgeon: Mikel Restrepo MD;  Location:  GI     NO HISTORY OF SURGERY         Medications & Allergies     Medications Prior to Admission   Medication Sig Dispense Refill Last Dose     insulin glargine (LANTUS PEN) 100 UNIT/ML pen Inject 20 Units Subcutaneous every morning 15 mL 1 1/19/2023     acetone urine (KETOSTIX) test strip Test urine ketones when BG above 300 or when sick. Use up 2 strips a day. 50 strip 3      blood glucose (NO BRAND SPECIFIED) lancets standard Use to test blood sugar 4 times daily or as directed. 200 lancet 3      blood glucose (NO BRAND SPECIFIED) lancets standard To use to test glucose level in the blood  Use to test blood sugar  3  times daily as directed. To accompany glucose monitor brands per insurance coverage. 100 each 0      blood glucose (NO BRAND SPECIFIED) test strip Use to test blood sugar three times daily as directed. To accompany glucose monitor brands per insurance coverage. 350 strip 3      blood glucose calibration (NO BRAND SPECIFIED) solution Used to calibrate the blood glucose monitor as needed and as directed.  To accompany  blood glucose brands per insurance coverage 1 each 0      blood glucose monitoring (CONTOUR NEXT MONITOR W/DEVICE KIT) meter device kit         blood glucose monitoring (NO BRAND SPECIFIED) meter device kit Use  to test blood sugar 4 times daily or as directed. 1 kit 0      Continuous Blood Gluc Sensor (DEXCOM G6 SENSOR) MISC Change every 10 days. 3 each 11      Continuous Blood Gluc Transmit (DEXCOM G6 TRANSMITTER) MISC CHANGE EVERY 3 MONTHS 1 each 3      Glucagon (BAQSIMI ONE PACK) 3 MG/DOSE POWD Use only for severe hypoglycemia. 1 each 1      insulin pen needle (32G X 4 MM) 32G X 4 MM miscellaneous Use 6 pen needles daily or as directed. 200 each 6      insulin pen needle 30G X 5 MM Use 6 pen needles daily or as directed. 200 each 11      metoclopramide (REGLAN) 10 MG tablet Take 1 tablet (10 mg) by mouth 3 times daily as needed (Nausea or Vomiting) 21 tablet 0      ondansetron (ZOFRAN ODT) 4 MG ODT tab Take 1 tablet (4 mg) by mouth every 6 hours as needed for nausea (Patient not taking: Reported on 11/2/2022) 30 tablet 0      prochlorperazine (COMPAZINE) 25 MG suppository Place 1 suppository (25 mg) rectally every 12 hours as needed for nausea or vomiting Give if nausea not resolved 15 minutes after giving ondansetron (ZOFRAN). If nausea/intractable vomiting not resolved in 15-30 minutes (Patient not taking: Reported on 8/12/2022) 2 suppository 0      vitamin D2 (ERGOCALCIFEROL) 47639 units (1250 mcg) capsule TAKE 1 CAPSULE (50,000 UNITS) BY MOUTH ONCE A WEEK FOR 12 DOSES (Patient not taking: Reported on 1/20/2023) 12 capsule 0 Not Taking       Current scheduled medications:     insulin aspart  1-7 Units Subcutaneous TID AC     insulin aspart  1-5 Units Subcutaneous At Bedtime     insulin aspart   Subcutaneous TID AC     insulin glargine  20 Units Subcutaneous QAM AC     pantoprazole  40 mg Intravenous Daily with breakfast     Current PRN medications:     acetaminophen **OR** acetaminophen, bisacodyl, glucose **OR** dextrose **OR** glucagon, ketorolac, melatonin, metoclopramide, ondansetron **OR** ondansetron, polyethylene glycol, prochlorperazine **OR** prochlorperazine **OR** prochlorperazine, senna-docusate **OR**  "senna-docusate    No Known Allergies    Social & Family History   Social History:  reports that he has never smoked. He has never used smokeless tobacco. He reports current alcohol use. He reports that he does not use drugs.      Family History: family history includes Other - See Comments in his mother.    Physical Exam   Physical Exam:   Vital signs:   Blood pressure 131/79, pulse 90, temperature 98.9  F (37.2  C), temperature source Oral, resp. rate 16, weight 66.7 kg (147 lb), SpO2 100 %.  Estimated body mass index is 17.43 kg/m  as calculated from the following:    Height as of 8/23/22: 1.956 m (6' 5\").    Weight as of this encounter: 66.7 kg (147 lb).  General Appearance:   Alert, oriented  Eyes: Normal  HEENT: Normal  Respiratory: Bilateral  CV: Regular  GI: Soft  Musculoskeletal: Normal  Lymphatic: No edema  Skin/hair: No acute lesions  Neurologic: Nonfocal    Labs & Micro   Labs:   Recent Labs   Lab Test 01/21/23  0811 01/20/23  0246 08/17/22  1650 08/13/22  0707 08/12/22  0752   WBC 13.0* 15.4* 7.2 12.8* 19.5*   HGB 12.7* 14.5 14.4 13.9 13.2*   MCV 87 86 82 88 86    257 318 240 287     Recent Labs   Lab Test 01/21/23  0811 01/20/23  1422 01/20/23  0756 01/20/23  0246 08/23/22  1548   POTASSIUM 3.3* 3.5 3.9 3.6 3.8   CHLORIDE 101 102 103 103 98   BUN 8.5 8.9 8.3 9.5 3*     Recent Labs   Lab Test 01/20/23  0246 08/23/22  1548 08/17/22  1650 08/13/22  0707 08/11/22 2004   ALBUMIN 4.7 3.8 4.2 4.0 4.6   BILITOTAL 0.5 0.5 0.8 0.6 0.7   ALT 23 31 23 21 31   AST 21 24 24 23 26   LIPASE 84* 21 12* 8* 11*                    "

## 2023-01-22 ENCOUNTER — NURSE TRIAGE (OUTPATIENT)
Dept: NURSING | Facility: CLINIC | Age: 33
End: 2023-01-22

## 2023-01-22 NOTE — TELEPHONE ENCOUNTER
No consent to communicate in the chart. Patient's wife calling. Patient is with the caller and gives permission to speak with her.    Nurse Triage SBAR    Is this a 2nd Level Triage? YES, LICENSED PRACTITIONER REVIEW IS REQUIRED    Situation:   Calling due to severe abdominal pain and patient is vomiting foam. Vomiting started at approximately 1400, and there was actual vomit. Just vomited again at 1530 and it was foam. Has tried ordered Zofran with no relief.    Background:   Patient is a Type 1 diabetic with gastroparesis. Tried to drink broth around lunch time, but didn't eat much. Blood sugars are 250's at this time. Patient has taken no insulin today. He was just discharged from the hospital after uncontrolled vomiting and abdominal pain last night.    Assessment:   Patient's blood sugar is elevated with no oral intake or insulin administered, was just released from the hospital yesterday evening for same symptoms.    Protocol Recommended Disposition:   See HCP Within 4 Hours (Or PCP Triage), See More Appropriate Guideline    Recommendation:   Orders for patient. Patient is very resistant to the idea of being seen after just being released from the hospital. Wife is very concerned.     Paged to provider    Does the patient meet one of the following criteria for ADS visit consideration? 16+ years old, with an [facility] PCP     TIP  Providers, please consider if this condition is appropriate for management at one of our Acute and Diagnostic Services sites.     If patient is a good candidate, please use dotphrase <dot>triageresponse and select Refer to ADS to document.    MD consult, Dr. MATTHEW Combs Paged by RN at 4:09 PM  Reason for page: Type 1 diabetic with abdominal pain and vomiting, was just discharged from the hospital yesterday for the same reason.    Provider, Dr. Combs, returning page to Nurse Advisors at 4:10 PM  Provider Recommendations/Plan:  Go back to the emergency department for evaluation.    Call  "placed to patient and his wife to update with Dr. Combs's recommendation.    Chika Howell RN on 1/22/2023 at 4:15 PM    Reason for Disposition    Pain is mainly in upper abdomen  (if needed ask: \"is it mainly above the belly button?\")    [1] MILD-MODERATE pain AND [2] constant AND [3] present > 2 hours    Additional Information    Negative: Shock suspected (e.g., cold/pale/clammy skin, too weak to stand, low BP, rapid pulse)    Negative: Difficult to awaken or acting confused (e.g., disoriented, slurred speech)    Negative: Passed out (i.e., lost consciousness, collapsed and was not responding)    Negative: Sounds like a life-threatening emergency to the triager    Negative: Chest pain    Negative: SEVERE difficulty breathing (e.g., struggling for each breath, speaks in single words)    Negative: Shock suspected (e.g., cold/pale/clammy skin, too weak to stand, low BP, rapid pulse)    Negative: Difficult to awaken or acting confused (e.g., disoriented, slurred speech)    Negative: Passed out (i.e., lost consciousness, collapsed and was not responding)    Negative: Visible sweat on face or sweat dripping down face    Negative: Sounds like a life-threatening emergency to the triager    Negative: Followed an abdomen (stomach) injury    Negative: Chest pain    Negative: [1] SEVERE pain (e.g., excruciating) AND [2] present > 1 hour    Negative: [1] Pain lasts > 10 minutes AND [2] age > 50    Negative: [1] Pain lasts > 10 minutes AND [2] age > 40 AND [3] associated chest, arm, neck, upper back or jaw pain    Negative: [1] Pain lasts > 10 minutes AND [2] age > 35 AND [3] at least one cardiac risk factor (i.e., hypertension, diabetes, obesity, smoker or strong family history of heart disease)    Negative: [1] Pain lasts > 10 minutes AND [2] history of heart disease (i.e., heart attack, bypass surgery, angina, angioplasty, CHF; not just a heart murmur)    Negative: [1] Pain lasts > 10 minutes AND [2] difficulty " "breathing    Negative: [1] Vomiting AND [2] contains red blood  (Exception: few streaks and only occurred once)    Negative: [1] Vomiting AND [2] contains black (\"coffee ground\") material    Negative: Blood in bowel movements  (Exception: Blood on surface of BM with constipation)    Negative: Black or tarry bowel movements (Exception: chronic-unchanged black-grey bowel movements AND is taking iron pills or Pepto-bismol)    Negative: Patient sounds very sick or weak to the triager    Protocols used: ABDOMINAL PAIN - MALE-A-AH, ABDOMINAL PAIN - UPPER-A-AH      "

## 2023-01-22 NOTE — DISCHARGE SUMMARY
Hospitalist Discharge Summary      Wally Avendano MRN# [id number]   YOB: 1990 Age: 32 year old     Date of Admission:  1/20/2023  Date of Discharge:  1/21/2023  5:15 PM  Admitting Physician:  Trevor Beltran DO  Discharge Physician:  Sagar Beltran DO  Discharging Service:  Hospitalist     Primary Provider: [facility]          Discharge Diagnosis:     Acute Intractable Nausea and Vomiting  Possible Gastroparesis Flare vs Cannabis Hyperemesis Syndrome  Lactic Acidosis  - IVF  - Advance diet as tolerated  - Recommended alcohol and marijuana cessation  - Appreciate [facility] recommendations  - IV PPI  - Antiemetics  - LA improved with IVF     Type 1 Diabetes Mellitus  - A1C = 7.2  - Continue PTA Lantus 20 units qAM  - Continue ISS with carb coverage insulin  - Has CGM and follows with Endocrinology as an outpatient             Discharge Disposition:     Discharged to home           Allergies:     No Known Allergies           Discharge Medications:     Discharge Medication List as of 1/21/2023  4:52 PM      CONTINUE these medications which have CHANGED    Details   metoclopramide (REGLAN) 10 MG tablet Take 1 tablet (10 mg) by mouth 3 times daily (before meals), Disp-90 tablet, R-0, Local Print      ondansetron (ZOFRAN ODT) 4 MG ODT tab Take 1 tablet (4 mg) by mouth every 8 hours as needed for nausea, Disp-21 tablet, R-0, Local Print         CONTINUE these medications which have NOT CHANGED    Details   insulin glargine (LANTUS PEN) 100 UNIT/ML pen Inject 20 Units Subcutaneous every morning, Disp-15 mL, R-1, E-PrescribeIf Lantus is not covered by insurance, may substitute Basaglar or Semglee or other insulin glargine product per insurance preference at same dose and frequency.        acetone urine (KETOSTIX) test strip Test urine ketones when BG above 300 or when sick. Use up 2 strips a day., Disp-50 strip, R-3, E-Prescribe      !! blood glucose (NO  BRAND SPECIFIED) lancets standard Use to test blood sugar 4 times daily or as directed.Disp-200 lancet, [id number]      !! blood glucose (NO BRAND SPECIFIED) lancets standard To use to test glucose level in the blood  Use to test blood sugar  3  times daily as directed. To accompany glucose monitor brands per insurance coverage.Disp-100 each, R-0Local Print      blood glucose (NO BRAND SPECIFIED) test strip Use to test blood sugar three times daily as directed. To accompany glucose monitor brands per insurance coverage., Disp-350 strip, R-3, E-Prescribe      blood glucose calibration (NO BRAND SPECIFIED) solution Used to calibrate the blood glucose monitor as needed and as directed.  To accompany  blood glucose brands per insurance coverage, Disp-1 each, R-0, E-Prescribe      blood glucose monitoring (CONTOUR NEXT MONITOR W/DEVICE KIT) meter device kit Historical      blood glucose monitoring (NO BRAND SPECIFIED) meter device kit Use to test blood sugar 4 times daily or as directed.Disp-1 kit, [id number]      Continuous Blood Gluc Sensor (DEXCOM G6 SENSOR) MISC Change every 10 days., Disp-3 each, R-11, E-Prescribe      Continuous Blood Gluc Transmit (DEXCOM G6 TRANSMITTER) MISC CHANGE EVERY 3 MONTHS, Disp-1 each, R-3, E-Prescribe      Glucagon (BAQSIMI ONE PACK) 3 MG/DOSE POWD Use only for severe hypoglycemia., Disp-1 each, R-1, E-Prescribe      !! insulin pen needle (32G X 4 MM) 32G X 4 MM miscellaneous Use 6 pen needles daily or as directed.Disp-200 each, [id number]      !! insulin pen needle 30G X 5 MM Use 6 pen needles daily or as directed.Disp-200 each, [id number]       !! - Potential duplicate medications found. Please discuss with provider.      STOP taking these medications       prochlorperazine (COMPAZINE) 25 MG suppository Comments:   Reason for Stopping:         vitamin D2 (ERGOCALCIFEROL) 73069 units (1250 mcg) capsule Comments:   Reason for Stopping:                      Condition on  Discharge:     Discharge condition: Fair   Discharge vitals: Blood pressure (!) 146/76, pulse 88, temperature 98.1  F (36.7  C), temperature source Oral, resp. rate 16, weight 66.7 kg (147 lb), SpO2 97 %.   Code status on discharge: Full Code      BASIC PHYSICAL EXAMINATION:  GENERAL: No apparent distress.  CARDIOVASCULAR: Regular rate and rhythm without murmurs.  PULMONARY: Clear to auscultation bilaterally.   GASTROINTESTINAL: Abdomen soft, non-tender.  EXTREMITIES: No edema, pulses intact.  NEUROLOGIC: No focal deficits.            History of Illness:   See detailed admission note for full details.               Procedures excluding imaging which is summarized below:     Please see details in the electronic medical record.           Consultations:     SOCIAL WORK IP CONSULT  GASTROENTEROLOGY IP CONSULT          Significant Results:     Results for orders placed or performed during the hospital encounter of 01/20/23   XR Chest Port 1 View    Narrative    CHEST ONE VIEW PORTABLE    1/20/2023 10:56 AM     HISTORY: Leukocytosis    COMPARISON: Chest x-ray on 4/1/2021      Impression    IMPRESSION: AP views of the chest were obtained. Cardiomediastinal  silhouette is within normal limits. No suspicious focal pulmonary  opacities. No significant pleural effusion or pneumothorax.    HUGH KRAUSE MD         SYSTEM ID:  [id number]       Transthoracic Echocardiogram Results:  No results found for this or any previous visit (from the past 4320 hour(s)).             Pending Results:     Unresulted Labs Ordered in the Past 30 Days of this Admission     No orders found from 12/21/2022 to 1/21/2023.                      Discharge Instructions and Follow-Up:     Discharge instructions and follow-up:   Discharge Procedure Orders   Primary Care - Care Coordination Referral   Standing Status: Future   Referral Priority: Routine: Next available opening Referral Type: Care Coordination   Number of Visits Requested: 1     Adult GI   Referral - Consult Only   Standing Status: Future   Referral Priority: Routine: Next available opening Referral Type: Consultation   Requested Specialty: Gastroenterology   Number of Visits Requested: 1     Reason for your hospital stay   Order Comments: Gastroparesis Flare     Follow-up and recommended labs and tests    Order Comments: [facility] [place] within 7 days for hospital follow- up.  The following labs/tests are recommended: BMP.    You should follow up with [facility] in 3-4 weeks for a hospital follow up regarding your Gastroparesis.      Avoid alcohol as it contributes to slow gut motility, which contributes to your gastroparesis symptoms.  Avoid cannabis as this may contribute to your nausea and vomiting.      You need to change your daily routine as to avoid times that previously were filled with cannabis or alcohol.  These substances will make your symptoms worse.      Dietary modifications are key to treatment of gastroparesis, and often first line therapies.  You should follow a low fat diet and low insoluble fiber.  Recommend you avoid carbonated beverages as these may slow gastric emptying as well.  You may benefit from smaller, more frequent meals as well.    Take Reglan 10 mg three times daily before meals.     Activity   Order Comments: Your activity upon discharge: activity as tolerated     Order Specific Question Answer Comments   Is discharge order? Yes      Diet   Order Comments: Follow this diet upon discharge: Orders Placed This Encounter      Clear liquid and advance as tolerated to low fat diet     Order Specific Question Answer Comments   Is discharge order? Yes              Hospital Course:     Wally Avendano is a 32 year old male with a history of type 1 diabetes mellitus, gastroparesis admitted on 1/20/2023 with nausea and vomiting.  In the emergency department, patient was found to have a blood pressure of  115/78, heart rate 111, temperature 96.0  F, respiratory rate 19, SPO2 97% on room air.  Initial lab work showed leukocytosis of 15.4, glucose 372, anion gap 18, bicarb 22, hepatic panel within normal limits, lactic acid 3.4, lipase 84.  Chest x-ray showed no acute abnormalities.  The patient was started on IV fluids and provided with antiemetics for the treatment of possible gastroparesis flare versus cannabis hyperemesis syndrome.  The patient's symptoms persisted so gastroenterology was consulted to see the patient.  Gastroenterology thought the patient's symptoms were more likely related to his gastroparesis and recommended scheduled Reglan 3 times a day before meals..  Dietary modifications and education was discussed with the patient, as well as his wife via phone.  All questions were answered.  On 1/21, the patient tolerated a liquid diet and at that point was stable for discharge home to follow-up as an outpatient.    The patient was seen, examined, and counseled on this day. The hospitalization and plan of care was reviewed with the patient extensively. All questions were addressed and the patient agreed to follow-up as noted above.      Total time spent in face to face contact with the patient and coordinating discharge was:  35 Minutes    Sagar Beltran DO  [facility] Hospitalist  [address] [place] [address]  01/22/2023

## 2023-01-23 ENCOUNTER — PATIENT OUTREACH (OUTPATIENT)
Dept: CARE COORDINATION | Facility: CLINIC | Age: 33
End: 2023-01-23

## 2023-01-23 NOTE — LETTER
[facility] CARE COORDINATION  [address]  [place] [address]     January 24, 2023    Wally Avendano  [address]  [place] [address]      Dear Wally,    I am a clinic care coordinator who works with [facility] with the [facility]. I wanted to thank you for spending the time to talk with me.  Below is a description of clinic care coordination and how I can further assist you.       The clinic care coordination team is made up of a registered nurse, , financial resource worker and community health worker who understand the health care system. The goal of clinic care coordination is to help you manage your health and improve access to the health care system. Our team works alongside your provider to assist you in determining your health and social needs. We can help you obtain health care and community resources, providing you with necessary information and education. We can work with you through any barriers and develop a care plan that helps coordinate and strengthen the communication between you and your care team.    Please feel free to contact me with any questions or concerns regarding care coordination and what we can offer.      We are focused on providing you with the highest-quality healthcare experience possible.    Sincerely,     Marcy Hidalgo RN Care Coordinator  [facility] [place]  Email: [email or web address]  Phone: [phone]       Enclosed: I have enclosed a copy of the Patient Centered Plan of Care. This has helpful information and goals that we have talked about. Please keep this in an easy to access place to use as needed. and [facility] Application/instructions.

## 2023-01-23 NOTE — LETTER
[facility]  Patient Centered Plan of Care  About Me:        Patient Name:  Wally Archer    YOB: 1990  Age:         32 year old   So MRN:    [id number] Telephone Information:  Home Phone [phone]   Mobile [phone]       Address:  [address]  [place] [address] Email address:  [email or web address]      Emergency Contact(s)    Name Relationship Lgl Grd Work Phone Home Phone Mobile Phone   1. MARGI ARCHER Spouse   [phone]   2. CHEPE JOHNSON Father    [phone]           Primary language:  English     needed? No   [facility]:  [phone] op. 1  Other communication barriers:None    Preferred Method of Communication:     Current living arrangement: I live in a private home with spouse    Mobility Status/ Medical Equipment: Independent    Health Maintenance  Health Maintenance Reviewed: Due/Overdue   Health Maintenance Due   Topic Date Due     YEARLY PREVENTIVE VISIT  Never done     DIABETIC FOOT EXAM  Never done     ADVANCE CARE PLANNING  Never done     EYE EXAM  Never done     HEPATITIS B IMMUNIZATION (1 of 3 - 3-dose series) Never done     COVID-19 Vaccine (1) Never done     Pneumococcal Vaccine: Pediatrics (0 to 5 Years) and At-Risk Patients (6 to 64 Years) (1 - PCV) Never done     DTAP/TDAP/TD IMMUNIZATION (1 - Tdap) Never done     LIPID  04/22/2022     INFLUENZA VACCINE (1) Never done     PHQ-2 (once per calendar year)  01/01/2023        My Access Plan  Medical Emergency 911   Primary Clinic Line [facility] - [phone]   24 Hour Appointment Line [phone] or  [phone]) (toll-free)   24 Hour Nurse Line [phone] (toll-free)   Preferred Urgent Care [facility] [place], [phone]     Preferred Hospital [facility]  [phone]     Preferred Pharmacy [id number] - [place] - [address]  AT [facility] [address]  [facility]     Behavioral Health Crisis Line The National Suicide Prevention Lifeline at 1-392.304.5749 or Text/Call 988     My Care Team Members  Patient Care Team       Relationship Specialty Notifications Start End    Clinic - [facility] [place] [facility] PCP - General   4/7/21     no pcp per pt    Phone: [phone] Fax: [phone]         [address] [facility] [place] [address]    Radha Davalos PA-C Physician Assistant Endocrinology, Diabetes, and Metabolism  8/12/22     Phone: [phone] Fax: [phone]         [address] [facility] [address] [facility] [address]    Nathalia Mendez, RN Diabetes Educator Diabetes Education  9/6/22     Deonna Salazar, CNP Assigned PCP   9/10/22     Phone: [phone] Fax: [phone]         [address] [place] [address]    Katelynn Raza OD MD Ophthalmology  11/2/22     Phone: [phone] Fax: [phone]         [facility] [facility] [place] [address]    Radha Davalos RN Referring Physician   11/2/22     Referring Physician to Eye    Radha Davalos PA-C Assigned Endocrinology Provider   12/10/22     Phone: [phone] Fax: [phone]         [address] [place] [address] [address] [facility] [address]    Marcy Hidalgo, RN Lead Care Coordinator  Admissions 1/23/23     Phone: [phone]         Jyoti Page MA Community Health Worker Primary Care - CC Admissions 1/24/23     Phone: [phone]                 My Care Plans  Self Management and Treatment Plan  Care Plan  Care Plan: Financial Wellbeing     Problem: Patient expresses financial resource strain     Long-Range Goal: Create an action plan to increase financial stability (Katelyn Care)     Start Date: 1/24/2023 Expected End Date: 8/25/2023    Note:     Barriers: does not have health insurance currently, postponing recommended provider follow up to avoid additional medical debt, symptoms limit functional ability.    Strengths: Submitted MA application  01/23/2023, motivated, engaged in care coordination, receiving unemployment, actively seeking new employment.   Patient expressed understanding of goal: Yes  Action steps to achieve this goal:  1. I will follow up with my providers as scheduled/recommended:   - Endocrinology: 02/09/2023 & 07/05/2023  - GI - TBD  - Primary Care Provider - TBD  2. I will discuss, review, schedule and complete recommended overdue health maintenance with my Primary Care Provider.   3. I will take my medications as prescribed.   4. I will avoid alcohol/cannabis use as recommended.   5. I will complete and submit jose care application along with supporting financial verification information required for application  6. I will contact my care team with questions, concerns, support needs. I will use the clinic as a resource and I understand I can contact my clinic with 24/7 after hours services available. Care Coordinator will remain available as needed.      *RNCC to mail [facility] application and instructions to patient on 01/26/2023.                          Action Plans on File:                       Advance Care Plans/Directives Type:   No data recorded    My Medical and Care Information  Problem List   Patient Active Problem List   Diagnosis     DKA (diabetic ketoacidoses)     Type 1 diabetes mellitus with other specified complication (H)     Diarrhea, unspecified type     Gastroparesis     Hypokalemia     Intractable nausea and vomiting     Diabetic ketoacidosis without coma associated with type 1 diabetes mellitus (H)      Current Medications and Allergies:  No Known Allergies   Current Outpatient Medications:      acetone urine (KETOSTIX) test strip, Test urine ketones when BG above 300 or when sick. Use up 2 strips a day., Disp: 50 strip, Rfl: 3     blood glucose (NO BRAND SPECIFIED) lancets standard, Use to test blood sugar 4 times daily or as directed., Disp: 200 lancet, Rfl: 3     blood glucose (NO BRAND SPECIFIED)  lancets standard, To use to test glucose level in the blood Use to test blood sugar  3  times daily as directed. To accompany glucose monitor brands per insurance coverage., Disp: 100 each, Rfl: 0     blood glucose (NO BRAND SPECIFIED) test strip, Use to test blood sugar three times daily as directed. To accompany glucose monitor brands per insurance coverage., Disp: 350 strip, Rfl: 3     blood glucose calibration (NO BRAND SPECIFIED) solution, Used to calibrate the blood glucose monitor as needed and as directed.  To accompany  blood glucose brands per insurance coverage, Disp: 1 each, Rfl: 0     blood glucose monitoring (CONTOUR NEXT MONITOR W/DEVICE KIT) meter device kit, , Disp: , Rfl:      blood glucose monitoring (NO BRAND SPECIFIED) meter device kit, Use to test blood sugar 4 times daily or as directed., Disp: 1 kit, Rfl: 0     Continuous Blood Gluc Sensor (DEXCOM G6 SENSOR) MISC, Change every 10 days., Disp: 3 each, Rfl: 11     Continuous Blood Gluc Transmit (DEXCOM G6 TRANSMITTER) MISC, CHANGE EVERY 3 MONTHS, Disp: 1 each, Rfl: 3     Glucagon (BAQSIMI ONE PACK) 3 MG/DOSE POWD, Use only for severe hypoglycemia., Disp: 1 each, Rfl: 1     insulin glargine (LANTUS PEN) 100 UNIT/ML pen, Inject 20 Units Subcutaneous every morning, Disp: 15 mL, Rfl: 1     insulin pen needle (32G X 4 MM) 32G X 4 MM miscellaneous, Use 6 pen needles daily or as directed., Disp: 200 each, Rfl: 6     insulin pen needle 30G X 5 MM, Use 6 pen needles daily or as directed., Disp: 200 each, Rfl: 11     metoclopramide (REGLAN) 10 MG tablet, Take 1 tablet (10 mg) by mouth 3 times daily (before meals), Disp: 90 tablet, Rfl: 0     ondansetron (ZOFRAN ODT) 4 MG ODT tab, Take 1 tablet (4 mg) by mouth every 8 hours as needed for nausea, Disp: 21 tablet, Rfl: 0     Care Coordination Start Date: 1/23/2023   Frequency of Care Coordination: monthly     Form Last Updated: 01/24/2023

## 2023-01-23 NOTE — PROGRESS NOTES
Clinic Care Coordination Contact  [facility]/Voicemail    Referral Source: IP Handoff  Clinical Data: Care Coordinator Outreach  Outreach attempted x 1.  Left message on patient's voicemail with call back information and requested return call.  Plan: Care Coordinator will try to reach patient again in 1-2 business days.    Marcy Hidalgo, RN Care Coordinator  [facility] [place]  Email: [email or web address]  Phone: [phone]

## 2023-01-24 SDOH — ECONOMIC STABILITY: FOOD INSECURITY: WITHIN THE PAST 12 MONTHS, YOU WORRIED THAT YOUR FOOD WOULD RUN OUT BEFORE YOU GOT MONEY TO BUY MORE.: NEVER TRUE

## 2023-01-24 SDOH — ECONOMIC STABILITY: FOOD INSECURITY: WITHIN THE PAST 12 MONTHS, THE FOOD YOU BOUGHT JUST DIDN'T LAST AND YOU DIDN'T HAVE MONEY TO GET MORE.: NEVER TRUE

## 2023-01-24 SDOH — ECONOMIC STABILITY: TRANSPORTATION INSECURITY
IN THE PAST 12 MONTHS, HAS LACK OF TRANSPORTATION KEPT YOU FROM MEETINGS, WORK, OR FROM GETTING THINGS NEEDED FOR DAILY LIVING?: NO

## 2023-01-24 SDOH — ECONOMIC STABILITY: TRANSPORTATION INSECURITY
IN THE PAST 12 MONTHS, HAS THE LACK OF TRANSPORTATION KEPT YOU FROM MEDICAL APPOINTMENTS OR FROM GETTING MEDICATIONS?: NO

## 2023-01-24 ASSESSMENT — SOCIAL DETERMINANTS OF HEALTH (SDOH): HOW HARD IS IT FOR YOU TO PAY FOR THE VERY BASICS LIKE FOOD, HOUSING, MEDICAL CARE, AND HEATING?: HARD

## 2023-01-24 ASSESSMENT — ACTIVITIES OF DAILY LIVING (ADL): DEPENDENT_IADLS:: INDEPENDENT

## 2023-01-24 NOTE — PROGRESS NOTES
Clinic Care Coordination Contact  OUTREACH with Post Discharge Assessment    Referral Information:  Referral Source: IP Handoff  Primary Diagnosis: GI Disorders    Chief Complaint   Patient presents with     Clinic Care Coordination - Initial     IP Handoff     Clinic Care Coordination - Post Hospital     Nausea/vomiting      Universal Utilization: 35% Risk of Admission or ED Visit   Clinic Utilization  Difficulty keeping appointments:: No  Compliance Concerns: No  No-Show Concerns: No  No PCP office visit in Past Year: No  Utilization    Hospital Admissions  3             ED Visits  6             No Show Count (past year)  3                Current as of: 1/23/2023 11:37 PM            Clinical Concerns:  Current Medical Concerns:    Patient Active Problem List   Diagnosis     DKA (diabetic ketoacidoses)     Type 1 diabetes mellitus with other specified complication (H)     Diarrhea, unspecified type     Gastroparesis     Hypokalemia     Intractable nausea and vomiting     Diabetic ketoacidosis without coma associated with type 1 diabetes mellitus (H)      Patient states that he hasn't thrown up, but is feeling weak.   Shares that he lost his job in December, and does not have any health insurance currently. He reports that he is receiving unemployment and is actively seeking new employment. Additionally, shares that his spouse is currently employed.   Reports that he submitted an application for medical assistance yesterday.   Patient is interested in jose care resource, and declines any other financial wellbeing resources such as medication affordability, utilities, rent, food assistance.   Patient shares that he is able to tolerate a liquid diet, slowly advancing diet as instructed - keeping note of trigger foods and tolerable foods. He explains that he is also eating small amount of foods more frequently which seems to be helpful in preventing and avoiding flare of abdominal pain, nausea, vomiting.   Reports  "his blood sugars have been reading above 200 without known cause.   Verbalizes he is working with endocrinology team for management of his diabetes.     Current Behavioral Concerns: none noted.    Education Provided to patient: Care Coordination role, clinic after hours, After Visit Summary, medications, appointments, jose care, other [facility] financial assistance resources discussed/reviewed. Support provided.    Pain  Pain (GOAL):: Yes  Type: Chronic (>3mo)  Location of chronic pain:: Back pain  Radiating: No  Progression: Unchanged  Description of pain: Throbbing  Limitation of routine activities due to chronic pain:: Yes  Description: Able to do light housework  Alleviating Factors: Rest, Heat  Aggravating Factors: Activity, Eating  Health Maintenance Reviewed: Due/Overdue   Health Maintenance Due   Topic Date Due     YEARLY PREVENTIVE VISIT  Never done     DIABETIC FOOT EXAM  Never done     ADVANCE CARE PLANNING  Never done     EYE EXAM  Never done     HEPATITIS B IMMUNIZATION (1 of 3 - 3-dose series) Never done     COVID-19 Vaccine (1) Never done     Pneumococcal Vaccine: Pediatrics (0 to 5 Years) and At-Risk Patients (6 to 64 Years) (1 - PCV) Never done     DTAP/TDAP/TD IMMUNIZATION (1 - Tdap) Never done     LIPID  04/22/2022     INFLUENZA VACCINE (1) Never done     PHQ-2 (once per calendar year)  01/01/2023      Clinical Pathway: None    Admission:    Admission Date: 01/20/23   Reason for Admission: Acute Intractable Nausea and Vomiting  Possible Gastroparesis Flare vs Cannabis Hyperemesis Syndrome  Lactic Acidosis  Discharge:   Discharge Date: 01/21/23  Discharge Diagnosis: Acute Intractable Nausea and Vomiting  Possible Gastroparesis Flare vs Cannabis Hyperemesis Syndrome  Lactic Acidosis    Enrollment  Outreach Frequency: monthly    Discharge Assessment  How are you doing now that you are home?: \"I'm doing okay, haven't thrown up, but feeling weak\"  How are your symptoms? (Red Flag symptoms " escalate to triage hotline per guidelines): Improved  Do you feel your condition is stable enough to be safe at home until your provider visit?: Yes  Does the patient have their discharge instructions? : Yes  Does the patient have questions regarding their discharge instructions? : No  Were you started on any new medications or were there changes to any of your previous medications? : Yes  Does the patient have all of their medications?: No (see comment) (Does not have Zofran due to lack of health insurance and medication cost)  Do you have questions regarding any of your medications? : No  Do you have all of your needed medical supplies or equipment (DME)?  (i.e. oxygen tank, CPAP, cane, etc.): No - What equipment or supplies are needed? (Reports currently using last CGM sensor.)  Discharge follow-up appointment scheduled within 14 calendar days? : No  Is patient agreeable to assistance with scheduling? : No (Due to lack of health insurance)         Post-op (Clinicians Only)  Did the patient have surgery or a procedure: No    Medication Management:  Medication review status: Medications reviewed.  Changes noted per patient report.  Patient states he does not have Zofran medication due to out of pocket cost of medication secondary to having no current health insurance.    Patient declined any resources for medication affordability during outreach.     Functional Status:  Dependent ADLs:: Independent  Dependent IADLs:: Independent  Bed or wheelchair confined:: No  Mobility Status: Independent  Fallen 2 or more times in the past year?: No  Any fall with injury in the past year?: No    Living Situation:  Current living arrangement:: I live in a private home with spouse  Type of residence:: Private home - stairs    Lifestyle & Psychosocial Needs:    Social Determinants of Health     Tobacco Use: Low Risk      Smoking Tobacco Use: Never     Smokeless Tobacco Use: Never     Passive Exposure: Not on file   Alcohol Use:  Unknown     Frequency of Alcohol Consumption: Monthly or less     Average Number of Drinks: Not asked     Frequency of Binge Drinking: Not asked   Financial Resource Strain: High Risk     Difficulty of Paying Living Expenses: Hard   Food Insecurity: No Food Insecurity     Worried About Running Out of Food in the Last Year: Never true     Ran Out of Food in the Last Year: Never true   Transportation Needs: No Transportation Needs     Lack of Transportation (Medical): No     Lack of Transportation (Non-Medical): No   Physical Activity: Not on file   Stress: Not on file   Social Connections: Not on file   Intimate Partner Violence: Not on file   Depression: Not at risk     PHQ-2 Score: 0   Housing Stability: Not on file     Diet:: Diabetic diet, Low saturated fat, Low Fiber  Inadequate nutrition (GOAL):: No  Tube Feeding: No  Inadequate activity/exercise (GOAL):: No  Significant changes in sleep pattern (GOAL): No  Transportation means:: Regular car  Mu-ism or spiritual beliefs that impact treatment:: No  Mental health DX:: No  Mental health management concern (GOAL):: No  Chemical Dependency Status: Current Concern  Informal Support system:: Children, Family, Parent, Spouse      Resources and Interventions:  Current Resources:   Community Resources: DME  Supplies Currently Used at Home: Diabetic Supplies  Equipment Currently Used at Home: glucometer  Employment Status: unemployed  Advance Care Plan/Directive  Advanced Care Plans/Directives on file:: No  Advanced Care Plan/Directive Status: Declined Further Information    Referrals Placed: Financial Services     Care Plan:   Care Plan: Financial Wellbeing     Problem: Patient expresses financial resource strain     Long-Range Goal: Create an action plan to increase financial stability (Katelyn Care)     Start Date: 1/24/2023 Expected End Date: 8/25/2023    Note:     Barriers: does not have health insurance currently, postponing recommended provider follow up to  avoid additional medical debt, symptoms limit functional ability.    Strengths: Submitted MA application 01/23/2023, motivated, engaged in care coordination, receiving unemployment, actively seeking new employment.   Patient expressed understanding of goal: Yes  Action steps to achieve this goal:  1. I will follow up with my providers as scheduled/recommended:   - Endocrinology: 02/09/2023 & 07/05/2023  - GI - TBD  - Primary Care Provider - TBD  2. I will discuss, review, schedule and complete recommended overdue health maintenance with my Primary Care Provider.   3. I will take my medications as prescribed.   4. I will avoid alcohol/cannabis use as recommended.   5. I will complete and submit jose care application along with supporting financial verification information required for application  6. I will contact my care team with questions, concerns, support needs. I will use the clinic as a resource and I understand I can contact my clinic with 24/7 after hours services available. Care Coordinator will remain available as needed.      *RNCC to mail jose care application and instructions to patient on 01/26/2023.                       Patient/Caregiver understanding: Patient/caregiver verbalized understanding and denies any additional questions or concerns at this time. RNCC engaged in [employer] communications during encounter.      Outreach Frequency: monthly  Future Appointments              In 2 weeks Radha Davalos PA-C [facility]    In 5 months Latoya Perez MD [facility]        Plan: RNCC will send clinic care coordination introduction letter, patient centered plan of care, and medication list to patient via [employer]. RNCC will send jose care application and instructions to patient via mail. Patient/caregiver was provided with writers contact information and encouraged to call with questions,  concerns, support needs. RNCC will remain available as needed. CHWCC will follow up with patient/caregiver again in 1 month. RNCC will review chart in 6 weeks. .      Marcy Hidalgo RN Care Coordinator  [facility] [place]  Email: [email or web address]  Phone: [phone]

## 2023-01-25 ENCOUNTER — HOSPITAL ENCOUNTER (EMERGENCY)
Facility: CLINIC | Age: 33
Discharge: LEFT BEFORE DISCHARGE | End: 2023-01-25

## 2023-01-25 VITALS
TEMPERATURE: 98 F | SYSTOLIC BLOOD PRESSURE: 112 MMHG | OXYGEN SATURATION: 100 % | WEIGHT: 161 LBS | HEART RATE: 106 BPM | RESPIRATION RATE: 16 BRPM | DIASTOLIC BLOOD PRESSURE: 83 MMHG | BODY MASS INDEX: 19.09 KG/M2

## 2023-01-26 NOTE — ED TRIAGE NOTES
Pt arrives via EMS coming from home. Pt complains of abd pain, n/v/d, hyperglycemia. Pt unable to check sugar due to lack of insurance and supply diabetes stuff. Per EMS pt has been vomiting for past wk. EMS gave 4mg zofran. VSS per EMS.

## 2023-02-07 ENCOUNTER — TELEPHONE (OUTPATIENT)
Dept: ENDOCRINOLOGY | Facility: CLINIC | Age: 33
End: 2023-02-07

## 2023-02-07 NOTE — TELEPHONE ENCOUNTER
Called patient and left voicemail. Patient has an appointment on 2/9/2023. Need patient to upload their Dexcom device to site for provider to review prior to their appointment.  Taylor Myhre, RMA

## 2023-02-07 NOTE — TELEPHONE ENCOUNTER
LVM, Need pt to upload dexcom.  Sharing code is accurate. But again, pt just needs to upload to dexcom site.   Taylor Myhre, RMA

## 2023-02-23 ENCOUNTER — PATIENT OUTREACH (OUTPATIENT)
Dept: CARE COORDINATION | Facility: CLINIC | Age: 33
End: 2023-02-23

## 2023-02-23 NOTE — PROGRESS NOTES
Clinic Care Coordination Contact  [facility]/Voicemail       Clinical Data: Care Coordinator Outreach  Outreach attempted x 1.  Left message on patient's voicemail with call back information and requested return call.    Plan: Care Coordinator will try to reach patient again in 10 business days.    KATIE Quispe, B.S. [facility] Care Coordination  [facility]:  [place]  [phone]  [email or web address]

## 2023-03-04 ENCOUNTER — HOSPITAL ENCOUNTER (INPATIENT)
Facility: CLINIC | Age: 33
LOS: 3 days | Discharge: HOME | DRG: 639 | End: 2023-03-07
Attending: EMERGENCY MEDICINE | Admitting: INTERNAL MEDICINE

## 2023-03-04 DIAGNOSIS — D72.829 LEUKOCYTOSIS, UNSPECIFIED TYPE: ICD-10-CM

## 2023-03-04 DIAGNOSIS — K31.84 GASTROPARESIS: ICD-10-CM

## 2023-03-04 DIAGNOSIS — E10.10 DIABETIC KETOACIDOSIS WITHOUT COMA ASSOCIATED WITH TYPE 1 DIABETES MELLITUS (H): ICD-10-CM

## 2023-03-04 DIAGNOSIS — E87.20 LACTIC ACIDOSIS: ICD-10-CM

## 2023-03-04 DIAGNOSIS — R79.89 KETONEMIA: ICD-10-CM

## 2023-03-04 LAB
ALBUMIN SERPL BCG-MCNC: 4.1 G/DL (ref 3.5–5.2)
ALBUMIN SERPL BCG-MCNC: 4.7 G/DL (ref 3.5–5.2)
ALLEN'S TEST: NO
ALP SERPL-CCNC: 72 U/L (ref 40–129)
ALP SERPL-CCNC: 84 U/L (ref 40–129)
ALT SERPL W P-5'-P-CCNC: 36 U/L (ref 10–50)
ALT SERPL W P-5'-P-CCNC: 43 U/L (ref 10–50)
ANION GAP SERPL CALCULATED.3IONS-SCNC: 20 MMOL/L (ref 7–15)
ANION GAP SERPL CALCULATED.3IONS-SCNC: 24 MMOL/L (ref 7–15)
AST SERPL W P-5'-P-CCNC: 28 U/L (ref 10–50)
AST SERPL W P-5'-P-CCNC: 33 U/L (ref 10–50)
B-OH-BUTYR SERPL-MCNC: 4.2 MMOL/L
B-OH-BUTYR SERPL-MCNC: 4.4 MMOL/L
BASE EXCESS BLDA CALC-SCNC: -8.3 MMOL/L (ref -9–1.8)
BASE EXCESS BLDV CALC-SCNC: -2.6 MMOL/L (ref -7.7–1.9)
BASE EXCESS BLDV CALC-SCNC: -5.8 MMOL/L (ref -7.7–1.9)
BASOPHILS # BLD AUTO: 0 10E3/UL (ref 0–0.2)
BASOPHILS # BLD AUTO: 0.1 10E3/UL (ref 0–0.2)
BASOPHILS NFR BLD AUTO: 0 %
BASOPHILS NFR BLD AUTO: 0 %
BILIRUB DIRECT SERPL-MCNC: <0.2 MG/DL (ref 0–0.3)
BILIRUB SERPL-MCNC: 0.3 MG/DL
BILIRUB SERPL-MCNC: 0.4 MG/DL
BUN SERPL-MCNC: 7.5 MG/DL (ref 6–20)
BUN SERPL-MCNC: 8.3 MG/DL (ref 6–20)
CALCIUM SERPL-MCNC: 8.2 MG/DL (ref 8.6–10)
CALCIUM SERPL-MCNC: 9.6 MG/DL (ref 8.6–10)
CHLORIDE SERPL-SCNC: 101 MMOL/L (ref 98–107)
CHLORIDE SERPL-SCNC: 96 MMOL/L (ref 98–107)
CREAT SERPL-MCNC: 0.52 MG/DL (ref 0.67–1.17)
CREAT SERPL-MCNC: 0.54 MG/DL (ref 0.67–1.17)
DEPRECATED HCO3 PLAS-SCNC: 18 MMOL/L (ref 22–29)
DEPRECATED HCO3 PLAS-SCNC: 19 MMOL/L (ref 22–29)
EOSINOPHIL # BLD AUTO: 0 10E3/UL (ref 0–0.7)
EOSINOPHIL # BLD AUTO: 0 10E3/UL (ref 0–0.7)
EOSINOPHIL NFR BLD AUTO: 0 %
EOSINOPHIL NFR BLD AUTO: 0 %
ERYTHROCYTE [DISTWIDTH] IN BLOOD BY AUTOMATED COUNT: 13.2 % (ref 10–15)
ERYTHROCYTE [DISTWIDTH] IN BLOOD BY AUTOMATED COUNT: 13.3 % (ref 10–15)
GFR SERPL CREATININE-BSD FRML MDRD: >90 ML/MIN/1.73M2
GFR SERPL CREATININE-BSD FRML MDRD: >90 ML/MIN/1.73M2
GLUCOSE BLDC GLUCOMTR-MCNC: 254 MG/DL (ref 70–99)
GLUCOSE BLDC GLUCOMTR-MCNC: 297 MG/DL (ref 70–99)
GLUCOSE BLDC GLUCOMTR-MCNC: 361 MG/DL (ref 70–99)
GLUCOSE SERPL-MCNC: 355 MG/DL (ref 70–99)
GLUCOSE SERPL-MCNC: 371 MG/DL (ref 70–99)
HCO3 BLD-SCNC: 18 MMOL/L (ref 21–28)
HCO3 BLDV-SCNC: 20 MMOL/L (ref 21–28)
HCO3 BLDV-SCNC: 20 MMOL/L (ref 21–28)
HCT VFR BLD AUTO: 37.2 % (ref 40–53)
HCT VFR BLD AUTO: 43.2 % (ref 40–53)
HGB BLD-MCNC: 12.4 G/DL (ref 13.3–17.7)
HGB BLD-MCNC: 14.7 G/DL (ref 13.3–17.7)
HOLD SPECIMEN: NORMAL
HOLD SPECIMEN: NORMAL
IMM GRANULOCYTES # BLD: 0 10E3/UL
IMM GRANULOCYTES # BLD: 0.1 10E3/UL
IMM GRANULOCYTES NFR BLD: 0 %
IMM GRANULOCYTES NFR BLD: 0 %
LACTATE SERPL-SCNC: 1.3 MMOL/L (ref 0.7–2)
LACTATE SERPL-SCNC: 2.5 MMOL/L (ref 0.7–2)
LYMPHOCYTES # BLD AUTO: 0.5 10E3/UL (ref 0.8–5.3)
LYMPHOCYTES # BLD AUTO: 0.7 10E3/UL (ref 0.8–5.3)
LYMPHOCYTES NFR BLD AUTO: 4 %
LYMPHOCYTES NFR BLD AUTO: 5 %
MAGNESIUM SERPL-MCNC: 1.6 MG/DL (ref 1.7–2.3)
MAGNESIUM SERPL-MCNC: 1.8 MG/DL (ref 1.7–2.3)
MCH RBC QN AUTO: 29.6 PG (ref 26.5–33)
MCH RBC QN AUTO: 29.7 PG (ref 26.5–33)
MCHC RBC AUTO-ENTMCNC: 33.3 G/DL (ref 31.5–36.5)
MCHC RBC AUTO-ENTMCNC: 34 G/DL (ref 31.5–36.5)
MCV RBC AUTO: 87 FL (ref 78–100)
MCV RBC AUTO: 89 FL (ref 78–100)
MONOCYTES # BLD AUTO: 0.2 10E3/UL (ref 0–1.3)
MONOCYTES # BLD AUTO: 0.3 10E3/UL (ref 0–1.3)
MONOCYTES NFR BLD AUTO: 1 %
MONOCYTES NFR BLD AUTO: 2 %
NEUTROPHILS # BLD AUTO: 11.8 10E3/UL (ref 1.6–8.3)
NEUTROPHILS # BLD AUTO: 12.3 10E3/UL (ref 1.6–8.3)
NEUTROPHILS NFR BLD AUTO: 93 %
NEUTROPHILS NFR BLD AUTO: 95 %
NRBC # BLD AUTO: 0 10E3/UL
NRBC # BLD AUTO: 0 10E3/UL
NRBC BLD AUTO-RTO: 0 /100
NRBC BLD AUTO-RTO: 0 /100
O2/TOTAL GAS SETTING VFR VENT: 0 %
O2/TOTAL GAS SETTING VFR VENT: 21 %
O2/TOTAL GAS SETTING VFR VENT: 3 %
OXYHGB MFR BLD: 97 % (ref 92–100)
PCO2 BLD: 38 MM HG (ref 35–45)
PCO2 BLDV: 28 MM HG (ref 40–50)
PCO2 BLDV: 38 MM HG (ref 40–50)
PH BLD: 7.28 [PH] (ref 7.35–7.45)
PH BLDV: 7.32 [PH] (ref 7.32–7.43)
PH BLDV: 7.46 [PH] (ref 7.32–7.43)
PHOSPHATE SERPL-MCNC: 3.8 MG/DL (ref 2.5–4.5)
PLATELET # BLD AUTO: 337 10E3/UL (ref 150–450)
PLATELET # BLD AUTO: 374 10E3/UL (ref 150–450)
PO2 BLD: 116 MM HG (ref 80–105)
PO2 BLDV: 42 MM HG (ref 25–47)
PO2 BLDV: 43 MM HG (ref 25–47)
POTASSIUM SERPL-SCNC: 3.6 MMOL/L (ref 3.4–5.3)
POTASSIUM SERPL-SCNC: 3.6 MMOL/L (ref 3.4–5.3)
POTASSIUM SERPL-SCNC: 4.5 MMOL/L (ref 3.4–5.3)
PROT SERPL-MCNC: 6.6 G/DL (ref 6.4–8.3)
PROT SERPL-MCNC: 7.7 G/DL (ref 6.4–8.3)
RBC # BLD AUTO: 4.18 10E6/UL (ref 4.4–5.9)
RBC # BLD AUTO: 4.96 10E6/UL (ref 4.4–5.9)
SODIUM SERPL-SCNC: 139 MMOL/L (ref 136–145)
SODIUM SERPL-SCNC: 139 MMOL/L (ref 136–145)
WBC # BLD AUTO: 12.9 10E3/UL (ref 4–11)
WBC # BLD AUTO: 13.1 10E3/UL (ref 4–11)

## 2023-03-04 PROCEDURE — 84155 ASSAY OF PROTEIN SERUM: CPT | Performed by: INTERNAL MEDICINE

## 2023-03-04 PROCEDURE — 82803 BLOOD GASES ANY COMBINATION: CPT | Performed by: EMERGENCY MEDICINE

## 2023-03-04 PROCEDURE — 99223 1ST HOSP IP/OBS HIGH 75: CPT | Mod: AI | Performed by: INTERNAL MEDICINE

## 2023-03-04 PROCEDURE — 85025 COMPLETE CBC W/AUTO DIFF WBC: CPT | Performed by: INTERNAL MEDICINE

## 2023-03-04 PROCEDURE — 84450 TRANSFERASE (AST) (SGOT): CPT | Performed by: INTERNAL MEDICINE

## 2023-03-04 PROCEDURE — 82962 GLUCOSE BLOOD TEST: CPT

## 2023-03-04 PROCEDURE — 36415 COLL VENOUS BLD VENIPUNCTURE: CPT | Performed by: EMERGENCY MEDICINE

## 2023-03-04 PROCEDURE — 84100 ASSAY OF PHOSPHORUS: CPT | Performed by: INTERNAL MEDICINE

## 2023-03-04 PROCEDURE — 96374 THER/PROPH/DIAG INJ IV PUSH: CPT

## 2023-03-04 PROCEDURE — 250N000011 HC RX IP 250 OP 636

## 2023-03-04 PROCEDURE — 250N000009 HC RX 250: Performed by: EMERGENCY MEDICINE

## 2023-03-04 PROCEDURE — 99285 EMERGENCY DEPT VISIT HI MDM: CPT | Mod: 25

## 2023-03-04 PROCEDURE — 96361 HYDRATE IV INFUSION ADD-ON: CPT

## 2023-03-04 PROCEDURE — 87040 BLOOD CULTURE FOR BACTERIA: CPT | Performed by: INTERNAL MEDICINE

## 2023-03-04 PROCEDURE — 83930 ASSAY OF BLOOD OSMOLALITY: CPT | Performed by: INTERNAL MEDICINE

## 2023-03-04 PROCEDURE — 120N000001 HC R&B MED SURG/OB

## 2023-03-04 PROCEDURE — 83735 ASSAY OF MAGNESIUM: CPT | Performed by: EMERGENCY MEDICINE

## 2023-03-04 PROCEDURE — 93005 ELECTROCARDIOGRAM TRACING: CPT

## 2023-03-04 PROCEDURE — 82805 BLOOD GASES W/O2 SATURATION: CPT | Performed by: INTERNAL MEDICINE

## 2023-03-04 PROCEDURE — 84132 ASSAY OF SERUM POTASSIUM: CPT | Performed by: EMERGENCY MEDICINE

## 2023-03-04 PROCEDURE — 258N000003 HC RX IP 258 OP 636: Performed by: EMERGENCY MEDICINE

## 2023-03-04 PROCEDURE — 96375 TX/PRO/DX INJ NEW DRUG ADDON: CPT

## 2023-03-04 PROCEDURE — 83605 ASSAY OF LACTIC ACID: CPT | Performed by: EMERGENCY MEDICINE

## 2023-03-04 PROCEDURE — 80053 COMPREHEN METABOLIC PANEL: CPT | Performed by: EMERGENCY MEDICINE

## 2023-03-04 PROCEDURE — 250N000011 HC RX IP 250 OP 636: Performed by: EMERGENCY MEDICINE

## 2023-03-04 PROCEDURE — 83735 ASSAY OF MAGNESIUM: CPT | Performed by: INTERNAL MEDICINE

## 2023-03-04 PROCEDURE — 85025 COMPLETE CBC W/AUTO DIFF WBC: CPT | Performed by: EMERGENCY MEDICINE

## 2023-03-04 PROCEDURE — 36600 WITHDRAWAL OF ARTERIAL BLOOD: CPT

## 2023-03-04 PROCEDURE — 82010 KETONE BODYS QUAN: CPT | Performed by: EMERGENCY MEDICINE

## 2023-03-04 RX ORDER — ONDANSETRON 2 MG/ML
INJECTION INTRAMUSCULAR; INTRAVENOUS
Status: COMPLETED
Start: 2023-03-04 | End: 2023-03-04

## 2023-03-04 RX ORDER — NICOTINE POLACRILEX 4 MG
15-30 LOZENGE BUCCAL
Status: DISCONTINUED | OUTPATIENT
Start: 2023-03-04 | End: 2023-03-06

## 2023-03-04 RX ORDER — DEXTROSE MONOHYDRATE 25 G/50ML
25-50 INJECTION, SOLUTION INTRAVENOUS
Status: DISCONTINUED | OUTPATIENT
Start: 2023-03-04 | End: 2023-03-05

## 2023-03-04 RX ORDER — OLANZAPINE 10 MG/1
2.5 INJECTION, POWDER, LYOPHILIZED, FOR SOLUTION INTRAMUSCULAR ONCE
Status: COMPLETED | OUTPATIENT
Start: 2023-03-04 | End: 2023-03-04

## 2023-03-04 RX ORDER — METOCLOPRAMIDE HYDROCHLORIDE 5 MG/ML
10 INJECTION INTRAMUSCULAR; INTRAVENOUS ONCE
Status: COMPLETED | OUTPATIENT
Start: 2023-03-04 | End: 2023-03-04

## 2023-03-04 RX ORDER — DEXTROSE MONOHYDRATE 25 G/50ML
25-50 INJECTION, SOLUTION INTRAVENOUS
Status: DISCONTINUED | OUTPATIENT
Start: 2023-03-04 | End: 2023-03-06

## 2023-03-04 RX ORDER — ONDANSETRON 2 MG/ML
4 INJECTION INTRAMUSCULAR; INTRAVENOUS ONCE
Status: COMPLETED | OUTPATIENT
Start: 2023-03-04 | End: 2023-03-04

## 2023-03-04 RX ORDER — KETOROLAC TROMETHAMINE 15 MG/ML
15 INJECTION, SOLUTION INTRAMUSCULAR; INTRAVENOUS ONCE
Status: COMPLETED | OUTPATIENT
Start: 2023-03-04 | End: 2023-03-04

## 2023-03-04 RX ADMIN — PROCHLORPERAZINE EDISYLATE 10 MG: 5 INJECTION INTRAMUSCULAR; INTRAVENOUS at 21:08

## 2023-03-04 RX ADMIN — ONDANSETRON 4 MG: 2 INJECTION INTRAMUSCULAR; INTRAVENOUS at 18:40

## 2023-03-04 RX ADMIN — OLANZAPINE 2.5 MG: 10 INJECTION, POWDER, FOR SOLUTION INTRAMUSCULAR at 20:32

## 2023-03-04 RX ADMIN — METOCLOPRAMIDE HYDROCHLORIDE 10 MG: 5 INJECTION INTRAMUSCULAR; INTRAVENOUS at 19:50

## 2023-03-04 RX ADMIN — SODIUM CHLORIDE 1000 ML: 9 INJECTION, SOLUTION INTRAVENOUS at 19:44

## 2023-03-04 RX ADMIN — KETOROLAC TROMETHAMINE 15 MG: 15 INJECTION, SOLUTION INTRAMUSCULAR; INTRAVENOUS at 18:39

## 2023-03-04 RX ADMIN — Medication 3.5 UNITS/HR: at 21:42

## 2023-03-04 RX ADMIN — SODIUM CHLORIDE, POTASSIUM CHLORIDE, SODIUM LACTATE AND CALCIUM CHLORIDE 1000 ML: 600; 310; 30; 20 INJECTION, SOLUTION INTRAVENOUS at 21:08

## 2023-03-04 RX ADMIN — SODIUM CHLORIDE 1000 ML: 9 INJECTION, SOLUTION INTRAVENOUS at 18:40

## 2023-03-04 ASSESSMENT — ENCOUNTER SYMPTOMS
DYSURIA: 0
COUGH: 0
FEVER: 0
DIARRHEA: 1
ABDOMINAL PAIN: 1
NAUSEA: 1
VOMITING: 1

## 2023-03-04 ASSESSMENT — ACTIVITIES OF DAILY LIVING (ADL)
ADLS_ACUITY_SCORE: 35

## 2023-03-04 NOTE — ED TRIAGE NOTES
Patient's vomiting started this morning- dx of gastroparesis and DM1. Patient's son has recently had a 'stomach bug' and was throwing up the other night. Patient's glucose was 350 almost 2 hours ago. Patient states generalized abdominal pain- has diarrhea normally.

## 2023-03-05 LAB
ALBUMIN SERPL BCG-MCNC: 3.6 G/DL (ref 3.5–5.2)
ALP SERPL-CCNC: 61 U/L (ref 40–129)
ALT SERPL W P-5'-P-CCNC: 29 U/L (ref 10–50)
ANION GAP SERPL CALCULATED.3IONS-SCNC: 11 MMOL/L (ref 7–15)
ANION GAP SERPL CALCULATED.3IONS-SCNC: 15 MMOL/L (ref 7–15)
AST SERPL W P-5'-P-CCNC: 23 U/L (ref 10–50)
B-OH-BUTYR SERPL-MCNC: 0.2 MMOL/L
B-OH-BUTYR SERPL-MCNC: <0.2 MMOL/L
BILIRUB SERPL-MCNC: 0.3 MG/DL
BUN SERPL-MCNC: 4.3 MG/DL (ref 6–20)
BUN SERPL-MCNC: 7.2 MG/DL (ref 6–20)
CALCIUM SERPL-MCNC: 7.9 MG/DL (ref 8.6–10)
CALCIUM SERPL-MCNC: 8.4 MG/DL (ref 8.6–10)
CHLORIDE SERPL-SCNC: 101 MMOL/L (ref 98–107)
CHLORIDE SERPL-SCNC: 108 MMOL/L (ref 98–107)
CREAT SERPL-MCNC: 0.48 MG/DL (ref 0.67–1.17)
CREAT SERPL-MCNC: 0.56 MG/DL (ref 0.67–1.17)
DEPRECATED HCO3 PLAS-SCNC: 19 MMOL/L (ref 22–29)
DEPRECATED HCO3 PLAS-SCNC: 23 MMOL/L (ref 22–29)
ERYTHROCYTE [DISTWIDTH] IN BLOOD BY AUTOMATED COUNT: 13.4 % (ref 10–15)
GFR SERPL CREATININE-BSD FRML MDRD: >90 ML/MIN/1.73M2
GFR SERPL CREATININE-BSD FRML MDRD: >90 ML/MIN/1.73M2
GLUCOSE BLDC GLUCOMTR-MCNC: 111 MG/DL (ref 70–99)
GLUCOSE BLDC GLUCOMTR-MCNC: 121 MG/DL (ref 70–99)
GLUCOSE BLDC GLUCOMTR-MCNC: 136 MG/DL (ref 70–99)
GLUCOSE BLDC GLUCOMTR-MCNC: 140 MG/DL (ref 70–99)
GLUCOSE BLDC GLUCOMTR-MCNC: 145 MG/DL (ref 70–99)
GLUCOSE BLDC GLUCOMTR-MCNC: 147 MG/DL (ref 70–99)
GLUCOSE BLDC GLUCOMTR-MCNC: 154 MG/DL (ref 70–99)
GLUCOSE BLDC GLUCOMTR-MCNC: 154 MG/DL (ref 70–99)
GLUCOSE BLDC GLUCOMTR-MCNC: 156 MG/DL (ref 70–99)
GLUCOSE BLDC GLUCOMTR-MCNC: 157 MG/DL (ref 70–99)
GLUCOSE BLDC GLUCOMTR-MCNC: 157 MG/DL (ref 70–99)
GLUCOSE BLDC GLUCOMTR-MCNC: 164 MG/DL (ref 70–99)
GLUCOSE BLDC GLUCOMTR-MCNC: 167 MG/DL (ref 70–99)
GLUCOSE BLDC GLUCOMTR-MCNC: 178 MG/DL (ref 70–99)
GLUCOSE BLDC GLUCOMTR-MCNC: 180 MG/DL (ref 70–99)
GLUCOSE BLDC GLUCOMTR-MCNC: 181 MG/DL (ref 70–99)
GLUCOSE BLDC GLUCOMTR-MCNC: 183 MG/DL (ref 70–99)
GLUCOSE BLDC GLUCOMTR-MCNC: 191 MG/DL (ref 70–99)
GLUCOSE BLDC GLUCOMTR-MCNC: 196 MG/DL (ref 70–99)
GLUCOSE BLDC GLUCOMTR-MCNC: 216 MG/DL (ref 70–99)
GLUCOSE BLDC GLUCOMTR-MCNC: 221 MG/DL (ref 70–99)
GLUCOSE BLDC GLUCOMTR-MCNC: 94 MG/DL (ref 70–99)
GLUCOSE BLDC GLUCOMTR-MCNC: 96 MG/DL (ref 70–99)
GLUCOSE SERPL-MCNC: 145 MG/DL (ref 70–99)
GLUCOSE SERPL-MCNC: 212 MG/DL (ref 70–99)
HCT VFR BLD AUTO: 35.3 % (ref 40–53)
HGB BLD-MCNC: 11.8 G/DL (ref 13.3–17.7)
MCH RBC QN AUTO: 29.7 PG (ref 26.5–33)
MCHC RBC AUTO-ENTMCNC: 33.4 G/DL (ref 31.5–36.5)
MCV RBC AUTO: 89 FL (ref 78–100)
OSMOLALITY SERPL: 310 MMOL/KG (ref 275–295)
PLATELET # BLD AUTO: 288 10E3/UL (ref 150–450)
POTASSIUM SERPL-SCNC: 3.2 MMOL/L (ref 3.4–5.3)
POTASSIUM SERPL-SCNC: 3.2 MMOL/L (ref 3.4–5.3)
POTASSIUM SERPL-SCNC: 3.7 MMOL/L (ref 3.4–5.3)
PROT SERPL-MCNC: 5.6 G/DL (ref 6.4–8.3)
RBC # BLD AUTO: 3.97 10E6/UL (ref 4.4–5.9)
SODIUM SERPL-SCNC: 135 MMOL/L (ref 136–145)
SODIUM SERPL-SCNC: 142 MMOL/L (ref 136–145)
WBC # BLD AUTO: 11.8 10E3/UL (ref 4–11)

## 2023-03-05 PROCEDURE — 258N000001 HC RX 258: Performed by: EMERGENCY MEDICINE

## 2023-03-05 PROCEDURE — 36415 COLL VENOUS BLD VENIPUNCTURE: CPT | Performed by: INTERNAL MEDICINE

## 2023-03-05 PROCEDURE — 85018 HEMOGLOBIN: CPT | Performed by: INTERNAL MEDICINE

## 2023-03-05 PROCEDURE — 82310 ASSAY OF CALCIUM: CPT | Performed by: INTERNAL MEDICINE

## 2023-03-05 PROCEDURE — 250N000011 HC RX IP 250 OP 636: Performed by: INTERNAL MEDICINE

## 2023-03-05 PROCEDURE — 99233 SBSQ HOSP IP/OBS HIGH 50: CPT | Performed by: INTERNAL MEDICINE

## 2023-03-05 PROCEDURE — 258N000003 HC RX IP 258 OP 636: Performed by: INTERNAL MEDICINE

## 2023-03-05 PROCEDURE — 82962 GLUCOSE BLOOD TEST: CPT

## 2023-03-05 PROCEDURE — 82010 KETONE BODYS QUAN: CPT | Performed by: INTERNAL MEDICINE

## 2023-03-05 PROCEDURE — 84132 ASSAY OF SERUM POTASSIUM: CPT | Performed by: INTERNAL MEDICINE

## 2023-03-05 PROCEDURE — 250N000011 HC RX IP 250 OP 636

## 2023-03-05 PROCEDURE — 80053 COMPREHEN METABOLIC PANEL: CPT | Performed by: INTERNAL MEDICINE

## 2023-03-05 PROCEDURE — 120N000001 HC R&B MED SURG/OB

## 2023-03-05 PROCEDURE — 250N000009 HC RX 250: Performed by: INTERNAL MEDICINE

## 2023-03-05 RX ORDER — ONDANSETRON 4 MG/1
4 TABLET, ORALLY DISINTEGRATING ORAL EVERY 6 HOURS PRN
Status: DISCONTINUED | OUTPATIENT
Start: 2023-03-05 | End: 2023-03-07 | Stop reason: HOSPADM

## 2023-03-05 RX ORDER — LIDOCAINE 40 MG/G
CREAM TOPICAL
Status: DISCONTINUED | OUTPATIENT
Start: 2023-03-05 | End: 2023-03-07 | Stop reason: HOSPADM

## 2023-03-05 RX ORDER — INSULIN LISPRO 100 [IU]/ML
INJECTION, SOLUTION INTRAVENOUS; SUBCUTANEOUS
COMMUNITY
End: 2023-06-15

## 2023-03-05 RX ORDER — NITROGLYCERIN 0.4 MG/1
0.4 TABLET SUBLINGUAL EVERY 5 MIN PRN
Status: DISCONTINUED | OUTPATIENT
Start: 2023-03-05 | End: 2023-03-07 | Stop reason: HOSPADM

## 2023-03-05 RX ORDER — POTASSIUM CHLORIDE 7.45 MG/ML
10 INJECTION INTRAVENOUS
Status: DISPENSED | OUTPATIENT
Start: 2023-03-05 | End: 2023-03-05

## 2023-03-05 RX ORDER — PROCHLORPERAZINE MALEATE 5 MG
5 TABLET ORAL EVERY 6 HOURS PRN
Status: DISCONTINUED | OUTPATIENT
Start: 2023-03-05 | End: 2023-03-07 | Stop reason: HOSPADM

## 2023-03-05 RX ORDER — PROCHLORPERAZINE 25 MG
25 SUPPOSITORY, RECTAL RECTAL EVERY 12 HOURS PRN
Status: DISCONTINUED | OUTPATIENT
Start: 2023-03-05 | End: 2023-03-07 | Stop reason: HOSPADM

## 2023-03-05 RX ORDER — HYDROMORPHONE HCL IN WATER/PF 6 MG/30 ML
0.2 PATIENT CONTROLLED ANALGESIA SYRINGE INTRAVENOUS ONCE
Status: COMPLETED | OUTPATIENT
Start: 2023-03-05 | End: 2023-03-05

## 2023-03-05 RX ORDER — POTASSIUM CHLORIDE 7.45 MG/ML
10 INJECTION INTRAVENOUS
Status: COMPLETED | OUTPATIENT
Start: 2023-03-05 | End: 2023-03-05

## 2023-03-05 RX ORDER — SODIUM CHLORIDE AND POTASSIUM CHLORIDE 150; 900 MG/100ML; MG/100ML
INJECTION, SOLUTION INTRAVENOUS CONTINUOUS
Status: DISCONTINUED | OUTPATIENT
Start: 2023-03-05 | End: 2023-03-05

## 2023-03-05 RX ORDER — ONDANSETRON 2 MG/ML
4 INJECTION INTRAMUSCULAR; INTRAVENOUS EVERY 6 HOURS PRN
Status: DISCONTINUED | OUTPATIENT
Start: 2023-03-05 | End: 2023-03-07 | Stop reason: HOSPADM

## 2023-03-05 RX ORDER — ACETAMINOPHEN 325 MG/1
650 TABLET ORAL EVERY 4 HOURS PRN
Status: DISCONTINUED | OUTPATIENT
Start: 2023-03-05 | End: 2023-03-07 | Stop reason: HOSPADM

## 2023-03-05 RX ORDER — DEXTROSE MONOHYDRATE, SODIUM CHLORIDE, AND POTASSIUM CHLORIDE 50; 1.49; 4.5 G/1000ML; G/1000ML; G/1000ML
INJECTION, SOLUTION INTRAVENOUS CONTINUOUS
Status: DISCONTINUED | OUTPATIENT
Start: 2023-03-05 | End: 2023-03-06

## 2023-03-05 RX ADMIN — DEXTROSE AND SODIUM CHLORIDE 1000 ML: 5; 450 INJECTION, SOLUTION INTRAVENOUS at 09:17

## 2023-03-05 RX ADMIN — SODIUM CHLORIDE 1000 ML: 9 INJECTION, SOLUTION INTRAVENOUS at 00:18

## 2023-03-05 RX ADMIN — ONDANSETRON 4 MG: 2 INJECTION INTRAMUSCULAR; INTRAVENOUS at 06:56

## 2023-03-05 RX ADMIN — DEXTROSE AND SODIUM CHLORIDE 500 ML: 5; 450 INJECTION, SOLUTION INTRAVENOUS at 03:00

## 2023-03-05 RX ADMIN — PROCHLORPERAZINE EDISYLATE 5 MG: 5 INJECTION INTRAMUSCULAR; INTRAVENOUS at 04:32

## 2023-03-05 RX ADMIN — ONDANSETRON 4 MG: 2 INJECTION INTRAMUSCULAR; INTRAVENOUS at 17:36

## 2023-03-05 RX ADMIN — DEXTROSE AND SODIUM CHLORIDE 500 ML: 5; 450 INJECTION, SOLUTION INTRAVENOUS at 01:08

## 2023-03-05 RX ADMIN — Medication 4 UNITS/HR: at 21:47

## 2023-03-05 RX ADMIN — Medication 3 UNITS/HR: at 02:59

## 2023-03-05 RX ADMIN — POTASSIUM CHLORIDE 10 MEQ: 7.46 INJECTION, SOLUTION INTRAVENOUS at 16:13

## 2023-03-05 RX ADMIN — Medication: at 12:14

## 2023-03-05 RX ADMIN — POTASSIUM CHLORIDE 10 MEQ: 7.46 INJECTION, SOLUTION INTRAVENOUS at 19:35

## 2023-03-05 RX ADMIN — HYDROMORPHONE HYDROCHLORIDE 0.2 MG: 0.2 INJECTION, SOLUTION INTRAMUSCULAR; INTRAVENOUS; SUBCUTANEOUS at 17:50

## 2023-03-05 RX ADMIN — PROCHLORPERAZINE EDISYLATE 5 MG: 5 INJECTION INTRAMUSCULAR; INTRAVENOUS at 11:30

## 2023-03-05 RX ADMIN — ONDANSETRON 4 MG: 2 INJECTION INTRAMUSCULAR; INTRAVENOUS at 03:14

## 2023-03-05 RX ADMIN — DEXTROSE AND SODIUM CHLORIDE 1000 ML: 5; 450 INJECTION, SOLUTION INTRAVENOUS at 05:09

## 2023-03-05 RX ADMIN — POTASSIUM CHLORIDE AND SODIUM CHLORIDE: 900; 150 INJECTION, SOLUTION INTRAVENOUS at 13:33

## 2023-03-05 RX ADMIN — POTASSIUM CHLORIDE 10 MEQ: 7.46 INJECTION, SOLUTION INTRAVENOUS at 20:50

## 2023-03-05 RX ADMIN — PROCHLORPERAZINE EDISYLATE 5 MG: 5 INJECTION INTRAMUSCULAR; INTRAVENOUS at 19:05

## 2023-03-05 RX ADMIN — POTASSIUM CHLORIDE 10 MEQ: 7.46 INJECTION, SOLUTION INTRAVENOUS at 17:33

## 2023-03-05 ASSESSMENT — ACTIVITIES OF DAILY LIVING (ADL)
ADLS_ACUITY_SCORE: 18
ADLS_ACUITY_SCORE: 35
ADLS_ACUITY_SCORE: 18
ADLS_ACUITY_SCORE: 35
ADLS_ACUITY_SCORE: 18

## 2023-03-05 NOTE — PLAN OF CARE
Goal Outcome Evaluation:         Pt is alert and oriented,up with standby assist in the room and to the bathroom. Pt continues on the insulin drip, alg #3. Some nausea overnight, IV Zofran and Compazine given with some relief. Tele reading [facility].

## 2023-03-05 NOTE — ED NOTES
[facility]  ED Nurse Handoff Report    Wally Avendano is a 32 year old male   ED Chief complaint: No chief complaint on file.  . ED Diagnosis:   Final diagnoses:   None     Allergies: No Known Allergies    Code Status: Full Code  Activity level - Baseline/Home:  Independent. Activity Level - Current:   Independent. Lift room needed: No. Bariatric: No   Needed: No   Isolation: No. Infection: Not Applicable.     Vital Signs:   Vitals:    03/04/23 1848 03/04/23 2030 03/04/23 2031 03/04/23 2032   BP:  130/85     Pulse: 93 118 113 111   Resp:       Temp:       TempSrc:       SpO2: 100% 99% 100% 100%       Cardiac Rhythm:  ,      Pain level:    Patient confused: No. Patient Falls Risk: No.   Elimination Status: Has voided   Patient Report - Initial Complaint: Patient's vomiting started this morning- dx of gastroparesis and DM1. Patient's son has recently had a 'stomach bug' and was throwing up the other night. Patient's glucose was 350 almost 2 hours ago. Patient states generalized abdominal pain- has diarrhea normally. Focused Assessment: hyperglycemia, vomiting  Tests Performed:   Labs Ordered and Resulted from Time of ED Arrival to Time of ED Departure   GLUCOSE BY METER - Abnormal       Result Value    GLUCOSE BY METER POCT 361 (*)    COMPREHENSIVE METABOLIC PANEL - Abnormal    Sodium 139      Potassium 3.6      Chloride 96 (*)     Carbon Dioxide (CO2) 19 (*)     Anion Gap 24 (*)     Urea Nitrogen 8.3      Creatinine 0.54 (*)     Calcium 9.6      Glucose 371 (*)     Alkaline Phosphatase 84      AST 33      ALT 43      Protein Total 7.7      Albumin 4.7      Bilirubin Total 0.4      GFR Estimate >90     LACTIC ACID WHOLE BLOOD - Abnormal    Lactic Acid 2.5 (*)    BLOOD GAS VENOUS - Abnormal    pH Venous 7.46 (*)     pCO2 Venous 28 (*)     pO2 Venous 43      Bicarbonate Venous 20 (*)     Base Excess/Deficit (+/-) -2.6      FIO2 0     KETONE BETA-HYDROXYBUTYRATE QUANTITATIVE, RAPID - Abnormal     Ketone (Beta-Hydroxybutyrate) Quantitative 4.40 (*)    CBC WITH PLATELETS AND DIFFERENTIAL - Abnormal    WBC Count 13.1 (*)     RBC Count 4.96      Hemoglobin 14.7      Hematocrit 43.2      MCV 87      MCH 29.6      MCHC 34.0      RDW 13.2      Platelet Count 374      % Neutrophils 95      % Lymphocytes 4      % Monocytes 1      % Eosinophils 0      % Basophils 0      % Immature Granulocytes 0      NRBCs per 100 WBC 0      Absolute Neutrophils 12.3 (*)     Absolute Lymphocytes 0.5 (*)     Absolute Monocytes 0.2      Absolute Eosinophils 0.0      Absolute Basophils 0.1      Absolute Immature Granulocytes 0.0      Absolute NRBCs 0.0     BASIC METABOLIC PANEL - Abnormal    Sodium 139      Potassium 3.6      Chloride 101      Carbon Dioxide (CO2) 18 (*)     Anion Gap 20 (*)     Urea Nitrogen 7.5      Creatinine 0.52 (*)     Calcium 8.2 (*)     Glucose 355 (*)     GFR Estimate >90     KETONE BETA-HYDROXYBUTYRATE QUANTITATIVE, RAPID - Abnormal    Ketone (Beta-Hydroxybutyrate) Quantitative 4.20 (*)    BLOOD GAS VENOUS - Abnormal    pH Venous 7.32      pCO2 Venous 38 (*)     pO2 Venous 42      Bicarbonate Venous 20 (*)     Base Excess/Deficit (+/-) -5.8      FIO2 3     MAGNESIUM - Normal    Magnesium 1.8     LACTIC ACID WHOLE BLOOD - Normal    Lactic Acid 1.3     LACTIC ACID WHOLE BLOOD      No orders to display      . Abnormal Results: see above.   Treatments provided: see MAR  Family Comments:   OBS brochure/video discussed/provided to patient:  N/A  ED Medications:   Medications   lactated ringers BOLUS 1,000 mL (1,000 mLs Intravenous $New Bag 3/4/23 2108)   dextrose 5% and 0.45% NaCl infusion (has no administration in time range)   dextrose 50 % injection 25-50 mL (has no administration in time range)   insulin 1 unit/1 mL in NS (NovoLIN, HumuLIN Regular) drip -ED DKA algorithm (has no administration in time range)   0.9% sodium chloride BOLUS (0 mLs Intravenous Stopped 3/4/23 1945)     Followed by   0.9% sodium  chloride BOLUS (0 mLs Intravenous Stopped 3/4/23 2032)   ondansetron (ZOFRAN) injection 4 mg (4 mg Intravenous $Given 3/4/23 1840)   ketorolac (TORADOL) injection 15 mg (15 mg Intravenous $Given 3/4/23 1839)   metoclopramide (REGLAN) injection 10 mg (10 mg Intravenous $Given 3/4/23 1950)   OLANZapine (zyPREXA) IV injection 2.5 mg (2.5 mg Intravenous $Given 3/4/23 2032)   prochlorperazine (COMPAZINE) injection 10 mg (10 mg Intravenous $Given 3/4/23 2108)     Drips infusing:  Yes  insulin  For the majority of the shift, the patient's behavior Green. Interventions performed were .    Sepsis treatment initiated: No     Patient tested for COVID 19 prior to admission: NO    ED Nurse Name/Phone Number: Delfina Alvarado RN,   9:33 PM  RECEIVING UNIT ED HANDOFF REVIEW    Above ED Nurse Handoff Report was reviewed: Yes  Reviewed by: Javy Quinones RN on March 5, 2023 at 5:31 PM

## 2023-03-05 NOTE — ED PROVIDER NOTES
"  History     Chief Complaint:  Abdominal Pain    The history is provided by the patient.      Wally Avendano is a 32 year old male with a history of type 1 diabetes with gastroparesis who presents with his father for abdominal pain.  Wally developed abdominal pain similar to prior episodes of gastroparesis related pain today.  He has had associated vomiting, estimating 10-20 episodes.  There has been small amounts of blood.  He has had 2 episodes of diarrhea.  He also remarks his son had a \"stomach bug\" 2 days ago and admits some of his symptoms may be related to a similar illness.  He had hyperglycemia at home. He denies fever, cough, and dysuria.  He uses marijuana a couple of times per day \"because it helps with my nausea.\"    Independent Historian:   As above    Review of External Notes:   I reviewed the patient's chart from 2/16/23 admission to [facility] for DKA. I reviewed the patient's chart from 1/20/23 admission to [facility] for similar symptoms.    ROS:  Review of Systems   Constitutional: Negative for fever.   Respiratory: Negative for cough.    Gastrointestinal: Positive for abdominal pain, diarrhea, nausea and vomiting.   Genitourinary: Negative for dysuria.   All other systems reviewed and are negative.    Allergies:  No Known Allergies     Medications:    Zofran  Reglan  Novolog  Humalog    Past Medical History:    Diabetes Type 1  DKA  Gastroparesis    Social History:  The patient presents to the ED via private car.  The patient presents to the ED with his father.  Has a son who attends .  The patient lost his job in December but is starting employment with [employer] soon.  Daily marijuana use.  Occasional alcohol use.  Denies tobacco use.    Physical Exam     Patient Vitals for the past 24 hrs:   BP Temp Temp src Pulse Resp SpO2   03/05/23 0014 90/44 -- -- 103 20 97 %   03/04/23 2231 -- -- -- 90 -- 98 %   03/04/23 2230 92/46 -- -- 89 -- 98 %   03/04/23 2032 -- -- -- 111 -- 100 %   03/04/23 2031 -- -- -- " 113 -- 100 %   03/04/23 2030 130/85 -- -- 118 -- 99 %   03/04/23 1848 -- -- -- 93 -- 100 %   03/04/23 1833 129/86 -- -- -- -- --   03/04/23 1752 (!) 168/88 97.7  F (36.5  C) Temporal (!) 135 26 99 %      Physical Exam  General: Well-developed and thin.  Chronically ill-appearing young  man. Cooperative.  Holding an emesis bag with emesis in it.  Head:  Atraumatic.  Eyes:  Conjunctivae, lids, and sclerae are normal.  Neck:  Supple. Normal range of motion.  CV:  Tachycardic rate and regular rhythm. Normal heart sounds with no murmurs, rubs, or gallops detected.  Resp:  No respiratory distress. Clear to auscultation bilaterally without decreased breath sounds, wheezing, rales, or rhonchi.  GI:  Soft. Non-distended.  Mild diffuse tenderness.    MS:  Normal ROM.   Skin:  Warm. Non-diaphoretic. No pallor.  Neuro:  Awake. A&Ox3. Normal strength.  Psych: Normal mood and affect. Normal speech.  Vitals reviewed.    Emergency Department Course   EKG  Indication: Tachycardia  Time: 1916  Rate 106 bpm. AK interval 158. QRS duration 92. QT/QTc 346/459.   Sinus tachycardia  Possible left atrial enlargement  Rightward axis  Borderline ECG  No acute ST changes.  No change as compared to prior, dated 1/20/23.    Laboratory:  Labs Ordered and Resulted from Time of ED Arrival to Time of ED Departure   GLUCOSE BY METER - Abnormal       Result Value    GLUCOSE BY METER POCT 361 (*)    COMPREHENSIVE METABOLIC PANEL - Abnormal    Sodium 139      Potassium 3.6      Chloride 96 (*)     Carbon Dioxide (CO2) 19 (*)     Anion Gap 24 (*)     Urea Nitrogen 8.3      Creatinine 0.54 (*)     Calcium 9.6      Glucose 371 (*)     Alkaline Phosphatase 84      AST 33      ALT 43      Protein Total 7.7      Albumin 4.7      Bilirubin Total 0.4      GFR Estimate >90     LACTIC ACID WHOLE BLOOD - Abnormal    Lactic Acid 2.5 (*)    BLOOD GAS VENOUS - Abnormal    pH Venous 7.46 (*)     pCO2 Venous 28 (*)     pO2 Venous 43      Bicarbonate Venous 20  (*)     Base Excess/Deficit (+/-) -2.6      FIO2 0     KETONE BETA-HYDROXYBUTYRATE QUANTITATIVE, RAPID - Abnormal    Ketone (Beta-Hydroxybutyrate) Quantitative 4.40 (*)    CBC WITH PLATELETS AND DIFFERENTIAL - Abnormal    WBC Count 13.1 (*)     RBC Count 4.96      Hemoglobin 14.7      Hematocrit 43.2      MCV 87      MCH 29.6      MCHC 34.0      RDW 13.2      Platelet Count 374      % Neutrophils 95      % Lymphocytes 4      % Monocytes 1      % Eosinophils 0      % Basophils 0      % Immature Granulocytes 0      NRBCs per 100 WBC 0      Absolute Neutrophils 12.3 (*)     Absolute Lymphocytes 0.5 (*)     Absolute Monocytes 0.2      Absolute Eosinophils 0.0      Absolute Basophils 0.1      Absolute Immature Granulocytes 0.0      Absolute NRBCs 0.0     BASIC METABOLIC PANEL - Abnormal    Sodium 139      Potassium 3.6      Chloride 101      Carbon Dioxide (CO2) 18 (*)     Anion Gap 20 (*)     Urea Nitrogen 7.5      Creatinine 0.52 (*)     Calcium 8.2 (*)     Glucose 355 (*)     GFR Estimate >90     KETONE BETA-HYDROXYBUTYRATE QUANTITATIVE, RAPID - Abnormal    Ketone (Beta-Hydroxybutyrate) Quantitative 4.20 (*)    BLOOD GAS VENOUS - Abnormal    pH Venous 7.32      pCO2 Venous 38 (*)     pO2 Venous 42      Bicarbonate Venous 20 (*)     Base Excess/Deficit (+/-) -5.8      FIO2 3     GLUCOSE BY METER - Abnormal    GLUCOSE BY METER POCT 297 (*)    GLUCOSE BY METER - Abnormal    GLUCOSE BY METER POCT 254 (*)    MAGNESIUM - Normal    Magnesium 1.8     LACTIC ACID WHOLE BLOOD - Normal    Lactic Acid 1.3     BLOOD CULTURE      Emergency Department Course & Assessments:  Interventions:  Medications   lactated ringers BOLUS 1,000 mL (1,000 mLs Intravenous $New Bag 3/4/23 2108)   insulin 1 unit/1 mL in NS (NovoLIN, HumuLIN Regular) drip -ED DKA algorithm (3.5U/hr at 2142)   0.9% sodium chloride BOLUS (1,000 mLs Intravenous Stopped 3/4/23 1945)     Followed by   0.9% sodium chloride BOLUS (1,000 mLs Intravenous Stopped 3/4/23 2032)    ondansetron (ZOFRAN) injection 4 mg (4 mg Intravenous $Given 3/4/23 1840)   ketorolac (TORADOL) injection 15 mg (15 mg Intravenous $Given 3/4/23 1839)   metoclopramide (REGLAN) injection 10 mg (10 mg Intravenous $Given 3/4/23 1950)   OLANZapine (zyPREXA) IV injection 2.5 mg (2.5 mg Intravenous $Given 3/4/23 2032)   prochlorperazine (COMPAZINE) injection 10 mg (10 mg Intravenous $Given 3/4/23 2108)      Assessments:  1822 I assessed and examined the patient.  2045 I updated the patient and his father. The patient still has dry heaves. He is agreeable to admission.     Independent Interpretation (X-rays, CTs, rhythm strip):  Not applicable    Consultations/Discussion of Management or Tests:  2111 I discussed the patient with Dr. Gonsalez, hospitalist.    Social Determinants of Health affecting care:   Recently lost his insurance.  Supportive family.  About to start a new job.    Disposition:  The patient was admitted to the hospital under the care of Dr. Gonsalez.     Impression & Plan    CMS Diagnoses: The Lactic acid level is elevated due to dehydration related to vomiting, at this time there is no sign of severe sepsis or septic shock.    Medical Decision Making:  Wally is a 32 year old type I diabetic presenting with 1 day of abdominal pain, vomiting, and diarrhea similar to prior episodes of gastroparesis related symptoms.  He also has had a sick contact with gastroenteritis.  He has had no fever and appears well, though chronically ill, on exam.  He is actively vomiting and tachycardic.    EKG confirms sinus tachycardia with normal, narrow intervals.  His initial labs had a somewhat mixed picture as he had hyperglycemia of 371 with anion gap of 24 and bicarb of 19.  However, on VBG his bicarbonate was minimally low at 20 and his pH was 7.46.  He did have a lactic acidosis to 2.5 which could have been contributing to his anion gap metabolic acidosis.  As such, I initially treated him with 2 L of NS and simply  repeated these laboratory studies.  Unfortunately, it does appear that he has persistent mild or early DKA with bicarbonate of 18 and an anion gap of 20.  pH still normal at 7.32.  However, ketones were initially high and remained high at 4.2.  Lactate did normalize.  IV insulin infusion was initiated as well as a third liter LR bolus.    He does not have kidney injury.  He does not have transaminitis or hyperbilirubinemia.  He does not have significant abdominal tenderness and remarks these symptoms have been very similar to those in the past so abdominal imaging can safely be deferred.  There is no anemia.  He has mild leukocytosis to 13.1 which is likely stress demargination from his illness.    He was given Toradol and Zyprexa for pain.  His nausea was difficult to control despite Zofran, Reglan, and Compazine.  He clearly requires hospitalization for closing of his anion gap/DKA treatment as well as symptom control.  It is certainly possible this is all related to gastroparesis but a recent sick contact also raises question of gastroenteritis.  He also uses marijuana daily and I suspect there is a component of cannabinoid hyperemesis here.  I updated him and his father and answered all their questions.  They verbalized understanding and are amenable. I discussed the patient's case with Dr. Gonsalez, hospitalist, who accepts admission and has no further orders.    Diagnosis:    ICD-10-CM    1. Diabetic ketoacidosis without coma associated with type 1 diabetes mellitus (H)  E10.10       2. Leukocytosis, unspecified type  D72.829       3. Lactic acidosis  E87.20       4. Ketonemia  R79.89       5. Gastroparesis  K31.84            Scribe Disclosure:  I, Christofer Ware, am serving as a scribe at 6:30 PM on 3/4/2023 to document services personally performed by Dorinda Up MD based on my observations and the provider's statements to me.     3/4/2023   Dorinda Up MD Dixson, Kylie S, MD  03/07/23 3046

## 2023-03-05 NOTE — PHARMACY-ADMISSION MEDICATION HISTORY
"Admission medication history interview status for this patient is complete. See [facility] admission navigator for allergy information, prior to admission medications and immunization status.     Medication history interview done, indicate source(s): Patient  Medication history resources (including written lists, pill bottles, clinic record): [facility] and Care Everywhere  Pharmacy: [id number]    Changes made to PTA medication list:  Added: Humalog  Changed: None  Reported as Not Taking: None  Removed: metoclopramide, ondansetron    Actions taken by pharmacist (provider contacted, etc):None     Additional medication history information:None    Medication reconciliation/reorder completed by provider prior to medication history?  No   (Y/N)     Medication Affordability:  Not including over the counter (OTC) medications, was there a time in the past 12 months when you did not take your medications as prescribed because of cost?: No     For patients on insulin therapy:   Do you use sliding scale insulin based on blood sugars? Yes  Do you typically eat three meals a day? \"Most days\"  How many times do you check your blood glucose per day? CGM  How many episodes of hypoglycemia do you typically have per month? 1x/month  Do you have a Continuous Glucose Monitor (CGM)?  Yes    Prior to Admission medications    Medication Sig Last Dose Taking? Auth Provider Long Term End Date   Glucagon (BAQSIMI ONE PACK) 3 MG/DOSE POWD Use only for severe hypoglycemia.  at PRN Yes Radha Davalos PA-C Yes    insulin glargine (LANTUS PEN) 100 UNIT/ML pen Inject 20 Units Subcutaneous every morning 3/4/2023 Yes Deonna Salazar CNP Yes    insulin lispro (HUMALOG KWIKPEN) 100 UNIT/ML (1 unit dial) KWIKPEN 1 unit per 15g of carbs with meals Past Week Yes Unknown, Entered By History Yes    acetone urine (KETOSTIX) test strip Test urine ketones when BG above 300 or when sick. Use up 2 strips a day.   Deonna Salazar CNP     blood glucose " (NO BRAND SPECIFIED) lancets standard Use to test blood sugar 4 times daily or as directed.   Deonna Salazar CNP     blood glucose (NO BRAND SPECIFIED) lancets standard To use to test glucose level in the blood  Use to test blood sugar  3  times daily as directed. To accompany glucose monitor brands per insurance coverage.   Jackelin Perez, DO     blood glucose (NO BRAND SPECIFIED) test strip Use to test blood sugar three times daily as directed. To accompany glucose monitor brands per insurance coverage.   Latoya Perez MD Yes    blood glucose calibration (NO BRAND SPECIFIED) solution Used to calibrate the blood glucose monitor as needed and as directed.  To accompany  blood glucose brands per insurance coverage   Jackelin Perez, DO     blood glucose monitoring (CONTOUR NEXT MONITOR W/DEVICE KIT) meter device kit    Reported, Patient     blood glucose monitoring (NO BRAND SPECIFIED) meter device kit Use to test blood sugar 4 times daily or as directed.   Fuad Landeros MD     Continuous Blood Gluc Sensor (DEXCOM G6 SENSOR) MISC Change every 10 days.   Deonna Salazar CNP     Continuous Blood Gluc Transmit (DEXCOM G6 TRANSMITTER) MISC CHANGE EVERY 3 MONTHS   Deonna Salazar CNP     insulin pen needle (32G X 4 MM) 32G X 4 MM miscellaneous Use 6 pen needles daily or as directed.   Deonna Salazar CNP     insulin pen needle 30G X 5 MM Use 6 pen needles daily or as directed.   Deonna Salazar CNP

## 2023-03-05 NOTE — H&P
[facility]    History and Physical - Hospitalist Service       Date of Admission:  3/4/2023    Assessment & Plan      Wally Avendano is a 32 year old male with medical history of type 1 diabetes mellitus with gastroparesis, admitted on 3/4/2023.  With intractable vomiting, hyperglycemia and diabetic DKA      Type 1 diabetes mellitus with hyperglycemia and DKA:  - IV insulin drip until acidosis is resolved  - Aggressive IV rehydration.  - Serial blood sugar and serum electrolytes checkS.  Replete as needed.    Diabetic gastroparesis:  - Antiemetics.  - Pain management.     Diet:  Clear liquid diet; advance as tolerated.  DVT Prophylaxis: Pneumatic Compression Devices and Anti-embolisim stockings (TEDs)  Fuentes Catheter: Not present  Lines: None     Cardiac Monitoring: ACTIVE order. Indication: DKA  Code Status:  FULL CODE    Clinically Significant Risk Factors Present on Admission          # Hypocalcemia: Lowest Ca = 8.2 mg/dL in last 2 days, will monitor and replace as appropriate    # Anion Gap Metabolic Acidosis: Highest Anion Gap = 24 mmol/L in last 2 days, will monitor and treat as appropriate                   Disposition Plan Home     Expected Discharge Date: 03/06/2023                  Ashley Gonsalez MD  Hospitalist Service  [facility]  Securely message with [employer] (more info)  Text page via [facility] Paging/Directory     ______________________________________________________________________    Chief Complaint   Vomiting x1 day    History is obtained from the patient    History of Present Illness   Wally Avendano is a 32 year old male who presented to the ED with intractable vomiting and generalized abdominal pain for 1 day.  Compliant with his insulin regimen.  No fever, chest pain, shortness of breath, hematemesis,  change in bowel habits, melena or hematochezia.  Admits excessive thirst and polyuria.      Past Medical History    Past Medical History:   Diagnosis Date      Diabetes (H)     type 1     Known health problems: none        Past Surgical History   Past Surgical History:   Procedure Laterality Date     ESOPHAGOSCOPY, GASTROSCOPY, DUODENOSCOPY (EGD), COMBINED N/A 7/28/2021    Procedure: ESOPHAGOGASTRODUODENOSCOPY, WITH BIOPSY with duodenum biopsies for celiac sprue and gastric biopsies for h.Pylori by cold biopsy forceps;  Surgeon: Mikel Restrepo MD;  Location:  GI     NO HISTORY OF SURGERY         Prior to Admission Medications   Prior to Admission Medications   Prescriptions Last Dose Informant Patient Reported? Taking?   Continuous Blood Gluc Sensor (DEXCOM G6 SENSOR) MISC   No No   Sig: Change every 10 days.   Continuous Blood Gluc Transmit (DEXCOM G6 TRANSMITTER) MISC   No No   Sig: CHANGE EVERY 3 MONTHS   Glucagon (BAQSIMI ONE PACK) 3 MG/DOSE POWD   No No   Sig: Use only for severe hypoglycemia.   acetone urine (KETOSTIX) test strip   No No   Sig: Test urine ketones when BG above 300 or when sick. Use up 2 strips a day.   blood glucose (NO BRAND SPECIFIED) lancets standard   No No   Sig: To use to test glucose level in the blood  Use to test blood sugar  3  times daily as directed. To accompany glucose monitor brands per insurance coverage.   blood glucose (NO BRAND SPECIFIED) lancets standard   No No   Sig: Use to test blood sugar 4 times daily or as directed.   blood glucose (NO BRAND SPECIFIED) test strip   No No   Sig: Use to test blood sugar three times daily as directed. To accompany glucose monitor brands per insurance coverage.   blood glucose calibration (NO BRAND SPECIFIED) solution   No No   Sig: Used to calibrate the blood glucose monitor as needed and as directed.  To accompany  blood glucose brands per insurance coverage   blood glucose monitoring (CONTOUR NEXT MONITOR W/DEVICE KIT) meter device kit   Yes No   blood glucose monitoring (NO BRAND SPECIFIED) meter device kit   No No   Sig: Use to test blood sugar 4 times daily or as directed.    insulin glargine (LANTUS PEN) 100 UNIT/ML pen   No No   Sig: Inject 20 Units Subcutaneous every morning   insulin pen needle (32G X 4 MM) 32G X 4 MM miscellaneous   No No   Sig: Use 6 pen needles daily or as directed.   insulin pen needle 30G X 5 MM   No No   Sig: Use 6 pen needles daily or as directed.   metoclopramide (REGLAN) 10 MG tablet   No No   Sig: Take 1 tablet (10 mg) by mouth 3 times daily (before meals)   ondansetron (ZOFRAN ODT) 4 MG ODT tab   No No   Sig: Take 1 tablet (4 mg) by mouth every 8 hours as needed for nausea      Facility-Administered Medications: None        Review of Systems    The 10 point Review of Systems is negative other than noted in the HPI     Social History   I have reviewed this patient's social history and updated it with pertinent information if needed.  Social History     Tobacco Use     Smoking status: Never     Smokeless tobacco: Never   Substance Use Topics     Alcohol use: Yes     Drug use: Never       Family History   I have reviewed this patient's family history and updated it with pertinent information if needed.  Family History   Problem Relation Age of Onset     Other - See Comments Mother         PBC       Allergies   No Known Allergies     Physical Exam   Vital Signs: Temp: 97.7  F (36.5  C) Temp src: Temporal BP: 92/46 Pulse: 90   Resp: 26 SpO2: 98 %      Weight: 0 lbs 0 oz    General Appearance: Well-nourished and well-developed.  Acutely ill looking.  In no respiratory distress  Eyes: Usable round and reactive to light.  Extraocular movements intact.  HEENT: Normocephalic and atraumatic.  Dry mucous membranes.  Neck supple.  No JVD.  Respiratory: Clear to auscultation.  Good air entry bilaterally.  Cardiovascular: Regular rate and rhythm.  S1 and S2 normal.  No murmurs gallops or rubs.  GI: Soft, nontender, not distended.  Bowel sounds present.  No organomegaly.  Lymph/Hematologic: No palpable lymph nodes.  Genitourinary: No CVA tenderness.  Skin: Good turgor.   Warm and dry.  No rash or wounds.  Musculoskeletal: Full range of motion x4.  No edema.  Distal pulses 2+.  Neurologic: Alert.  Oriented x3.  No focal signs.  Psychiatric: Good eye contact.  Normal affect.  No hallucinations or delusions.    Medical Decision Making     78 MINUTES SPENT BY ME on the date of service doing chart review, history, exam, documentation & further activities per the note.      Data     I have personally reviewed the following data over the past 24 hrs:    13.1 (H)  \   14.7   / 374     139 101 7.5 /  254 (H)   3.6 18 (L) 0.52 (L) \       ALT: 43 AST: 33 AP: 84 TBILI: 0.4   ALB: 4.7 TOT PROTEIN: 7.7 LIPASE: N/A       Procal: N/A CRP: N/A Lactic Acid: 1.3         Imaging results reviewed over the past 24 hrs:   No results found for this or any previous visit (from the past 24 hour(s)).

## 2023-03-05 NOTE — PROGRESS NOTES
[facility]  Hospitalist Progress Note  Admit 3/4/2023  6:13 PM    Name: Wally Avendano    MRN: [id number]  Provider:  Javier Martinez MD, formerly [facility]    Date of Service: 03/05/2023     Reason for Stay (Diagnosis): DKA         Summary of hospital stay & Assessment/Plan:   Summary of Stay: Wally Avendano is a 32 year old male who was admitted on 3/4/2023    32-year-old male with a history of type 1 diabetes presents to the hospital with abdominal pain and vomiting. The patient states he had recent flair of gastroparesis and he had roughly 10-20 episodes of vomiting with faint blood. He is unemployed and unable to pay for an insulin pump but using subcutaneous insulin. His glucose level is high 350 and he has had two episodes of diarrhea.   He denies fever, cough, dysuria, chest pain, shortness of breath, hematemesis, melena or hematochezia. He has a son who developed a stomach bug and is in  with ill contacts. He was previously admitted for DKA on 2/16/23 and was compliant with his insulin regimen.  He also has a recent admission here in January of this year with intractable nausea and vomiting.  He did admit to using cannabinoid when he started having symptoms to treat the nausea however he has been instructed before that he might have hyperemesis syndrome related to use of marijuana.      Problem List:   1. Diabetic ketoacidosis  2. Gastroparesis Flare vs Cannabis Hyperemesis Syndrome    With significant decrease in oral intake I would continue him on insulin drip, acidosis has resolved however he will be difficult to manage with subcutaneous insulin and minimal oral intake  Continue IV fluid hydration normal saline with potassium  Monitor BMP and electrolytes      DVT Prophylaxis: Pneumatic Compression Devices  Code Status:  Prior    Disposition Plan     Expected discharge in 2  days to prior living arrangement once tolerating p.o. intake.     Entered: Javier Martinez MD 03/05/2023, 12:45 PM                  Interval History:       Continues to have significant nausea even with water  Today's plan detailed above discussed with nursing               Physical Exam:   Physical Exam   Temp: 98  F (36.7  C) Temp src: Oral BP: 115/66 Pulse: 93   Resp: 20 SpO2: 98 % O2 Device: None (Room air)    Vitals:    03/05/23 0400   Weight: 69 kg (152 lb 1.9 oz)     I/O last 3 completed shifts:  In: -   Out: 100 [Emesis/NG output:100]          GENERAL:  Comfortable.   PSYCH: pleasant, oriented, No acute distress.  EYES: PERRLA, Normal conjunctiva.  HEART:  Normal S1, S2 with no edema.  LUNGS:  Clear to auscultation, normal Respiratory effort.  ABDOMEN: Mild tenderness diffuse, no hepatosplenomegaly, normal bowel sounds.  SKIN:  Dry to touch, No rash.  Neuro: Non focal with normal motor power, sensation, CN's and Reflexes.    Medications     0.9% sodium chloride + KCl 20 mEq/L       dextrose 5% and 0.45% NaCl 250 mL/hr at 03/05/23 1117     insulin 3 mL/hr at 03/05/23 1214       Data     -Data reviewed today:  I personally reviewed  all new labs and imaging results over the last 24 hours.    Recent Labs   Lab 03/05/23  0836 03/04/23 2309 03/04/23  1842   WBC 11.8* 12.9* 13.1*   HGB 11.8* 12.4* 14.7   HCT 35.3* 37.2* 43.2   MCV 89 89 87    337 374     Recent Labs   Lab 03/05/23  1213 03/05/23  1107 03/05/23  1002 03/05/23  0654 03/05/23  0618 03/05/23  0008 03/04/23  2309 03/04/23  2139 03/04/23 2008 03/04/23  1842   NA  --   --   --   --  142  --   --   --  139 139   POTASSIUM  --   --   --   --  3.7  --  4.5  --  3.6 3.6   CHLORIDE  --   --   --   --  108*  --   --   --  101 96*   CO2  --   --   --   --  19*  --   --   --  18* 19*   ANIONGAP  --   --   --   --  15  --   --   --  20* 24*   * 167* 191*   < > 145*   < >  --    < > 355* 371*   BUN  --   --   --   --  7.2  --   --   --  7.5 8.3   CR  --   --   --   --  0.56*  --   --   --  0.52* 0.54*   GFRESTIMATED  --   --   --   --  >90  --   --   --  >90 >90    MARY  --   --   --   --  8.4*  --   --   --  8.2* 9.6    < > = values in this interval not displayed.       No results found for this or any previous visit (from the past 24 hour(s)).    This document was produced using voice recognition software

## 2023-03-05 NOTE — PROGRESS NOTES
An ABG was completed on the pt's right arm @ 2311 on RA  with no complications noted during the procedure.

## 2023-03-06 LAB
ALBUMIN SERPL BCG-MCNC: 3.6 G/DL (ref 3.5–5.2)
ALP SERPL-CCNC: 64 U/L (ref 40–129)
ALT SERPL W P-5'-P-CCNC: 28 U/L (ref 10–50)
ANION GAP SERPL CALCULATED.3IONS-SCNC: 10 MMOL/L (ref 7–15)
ANION GAP SERPL CALCULATED.3IONS-SCNC: 16 MMOL/L (ref 7–15)
AST SERPL W P-5'-P-CCNC: 29 U/L (ref 10–50)
ATRIAL RATE - MUSE: 106 BPM
BILIRUB SERPL-MCNC: 0.5 MG/DL
BUN SERPL-MCNC: 1.8 MG/DL (ref 6–20)
BUN SERPL-MCNC: <1.4 MG/DL (ref 6–20)
CALCIUM SERPL-MCNC: 8.4 MG/DL (ref 8.6–10)
CALCIUM SERPL-MCNC: 8.5 MG/DL (ref 8.6–10)
CHLORIDE SERPL-SCNC: 95 MMOL/L (ref 98–107)
CHLORIDE SERPL-SCNC: 97 MMOL/L (ref 98–107)
CREAT SERPL-MCNC: 0.48 MG/DL (ref 0.67–1.17)
CREAT SERPL-MCNC: 0.49 MG/DL (ref 0.67–1.17)
DEPRECATED HCO3 PLAS-SCNC: 21 MMOL/L (ref 22–29)
DEPRECATED HCO3 PLAS-SCNC: 26 MMOL/L (ref 22–29)
DIASTOLIC BLOOD PRESSURE - MUSE: NORMAL MMHG
ERYTHROCYTE [DISTWIDTH] IN BLOOD BY AUTOMATED COUNT: 13.5 % (ref 10–15)
GFR SERPL CREATININE-BSD FRML MDRD: >90 ML/MIN/1.73M2
GFR SERPL CREATININE-BSD FRML MDRD: >90 ML/MIN/1.73M2
GLUCOSE BLDC GLUCOMTR-MCNC: 106 MG/DL (ref 70–99)
GLUCOSE BLDC GLUCOMTR-MCNC: 123 MG/DL (ref 70–99)
GLUCOSE BLDC GLUCOMTR-MCNC: 126 MG/DL (ref 70–99)
GLUCOSE BLDC GLUCOMTR-MCNC: 128 MG/DL (ref 70–99)
GLUCOSE BLDC GLUCOMTR-MCNC: 129 MG/DL (ref 70–99)
GLUCOSE BLDC GLUCOMTR-MCNC: 132 MG/DL (ref 70–99)
GLUCOSE BLDC GLUCOMTR-MCNC: 140 MG/DL (ref 70–99)
GLUCOSE BLDC GLUCOMTR-MCNC: 145 MG/DL (ref 70–99)
GLUCOSE BLDC GLUCOMTR-MCNC: 157 MG/DL (ref 70–99)
GLUCOSE BLDC GLUCOMTR-MCNC: 163 MG/DL (ref 70–99)
GLUCOSE BLDC GLUCOMTR-MCNC: 176 MG/DL (ref 70–99)
GLUCOSE BLDC GLUCOMTR-MCNC: 195 MG/DL (ref 70–99)
GLUCOSE BLDC GLUCOMTR-MCNC: 200 MG/DL (ref 70–99)
GLUCOSE BLDC GLUCOMTR-MCNC: 208 MG/DL (ref 70–99)
GLUCOSE BLDC GLUCOMTR-MCNC: 210 MG/DL (ref 70–99)
GLUCOSE BLDC GLUCOMTR-MCNC: 215 MG/DL (ref 70–99)
GLUCOSE BLDC GLUCOMTR-MCNC: 222 MG/DL (ref 70–99)
GLUCOSE BLDC GLUCOMTR-MCNC: 230 MG/DL (ref 70–99)
GLUCOSE BLDC GLUCOMTR-MCNC: 237 MG/DL (ref 70–99)
GLUCOSE BLDC GLUCOMTR-MCNC: 249 MG/DL (ref 70–99)
GLUCOSE SERPL-MCNC: 175 MG/DL (ref 70–99)
GLUCOSE SERPL-MCNC: 245 MG/DL (ref 70–99)
HCT VFR BLD AUTO: 39.2 % (ref 40–53)
HGB BLD-MCNC: 13.1 G/DL (ref 13.3–17.7)
INTERPRETATION ECG - MUSE: NORMAL
MCH RBC QN AUTO: 29.9 PG (ref 26.5–33)
MCHC RBC AUTO-ENTMCNC: 33.4 G/DL (ref 31.5–36.5)
MCV RBC AUTO: 90 FL (ref 78–100)
P AXIS - MUSE: 70 DEGREES
PLATELET # BLD AUTO: 296 10E3/UL (ref 150–450)
POTASSIUM SERPL-SCNC: 3 MMOL/L (ref 3.4–5.3)
POTASSIUM SERPL-SCNC: 3.7 MMOL/L (ref 3.4–5.3)
POTASSIUM SERPL-SCNC: 4.1 MMOL/L (ref 3.4–5.3)
POTASSIUM SERPL-SCNC: 4.2 MMOL/L (ref 3.4–5.3)
PR INTERVAL - MUSE: 158 MS
PROT SERPL-MCNC: 5.9 G/DL (ref 6.4–8.3)
QRS DURATION - MUSE: 92 MS
QT - MUSE: 346 MS
QTC - MUSE: 459 MS
R AXIS - MUSE: 109 DEGREES
RBC # BLD AUTO: 4.38 10E6/UL (ref 4.4–5.9)
SODIUM SERPL-SCNC: 131 MMOL/L (ref 136–145)
SODIUM SERPL-SCNC: 134 MMOL/L (ref 136–145)
SYSTOLIC BLOOD PRESSURE - MUSE: NORMAL MMHG
T AXIS - MUSE: 60 DEGREES
VENTRICULAR RATE- MUSE: 106 BPM
WBC # BLD AUTO: 7.8 10E3/UL (ref 4–11)

## 2023-03-06 PROCEDURE — 84132 ASSAY OF SERUM POTASSIUM: CPT | Performed by: HOSPITALIST

## 2023-03-06 PROCEDURE — 36415 COLL VENOUS BLD VENIPUNCTURE: CPT | Performed by: INTERNAL MEDICINE

## 2023-03-06 PROCEDURE — 84132 ASSAY OF SERUM POTASSIUM: CPT | Performed by: INTERNAL MEDICINE

## 2023-03-06 PROCEDURE — 258N000003 HC RX IP 258 OP 636: Performed by: INTERNAL MEDICINE

## 2023-03-06 PROCEDURE — 250N000011 HC RX IP 250 OP 636: Performed by: HOSPITALIST

## 2023-03-06 PROCEDURE — 250N000012 HC RX MED GY IP 250 OP 636 PS 637: Performed by: HOSPITALIST

## 2023-03-06 PROCEDURE — 258N000003 HC RX IP 258 OP 636: Performed by: HOSPITALIST

## 2023-03-06 PROCEDURE — 85027 COMPLETE CBC AUTOMATED: CPT | Performed by: INTERNAL MEDICINE

## 2023-03-06 PROCEDURE — 250N000009 HC RX 250: Performed by: INTERNAL MEDICINE

## 2023-03-06 PROCEDURE — 36415 COLL VENOUS BLD VENIPUNCTURE: CPT | Performed by: HOSPITALIST

## 2023-03-06 PROCEDURE — 120N000001 HC R&B MED SURG/OB

## 2023-03-06 PROCEDURE — 250N000011 HC RX IP 250 OP 636: Performed by: INTERNAL MEDICINE

## 2023-03-06 PROCEDURE — 99232 SBSQ HOSP IP/OBS MODERATE 35: CPT | Performed by: HOSPITALIST

## 2023-03-06 RX ORDER — METOCLOPRAMIDE HYDROCHLORIDE 5 MG/ML
10 INJECTION INTRAMUSCULAR; INTRAVENOUS EVERY 6 HOURS PRN
Status: DISCONTINUED | OUTPATIENT
Start: 2023-03-06 | End: 2023-03-06

## 2023-03-06 RX ORDER — NALOXONE HYDROCHLORIDE 0.4 MG/ML
0.4 INJECTION, SOLUTION INTRAMUSCULAR; INTRAVENOUS; SUBCUTANEOUS
Status: DISCONTINUED | OUTPATIENT
Start: 2023-03-06 | End: 2023-03-07 | Stop reason: HOSPADM

## 2023-03-06 RX ORDER — DEXTROSE MONOHYDRATE 25 G/50ML
25-50 INJECTION, SOLUTION INTRAVENOUS
Status: DISCONTINUED | OUTPATIENT
Start: 2023-03-06 | End: 2023-03-07 | Stop reason: HOSPADM

## 2023-03-06 RX ORDER — NICOTINE POLACRILEX 4 MG
15-30 LOZENGE BUCCAL
Status: DISCONTINUED | OUTPATIENT
Start: 2023-03-06 | End: 2023-03-07 | Stop reason: HOSPADM

## 2023-03-06 RX ORDER — METOCLOPRAMIDE HYDROCHLORIDE 5 MG/ML
10 INJECTION INTRAMUSCULAR; INTRAVENOUS
Status: DISCONTINUED | OUTPATIENT
Start: 2023-03-06 | End: 2023-03-07 | Stop reason: HOSPADM

## 2023-03-06 RX ORDER — POTASSIUM CHLORIDE 7.45 MG/ML
10 INJECTION INTRAVENOUS
Status: COMPLETED | OUTPATIENT
Start: 2023-03-06 | End: 2023-03-06

## 2023-03-06 RX ORDER — NALOXONE HYDROCHLORIDE 0.4 MG/ML
0.2 INJECTION, SOLUTION INTRAMUSCULAR; INTRAVENOUS; SUBCUTANEOUS
Status: DISCONTINUED | OUTPATIENT
Start: 2023-03-06 | End: 2023-03-07 | Stop reason: HOSPADM

## 2023-03-06 RX ORDER — SODIUM CHLORIDE 9 MG/ML
INJECTION, SOLUTION INTRAVENOUS CONTINUOUS
Status: DISCONTINUED | OUTPATIENT
Start: 2023-03-06 | End: 2023-03-07 | Stop reason: HOSPADM

## 2023-03-06 RX ORDER — MORPHINE SULFATE 2 MG/ML
2 INJECTION, SOLUTION INTRAMUSCULAR; INTRAVENOUS EVERY 6 HOURS PRN
Status: DISCONTINUED | OUTPATIENT
Start: 2023-03-06 | End: 2023-03-06

## 2023-03-06 RX ADMIN — INSULIN GLARGINE 15 UNITS: 100 INJECTION, SOLUTION SUBCUTANEOUS at 17:24

## 2023-03-06 RX ADMIN — METOCLOPRAMIDE HYDROCHLORIDE 10 MG: 5 INJECTION INTRAMUSCULAR; INTRAVENOUS at 10:11

## 2023-03-06 RX ADMIN — METOCLOPRAMIDE HYDROCHLORIDE 10 MG: 5 INJECTION INTRAMUSCULAR; INTRAVENOUS at 17:23

## 2023-03-06 RX ADMIN — POTASSIUM CHLORIDE 10 MEQ: 7.46 INJECTION, SOLUTION INTRAVENOUS at 05:06

## 2023-03-06 RX ADMIN — METOCLOPRAMIDE HYDROCHLORIDE 10 MG: 5 INJECTION INTRAMUSCULAR; INTRAVENOUS at 03:55

## 2023-03-06 RX ADMIN — INSULIN ASPART 3 UNITS: 100 INJECTION, SOLUTION INTRAVENOUS; SUBCUTANEOUS at 17:25

## 2023-03-06 RX ADMIN — Medication 4 UNITS/HR: at 00:11

## 2023-03-06 RX ADMIN — ONDANSETRON 4 MG: 2 INJECTION INTRAMUSCULAR; INTRAVENOUS at 00:12

## 2023-03-06 RX ADMIN — MORPHINE SULFATE 2 MG: 2 INJECTION, SOLUTION INTRAMUSCULAR; INTRAVENOUS at 08:20

## 2023-03-06 RX ADMIN — POTASSIUM CHLORIDE 10 MEQ: 7.46 INJECTION, SOLUTION INTRAVENOUS at 07:02

## 2023-03-06 RX ADMIN — POTASSIUM CHLORIDE 10 MEQ: 7.46 INJECTION, SOLUTION INTRAVENOUS at 02:00

## 2023-03-06 RX ADMIN — Medication 4 UNITS/HR: at 16:11

## 2023-03-06 RX ADMIN — ONDANSETRON 4 MG: 2 INJECTION INTRAMUSCULAR; INTRAVENOUS at 06:20

## 2023-03-06 RX ADMIN — POTASSIUM CHLORIDE, DEXTROSE MONOHYDRATE AND SODIUM CHLORIDE: 150; 5; 450 INJECTION, SOLUTION INTRAVENOUS at 00:12

## 2023-03-06 RX ADMIN — POTASSIUM CHLORIDE 10 MEQ: 7.46 INJECTION, SOLUTION INTRAVENOUS at 04:02

## 2023-03-06 RX ADMIN — ONDANSETRON 4 MG: 2 INJECTION INTRAMUSCULAR; INTRAVENOUS at 13:38

## 2023-03-06 RX ADMIN — PROCHLORPERAZINE EDISYLATE 5 MG: 5 INJECTION INTRAMUSCULAR; INTRAVENOUS at 01:44

## 2023-03-06 RX ADMIN — PROCHLORPERAZINE EDISYLATE 5 MG: 5 INJECTION INTRAMUSCULAR; INTRAVENOUS at 08:06

## 2023-03-06 RX ADMIN — SODIUM CHLORIDE: 9 INJECTION, SOLUTION INTRAVENOUS at 17:35

## 2023-03-06 RX ADMIN — MORPHINE SULFATE 2 MG: 2 INJECTION, SOLUTION INTRAMUSCULAR; INTRAVENOUS at 02:24

## 2023-03-06 RX ADMIN — Medication: at 08:03

## 2023-03-06 RX ADMIN — INSULIN ASPART 1 UNITS: 100 INJECTION, SOLUTION INTRAVENOUS; SUBCUTANEOUS at 17:23

## 2023-03-06 RX ADMIN — POTASSIUM CHLORIDE 10 MEQ: 7.46 INJECTION, SOLUTION INTRAVENOUS at 03:07

## 2023-03-06 RX ADMIN — POTASSIUM CHLORIDE, DEXTROSE MONOHYDRATE AND SODIUM CHLORIDE: 150; 5; 450 INJECTION, SOLUTION INTRAVENOUS at 10:15

## 2023-03-06 RX ADMIN — POTASSIUM CHLORIDE 10 MEQ: 7.46 INJECTION, SOLUTION INTRAVENOUS at 06:13

## 2023-03-06 ASSESSMENT — ACTIVITIES OF DAILY LIVING (ADL)
ADLS_ACUITY_SCORE: 18

## 2023-03-06 NOTE — PLAN OF CARE
"/63 (BP Location: Left arm, Patient Position: Right side, Cuff Size: Adult Regular)   Pulse 89   Temp 98  F (36.7  C) (Oral)   Resp 16   Ht 1.88 m (6' 2\")   Wt 69 kg (152 lb 1.9 oz)   SpO2 95%   BMI 19.53 kg/m      Aox4. Reports abdominal pain 9/10. MD paged for IV PRN as pt cannot keep down PO meds. PRN morphine 2 mg ordered 6hr. Given x1 w/ relief per pt. VSS on RA. Pt nasueated and vomiting and dry heaving throughout shift. PRN compazine and zofran given w/ little to no relief. MD paged and PRN Reglan ordered w/ relief per pt. x2 PIV on L. D5 1/2 NS @ 100 ml/hr. Insulin running per DKA algorithm. K replaced IV and recheck in for 10:32 AM. Tele: SR. Per tele 5 beats of V tach. Pt asymptomatic. HR up to 160s when pt ambulating to bathroom, but back to 90s at rest. Full liquid diet. Independent in room. Plan: IVF, insulin drip, nausea and pain control. Continue w/ POC.   "

## 2023-03-06 NOTE — PROVIDER NOTIFICATION
MD paged: Pt still vomiting. PRN Compazine and Zofran given. Pt requesting Reglan. Can we get an order? Thanks.     MD placed order.

## 2023-03-06 NOTE — PROVIDER NOTIFICATION
MD paged: Pt on insulin drip. Can you place IMC orders. Also, pt on NS w/ 20 KCl. Last B. Do you want any D5 1/2 running as well? Thank you.     MD placed orders. Thanks.

## 2023-03-06 NOTE — PLAN OF CARE
Goal Outcome Evaluation:         Admitting Diagnosis:  DKA  Pertinent History:Type 1 DM, recent gastroparesis.  For vitals and assessment please see flow sheet.   Living Situation: Home  Pain plan:  c/o abdominal pain, offered Tylenol, pt refused. C/o nausea, prn IV compazine given.   Mobility:  assistance, stand-by, independent  Baseline activity:Independent.   Alarms/Safety: steady gait.   LDA's: 2 PIV.   Pertinent test results: K+ 3.2, replaced. M.6.   Consults:  none  Abnormals/Pending: none  Other Cares/Comments: A & O, VSS, Tele SR, SL clear, Insulin drip off @ 2250. IVF infusing.   Discharge Disposition:  TBD.   Discharge Time:  TBD.

## 2023-03-06 NOTE — PROVIDER NOTIFICATION
MD paged. pt wants to get for abdominal pain.  pain rate 8/10 .  please advise.  Thank you.   MD placed order.   MD paged. pt's  Insulin drip started @ 2000.  there is no order for 0.9% sodium chloride.   Please advise.   Thank you.

## 2023-03-06 NOTE — PROGRESS NOTES
[facility]    Hospitalist Progress Note      Assessment & Plan   Wally Avendano is a 32-year-old male with a history of type 1 diabetes presents to the hospital with abdominal pain and vomiting.     #Mild DKA with gastroparesis flare: Pt has had multiple admissions including last one in January 2023.  He was seen by GI during that visit and it was recommended that he schedule reglan prior to meals for his gastroparesis.  He had previous gastric emptying study that showed 30% retention at 4 hours consistent with severe gastroparesis.    -He is unemployed and unable to pay for an insulin pump but using subcutaneous insulin. His glucose level is high 350 and he has had two episodes of diarrhea.   -Denies fever, cough, dysuria, chest pain, shortness of breath, hematemesis, melena or hematochezia. He has a son who developed a stomach bug and is in  with ill contacts.   -He was admitted to [facility] for DKA on 2/16/23 and was compliant with his insulin regimen.  -On 3/6, pt still with significant nausea and poor PO intake.   -Will schedule reglan per previous GI recs. Continue D5 containing fluids with insulin gtt for now pending improved PO intake.  His anion gap also trended up on AM of 3/6.  Will recheck BMP @ 1500.  If improved and tolerating PO intake, will start subcutaneous insulin regimen.  -Stop opiates as could be making gastroparesis worse.  Discussed with patient.     Addendum: BMP improved. Bicarb WNL. Anion gap WNL.  Tolerating more PO.  Will transition off insulin gtt and start lantus 15 units daily now given less PO intake compared to baseline (usually does 20 units at home). Start carb count 1 unit for 15 grams carbs with meals along with sliding scale insulin.  Stop D5/0.45 NS and continue NS overnight.  Discussed with nursing.      #Hypokalemia, hypomagnesemia: Replacement protocol.     DVT Prophylaxis: Pneumatic Compression Devices  Code Status: Full Code  Dispo:  Pending improvement in symptoms. Hopeful in next 24-48 hours.    Jaycob Uribe MD    Interval History   Pt still with minimal PO intake. Immediate Nausea and emesis with any liquids yesterday night. No fevers.  Anion gap a bit up today. D5 drip started last night.     -Data reviewed today: I reviewed all new labs and imaging results over the last 24 hours    Physical Exam   Temp: 96.8  F (36  C) Temp src: Axillary BP: (!) 150/95 Pulse: 102   Resp: 16 SpO2: 98 % O2 Device: None (Room air)    Vitals:    03/05/23 0400   Weight: 69 kg (152 lb 1.9 oz)     Vital Signs with Ranges  Temp:  [96.8  F (36  C)-98.5  F (36.9  C)] 96.8  F (36  C)  Pulse:  [] 102  Resp:  [16-20] 16  BP: (108-153)/(59-95) 150/95  SpO2:  [93 %-99 %] 98 %  I/O last 3 completed shifts:  In: 1609 [P.O.:1600; I.V.:9]  Out: 450 [Emesis/NG output:450]    Constitutional: Nontoxic, NAD  HEENT: Normocephalic. MMM, No elevation of JVD noted.   Respiratory: Nl WOB, Clear bilaterally, No wheezes or crackles  Cardiovascular: Regular, no murmur  GI: Soft, BS+. Minimal tenderness to palpation. No rebound or guarding.   Skin/Integumen: WWP, no rash. No edema  Neuro: CNII-XII intact. Moves all extremities. No tremor. A&Ox3.    Medications     dextrose 5% and 0.45% NaCl + KCl 20 mEq/L 100 mL/hr at 03/06/23 1015     dextrose 5% and 0.45% NaCl Stopped (03/05/23 1332)     insulin 8 mL/hr at 03/06/23 1209       metoclopramide  10 mg Intravenous TID w/meals     sodium chloride (PF)  3 mL Intracatheter Q8H       Data   Recent Labs   Lab 03/06/23  1207 03/06/23  1059 03/06/23  1055 03/06/23  0957 03/06/23  0700 03/06/23  0625 03/06/23  0208 03/06/23  0056 03/05/23  1433 03/05/23  1346 03/05/23  0900 03/05/23  0836 03/05/23  0654 03/05/23  0618 03/05/23  0008 03/04/23  2309   WBC  --   --   --   --   --  7.8  --   --   --   --   --  11.8*  --   --   --  12.9*   HGB  --   --   --   --   --  13.1*  --   --   --   --   --  11.8*  --   --   --  12.4*   MCV  --   --   --   --    --  90  --   --   --   --   --  89  --   --   --  89   PLT  --   --   --   --   --  296  --   --   --   --   --  288  --   --   --  337   NA  --   --   --   --   --  134*  --   --   --  135*  --   --   --  142  --   --    POTASSIUM  --   --  4.2  --   --  4.1  --  3.0*   < > 3.2*  --   --   --  3.7  --  4.5   CHLORIDE  --   --   --   --   --  97*  --   --   --  101  --   --   --  108*  --   --    CO2  --   --   --   --   --  21*  --   --   --  23  --   --   --  19*  --   --    BUN  --   --   --   --   --  1.8*  --   --   --  4.3*  --   --   --  7.2  --   --    CR  --   --   --   --   --  0.48*  --   --   --  0.48*  --   --   --  0.56*  --   --    ANIONGAP  --   --   --   --   --  16*  --   --   --  11  --   --   --  15  --   --    MARY  --   --   --   --   --  8.4*  --   --   --  7.9*  --   --   --  8.4*  --   --    * 222*  --  210*   < > 175*   < >  --    < > 212*   < >  --    < > 145*   < >  --    ALBUMIN  --   --   --   --   --  3.6  --   --   --   --   --   --   --  3.6  --   --    PROTTOTAL  --   --   --   --   --  5.9*  --   --   --   --   --   --   --  5.6*  --   --    BILITOTAL  --   --   --   --   --  0.5  --   --   --   --   --   --   --  0.3  --   --    ALKPHOS  --   --   --   --   --  64  --   --   --   --   --   --   --  61  --   --    ALT  --   --   --   --   --  28  --   --   --   --   --   --   --  29  --   --    AST  --   --   --   --   --  29  --   --   --   --   --   --   --  23  --   --     < > = values in this interval not displayed.       No results found for this or any previous visit (from the past 24 hour(s)).

## 2023-03-06 NOTE — PLAN OF CARE
12:50 Text page sent to MD to update: Pt has eaten some food (broth, fruit ice, diet soda) and feels pretty good.  Nausea is better and he has not vomited. He has had 2 soft stools and has hypoactive bowel sounds.    Shift Summary: Pt is alert and oriented.  States he is feeling a little better his afternoon. Abdominal pain and nausea are improving. Ambulates in room with stand by assist (to help with the tubes and cords.)  Steady gait. Lungs clear. Tele: SR with episodes of tachycardia with activity.  Tolerating full liquid diet.  Voiding without difficulty. 2 PIV infusing maintenance fluids and insulin drip per orders. Continue anti-nausea meds, pain control, insulin drip and tele.  Anion gap was 16 this morning.  Re-check planned for this afternoon.    14:28  Text page sent to update, The previous insulin pump appears to have been malfunctioning.  Exchanged in for a new one.Started the DKA algorithm over from the begining.

## 2023-03-06 NOTE — PROVIDER NOTIFICATION
MD paged: Pt having 9/10 abdominal pain and vomiting. Can we have an IV dilaudid PRN please? Thank you.    MD placed order for morphine.

## 2023-03-07 VITALS
OXYGEN SATURATION: 97 % | BODY MASS INDEX: 19.52 KG/M2 | SYSTOLIC BLOOD PRESSURE: 135 MMHG | RESPIRATION RATE: 16 BRPM | DIASTOLIC BLOOD PRESSURE: 88 MMHG | HEIGHT: 74 IN | WEIGHT: 152.12 LBS | TEMPERATURE: 98.1 F | HEART RATE: 98 BPM

## 2023-03-07 LAB
ANION GAP SERPL CALCULATED.3IONS-SCNC: 13 MMOL/L (ref 7–15)
BUN SERPL-MCNC: 1.8 MG/DL (ref 6–20)
CALCIUM SERPL-MCNC: 8.7 MG/DL (ref 8.6–10)
CHLORIDE SERPL-SCNC: 96 MMOL/L (ref 98–107)
CREAT SERPL-MCNC: 0.5 MG/DL (ref 0.67–1.17)
DEPRECATED HCO3 PLAS-SCNC: 26 MMOL/L (ref 22–29)
GFR SERPL CREATININE-BSD FRML MDRD: >90 ML/MIN/1.73M2
GLUCOSE BLDC GLUCOMTR-MCNC: 198 MG/DL (ref 70–99)
GLUCOSE SERPL-MCNC: 176 MG/DL (ref 70–99)
POTASSIUM SERPL-SCNC: 4.1 MMOL/L (ref 3.4–5.3)
SODIUM SERPL-SCNC: 135 MMOL/L (ref 136–145)

## 2023-03-07 PROCEDURE — 36415 COLL VENOUS BLD VENIPUNCTURE: CPT | Performed by: HOSPITALIST

## 2023-03-07 PROCEDURE — 250N000011 HC RX IP 250 OP 636: Performed by: HOSPITALIST

## 2023-03-07 PROCEDURE — 258N000003 HC RX IP 258 OP 636: Performed by: HOSPITALIST

## 2023-03-07 PROCEDURE — 99239 HOSP IP/OBS DSCHRG MGMT >30: CPT | Performed by: HOSPITALIST

## 2023-03-07 PROCEDURE — 80048 BASIC METABOLIC PNL TOTAL CA: CPT | Performed by: HOSPITALIST

## 2023-03-07 PROCEDURE — 250N000011 HC RX IP 250 OP 636: Performed by: INTERNAL MEDICINE

## 2023-03-07 RX ORDER — LORAZEPAM 2 MG/ML
INJECTION INTRAMUSCULAR
Status: DISCONTINUED
Start: 2023-03-07 | End: 2023-03-07 | Stop reason: HOSPADM

## 2023-03-07 RX ORDER — METOCLOPRAMIDE 10 MG/1
10 TABLET ORAL
Qty: 90 TABLET | Refills: 0 | Status: SHIPPED | OUTPATIENT
Start: 2023-03-07 | End: 2023-04-06

## 2023-03-07 RX ORDER — PROCHLORPERAZINE MALEATE 10 MG
10 TABLET ORAL EVERY 8 HOURS PRN
Qty: 30 TABLET | Refills: 0 | Status: SHIPPED | OUTPATIENT
Start: 2023-03-07 | End: 2023-04-06

## 2023-03-07 RX ORDER — LORAZEPAM 2 MG/ML
0.5 INJECTION INTRAMUSCULAR ONCE
Status: COMPLETED | OUTPATIENT
Start: 2023-03-07 | End: 2023-03-07

## 2023-03-07 RX ADMIN — INSULIN GLARGINE 15 UNITS: 100 INJECTION, SOLUTION SUBCUTANEOUS at 09:57

## 2023-03-07 RX ADMIN — PROCHLORPERAZINE EDISYLATE 5 MG: 5 INJECTION INTRAMUSCULAR; INTRAVENOUS at 06:26

## 2023-03-07 RX ADMIN — LORAZEPAM 0.5 MG: 2 INJECTION INTRAMUSCULAR; INTRAVENOUS at 03:58

## 2023-03-07 RX ADMIN — ONDANSETRON 4 MG: 2 INJECTION INTRAMUSCULAR; INTRAVENOUS at 00:13

## 2023-03-07 RX ADMIN — SODIUM CHLORIDE: 9 INJECTION, SOLUTION INTRAVENOUS at 03:38

## 2023-03-07 RX ADMIN — PROCHLORPERAZINE EDISYLATE 5 MG: 5 INJECTION INTRAMUSCULAR; INTRAVENOUS at 00:32

## 2023-03-07 RX ADMIN — INSULIN ASPART 1 UNITS: 100 INJECTION, SOLUTION INTRAVENOUS; SUBCUTANEOUS at 10:54

## 2023-03-07 RX ADMIN — METOCLOPRAMIDE HYDROCHLORIDE 10 MG: 5 INJECTION INTRAMUSCULAR; INTRAVENOUS at 08:00

## 2023-03-07 ASSESSMENT — ACTIVITIES OF DAILY LIVING (ADL)
ADLS_ACUITY_SCORE: 18

## 2023-03-07 NOTE — PLAN OF CARE
"Goal Outcome Evaluation:      Plan of Care Reviewed With: patient    Overall Patient Progress: no changeOverall Patient Progress: no change       Tele: SR/ST HR 80-150s. HR increases when pt up to BR, does not increase as much when vomitting.  BP (!) 139/103 (BP Location: Right arm)   Pulse 117   Temp 97.4  F (36.3  C) (Axillary)   Resp 20   Ht 1.88 m (6' 2\")   Wt 69 kg (152 lb 1.9 oz)   SpO2 99%   BMI 19.53 kg/m     Physical assessment WDL.Continuous nausea overnight, medicated as ordered, MD paged for additional medication, refer to provider notification note.    Refer to VS, I/O, Adult PCS for full assessment & shift details.  Continue w/current POC  Disposition plan pending ability to tolerate PO nutrition.  Chloe Berman, BSN, RN  Medical/Telemetry - 3         "

## 2023-03-07 NOTE — PLAN OF CARE
Goal Outcome Evaluation:       Alert and oriented x4. FC. Ind in room. IMC orders discontinued this am. Nauseous and vomiting. LR running at 100mL/hr. GI consulted. Mod cho diet; 6 small meals/day. K protocol; recheck ordered for am.  this am. Ate breakfast and tolerated well.   Tele removed, PIV removed. All belongings packed up and discharge education reviewed. Voiced understanding and all questions answered. Discharged unit at 1115am.

## 2023-03-07 NOTE — PLAN OF CARE
Goal Outcome Evaluation:       Admitting Diagnosis:  DKA  Pertinent History:Type 1 DM, recent gastroparesis.  For vitals and assessment please see flow sheet.   Living Situation: Home  Pain plan:  denies pain  Mobility:  assistance, stand-by, independent  Baseline activity:Independent.   Alarms/Safety: steady gait.   LDA's: 2 PIV.   Pertinent test results: K+ 3.7, M.6. .  Consults:  none  Abnormals/Pending: none  Other Cares/Comments: A & O, VSS, Tele SR, LS clear, Insulin drip off @ 1839. IVF infusing.   Discharge Disposition:  TBD.   Discharge Time:  TBD.

## 2023-03-07 NOTE — PLAN OF CARE
"Goal Outcome Evaluation:      Plan of Care Reviewed With: patient    Overall Patient Progress: improvingOverall Patient Progress: improving    Outcome Evaluation: Tolerating foods, BG stable.  Problem: Plan of Care - These are the overarching goals to be used throughout the patient stay.    Goal: Plan of Care Review  Description: The Plan of Care Review/Shift note should be completed every shift.  The Outcome Evaluation is a brief statement about your assessment that the patient is improving, declining, or no change.  This information will be displayed automatically on your shift note.  Outcome: Adequate for Care Transition  Flowsheets (Taken 3/7/2023 1116)  Outcome Evaluation: Tolerating foods, BG stable.  Plan of Care Reviewed With: patient  Overall Patient Progress: improving  Goal: Patient-Specific Goal (Individualized)  Description: You can add care plan individualizations to a care plan. Examples of Individualization might be:  \"Parent requests to be called daily at 9am for status\", \"I have a hard time hearing out of my right ear\", or \"Do not touch me to wake me up as it startles me\".  Outcome: Adequate for Care Transition  Flowsheets (Taken 3/7/2023 1116)  Individualized Care Needs: Able to tolerate food.  Anxieties, Fears or Concerns: nothing voiced. Wants to go home.  Goal: Absence of Hospital-Acquired Illness or Injury  Outcome: Adequate for Care Transition  Intervention: Identify and Manage Fall Risk  Recent Flowsheet Documentation  Taken 3/7/2023 1002 by Tierra Ivey RN  Safety Promotion/Fall Prevention:   nonskid shoes/slippers when out of bed   patient and family education   safety round/check completed  Intervention: Prevent Skin Injury  Recent Flowsheet Documentation  Taken 3/7/2023 1002 by Tierra Ivey RN  Body Position: position changed independently  Intervention: Prevent and Manage VTE (Venous Thromboembolism) Risk  Recent Flowsheet Documentation  Taken 3/7/2023 1002 by Loni, " Tierra RODRIGEZ RN  VTE Prevention/Management: SCDs (sequential compression devices) off  Goal: Optimal Comfort and Wellbeing  Outcome: Adequate for Care Transition  Goal: Readiness for Transition of Care  Outcome: Adequate for Care Transition  Flowsheets (Taken 3/7/2023 1116)  Anticipated Changes Related to Illness: none  Transportation Anticipated: (Plan to take Uber at discharge.) other (see comments)  Concerns to be Addressed:   all concerns addressed in this encounter   denies needs/concerns at this time  Intervention: Mutually Develop Transition Plan  Recent Flowsheet Documentation  Taken 3/7/2023 1116 by Tierra Ivey RN  Anticipated Changes Related to Illness: none  Transportation Anticipated: (Plan to take Uber at discharge.) other (see comments)  Concerns to be Addressed:   all concerns addressed in this encounter   denies needs/concerns at this time     Problem: Diabetic Ketoacidosis  Goal: Optimal Coping  Outcome: Adequate for Care Transition  Goal: Fluid and Electrolyte Balance with Absence of Ketosis  Outcome: Adequate for Care Transition

## 2023-03-07 NOTE — PROVIDER NOTIFICATION
"Admitting MD web base paged due to Pt has remained nauseated all shift thus far, emesis of clear water of ~600 mL since start of shift. Informed MD of Zofran is ineffective, Compazine has \"some what worked\" per pt report. Informed MD it is too soon to give either again, & pt is on scheduled Reglan.Inquired if could try IV Ativan for continued nausea.  Await return of page/new orders.  ADDM: Received a one time order for Ativan 0.5 mg IV from Dr. Trevor Berman, BSN, RN  Medical/Telemetry - 3      "

## 2023-03-07 NOTE — DISCHARGE SUMMARY
[facility]    Discharge Summary  Hospitalist    Date of Admission:  3/4/2023  Date of Discharge:  3/7/2023  Discharging Provider: Jaycob Uribe MD  Date of Service (when I saw the patient): 03/07/23    Discharge Diagnoses   #Type 1 DM with mild DKA  #Severe gastroparesis with flare  #Hypokalemia, hypomagnesemia    Hospital Course   Wally Avendano is a 32-year-old male with a history of type 1 diabetes presents to the hospital with abdominal pain and vomiting.      #Mild DKA with gastroparesis flare: Pt has had multiple admissions including last one in January 2023.  He was seen by GI during that visit and it was recommended that he schedule reglan prior to meals for his gastroparesis.  He had previous gastric emptying study that showed 30% retention at 4 hours consistent with severe gastroparesis.    -He is unemployed and unable to pay for an insulin pump but using subcutaneous insulin. His glucose level is high 350 and he has had two episodes of diarrhea.   -Denies fever, cough, dysuria, chest pain, shortness of breath, hematemesis, melena or hematochezia. He has a son who developed a stomach bug and is in  with ill contacts.   -He was admitted to [facility] for DKA on 2/16/23 and was compliant with his insulin regimen.  -Pt was treated with insulin gtt and IVF.  Reglan was scheduled with meals with improvement in his nausea symptoms.  He was transitioned to subcutaneous insulin regimen.  He will continue on his home insulin regimen on discharge (20 units lantus in AM starting on 3/8, carb count 1:15).  Pt was encouraged to eat several smaller meals through the day instead of 3 larger meals given severity of his gastroparesis.  -I discussed with gastroenterology on call on day of discharge.  They will work to have him seen in outpatient clinic by their gastroparesis specialist in coming weeks.  If recurrent symptoms over coming weeks could consider addition of erythromycin  therapy.       #Hypokalemia, hypomagnesemia: Replacement protocol.     Jaycob Uribe MD      Pending Results   These results will be followed up by Hospital medicine  Unresulted Labs Ordered in the Past 30 Days of this Admission     Date and Time Order Name Status Description    3/4/2023 11:01 PM Blood Culture Arm, Right Preliminary           Code Status   Full Code       Primary Care Physician   [facility]    Physical Exam   Temp: 97.4  F (36.3  C) Temp src: Axillary BP: 135/88 Pulse: 117   Resp: 16 SpO2: 99 % O2 Device: None (Room air)    Vitals:    03/05/23 0400   Weight: 69 kg (152 lb 1.9 oz)     Vital Signs with Ranges  Temp:  [96.8  F (36  C)-98.6  F (37  C)] 97.4  F (36.3  C)  Pulse:  [] 117  Resp:  [16-22] 16  BP: (119-150)/() 135/88  SpO2:  [96 %-100 %] 99 %  I/O last 3 completed shifts:  In: 2128 [P.O.:920; I.V.:1208]  Out: 600 [Emesis/NG output:600]    Constitutional: Nontoxic, NAD  HEENT: Normocephalic. MMM, No elevation of JVD noted.   Respiratory: Nl WOB, Clear bilaterally, No wheezes or crackles  Cardiovascular: Regular, no murmur  GI: Soft, BS+. No tenderness.  No rebound or guarding.   Skin/Integumen: WWP, no rash. No edema  Neuro: CNII-XII intact. Moves all extremities. No tremor. A&Ox3    Discharge Disposition   Discharged to home  Condition at discharge: Stable    Consultations This Hospital Stay   GASTROENTEROLOGY IP CONSULT    Time Spent on this Encounter   I, Jaycob Uribe MD, personally saw the patient today and spent greater than 30 minutes discharging this patient.    Discharge Orders      Reason for your hospital stay    You were hospitalized for gastroparesis flare.  You were treated with scheduled reglan which should be continued on discharge.  You also had mild DKA and were treated on insulin gtt.  You will continue on your home subcutaneous insulin regimen.  Ensure you check your BS at least 4x per day.  Follow up with PCP in next week or so.  I also  recommend you follow up with [facility] in near future for severe gastroparesis.     Follow-up and recommended labs and tests     Follow up with primary care provider, [facility], within 7 days for hospital follow- up.  The following labs/tests are recommended: BMP.  Maintain BS log from home values.    Follow up with [facility] for severe gastroparesis.     Activity    Your activity upon discharge: activity as tolerated     Full Code     Diet    Follow this diet upon discharge: Orders Placed This Encounter      Combination Diet Moderate Consistent Carb (60 g CHO per Meal) Diet; Six Small Feedings Adult     Discharge Medications   Current Discharge Medication List      START taking these medications    Details   metoclopramide (REGLAN) 10 MG tablet Take 1 tablet (10 mg) by mouth 3 times daily (with meals) for 30 days  Qty: 90 tablet, Refills: 0    Associated Diagnoses: Gastroparesis      prochlorperazine (COMPAZINE) 10 MG tablet Take 1 tablet (10 mg) by mouth every 8 hours as needed for nausea or vomiting  Qty: 30 tablet, Refills: 0    Associated Diagnoses: Gastroparesis         CONTINUE these medications which have NOT CHANGED    Details   Glucagon (BAQSIMI ONE PACK) 3 MG/DOSE POWD Use only for severe hypoglycemia.  Qty: 1 each, Refills: 1    Associated Diagnoses: Type 1 diabetes mellitus with other specified complication (H)      insulin glargine (LANTUS PEN) 100 UNIT/ML pen Inject 20 Units Subcutaneous every morning  Qty: 15 mL, Refills: 1    Comments: If Lantus is not covered by insurance, may substitute Basaglar or Semglee or other insulin glargine product per insurance preference at same dose and frequency.    Associated Diagnoses: Diabetic ketoacidosis without coma associated with type 1 diabetes mellitus (H)      insulin lispro (HUMALOG KWIKPEN) 100 UNIT/ML (1 unit dial) KWIKPEN 1 unit per 15g of carbs with meals      acetone urine (KETOSTIX) test strip Test urine ketones when BG  above 300 or when sick. Use up 2 strips a day.  Qty: 50 strip, Refills: 3    Associated Diagnoses: Type 1 diabetes mellitus with other specified complication (H)      !! blood glucose (NO BRAND SPECIFIED) lancets standard Use to test blood sugar 4 times daily or as directed.  Qty: 200 lancet, Refills: 3    Associated Diagnoses: Diabetic ketoacidosis without coma associated with type 1 diabetes mellitus (H)      !! blood glucose (NO BRAND SPECIFIED) lancets standard To use to test glucose level in the blood  Use to test blood sugar  3  times daily as directed. To accompany glucose monitor brands per insurance coverage.  Qty: 100 each, Refills: 0    Associated Diagnoses: Diabetic ketoacidosis without coma associated with type 1 diabetes mellitus (H); Type 1 diabetes mellitus with other specified complication (H)      blood glucose (NO BRAND SPECIFIED) test strip Use to test blood sugar three times daily as directed. To accompany glucose monitor brands per insurance coverage.  Qty: 350 strip, Refills: 3    Associated Diagnoses: Diabetic ketoacidosis without coma associated with type 1 diabetes mellitus (H); Type 1 diabetes mellitus with other specified complication (H)      blood glucose calibration (NO BRAND SPECIFIED) solution Used to calibrate the blood glucose monitor as needed and as directed.  To accompany  blood glucose brands per insurance coverage  Qty: 1 each, Refills: 0    Associated Diagnoses: Diabetic ketoacidosis without coma associated with type 1 diabetes mellitus (H); Type 1 diabetes mellitus with other specified complication (H)      blood glucose monitoring (CONTOUR NEXT MONITOR W/DEVICE KIT) meter device kit       blood glucose monitoring (NO BRAND SPECIFIED) meter device kit Use to test blood sugar 4 times daily or as directed.  Qty: 1 kit, Refills: 0    Associated Diagnoses: Diabetic ketoacidosis without coma associated with type 1 diabetes mellitus (H)      Continuous Blood Gluc Sensor (DEXCOM G6  SENSOR) MISC Change every 10 days.  Qty: 3 each, Refills: 11    Associated Diagnoses: Type 1 diabetes mellitus with other specified complication (H)      Continuous Blood Gluc Transmit (DEXCOM G6 TRANSMITTER) MISC CHANGE EVERY 3 MONTHS  Qty: 1 each, Refills: 3    Associated Diagnoses: Type 1 diabetes mellitus with other specified complication (H)      !! insulin pen needle (32G X 4 MM) 32G X 4 MM miscellaneous Use 6 pen needles daily or as directed.  Qty: 200 each, Refills: 6    Associated Diagnoses: Type 1 diabetes mellitus with other specified complication (H)      !! insulin pen needle 30G X 5 MM Use 6 pen needles daily or as directed.  Qty: 200 each, Refills: 11    Associated Diagnoses: Type 1 diabetes mellitus with other specified complication (H)       !! - Potential duplicate medications found. Please discuss with provider.        Allergies   No Known Allergies  Data   Most Recent 3 CBC's:  Recent Labs   Lab Test 03/06/23  0625 03/05/23  0836 03/04/23  2309   WBC 7.8 11.8* 12.9*   HGB 13.1* 11.8* 12.4*   MCV 90 89 89    288 337      Most Recent 3 BMP's:  Recent Labs   Lab Test 03/07/23  0740 03/07/23  0203 03/06/23  2219 03/06/23  1459 03/06/23  1444 03/06/23  1059 03/06/23  1055 03/06/23  0700 03/06/23  0625   *  --   --   --  131*  --   --   --  134*   POTASSIUM 4.1  --   --   --  3.7  --  4.2  --  4.1   CHLORIDE 96*  --   --   --  95*  --   --   --  97*   CO2 26  --   --   --  26  --   --   --  21*   BUN 1.8*  --   --   --  <1.4*  --   --   --  1.8*   CR 0.50*  --   --   --  0.49*  --   --   --  0.48*   ANIONGAP 13  --   --   --  10  --   --   --  16*   MARY 8.7  --   --   --  8.5*  --   --   --  8.4*   * 198* 157*   < > 245*   < >  --    < > 175*    < > = values in this interval not displayed.     Most Recent 2 LFT's:  Recent Labs   Lab Test 03/06/23  0625 03/05/23  0618   AST 29 23   ALT 28 29   ALKPHOS 64 61   BILITOTAL 0.5 0.3     Most Recent INR's and Anticoagulation Dosing  History:  Anticoagulation Dose History    There is no flowsheet data to display.       Most Recent 3 Troponin's:No lab results found.  Most Recent Cholesterol Panel:  Recent Labs   Lab Test 04/22/21  1755   CHOL 149   LDL 75   HDL 56   TRIG 88     Most Recent 6 Bacteria Isolates From Any Culture (See EPIC Reports for Culture Details):  Recent Labs   Lab Test 04/01/21 2043   CULT No growth  No growth     Most Recent TSH, T4 and A1c Labs:  Recent Labs   Lab Test 01/20/23  0246 07/17/21  1006 06/02/21  1607   TSH  --   --  1.72   A1C 7.2*   < >  --     < > = values in this interval not displayed.

## 2023-03-08 ENCOUNTER — PATIENT OUTREACH (OUTPATIENT)
Dept: CARE COORDINATION | Facility: CLINIC | Age: 33
End: 2023-03-08

## 2023-03-08 NOTE — PROGRESS NOTES
Clinic Care Coordination Contact  [facility]/Voicemail    Clinical Data: Care Coordinator Outreach  Outreach attempted x 1.  Unable to leave voicemail message as received prompt that the mailbox was full.   Plan: Care Coordinator will try to reach patient again in 1-2 business days.    Marcy Hidalgo, RN Care Coordinator  [facility] [place]  Email: [email or web address]  Phone: [phone]

## 2023-03-08 NOTE — LETTER
[facility] CARE COORDINATION  [address]  [place] [address]     March 9, 2023    Wally Avendano  [address]  [place] [address]      Dear Wally,    I have been attempting to reach you since our last contact. I would like to continue to work with you and provide any additional support you may need on achieving your health care related goals. I would appreciate if you would give me a call at [phone] to let me know if you would like to continue working together. I know that there are many things that can affect our ability to communicate and I hope we can continue to work together.    All of us at the [facility] are invested in your health and are here to assist you in meeting your goals.     Sincerely,    Marcy Hidalgo RN

## 2023-03-09 NOTE — PROGRESS NOTES
Clinic Care Coordination Contact  [facility]/Voicemail    Clinical Data: Care Coordinator Outreach  Outreach attempted x 2. Unable to leave voicemail message as received prompt indicating the voicemail box was full and not accepting any new messages.   Plan: Care Coordinator will send unable to contact letter with care coordinator contact information via kaleo. Care Coordinator will try to reach patient again in 1 month.    Marcy Hidalgo, RN Care Coordinator  [facility] [place]  Email: [email or web address]  Phone: [phone]

## 2023-03-10 LAB — BACTERIA BLD CULT: NO GROWTH

## 2023-03-29 ENCOUNTER — APPOINTMENT (OUTPATIENT)
Dept: GENERAL RADIOLOGY | Facility: CLINIC | Age: 33
End: 2023-03-29
Attending: EMERGENCY MEDICINE

## 2023-03-29 ENCOUNTER — HOSPITAL ENCOUNTER (EMERGENCY)
Facility: CLINIC | Age: 33
Discharge: HOME | End: 2023-03-30
Attending: EMERGENCY MEDICINE | Admitting: EMERGENCY MEDICINE

## 2023-03-29 DIAGNOSIS — K31.84 GASTROPARESIS: ICD-10-CM

## 2023-03-29 DIAGNOSIS — R79.89 KETONEMIA: ICD-10-CM

## 2023-03-29 DIAGNOSIS — R00.0 SINUS TACHYCARDIA: ICD-10-CM

## 2023-03-29 DIAGNOSIS — E87.6 HYPOKALEMIA: ICD-10-CM

## 2023-03-29 DIAGNOSIS — D72.829 LEUKOCYTOSIS, UNSPECIFIED TYPE: ICD-10-CM

## 2023-03-29 DIAGNOSIS — E10.65 HYPERGLYCEMIA DUE TO TYPE 1 DIABETES MELLITUS (H): ICD-10-CM

## 2023-03-29 LAB
ALBUMIN SERPL BCG-MCNC: 4.5 G/DL (ref 3.5–5.2)
ALP SERPL-CCNC: 76 U/L (ref 40–129)
ALT SERPL W P-5'-P-CCNC: 18 U/L (ref 10–50)
ANION GAP SERPL CALCULATED.3IONS-SCNC: 16 MMOL/L (ref 7–15)
ANION GAP SERPL CALCULATED.3IONS-SCNC: 21 MMOL/L (ref 7–15)
AST SERPL W P-5'-P-CCNC: 16 U/L (ref 10–50)
B-OH-BUTYR SERPL-SCNC: 3.9 MMOL/L
B-OH-BUTYR SERPL-SCNC: 5.7 MMOL/L
BASE EXCESS BLDV CALC-SCNC: 0.2 MMOL/L (ref -7.7–1.9)
BASE EXCESS BLDV CALC-SCNC: 1.4 MMOL/L (ref -7.7–1.9)
BASOPHILS # BLD AUTO: 0 10E3/UL (ref 0–0.2)
BASOPHILS NFR BLD AUTO: 0 %
BILIRUB SERPL-MCNC: 0.8 MG/DL
BUN SERPL-MCNC: 23.1 MG/DL (ref 6–20)
BUN SERPL-MCNC: 24.3 MG/DL (ref 6–20)
CALCIUM SERPL-MCNC: 9 MG/DL (ref 8.6–10)
CALCIUM SERPL-MCNC: 9.2 MG/DL (ref 8.6–10)
CHLORIDE SERPL-SCNC: 95 MMOL/L (ref 98–107)
CHLORIDE SERPL-SCNC: 98 MMOL/L (ref 98–107)
CREAT SERPL-MCNC: 0.72 MG/DL (ref 0.67–1.17)
CREAT SERPL-MCNC: 0.76 MG/DL (ref 0.67–1.17)
DEPRECATED HCO3 PLAS-SCNC: 23 MMOL/L (ref 22–29)
DEPRECATED HCO3 PLAS-SCNC: 25 MMOL/L (ref 22–29)
EOSINOPHIL # BLD AUTO: 0 10E3/UL (ref 0–0.7)
EOSINOPHIL NFR BLD AUTO: 0 %
ERYTHROCYTE [DISTWIDTH] IN BLOOD BY AUTOMATED COUNT: 13.4 % (ref 10–15)
GFR SERPL CREATININE-BSD FRML MDRD: >90 ML/MIN/1.73M2
GFR SERPL CREATININE-BSD FRML MDRD: >90 ML/MIN/1.73M2
GLUCOSE BLDC GLUCOMTR-MCNC: 448 MG/DL (ref 70–99)
GLUCOSE SERPL-MCNC: 365 MG/DL (ref 70–99)
GLUCOSE SERPL-MCNC: 413 MG/DL (ref 70–99)
HCO3 BLDV-SCNC: 25 MMOL/L (ref 21–28)
HCO3 BLDV-SCNC: 27 MMOL/L (ref 21–28)
HCT VFR BLD AUTO: 42.3 % (ref 40–53)
HGB BLD-MCNC: 14.1 G/DL (ref 13.3–17.7)
HOLD SPECIMEN: NORMAL
IMM GRANULOCYTES # BLD: 0.1 10E3/UL
IMM GRANULOCYTES NFR BLD: 1 %
LACTATE SERPL-SCNC: 1.8 MMOL/L (ref 0.7–2)
LIPASE SERPL-CCNC: 6 U/L (ref 13–60)
LYMPHOCYTES # BLD AUTO: 1.2 10E3/UL (ref 0.8–5.3)
LYMPHOCYTES NFR BLD AUTO: 8 %
MAGNESIUM SERPL-MCNC: 1.7 MG/DL (ref 1.7–2.3)
MCH RBC QN AUTO: 29.6 PG (ref 26.5–33)
MCHC RBC AUTO-ENTMCNC: 33.3 G/DL (ref 31.5–36.5)
MCV RBC AUTO: 89 FL (ref 78–100)
MONOCYTES # BLD AUTO: 0.8 10E3/UL (ref 0–1.3)
MONOCYTES NFR BLD AUTO: 5 %
NEUTROPHILS # BLD AUTO: 13.2 10E3/UL (ref 1.6–8.3)
NEUTROPHILS NFR BLD AUTO: 86 %
NRBC # BLD AUTO: 0 10E3/UL
NRBC BLD AUTO-RTO: 0 /100
O2/TOTAL GAS SETTING VFR VENT: 0 %
O2/TOTAL GAS SETTING VFR VENT: 0 %
PCO2 BLDV: 42 MM HG (ref 40–50)
PCO2 BLDV: 44 MM HG (ref 40–50)
PH BLDV: 7.39 [PH] (ref 7.32–7.43)
PH BLDV: 7.39 [PH] (ref 7.32–7.43)
PLATELET # BLD AUTO: 317 10E3/UL (ref 150–450)
PO2 BLDV: 66 MM HG (ref 25–47)
PO2 BLDV: 80 MM HG (ref 25–47)
POTASSIUM SERPL-SCNC: 3 MMOL/L (ref 3.4–5.3)
POTASSIUM SERPL-SCNC: 4.3 MMOL/L (ref 3.4–5.3)
PROT SERPL-MCNC: 7.2 G/DL (ref 6.4–8.3)
RBC # BLD AUTO: 4.77 10E6/UL (ref 4.4–5.9)
SODIUM SERPL-SCNC: 139 MMOL/L (ref 136–145)
SODIUM SERPL-SCNC: 139 MMOL/L (ref 136–145)
WBC # BLD AUTO: 15.4 10E3/UL (ref 4–11)

## 2023-03-29 PROCEDURE — 99285 EMERGENCY DEPT VISIT HI MDM: CPT | Mod: 25

## 2023-03-29 PROCEDURE — 258N000003 HC RX IP 258 OP 636: Performed by: EMERGENCY MEDICINE

## 2023-03-29 PROCEDURE — 83605 ASSAY OF LACTIC ACID: CPT | Performed by: EMERGENCY MEDICINE

## 2023-03-29 PROCEDURE — 80053 COMPREHEN METABOLIC PANEL: CPT | Performed by: EMERGENCY MEDICINE

## 2023-03-29 PROCEDURE — 96372 THER/PROPH/DIAG INJ SC/IM: CPT | Mod: 59

## 2023-03-29 PROCEDURE — 250N000012 HC RX MED GY IP 250 OP 636 PS 637: Performed by: EMERGENCY MEDICINE

## 2023-03-29 PROCEDURE — 96365 THER/PROPH/DIAG IV INF INIT: CPT

## 2023-03-29 PROCEDURE — 96361 HYDRATE IV INFUSION ADD-ON: CPT

## 2023-03-29 PROCEDURE — 83735 ASSAY OF MAGNESIUM: CPT | Performed by: EMERGENCY MEDICINE

## 2023-03-29 PROCEDURE — 36415 COLL VENOUS BLD VENIPUNCTURE: CPT | Performed by: EMERGENCY MEDICINE

## 2023-03-29 PROCEDURE — 71046 X-RAY EXAM CHEST 2 VIEWS: CPT

## 2023-03-29 PROCEDURE — 85025 COMPLETE CBC W/AUTO DIFF WBC: CPT | Performed by: EMERGENCY MEDICINE

## 2023-03-29 PROCEDURE — 83690 ASSAY OF LIPASE: CPT | Performed by: EMERGENCY MEDICINE

## 2023-03-29 PROCEDURE — 96375 TX/PRO/DX INJ NEW DRUG ADDON: CPT

## 2023-03-29 PROCEDURE — 82803 BLOOD GASES ANY COMBINATION: CPT | Performed by: EMERGENCY MEDICINE

## 2023-03-29 PROCEDURE — 96366 THER/PROPH/DIAG IV INF ADDON: CPT

## 2023-03-29 PROCEDURE — 82010 KETONE BODYS QUAN: CPT | Performed by: EMERGENCY MEDICINE

## 2023-03-29 PROCEDURE — 82962 GLUCOSE BLOOD TEST: CPT

## 2023-03-29 PROCEDURE — 250N000011 HC RX IP 250 OP 636: Performed by: EMERGENCY MEDICINE

## 2023-03-29 RX ORDER — POTASSIUM CHLORIDE 7.45 MG/ML
10 INJECTION INTRAVENOUS
Status: COMPLETED | OUTPATIENT
Start: 2023-03-29 | End: 2023-03-29

## 2023-03-29 RX ADMIN — SODIUM CHLORIDE 1000 ML: 9 INJECTION, SOLUTION INTRAVENOUS at 19:15

## 2023-03-29 RX ADMIN — POTASSIUM CHLORIDE 10 MEQ: 7.46 INJECTION, SOLUTION INTRAVENOUS at 20:24

## 2023-03-29 RX ADMIN — SODIUM CHLORIDE 1000 ML: 9 INJECTION, SOLUTION INTRAVENOUS at 22:30

## 2023-03-29 RX ADMIN — POTASSIUM CHLORIDE 10 MEQ: 7.46 INJECTION, SOLUTION INTRAVENOUS at 21:58

## 2023-03-29 RX ADMIN — INSULIN GLARGINE 20 UNITS: 100 INJECTION, SOLUTION SUBCUTANEOUS at 22:29

## 2023-03-29 RX ADMIN — SODIUM CHLORIDE 1000 ML: 9 INJECTION, SOLUTION INTRAVENOUS at 20:22

## 2023-03-29 RX ADMIN — PROCHLORPERAZINE EDISYLATE 10 MG: 5 INJECTION INTRAMUSCULAR; INTRAVENOUS at 20:23

## 2023-03-29 ASSESSMENT — ENCOUNTER SYMPTOMS
VOMITING: 1
FEVER: 0
NAUSEA: 1
ROS GI COMMENTS: HEMATEMESIS -
ABDOMINAL PAIN: 1

## 2023-03-29 ASSESSMENT — ACTIVITIES OF DAILY LIVING (ADL)
ADLS_ACUITY_SCORE: 35

## 2023-03-29 NOTE — ED TRIAGE NOTES
Pt is complaining of N/V and abdominal pain that has been ongoing over the last two days. Pt has a history of gastroparesis and type 1 diabetes. BG is 448. Pt did receive 4 mg Zofran from [facility] and 150 mls of NS

## 2023-03-29 NOTE — ED PROVIDER NOTES
History     Chief Complaint:  Nausea, Vomiting, and Abdominal Pain     The history is provided by the patient and the EMS personnel.      Wally Avendano is a 32 year old male with a history of type I diabetes mellitus, gastroparesis, and hospitalization for DKA earlier this month who presents with nausea, non-bloody vomiting, and epigastric abdominal pain starting yesterday. He has tried Reglan at home without any relief. He has had elevation in his blood glucose levels between 300 and 400 and did not take any insulin last night, but did take 6 units of short-acting insulin today without anything to eat since. EMS found his blood glucose was noted to be 448. He was given 4 mg of Zofran and 150 mL of IV fluids.    Presently, Wally rates his epigastric pain as an 8 or a 9 out of 10. He denies diarrhea or fever. He has no known sick contacts. He uses cannabis but not daily.    Independent Historian:    As above.    Review of External Notes: I reviewed notes from his most recent hospitalization on 3/4/23.     ROS:  Review of Systems   Constitutional: Negative for fever.   Gastrointestinal: Positive for abdominal pain, nausea and vomiting. Negative for diarrhea.        Hematemesis -   All other systems reviewed and are negative.    Allergies:  The patient has no known allergies.    Medications:    Insulin  Glucagon  Reglan  Compazine    Past Medical History:    Type I diabetes mellitus  DKA  Gastroparesis  Hypokalemia    Family History:    Mother: PBC    Social History:  The patient presents to the ED alone via EMS.   The patient lives with his wife and his son.  The patient uses cannabis.    Physical Exam     Patient Vitals for the past 24 hrs:   BP Temp Temp src Pulse Resp SpO2 Height Weight   03/29/23 2130 -- -- -- (!) 122 12 95 % -- --   03/29/23 1921 -- -- -- 114 -- 99 % -- --   03/29/23 1920 134/88 -- -- 118 12 98 % -- --   03/29/23 1919 -- -- -- (!) 122 -- (!) 78 % -- --   03/29/23 1918 -- -- -- 116 -- 96 % -- --  "  03/29/23 1917 -- -- -- 118 21 98 % -- --   03/29/23 1915 -- -- -- 120 13 98 % -- --   03/29/23 1914 -- -- -- (!) 128 -- (!) 71 % -- --   03/29/23 1913 -- -- -- 120 -- 94 % -- --   03/29/23 1912 -- -- -- (!) 124 21 98 % -- --   03/29/23 1910 -- -- -- (!) 131 22 99 % -- --   03/29/23 1909 -- -- -- (!) 130 17 99 % -- --   03/29/23 1908 -- -- -- (!) 128 21 90 % -- --   03/29/23 1907 -- -- -- (!) 124 11 97 % -- --   03/29/23 1857 (!) 122/101 97.5  F (36.4  C) Oral (!) 141 18 99 % 1.981 m (6' 6\") 68.1 kg (150 lb 2.1 oz)      Physical Exam  General: Well-developed and well-nourished. Fatigued appearing young  man. Cooperative.  Head:  Atraumatic.  Eyes:  Conjunctivae, lids, and sclerae are normal.  Neck:  Supple. Normal range of motion.  CV:  Tachycardic rate and regular rhythm. Normal heart sounds with no murmurs, rubs, or gallops detected.  Resp:  No respiratory distress. Clear to auscultation bilaterally without decreased breath sounds, wheezing, rales, or rhonchi.  GI:  Soft. Non-distended. Non-tender.    MS:  Normal ROM. No bilateral lower extremity edema.  Skin:  Warm. Non-diaphoretic. No pallor.  Neuro:  Awake. A&Ox3. Normal strength.  Psych: Normal mood and affect. Normal speech.  Vitals reviewed.    Emergency Department Course     EKG  Indication: epigastric pain and tachycardia  Time: 1904  Rate 129 bpm. MN interval 126. QRS duration 92. QT/QTc 318/465.   Sinus tachycardia  Left posterior fascicular block  Nonspecific ST abnormality  Abnormal ECG  No acute ST changes.  No significant change as compared to prior, dated 3/4/2023.    Imaging:  XR Chest 2 Views   Final Result   IMPRESSION: Negative chest.      Report per radiology    Laboratory:  Labs Ordered and Resulted from Time of ED Arrival to Time of ED Departure   GLUCOSE BY METER - Abnormal       Result Value    GLUCOSE BY METER POCT 448 (*)    COMPREHENSIVE METABOLIC PANEL - Abnormal    Sodium 139      Potassium 3.0 (*)     Chloride 95 (*)     " Carbon Dioxide (CO2) 23      Anion Gap 21 (*)     Urea Nitrogen 24.3 (*)     Creatinine 0.76      Calcium 9.2      Glucose 413 (*)     Alkaline Phosphatase 76      AST 16      ALT 18      Protein Total 7.2      Albumin 4.5      Bilirubin Total 0.8      GFR Estimate >90     LIPASE - Abnormal    Lipase 6 (*)    BLOOD GAS VENOUS - Abnormal    pH Venous 7.39      pCO2 Venous 42      pO2 Venous 80 (*)     Bicarbonate Venous 25      Base Excess/Deficit (+/-) 0.2      FIO2 0     KETONE BETA-HYDROXYBUTYRATE QUANTITATIVE, RAPID - Abnormal    Ketone (Beta-Hydroxybutyrate) Quantitative 5.70 (*)    CBC WITH PLATELETS AND DIFFERENTIAL - Abnormal    WBC Count 15.4 (*)     RBC Count 4.77      Hemoglobin 14.1      Hematocrit 42.3      MCV 89      MCH 29.6      MCHC 33.3      RDW 13.4      Platelet Count 317      % Neutrophils 86      % Lymphocytes 8      % Monocytes 5      % Eosinophils 0      % Basophils 0      % Immature Granulocytes 1      NRBCs per 100 WBC 0      Absolute Neutrophils 13.2 (*)     Absolute Lymphocytes 1.2      Absolute Monocytes 0.8      Absolute Eosinophils 0.0      Absolute Basophils 0.0      Absolute Immature Granulocytes 0.1      Absolute NRBCs 0.0     BASIC METABOLIC PANEL - Abnormal    Sodium 139      Potassium 4.3      Chloride 98      Carbon Dioxide (CO2) 25      Anion Gap 16 (*)     Urea Nitrogen 23.1 (*)     Creatinine 0.72      Calcium 9.0      Glucose 365 (*)     GFR Estimate >90     BLOOD GAS VENOUS - Abnormal    pH Venous 7.39      pCO2 Venous 44      pO2 Venous 66 (*)     Bicarbonate Venous 27      Base Excess/Deficit (+/-) 1.4      FIO2 0     LACTIC ACID WHOLE BLOOD - Normal    Lactic Acid 1.8     MAGNESIUM - Normal    Magnesium 1.7     KETONE BETA-HYDROXYBUTYRATE QUANTITATIVE, RAPID     Emergency Department Course & Assessments:  Interventions:  Medications   potassium chloride 10 mEq in 100 mL sterile water infusion (10 mEq Intravenous $New Bag 3/29/23 3873)   insulin glargine (LANTUS PEN)  injection 20 Units (has no administration in time range)   0.9% sodium chloride BOLUS (has no administration in time range)   0.9% sodium chloride BOLUS (0 mLs Intravenous Stopped 3/29/23 2015)   prochlorperazine (COMPAZINE) injection 10 mg (10 mg Intravenous $Given 3/29/23 2023)   0.9% sodium chloride BOLUS (1,000 mLs Intravenous $New Bag 3/29/23 2022)      Independent Interpretation (X-rays, CTs, rhythm strip):  2044 I independently reviewed the patient's chest X-ray.    Social Determinants of Health affecting care:  Supportive family.  Uses marijuana.   No documentation of follow-up after his last two admissions, indicating concern for compliance issues.    Assessments:  1906 I obtained history and performed an examination of the patient.   1919 I reassessed the patient after nurse informed me that his oxygen saturations are decreasing when he falls asleep. This was confirmed on the patient's monitor. His oxygen saturations return to normal after he is awakened. Denies history of sleep apnea.  2230 I reevaluated the patient who is feeling improved, though thirsty.  He is comfortable with plan for discharge if he is still feeling well after his fluids and potassium have completed as he can tolerate PO.  He does remain tachycardic.  Notably, he was not on oxygen but was sleeping when I entered the room and he was not hypoxic.    Disposition:  Care of the patient was transferred to my colleague, Dr. Burger, pending completion of fluids and potassium, reassessment, with goal of discharge.     Impression & Plan    Medical Decision Making:  Wally is a 32 year old male with type 1 diabetes presenting with nausea, vomiting, and epigastric pain for 1 day similar to prior presentations that have been attributed to gastroparesis.  He did have a recent hospitalization for DKA.  He did not take his Lantus last night but did use some short acting insulin today.  On exam he appears fatigued and is tachycardic.  He does not  have abdominal tenderness to warrant abdominal imaging.  He did have some desaturations while sleeping which improved when he was awakened and on my final evaluation he was sleeping without supplemental oxygen or hypoxia.    EKG is reassuring without acute ST changes.  Chest x-ray is unremarkable without explanation for his intermittent, resolved hypoxia.  Laboratory studies reveal hyperglycemia of 413 with anion gap of 21 but a normal bicarb both on BMP and VBG with pH of 7.39.  He does have ketonemia to 5.7.  He is hypokalemic to 3.0 without hepatitis, biliary obstruction, pancreatitis, or anemia.  Leukocytosis of 15.4 is likely stress reaction due to his vomiting.  There is no lactic acidosis.    After IV fluids I repeated his BMP which shows improved anion gap of 16, still with a normal bicarb and VBG still with pH of 7.39.  Glucose has improved to 365 and hypokalemia resolved to 4.3 after IV potassium repletion.  Repeat ketones are pending.    I will give him his usual nighttime dose of Lantus and further fluids.  He has not had any vomiting after Compazine.  At this point he is not in DKA and is appropriate for discharge if he is able to tolerate PO and feel improved.  He is comfortable with this plan.  His care was transferred to my partner, Dr. Burger, who will reevaluate after completion of current orders and PO challenge with likely discharge.    Diagnosis:    ICD-10-CM    1. Hyperglycemia due to type 1 diabetes mellitus (H)  E10.65       2. Gastroparesis  K31.84       3. Sinus tachycardia  R00.0       4. Ketonemia  R79.89       5. Hypokalemia  E87.6       6. Leukocytosis, unspecified type  D72.829              Scribe Disclosure:  I, Catie Ma, am serving as a scribe at 7:19 PM on 3/29/2023 to document services personally performed by Dorinda Up MD based on my observations and the provider's statements to me.     3/29/2023   Dorinda Up MD Dixson, Kylie S, MD  04/13/23 1402

## 2023-03-30 VITALS
BODY MASS INDEX: 17.37 KG/M2 | OXYGEN SATURATION: 95 % | DIASTOLIC BLOOD PRESSURE: 89 MMHG | HEART RATE: 122 BPM | HEIGHT: 78 IN | TEMPERATURE: 97.5 F | WEIGHT: 150.13 LBS | SYSTOLIC BLOOD PRESSURE: 127 MMHG | RESPIRATION RATE: 11 BRPM

## 2023-03-30 LAB
ATRIAL RATE - MUSE: 129 BPM
DIASTOLIC BLOOD PRESSURE - MUSE: NORMAL MMHG
GLUCOSE BLDC GLUCOMTR-MCNC: 354 MG/DL (ref 70–99)
INTERPRETATION ECG - MUSE: NORMAL
P AXIS - MUSE: 69 DEGREES
PR INTERVAL - MUSE: 126 MS
QRS DURATION - MUSE: 92 MS
QT - MUSE: 318 MS
QTC - MUSE: 465 MS
R AXIS - MUSE: 117 DEGREES
SYSTOLIC BLOOD PRESSURE - MUSE: NORMAL MMHG
T AXIS - MUSE: 40 DEGREES
VENTRICULAR RATE- MUSE: 129 BPM

## 2023-03-30 PROCEDURE — 82962 GLUCOSE BLOOD TEST: CPT

## 2023-03-30 ASSESSMENT — ACTIVITIES OF DAILY LIVING (ADL): ADLS_ACUITY_SCORE: 35

## 2023-03-30 NOTE — ED NOTES
Multiple nurses noticed Pt O2 decrease to 60-70% when Pt is asleep. Pt has been placed on 4 LPM oxy mask.

## 2023-03-30 NOTE — ED PROVIDER NOTES
Patient signed out to me by Dr. Jean-Baptiste.  He slept in the ED for couple hours after signout, was quite somnolent resting deeply.  Anion gap is closed.  Ketones have improved.  Potassium replaced.  Had supplemental oxygen placed while sleeping, at 12:45 AM he woke up and requested discharge.  He has tolerated oral intake.  Discharge instructions given.     Raghav Burger MD  03/30/23 0048

## 2023-04-01 ENCOUNTER — NURSE TRIAGE (OUTPATIENT)
Dept: NURSING | Facility: CLINIC | Age: 33
End: 2023-04-01
Payer: COMMERCIAL

## 2023-04-01 NOTE — TELEPHONE ENCOUNTER
Nurse Triage SBAR     Situation: Wife calling concerned about pt not eating for deepak last 5 days and throwing up  CTC not on file , pt able to give verbal consent to speak with wife     Background: Sick since Tuesday, seen in ED on Wednesday and sent home after getting fluids. DM1. Despite not eating or drinking BG's are still around 250 and he has continued giving himself insulin     Assessment: Threw up at least x5 today. Refusing to come down to try eating or get up to do anything around the house. Very tired. Refusing to check urine ketones. Wife wondering what she should do     Recommendation: ED advised. Reviewed care advice per protocol. Wife verbalizes understanding and agrees to plan. She will try to talk him into going to the ED, if she isn't able to get him to go she will call 911 to at least come out and assess him. Advised that nurse could try talking to him if she thinks that would help but she doesn't think that will help at all         Reason for Disposition    [1] Vomiting AND [2] signs of dehydration (e.g., very dry mouth, lightheaded, dark urine)    Additional Information    Negative: Unconscious or difficult to awaken    Negative: Acting confused (e.g., disoriented, slurred speech)    Negative: Very weak (e.g., can't stand)    Negative: Sounds like a life-threatening emergency to the triager    Protocols used: DIABETES - HIGH BLOOD SUGAR-[facility]      Starla Howell RN [facility] April 1, 2023 11:53 AM

## 2023-04-05 ENCOUNTER — TELEPHONE (OUTPATIENT)
Dept: ENDOCRINOLOGY | Facility: CLINIC | Age: 33
End: 2023-04-05
Payer: COMMERCIAL

## 2023-04-05 DIAGNOSIS — E10.69 TYPE 1 DIABETES MELLITUS WITH OTHER SPECIFIED COMPLICATION (H): Primary | ICD-10-CM

## 2023-04-05 DIAGNOSIS — E10.10 DIABETIC KETOACIDOSIS WITHOUT COMA ASSOCIATED WITH TYPE 1 DIABETES MELLITUS (H): ICD-10-CM

## 2023-04-05 NOTE — TELEPHONE ENCOUNTER
No answer when I returned the call to wife Ananya. With him having stomach pain and throwing up we wouldn't see him in clinic  In case its contagious. It looks like appointments with GI have been  no showed . We can try to control the blood sugars  but he needs to see GI  to figure out  what the stomach pain is.I see a history of gastroparesis . Attempted to reach both Wally and wife by phone a couple times  with no answer . He will need to contact his PCP to get  Scheduled in GI again  or go back to the ER and ask for GI consult  and Endocrine . I can't help if they don't answer the phone  . Last appointment with  Radha Davalos was no showed and last saw Dr Perez in 2021 . We could schedule a virtual for blood sugar control but unable to reach them to offer . Indy Mccarthy, RN on 4/5/2023 at 9:57 AM

## 2023-04-05 NOTE — TELEPHONE ENCOUNTER
[facility] Call Center    Phone Message    May a detailed message be left on voicemail: yes     Reason for Call: Symptoms or Concerns     If patient has red-flag symptoms, warm transfer to triage line    Current symptom or concern:  was in last seen in  ER on 3/29/23 per caller wife Ananya has been dealing with stomach pain and throwing up. Has been to ER 3 times in the last 6 weeks issue not being resolved asking for an urgent appointment to seen Endocrine doctor offered first available with DIANA Crowley 5/3/23 caller declined.     Symptoms have been present for:  6 week(s)    Has patient previously been seen for this? Yes    By : Seen By Dr. Perez       Are there any new or worsening symptoms? Yes:       Action Taken: Other: ENDO    Travel Screening: Not Applicable

## 2023-04-07 ENCOUNTER — PATIENT OUTREACH (OUTPATIENT)
Dept: CARE COORDINATION | Facility: CLINIC | Age: 33
End: 2023-04-07
Payer: COMMERCIAL

## 2023-04-07 ASSESSMENT — ACTIVITIES OF DAILY LIVING (ADL): DEPENDENT_IADLS:: INDEPENDENT

## 2023-04-07 NOTE — LETTER
[facility] CARE COORDINATION  [address]  [place] [address]     April 12, 2023        Wally Archer  [address] [place] [address]          Dear Baylee Lo is an updated Patient Centered Plan of Care for your continued enrollment in Care Coordination. Please let us know if you have additional questions, concerns, or goals that we can assist with.    Sincerely,    Marcy Hidalgo RN Care Coordinator  [facility]  Email: [email or web address]  Phone: [phone]            [facility]  Patient Centered Plan of Care  About Me:        Patient Name:  Wally Archer    YOB: 1990  Age:         32 year old   [place] MRN:    [id number] Telephone Information:  Home Phone [phone]   Mobile [phone]       Address:  [address] [place] [address] Email address:  [email or web address]      Emergency Contact(s)    Name Relationship Lgl Grd Work Phone Home Phone Mobile Phone   1. MARGI ARCHER Spouse   [phone]   2. CHEPE JOHNSON Father    [phone]           Primary language:  English     needed? No   [place] [facility]:  [phone] op. 1  Other communication barriers:None    Preferred Method of Communication:     Current living arrangement: I live in a private home with spouse    Mobility Status/ Medical Equipment: Independent    Health Maintenance  Health Maintenance Reviewed: Due/Overdue   Health Maintenance Due   Topic Date Due     YEARLY PREVENTIVE VISIT  Never done     DIABETIC FOOT EXAM  Never done     ADVANCE CARE PLANNING  Never done     EYE EXAM  Never done     HEPATITIS B IMMUNIZATION (1 of 3 - 3-dose series) Never done     COVID-19 Vaccine (1) Never done     Pneumococcal Vaccine: Pediatrics (0 to 5 Years) and At-Risk Patients (6 to 64 Years) (1 - PCV) Never done     DTAP/TDAP/TD IMMUNIZATION (1 - Tdap) Never done     LIPID  04/22/2022     INFLUENZA  VACCINE (1) Never done     PHQ-2 (once per calendar year)  01/01/2023     A1C  04/20/2023      My Access Plan  Medical Emergency 911   Primary Clinic Line [facility] - [phone]   24 Hour Appointment Line [phone] or  [phone]) (toll-free)   24 Hour Nurse Line [phone] (toll-free)   Preferred Urgent Care [facility] [place], [phone]     Preferred Hospital [facility]  [phone]     Preferred Pharmacy [id number] - [place], [place] - [address]  AT [address]     Behavioral Health Crisis Line The National Suicide Prevention Lifeline at 1-788.677.6154 or Text/Call 8     My Care Team Members  Patient Care Team         Relationship Specialty Notifications Start End    Clinic - [place] [facility] PCP - General   4/7/21     no pcp per pt    Phone: [phone] Fax: [phone]         [address] [place] [address] [place] [address]    Radha Davalos PA-C Physician Assistant Endocrinology, Diabetes, and Metabolism  8/12/22     Phone: [phone] Fax: [phone]         [address] [place] [address]    Nathalia Mendez RN Diabetes Educator Diabetes Education  9/6/22     Deonna Salazar, CNP Assigned PCP   9/10/22     Phone: [phone] Fax: [phone]         [address] [place] [address]    Katelynn Raza OD MD Ophthalmology  11/2/22     Phone: [phone] Fax: [phone]         [address] [place] [address] [address] [place] [id number]    Radha Davalos, RN Referring Physician   11/2/22     Referring Physician to Eye    Radha Davalos PA-C Assigned Endocrinology Provider   12/10/22     Phone: [phone] Fax: [phone]         [address] [place] [address]    Marcy Hidalgo, RN Lead Care Coordinator  Admissions 1/23/23     Phone: [phone]         Jyoti Page MA Community Health Worker Primary Care -  CC Admissions 1/24/23     Phone: [phone]                   My Care Plans  Self Management and Treatment Plan  Care Plan  Care Plan: Financial Wellbeing       Problem: Patient expresses financial resource strain       Long-Range Goal: Create an action plan to increase financial stability ([facility] Care)       Start Date: 1/24/2023 Expected End Date: 8/25/2023    This Visit's Progress: 10%    Note:     Barriers: symptoms limit functional ability.    Strengths: recently approved for [employer] health insurance, obtained new full time employment - Monday through Friday 08:30 am - 5:00 pm with hour lunch break,  motivated, engaged in care coordination.  Patient expressed understanding of goal: Yes  Action steps to achieve this goal:  1. I will follow up with my providers as scheduled/recommended:   - Endocrinology: 07/05/2023  - GI - 04/14/2023  - Primary Care Provider - 05/18/2023  2. I will discuss, review, schedule and complete recommended overdue health maintenance with my Primary Care Provider.   3. I will take my medications as prescribed.   4. I will avoid alcohol/cannabis use as recommended.   5. I will complete and submit Dot care application along with supporting financial verification information required for application  6. I will contact my care team with questions, concerns, support needs. I will use the clinic as a resource and I understand I can contact my clinic with 24/7 after hours services available. Care Coordinator will remain available as needed.      *RN[facility] to mail Dot care application and instructions to patient on 01/26/2023.                                Action Plans on File:                       Advance Care Plans/Directives Type:   No data recorded    My Medical and Care Information  Problem List   Patient Active Problem List   Diagnosis     DKA (diabetic ketoacidoses)     Type 1 diabetes mellitus with other specified complication (H)     Diarrhea, unspecified type     Gastroparesis      Hypokalemia     Intractable nausea and vomiting     Diabetic ketoacidosis without coma associated with type 1 diabetes mellitus (H)      Current Medications and Allergies:  No Known Allergies   Current Outpatient Medications:      acetone urine (KETOSTIX) test strip, Test urine ketones when BG above 300 or when sick. Use up 2 strips a day., Disp: 50 strip, Rfl: 3     blood glucose (NO BRAND SPECIFIED) lancets standard, Use to test blood sugar 4 times daily or as directed., Disp: 200 lancet, Rfl: 3     blood glucose (NO BRAND SPECIFIED) lancets standard, To use to test glucose level in the blood Use to test blood sugar  3  times daily as directed. To accompany glucose monitor brands per insurance coverage., Disp: 100 each, Rfl: 0     blood glucose (NO BRAND SPECIFIED) test strip, Use to test blood sugar three times daily as directed. To accompany glucose monitor brands per insurance coverage., Disp: 350 strip, Rfl: 3     blood glucose calibration (NO BRAND SPECIFIED) solution, Used to calibrate the blood glucose monitor as needed and as directed.  To accompany  blood glucose brands per insurance coverage, Disp: 1 each, Rfl: 0     blood glucose monitoring (CONTOUR NEXT MONITOR W/DEVICE KIT) meter device kit, , Disp: , Rfl:      blood glucose monitoring (NO BRAND SPECIFIED) meter device kit, Use to test blood sugar 4 times daily or as directed., Disp: 1 kit, Rfl: 0     Continuous Blood Gluc Sensor (DEXCOM G6 SENSOR) MISC, Change every 10 days., Disp: 3 each, Rfl: 11     Continuous Blood Gluc Transmit (DEXCOM G6 TRANSMITTER) MISC, CHANGE EVERY 3 MONTHS, Disp: 1 each, Rfl: 3     Glucagon (BAQSIMI ONE PACK) 3 MG/DOSE POWD, Use only for severe hypoglycemia., Disp: 1 each, Rfl: 1     insulin glargine (LANTUS PEN) 100 UNIT/ML pen, Inject 20 Units Subcutaneous every morning, Disp: 15 mL, Rfl: 1     insulin lispro (HUMALOG KWIKPEN) 100 UNIT/ML (1 unit dial) KWIKPEN, 1 unit per 15g of carbs with meals, Disp: , Rfl:       insulin pen needle (32G X 4 MM) 32G X 4 MM miscellaneous, Use 6 pen needles daily or as directed., Disp: 200 each, Rfl: 6     insulin pen needle 30G X 5 MM, Use 6 pen needles daily or as directed., Disp: 200 each, Rfl: 11     Care Coordination Start Date: 1/23/2023   Frequency of Care Coordination: monthly     Form Last Updated: 04/12/2023

## 2023-04-07 NOTE — PROGRESS NOTES
Clinic Care Coordination Contact    Follow Up Progress Note      Assessment:   Patient reports that he has a GI appointment scheduled next Friday.   Shares he is feeling better, things seem to be on the mend  Reports his blood sugars are averaging in low 200's.   Started new job last week and works Monday through Friday from 0830 am - 5 pm. Gets an hour lunch break around 1230-130 pm.   Shares that he was recently approved for [employer] health insurance.   He hasn't completed jose care application yet, however still has the form to assist with previous medical debt.   RNCC assisted patient to schedule annual wellness visit on 05/18/23 at 07:40 am.     Care Gaps:    Health Maintenance Due   Topic Date Due     YEARLY PREVENTIVE VISIT  Never done     DIABETIC FOOT EXAM  Never done     ADVANCE CARE PLANNING  Never done     EYE EXAM  Never done     HEPATITIS B IMMUNIZATION (1 of 3 - 3-dose series) Never done     COVID-19 Vaccine (1) Never done     Pneumococcal Vaccine: Pediatrics (0 to 5 Years) and At-Risk Patients (6 to 64 Years) (1 - PCV) Never done     DTAP/TDAP/TD IMMUNIZATION (1 - Tdap) Never done     LIPID  04/22/2022     INFLUENZA VACCINE (1) Never done     PHQ-2 (once per calendar year)  01/01/2023     A1C  04/20/2023     Care Gaps Last addressed on 04/07/2023, Care Gap Goal set: Yes and Scheduled 05/18/2023      Care Plans  Care Plan: Financial Wellbeing     Problem: Patient expresses financial resource strain     Long-Range Goal: Create an action plan to increase financial stability (Jose Care)     Start Date: 1/24/2023 Expected End Date: 8/25/2023    This Visit's Progress: 10%    Note:     Barriers: symptoms limit functional ability.    Strengths: recently approved for [employer] health insurance, obtained new full time employment - Monday through Friday 08:30 am - 5:00 pm with hour lunch break,  motivated, engaged in care coordination.  Patient expressed understanding of goal: Yes  Action steps to achieve this  goal:  1. I will follow up with my providers as scheduled/recommended:   - Endocrinology: 07/05/2023  - GI - 04/14/2023  - Primary Care Provider - 05/18/2023  2. I will discuss, review, schedule and complete recommended overdue health maintenance with my Primary Care Provider.   3. I will take my medications as prescribed.   4. I will avoid alcohol/cannabis use as recommended.   5. I will complete and submit jose care application along with supporting financial verification information required for application  6. I will contact my care team with questions, concerns, support needs. I will use the clinic as a resource and I understand I can contact my clinic with 24/7 after hours services available. Care Coordinator will remain available as needed.      *RNCC to mail jose care application and instructions to patient on 01/26/2023.                       Intervention/Education provided during outreach: As above. CC role, goal(s), clinic after hours, appointments, discussed/reviewed. Support provided.      Outreach Frequency: monthly    Plan:   Patient/caregiver will call RNCC with questions, concerns, support needs. RNCC will be available as needed.  RNCC will send updated patient centered plan of care letter to patient via [employer].   CHW Care Coordinator will follow up in 1 month. RNCC will review chart in 6 weeks.     Marcy Hidalgo RN Care Coordinator  [facility] [place], [place], [place]  Email: [email or web address]  Phone: [phone]

## 2023-04-13 ASSESSMENT — ENCOUNTER SYMPTOMS: DIARRHEA: 0

## 2023-05-01 DIAGNOSIS — K31.84 GASTROPARESIS: Primary | ICD-10-CM

## 2023-05-01 RX ORDER — METOCLOPRAMIDE 5 MG/1
5 TABLET ORAL 3 TIMES DAILY
Qty: 90 TABLET | Refills: 3 | Status: SHIPPED | OUTPATIENT
Start: 2023-05-01 | End: 2023-05-18

## 2023-05-12 ENCOUNTER — PATIENT OUTREACH (OUTPATIENT)
Dept: CARE COORDINATION | Facility: CLINIC | Age: 33
End: 2023-05-12
Payer: COMMERCIAL

## 2023-05-12 NOTE — PROGRESS NOTES
Clinic Care Coordination Contact  [facility]/Voicemail       Clinical Data: Care Coordinator Outreach  Outreach attempted x 1.  Left message on patient's voicemail with call back information and requested return call.    Plan: Care Coordinator will try to reach patient again in 10 business days.    KATIE Quispe, B.S. [facility] Care Coordination  [facility]:  [place] and [place]  [phone]  [email or web address]

## 2023-05-18 ENCOUNTER — OFFICE VISIT (OUTPATIENT)
Dept: PEDIATRICS | Facility: CLINIC | Age: 33
End: 2023-05-18
Payer: COMMERCIAL

## 2023-05-18 VITALS
HEART RATE: 98 BPM | OXYGEN SATURATION: 98 % | TEMPERATURE: 97.2 F | BODY MASS INDEX: 20.22 KG/M2 | DIASTOLIC BLOOD PRESSURE: 68 MMHG | RESPIRATION RATE: 16 BRPM | HEIGHT: 75 IN | SYSTOLIC BLOOD PRESSURE: 102 MMHG | WEIGHT: 162.6 LBS

## 2023-05-18 DIAGNOSIS — K31.84 GASTROPARESIS: ICD-10-CM

## 2023-05-18 DIAGNOSIS — N52.9 ERECTILE DYSFUNCTION, UNSPECIFIED ERECTILE DYSFUNCTION TYPE: ICD-10-CM

## 2023-05-18 DIAGNOSIS — Z00.00 ENCOUNTER FOR MEDICAL EXAMINATION TO ESTABLISH CARE: Primary | ICD-10-CM

## 2023-05-18 DIAGNOSIS — E10.69 TYPE 1 DIABETES MELLITUS WITH OTHER SPECIFIED COMPLICATION (H): ICD-10-CM

## 2023-05-18 LAB
CHOLEST SERPL-MCNC: 156 MG/DL
HBA1C MFR BLD: 8.4 % (ref 0–5.6)
HDLC SERPL-MCNC: 53 MG/DL
LDLC SERPL CALC-MCNC: 88 MG/DL
NONHDLC SERPL-MCNC: 103 MG/DL
TRIGL SERPL-MCNC: 74 MG/DL

## 2023-05-18 PROCEDURE — 99214 OFFICE O/P EST MOD 30 MIN: CPT | Mod: 25 | Performed by: STUDENT IN AN ORGANIZED HEALTH CARE EDUCATION/TRAINING PROGRAM

## 2023-05-18 PROCEDURE — 36415 COLL VENOUS BLD VENIPUNCTURE: CPT | Performed by: STUDENT IN AN ORGANIZED HEALTH CARE EDUCATION/TRAINING PROGRAM

## 2023-05-18 PROCEDURE — 80061 LIPID PANEL: CPT | Performed by: STUDENT IN AN ORGANIZED HEALTH CARE EDUCATION/TRAINING PROGRAM

## 2023-05-18 PROCEDURE — 83036 HEMOGLOBIN GLYCOSYLATED A1C: CPT | Performed by: STUDENT IN AN ORGANIZED HEALTH CARE EDUCATION/TRAINING PROGRAM

## 2023-05-18 PROCEDURE — 99395 PREV VISIT EST AGE 18-39: CPT | Mod: GC | Performed by: STUDENT IN AN ORGANIZED HEALTH CARE EDUCATION/TRAINING PROGRAM

## 2023-05-18 PROCEDURE — 99207 PR FOOT EXAM NO CHARGE: CPT | Mod: 25 | Performed by: STUDENT IN AN ORGANIZED HEALTH CARE EDUCATION/TRAINING PROGRAM

## 2023-05-18 PROCEDURE — 84403 ASSAY OF TOTAL TESTOSTERONE: CPT | Performed by: STUDENT IN AN ORGANIZED HEALTH CARE EDUCATION/TRAINING PROGRAM

## 2023-05-18 RX ORDER — ONDANSETRON 8 MG/1
TABLET, ORALLY DISINTEGRATING ORAL
COMMUNITY
Start: 2023-04-15

## 2023-05-18 RX ORDER — METOCLOPRAMIDE 5 MG/1
10 TABLET ORAL 3 TIMES DAILY
Qty: 120 TABLET | Refills: 3 | Status: SHIPPED | OUTPATIENT
Start: 2023-05-18 | End: 2023-09-06

## 2023-05-18 SDOH — ECONOMIC STABILITY: FOOD INSECURITY: WITHIN THE PAST 12 MONTHS, THE FOOD YOU BOUGHT JUST DIDN'T LAST AND YOU DIDN'T HAVE MONEY TO GET MORE.: NEVER TRUE

## 2023-05-18 SDOH — ECONOMIC STABILITY: FOOD INSECURITY: WITHIN THE PAST 12 MONTHS, YOU WORRIED THAT YOUR FOOD WOULD RUN OUT BEFORE YOU GOT MONEY TO BUY MORE.: NEVER TRUE

## 2023-05-18 SDOH — HEALTH STABILITY: PHYSICAL HEALTH: ON AVERAGE, HOW MANY DAYS PER WEEK DO YOU ENGAGE IN MODERATE TO STRENUOUS EXERCISE (LIKE A BRISK WALK)?: 0 DAYS

## 2023-05-18 SDOH — HEALTH STABILITY: PHYSICAL HEALTH: ON AVERAGE, HOW MANY MINUTES DO YOU ENGAGE IN EXERCISE AT THIS LEVEL?: 0 MIN

## 2023-05-18 SDOH — ECONOMIC STABILITY: INCOME INSECURITY: HOW HARD IS IT FOR YOU TO PAY FOR THE VERY BASICS LIKE FOOD, HOUSING, MEDICAL CARE, AND HEATING?: SOMEWHAT HARD

## 2023-05-18 SDOH — ECONOMIC STABILITY: INCOME INSECURITY: IN THE LAST 12 MONTHS, WAS THERE A TIME WHEN YOU WERE NOT ABLE TO PAY THE MORTGAGE OR RENT ON TIME?: NO

## 2023-05-18 ASSESSMENT — ENCOUNTER SYMPTOMS
MYALGIAS: 1
DYSURIA: 0
ABDOMINAL PAIN: 0
PALPITATIONS: 0
DIARRHEA: 1
HEMATURIA: 0
JOINT SWELLING: 0
NAUSEA: 0
FREQUENCY: 0
DIZZINESS: 0
HEMATOCHEZIA: 0
HEADACHES: 0
FEVER: 0
COUGH: 0
ARTHRALGIAS: 1
WEAKNESS: 0
PARESTHESIAS: 0
CONSTIPATION: 0
EYE PAIN: 0
NERVOUS/ANXIOUS: 0
HEARTBURN: 0
CHILLS: 0
SORE THROAT: 0
SHORTNESS OF BREATH: 0

## 2023-05-18 ASSESSMENT — PAIN SCALES - GENERAL: PAINLEVEL: MILD PAIN (3)

## 2023-05-18 ASSESSMENT — SOCIAL DETERMINANTS OF HEALTH (SDOH)
HOW OFTEN DO YOU ATTEND CHURCH OR RELIGIOUS SERVICES?: NEVER
DO YOU BELONG TO ANY CLUBS OR ORGANIZATIONS SUCH AS CHURCH GROUPS UNIONS, FRATERNAL OR ATHLETIC GROUPS, OR SCHOOL GROUPS?: NO
IN A TYPICAL WEEK, HOW MANY TIMES DO YOU TALK ON THE PHONE WITH FAMILY, FRIENDS, OR NEIGHBORS?: ONCE A WEEK
HOW OFTEN DO YOU GET TOGETHER WITH FRIENDS OR RELATIVES?: ONCE A WEEK

## 2023-05-18 ASSESSMENT — LIFESTYLE VARIABLES
AUDIT-C TOTAL SCORE: 1
HOW OFTEN DO YOU HAVE SIX OR MORE DRINKS ON ONE OCCASION: NEVER
HOW MANY STANDARD DRINKS CONTAINING ALCOHOL DO YOU HAVE ON A TYPICAL DAY: PATIENT DOES NOT DRINK
SKIP TO QUESTIONS 9-10: 1
HOW OFTEN DO YOU HAVE A DRINK CONTAINING ALCOHOL: MONTHLY OR LESS

## 2023-05-18 NOTE — PATIENT INSTRUCTIONS
-We'll send you your lab results and next steps if any  -You'll get a call to schedule eye and dietician appointments

## 2023-05-18 NOTE — PROGRESS NOTES
SUBJECTIVE:   CC: Wally is an 32 year old who presents for preventative health visit.       5/18/2023     7:55 AM   Additional Questions   Roomed by mf   Accompanied by Self     Patient has been advised of split billing requirements and indicates understanding: Yes  Healthy Habits:     Getting at least 3 servings of Calcium per day:  NO    Bi-annual eye exam:  NO    Dental care twice a year:  NO    Sleep apnea or symptoms of sleep apnea:  Daytime drowsiness    Diet:  Diabetic    Frequency of exercise:  None    Taking medications regularly:  Yes    Medication side effects:  Not applicable    PHQ-2 Total Score: 1    Additional concerns today:  No    Here to establish care. Has T1DM and gastroparesis diagnosed within the last two years, needs a PCP.    T1DM  Diagnosed 2021, has had a few bouts of DKA. Currently on lantus + carb correction (though he only takes the carb correction dose if his sugar bumps after eating. Given the gastroparesis, he has had times when he dropped too low from giving his standard correction dose. Rishi was ok with this plan per pt). Tries to eat well, but feels like his healthy eating is impeded by his gastroparesis. Hasn't had foot exam or eye exam. Scheduled to follow up with rishi this summer.    Gastroparesis  Follows with GI, developed within a year of DM diagnosis. Currently doing ok with prandial Reglan and multiple small meals. Feels that his gastroparesis diet limits the healthy eating he wants to do for his diabetes. Feels that his best symptomatic relief comes from cannabis-- he buys flower online and vapes it to avoid smoke exposure. Per patient, GI has been ok with this. Open to seeing a dietician.    Erectile dysfunction  Has trouble maintaining erections these days, starting to feel apathetic about sex. No random erections. Wonders whether it might be the DM or if his testosterone is low. The last few years have been pretty stressful with his new diagnoses and a toddler at home,  but he feels like his mental health is in a decent place otherwise.    Today's PHQ-2 Score:       5/18/2023     7:50 AM   PHQ-2 ( 1999 [employer])   Q1: Little interest or pleasure in doing things 1   Q2: Feeling down, depressed or hopeless 0   PHQ-2 Score 1   Q1: Little interest or pleasure in doing things Several days   Q2: Feeling down, depressed or hopeless Not at all   PHQ-2 Score 1     Have you ever done Advance Care Planning? (For example, a Health Directive, POLST, or a discussion with a medical provider or your loved ones about your wishes): No, advance care planning information given to patient to review.  Patient declined advance care planning discussion at this time.    Social History     Tobacco Use     Smoking status: Never     Smokeless tobacco: Never   Vaping Use     Vaping status: Never Used   Substance Use Topics     Alcohol use: Yes             5/18/2023     7:50 AM   Alcohol Use   Prescreen: >3 drinks/day or >7 drinks/week? Not Applicable     Last PSA: No results found for: PSA    Reviewed orders with patient. Reviewed health maintenance and updated orders accordingly - Yes    Reviewed and updated as needed this visit by clinical staff   Tobacco  Allergies  Meds            Reviewed and updated as needed this visit by Provider                 Review of Systems   Constitutional: Negative for chills and fever.   HENT: Negative for congestion, ear pain, hearing loss and sore throat.    Eyes: Negative for pain and visual disturbance.   Respiratory: Negative for cough and shortness of breath.    Cardiovascular: Negative for chest pain, palpitations and peripheral edema.   Gastrointestinal: Positive for diarrhea. Negative for abdominal pain, constipation, heartburn, hematochezia and nausea.   Genitourinary: Positive for impotence. Negative for dysuria, frequency, genital sores, hematuria, penile discharge and urgency.   Musculoskeletal: Positive for arthralgias and myalgias. Negative for joint swelling.  "  Skin: Negative for rash.   Neurological: Negative for dizziness, weakness, headaches and paresthesias.   Psychiatric/Behavioral: Positive for mood changes. The patient is not nervous/anxious.      OBJECTIVE:   /68   Pulse 98   Temp 97.2  F (36.2  C)   Resp 16   Ht 1.915 m (6' 3.39\")   Wt 73.8 kg (162 lb 9.6 oz)   SpO2 98%   BMI 20.11 kg/m      Physical Exam  GENERAL: healthy, alert and no distress  EYES: Eyes grossly normal to inspection, PERRL and conjunctivae and sclerae normal  HENT: ear canals and TM's normal, nose and mouth without ulcers or lesions  RESP: lungs clear to auscultation - no rales, rhonchi or wheezes  CV: regular rate and rhythm, normal S1 S2, no S3 or S4, no murmur, click or rub, no peripheral edema and peripheral pulses strong  ABDOMEN: soft, nontender, no hepatosplenomegaly, no masses and bowel sounds normal  MS: no gross musculoskeletal defects noted, no edema  SKIN: no suspicious lesions or rashes  NEURO: Normal strength and tone, mentation intact and speech normal  PSYCH: mentation appears normal, affect normal/bright  Diabetic foot exam: normal DP pulses, no trophic changes or ulcerative lesions, normal sensory exam and normal monofilament exam    Diagnostic Test Results:  Labs reviewed in Epic    ASSESSMENT/PLAN:   Wally was seen today for establish care and physical.    Diagnoses and all orders for this visit:    Encounter for medical examination to establish care  Establishing care. Declined TDAP and pneumococcal immunizations today, but will think about them (didn't grow up getting immunizations).    Gastroparesis  Refilling medication. Will continue to follow with GI. Referred to dietician as well today.  -     metoclopramide (REGLAN) 5 MG tablet; Take 2 tablets (10 mg) by mouth 3 times daily Take 10-15 min before a meal    Type 1 diabetes mellitus with other specified complication (H)  Following with endo, will see again this summer. Checked A1C today, down a bit to " 8.4. Will defer to endo for insulin changes, but referred for dietician consult today.  -     Hemoglobin A1c; Future  -     Lipid Profile (Chol, Trig, HDL, LDL calc); Future  -     Adult Eye  Referral; Future  -     Nutrition Referral; Future  -     FOOT EXAM  -     Hemoglobin A1c  -     Lipid Profile (Chol, Trig, HDL, LDL calc)    Erectile dysfunction, unspecified erectile dysfunction type  Likely psychosocial with possible DM contribution, however will check AM testosterone today given decreased libido.  -     Testosterone, total; Future  -     Testosterone, total    COUNSELING:   Special attention given to:        Healthy diet/nutrition       Vision screening       Pneumococcal Vaccine     He reports that he has never smoked. He has never used smokeless tobacco.          Kingsley Iyer MD  [facility]    I have seen this patient and I was present during all critical and key portions of the procedure/exam and immediately available to furnish services during the entire service. I have reviewed the documentation from this resident and discussed the findings with them, and I agree with the documentation their documentation.      Christofer Ley MD  Internal Medicine and Pediatrics

## 2023-05-21 LAB — TESTOST SERPL-MCNC: 570 NG/DL (ref 240–950)

## 2023-05-26 ENCOUNTER — PATIENT OUTREACH (OUTPATIENT)
Dept: CARE COORDINATION | Facility: CLINIC | Age: 33
End: 2023-05-26
Payer: COMMERCIAL

## 2023-05-26 NOTE — PROGRESS NOTES
Clinic Care Coordination Contact  [facility]/Voicemail       Clinical Data: Care Coordinator Outreach  Outreach attempted x 2.  Left message on patient's voicemail with call back information and requested return call.    Plan: Care Coordinator will send message via [employer] and try to reach patient again in 10 business days.    KATIE Quispe, B.S. [facility] Care Coordination  [facility] Clinics:  [place], [place] and [place]  [phone]  [email or web address]

## 2023-05-26 NOTE — PROGRESS NOTES
Clinic Care Coordination Contact    Situation: Patient chart reviewed by care coordinator.    Background: Care Coordination initial assessment and enrollment to Care Coordination was 1/23/2023. Patient centered goal(s) developed with participation from patient.  RN CC handed patient off to CHW for continued outreach every 30 days. Patient is not due for an updated complex care plan. Annual Assessment will be due 01/2024  .  Assessment: CHW CC is in process of outreaching to patient, recently unable to contact patient x 2. Next CHW CC outreach attempt scheduled for 06/02/2023 - please call patient between 12:30-1:00pm.     Plan/Recommendations: CHW CC will route request to RNCC to review for disenrollment per standard work if next outreach attempt is unsuccessful. RNCC will extend and complete clinical review after CHW CC has made contact with patient. RNCC will remain available as needed.     Marcy Hidalgo RN Care Coordinator  [facility] [place]  Email: [email or web address]  Phone: [phone]

## 2023-06-06 ENCOUNTER — PATIENT OUTREACH (OUTPATIENT)
Dept: CARE COORDINATION | Facility: CLINIC | Age: 33
End: 2023-06-06
Payer: COMMERCIAL

## 2023-06-06 NOTE — PROGRESS NOTES
Clinic Care Coordination Contact    Situation: Patient chart reviewed by care coordinator.    Background: Patient is enrolled in clinic care coordination and followed to assist with goal(s) progression. CHW CC routed chart to RNCC for review to determine eligibility of disenrolling from Care Coordination per standard work.     Assessment: Per Chart Review, patient has been unreachable for follow up x 3 attempts with no further engagement or return calls.     Plan/Recommendations: Per Care Coordination standard work, patient will be disenrolled from Care Coordination. Care Coordinator will send disenrollment letter with care coordinator contact information via [employer]. Care Coordinator will do no further outreaches at this time. Care Coordinator will remain available as needed. New Care Coordination referral may be ordered with any future needs for resources, assistance, and/or support if patient is agreeable and remains engaged.        Marcy Hidalgo RN Care Coordinator  [facility] [place]  Email: [email or web address]  Phone: [phone]

## 2023-06-06 NOTE — LETTER
[facility] CARE COORDINATION  [address]  [place] [address]     June 6, 2023    Wally Avendano  [address] [place] [address]      Dear Wally,    I have been unsuccessful in reaching you since our last contact. At this time the Care Coordination team will make no further attempts to reach you, however this does not change your ability to continue receiving care from your providers at your primary care clinic. If you need additional support from a care coordinator in the future please contact me at [phone].    All of us at [facility] are invested in your health and are here to assist you in meeting your goals.     Sincerely,    Marcy Hidalgo RN Care Coordinator  [facility] [place]  Email: [email or web address]  Phone: [phone]

## 2023-06-06 NOTE — PROGRESS NOTES
Clinic Care Coordination Contact  [facility]/Voicemail       Clinical Data: Care Coordinator Outreach  Outreach attempted x 3.  Left message on patient's voicemail with call back information and requested return call.    Plan: CHW has been unsuccessful in multiple recent attempts to contact patient. Will route to lead CC to review for disenrollment and/or further direction per standard work. No further outreach will be made at this time.     KATIE Quispe, B.S. [facility] Care Coordination  [facility] Clinics:  [place], [place] and [place]  [phone]  [email or web address]

## 2023-06-14 ENCOUNTER — HOSPITAL ENCOUNTER (INPATIENT)
Facility: CLINIC | Age: 33
LOS: 1 days | Discharge: LEFT BEFORE DISCHARGE | End: 2023-06-15
Attending: EMERGENCY MEDICINE | Admitting: INTERNAL MEDICINE
Payer: COMMERCIAL

## 2023-06-14 DIAGNOSIS — K31.84 GASTROPARESIS: ICD-10-CM

## 2023-06-14 DIAGNOSIS — E86.0 DEHYDRATION: ICD-10-CM

## 2023-06-14 LAB
HCO3 BLDV-SCNC: 23 MMOL/L (ref 21–28)
LACTATE BLD-SCNC: 3.1 MMOL/L
PCO2 BLDV: 23 MM HG (ref 40–50)
PH BLDV: 7.6 [PH] (ref 7.32–7.43)
PO2 BLDV: 40 MM HG (ref 25–47)
SAO2 % BLDV: 85 % (ref 94–100)

## 2023-06-14 PROCEDURE — 82010 KETONE BODYS QUAN: CPT | Performed by: EMERGENCY MEDICINE

## 2023-06-14 PROCEDURE — 258N000003 HC RX IP 258 OP 636: Performed by: EMERGENCY MEDICINE

## 2023-06-14 PROCEDURE — 82803 BLOOD GASES ANY COMBINATION: CPT

## 2023-06-14 PROCEDURE — 93005 ELECTROCARDIOGRAM TRACING: CPT

## 2023-06-14 PROCEDURE — 85041 AUTOMATED RBC COUNT: CPT | Performed by: EMERGENCY MEDICINE

## 2023-06-14 PROCEDURE — 80053 COMPREHEN METABOLIC PANEL: CPT | Performed by: EMERGENCY MEDICINE

## 2023-06-14 PROCEDURE — 99285 EMERGENCY DEPT VISIT HI MDM: CPT | Mod: 25

## 2023-06-14 PROCEDURE — 96361 HYDRATE IV INFUSION ADD-ON: CPT

## 2023-06-14 PROCEDURE — 250N000011 HC RX IP 250 OP 636: Performed by: EMERGENCY MEDICINE

## 2023-06-14 PROCEDURE — 83690 ASSAY OF LIPASE: CPT | Performed by: EMERGENCY MEDICINE

## 2023-06-14 PROCEDURE — 36415 COLL VENOUS BLD VENIPUNCTURE: CPT | Performed by: EMERGENCY MEDICINE

## 2023-06-14 PROCEDURE — 96374 THER/PROPH/DIAG INJ IV PUSH: CPT | Mod: 59

## 2023-06-14 RX ORDER — ONDANSETRON 2 MG/ML
4 INJECTION INTRAMUSCULAR; INTRAVENOUS ONCE
Status: COMPLETED | OUTPATIENT
Start: 2023-06-14 | End: 2023-06-14

## 2023-06-14 RX ADMIN — SODIUM CHLORIDE 1000 ML: 9 INJECTION, SOLUTION INTRAVENOUS at 23:40

## 2023-06-14 RX ADMIN — ONDANSETRON 4 MG: 2 INJECTION INTRAMUSCULAR; INTRAVENOUS at 23:40

## 2023-06-15 ENCOUNTER — APPOINTMENT (OUTPATIENT)
Dept: CT IMAGING | Facility: CLINIC | Age: 33
End: 2023-06-15
Attending: EMERGENCY MEDICINE
Payer: COMMERCIAL

## 2023-06-15 VITALS
RESPIRATION RATE: 16 BRPM | WEIGHT: 160 LBS | DIASTOLIC BLOOD PRESSURE: 75 MMHG | HEART RATE: 100 BPM | HEIGHT: 76 IN | SYSTOLIC BLOOD PRESSURE: 124 MMHG | TEMPERATURE: 98.3 F | BODY MASS INDEX: 19.48 KG/M2 | OXYGEN SATURATION: 98 %

## 2023-06-15 PROBLEM — E86.0 DEHYDRATION: Status: ACTIVE | Noted: 2023-06-15

## 2023-06-15 LAB
ALBUMIN SERPL BCG-MCNC: 5.1 G/DL (ref 3.5–5.2)
ALP SERPL-CCNC: 61 U/L (ref 40–129)
ALT SERPL W P-5'-P-CCNC: 16 U/L (ref 0–70)
ANION GAP SERPL CALCULATED.3IONS-SCNC: 13 MMOL/L (ref 7–15)
ANION GAP SERPL CALCULATED.3IONS-SCNC: 22 MMOL/L (ref 7–15)
AST SERPL W P-5'-P-CCNC: 21 U/L (ref 0–45)
ATRIAL RATE - MUSE: 98 BPM
B-OH-BUTYR SERPL-SCNC: 1.5 MMOL/L
BASOPHILS # BLD AUTO: 0 10E3/UL (ref 0–0.2)
BASOPHILS NFR BLD AUTO: 0 %
BILIRUB SERPL-MCNC: 0.5 MG/DL
BUN SERPL-MCNC: 10.3 MG/DL (ref 6–20)
BUN SERPL-MCNC: 13.4 MG/DL (ref 6–20)
CALCIUM SERPL-MCNC: 8.2 MG/DL (ref 8.6–10)
CALCIUM SERPL-MCNC: 9.7 MG/DL (ref 8.6–10)
CHLORIDE SERPL-SCNC: 108 MMOL/L (ref 98–107)
CHLORIDE SERPL-SCNC: 99 MMOL/L (ref 98–107)
CREAT SERPL-MCNC: 0.69 MG/DL (ref 0.67–1.17)
CREAT SERPL-MCNC: 0.76 MG/DL (ref 0.67–1.17)
DEPRECATED HCO3 PLAS-SCNC: 21 MMOL/L (ref 22–29)
DEPRECATED HCO3 PLAS-SCNC: 23 MMOL/L (ref 22–29)
DIASTOLIC BLOOD PRESSURE - MUSE: NORMAL MMHG
EOSINOPHIL # BLD AUTO: 0 10E3/UL (ref 0–0.7)
EOSINOPHIL NFR BLD AUTO: 0 %
ERYTHROCYTE [DISTWIDTH] IN BLOOD BY AUTOMATED COUNT: 12.6 % (ref 10–15)
GFR SERPL CREATININE-BSD FRML MDRD: >90 ML/MIN/1.73M2
GFR SERPL CREATININE-BSD FRML MDRD: >90 ML/MIN/1.73M2
GLUCOSE BLDC GLUCOMTR-MCNC: 191 MG/DL (ref 70–99)
GLUCOSE BLDC GLUCOMTR-MCNC: 245 MG/DL (ref 70–99)
GLUCOSE BLDC GLUCOMTR-MCNC: 248 MG/DL (ref 70–99)
GLUCOSE BLDC GLUCOMTR-MCNC: 255 MG/DL (ref 70–99)
GLUCOSE SERPL-MCNC: 258 MG/DL (ref 70–99)
GLUCOSE SERPL-MCNC: 308 MG/DL (ref 70–99)
HCT VFR BLD AUTO: 39.7 % (ref 40–53)
HGB BLD-MCNC: 13.6 G/DL (ref 13.3–17.7)
HOLD SPECIMEN: NORMAL
HOLD SPECIMEN: NORMAL
IMM GRANULOCYTES # BLD: 0.1 10E3/UL
IMM GRANULOCYTES NFR BLD: 0 %
INTERPRETATION ECG - MUSE: NORMAL
LACTATE SERPL-SCNC: 0.7 MMOL/L (ref 0.7–2)
LIPASE SERPL-CCNC: 32 U/L (ref 13–60)
LYMPHOCYTES # BLD AUTO: 0.7 10E3/UL (ref 0.8–5.3)
LYMPHOCYTES NFR BLD AUTO: 4 %
MAGNESIUM SERPL-MCNC: 1.7 MG/DL (ref 1.7–2.3)
MCH RBC QN AUTO: 28.6 PG (ref 26.5–33)
MCHC RBC AUTO-ENTMCNC: 34.3 G/DL (ref 31.5–36.5)
MCV RBC AUTO: 83 FL (ref 78–100)
MONOCYTES # BLD AUTO: 0.4 10E3/UL (ref 0–1.3)
MONOCYTES NFR BLD AUTO: 3 %
NEUTROPHILS # BLD AUTO: 15.9 10E3/UL (ref 1.6–8.3)
NEUTROPHILS NFR BLD AUTO: 93 %
NRBC # BLD AUTO: 0 10E3/UL
NRBC BLD AUTO-RTO: 0 /100
P AXIS - MUSE: 77 DEGREES
PLATELET # BLD AUTO: 344 10E3/UL (ref 150–450)
POTASSIUM SERPL-SCNC: 3.5 MMOL/L (ref 3.4–5.3)
POTASSIUM SERPL-SCNC: 3.6 MMOL/L (ref 3.4–5.3)
PR INTERVAL - MUSE: 132 MS
PROT SERPL-MCNC: 7.7 G/DL (ref 6.4–8.3)
QRS DURATION - MUSE: 100 MS
QT - MUSE: 356 MS
QTC - MUSE: 454 MS
R AXIS - MUSE: 109 DEGREES
RBC # BLD AUTO: 4.76 10E6/UL (ref 4.4–5.9)
SODIUM SERPL-SCNC: 142 MMOL/L (ref 136–145)
SODIUM SERPL-SCNC: 144 MMOL/L (ref 136–145)
SYSTOLIC BLOOD PRESSURE - MUSE: NORMAL MMHG
T AXIS - MUSE: 70 DEGREES
VENTRICULAR RATE- MUSE: 98 BPM
WBC # BLD AUTO: 17.2 10E3/UL (ref 4–11)

## 2023-06-15 PROCEDURE — 258N000003 HC RX IP 258 OP 636: Performed by: INTERNAL MEDICINE

## 2023-06-15 PROCEDURE — 250N000011 HC RX IP 250 OP 636: Performed by: INTERNAL MEDICINE

## 2023-06-15 PROCEDURE — 250N000011 HC RX IP 250 OP 636: Performed by: EMERGENCY MEDICINE

## 2023-06-15 PROCEDURE — 96375 TX/PRO/DX INJ NEW DRUG ADDON: CPT

## 2023-06-15 PROCEDURE — 82310 ASSAY OF CALCIUM: CPT | Performed by: INTERNAL MEDICINE

## 2023-06-15 PROCEDURE — 83735 ASSAY OF MAGNESIUM: CPT | Performed by: INTERNAL MEDICINE

## 2023-06-15 PROCEDURE — 83605 ASSAY OF LACTIC ACID: CPT

## 2023-06-15 PROCEDURE — 99207 PR NO BILLABLE SERVICE THIS VISIT: CPT | Performed by: INTERNAL MEDICINE

## 2023-06-15 PROCEDURE — 96361 HYDRATE IV INFUSION ADD-ON: CPT

## 2023-06-15 PROCEDURE — 120N000001 HC R&B MED SURG/OB

## 2023-06-15 PROCEDURE — 82962 GLUCOSE BLOOD TEST: CPT

## 2023-06-15 PROCEDURE — 258N000003 HC RX IP 258 OP 636: Performed by: EMERGENCY MEDICINE

## 2023-06-15 PROCEDURE — 74177 CT ABD & PELVIS W/CONTRAST: CPT

## 2023-06-15 PROCEDURE — 82374 ASSAY BLOOD CARBON DIOXIDE: CPT | Performed by: INTERNAL MEDICINE

## 2023-06-15 PROCEDURE — 99235 HOSP IP/OBS SAME DATE MOD 70: CPT | Performed by: INTERNAL MEDICINE

## 2023-06-15 PROCEDURE — 250N000009 HC RX 250: Performed by: INTERNAL MEDICINE

## 2023-06-15 PROCEDURE — 36415 COLL VENOUS BLD VENIPUNCTURE: CPT

## 2023-06-15 PROCEDURE — 36415 COLL VENOUS BLD VENIPUNCTURE: CPT | Performed by: INTERNAL MEDICINE

## 2023-06-15 RX ORDER — IOPAMIDOL 755 MG/ML
500 INJECTION, SOLUTION INTRAVASCULAR ONCE
Status: COMPLETED | OUTPATIENT
Start: 2023-06-15 | End: 2023-06-15

## 2023-06-15 RX ORDER — ACETAMINOPHEN 325 MG/1
650 TABLET ORAL EVERY 6 HOURS PRN
Status: DISCONTINUED | OUTPATIENT
Start: 2023-06-15 | End: 2023-06-15 | Stop reason: HOSPADM

## 2023-06-15 RX ORDER — KETOROLAC TROMETHAMINE 30 MG/ML
30 INJECTION, SOLUTION INTRAMUSCULAR; INTRAVENOUS EVERY 6 HOURS PRN
Status: DISCONTINUED | OUTPATIENT
Start: 2023-06-15 | End: 2023-06-15 | Stop reason: HOSPADM

## 2023-06-15 RX ORDER — NICOTINE POLACRILEX 4 MG
15-30 LOZENGE BUCCAL
Status: DISCONTINUED | OUTPATIENT
Start: 2023-06-15 | End: 2023-06-15 | Stop reason: HOSPADM

## 2023-06-15 RX ORDER — METOCLOPRAMIDE 5 MG/1
10 TABLET ORAL
Status: DISCONTINUED | OUTPATIENT
Start: 2023-06-15 | End: 2023-06-15 | Stop reason: HOSPADM

## 2023-06-15 RX ORDER — AMOXICILLIN 250 MG
2 CAPSULE ORAL 2 TIMES DAILY PRN
Status: DISCONTINUED | OUTPATIENT
Start: 2023-06-15 | End: 2023-06-15 | Stop reason: HOSPADM

## 2023-06-15 RX ORDER — DEXTROSE MONOHYDRATE 25 G/50ML
25-50 INJECTION, SOLUTION INTRAVENOUS
Status: DISCONTINUED | OUTPATIENT
Start: 2023-06-15 | End: 2023-06-15 | Stop reason: HOSPADM

## 2023-06-15 RX ORDER — METOCLOPRAMIDE HYDROCHLORIDE 5 MG/ML
10 INJECTION INTRAMUSCULAR; INTRAVENOUS ONCE
Status: COMPLETED | OUTPATIENT
Start: 2023-06-15 | End: 2023-06-15

## 2023-06-15 RX ORDER — PROCHLORPERAZINE MALEATE 10 MG
10 TABLET ORAL EVERY 6 HOURS PRN
COMMUNITY

## 2023-06-15 RX ORDER — POLYETHYLENE GLYCOL 3350 17 G/17G
17 POWDER, FOR SOLUTION ORAL DAILY PRN
Status: DISCONTINUED | OUTPATIENT
Start: 2023-06-15 | End: 2023-06-15 | Stop reason: HOSPADM

## 2023-06-15 RX ORDER — LIDOCAINE 40 MG/G
CREAM TOPICAL
Status: DISCONTINUED | OUTPATIENT
Start: 2023-06-15 | End: 2023-06-15 | Stop reason: HOSPADM

## 2023-06-15 RX ORDER — MAGNESIUM SULFATE HEPTAHYDRATE 40 MG/ML
2 INJECTION, SOLUTION INTRAVENOUS ONCE
Status: COMPLETED | OUTPATIENT
Start: 2023-06-15 | End: 2023-06-15

## 2023-06-15 RX ORDER — DROPERIDOL 2.5 MG/ML
1.25 INJECTION, SOLUTION INTRAMUSCULAR; INTRAVENOUS ONCE
Status: COMPLETED | OUTPATIENT
Start: 2023-06-15 | End: 2023-06-15

## 2023-06-15 RX ORDER — ONDANSETRON 4 MG/1
4 TABLET, ORALLY DISINTEGRATING ORAL EVERY 6 HOURS PRN
Status: DISCONTINUED | OUTPATIENT
Start: 2023-06-15 | End: 2023-06-15 | Stop reason: HOSPADM

## 2023-06-15 RX ORDER — SODIUM CHLORIDE 9 MG/ML
INJECTION, SOLUTION INTRAVENOUS CONTINUOUS
Status: DISCONTINUED | OUTPATIENT
Start: 2023-06-15 | End: 2023-06-15 | Stop reason: HOSPADM

## 2023-06-15 RX ORDER — IOPAMIDOL 755 MG/ML
500 INJECTION, SOLUTION INTRAVASCULAR ONCE
Status: DISCONTINUED | OUTPATIENT
Start: 2023-06-15 | End: 2023-06-15

## 2023-06-15 RX ORDER — LORAZEPAM 2 MG/ML
1 INJECTION INTRAMUSCULAR ONCE
Status: COMPLETED | OUTPATIENT
Start: 2023-06-15 | End: 2023-06-15

## 2023-06-15 RX ORDER — POTASSIUM CHLORIDE 7.45 MG/ML
10 INJECTION INTRAVENOUS
Status: COMPLETED | OUTPATIENT
Start: 2023-06-15 | End: 2023-06-15

## 2023-06-15 RX ORDER — HYDROMORPHONE HYDROCHLORIDE 1 MG/ML
0.5 INJECTION, SOLUTION INTRAMUSCULAR; INTRAVENOUS; SUBCUTANEOUS ONCE
Status: COMPLETED | OUTPATIENT
Start: 2023-06-15 | End: 2023-06-15

## 2023-06-15 RX ORDER — PROCHLORPERAZINE MALEATE 10 MG
10 TABLET ORAL EVERY 6 HOURS PRN
Status: DISCONTINUED | OUTPATIENT
Start: 2023-06-15 | End: 2023-06-15 | Stop reason: HOSPADM

## 2023-06-15 RX ORDER — ONDANSETRON 2 MG/ML
4 INJECTION INTRAMUSCULAR; INTRAVENOUS EVERY 6 HOURS PRN
Status: DISCONTINUED | OUTPATIENT
Start: 2023-06-15 | End: 2023-06-15 | Stop reason: HOSPADM

## 2023-06-15 RX ORDER — AMOXICILLIN 250 MG
1 CAPSULE ORAL 2 TIMES DAILY PRN
Status: DISCONTINUED | OUTPATIENT
Start: 2023-06-15 | End: 2023-06-15 | Stop reason: HOSPADM

## 2023-06-15 RX ORDER — PROCHLORPERAZINE 25 MG
25 SUPPOSITORY, RECTAL RECTAL EVERY 12 HOURS PRN
Status: DISCONTINUED | OUTPATIENT
Start: 2023-06-15 | End: 2023-06-15 | Stop reason: HOSPADM

## 2023-06-15 RX ORDER — ACETAMINOPHEN 650 MG/1
650 SUPPOSITORY RECTAL EVERY 6 HOURS PRN
Status: DISCONTINUED | OUTPATIENT
Start: 2023-06-15 | End: 2023-06-15 | Stop reason: HOSPADM

## 2023-06-15 RX ADMIN — SODIUM CHLORIDE 60 ML: 9 INJECTION, SOLUTION INTRAVENOUS at 06:52

## 2023-06-15 RX ADMIN — HYDROMORPHONE HYDROCHLORIDE 0.5 MG: 1 INJECTION, SOLUTION INTRAMUSCULAR; INTRAVENOUS; SUBCUTANEOUS at 06:00

## 2023-06-15 RX ADMIN — MAGNESIUM SULFATE HEPTAHYDRATE 2 G: 2 INJECTION, SOLUTION INTRAVENOUS at 16:54

## 2023-06-15 RX ADMIN — IOPAMIDOL 81 ML: 755 INJECTION, SOLUTION INTRAVENOUS at 06:52

## 2023-06-15 RX ADMIN — ONDANSETRON 4 MG: 2 INJECTION INTRAMUSCULAR; INTRAVENOUS at 14:57

## 2023-06-15 RX ADMIN — POTASSIUM CHLORIDE 10 MEQ: 7.46 INJECTION, SOLUTION INTRAVENOUS at 12:57

## 2023-06-15 RX ADMIN — SODIUM CHLORIDE: 9 INJECTION, SOLUTION INTRAVENOUS at 08:52

## 2023-06-15 RX ADMIN — ONDANSETRON 4 MG: 2 INJECTION INTRAMUSCULAR; INTRAVENOUS at 08:23

## 2023-06-15 RX ADMIN — KETOROLAC TROMETHAMINE 30 MG: 30 INJECTION, SOLUTION INTRAMUSCULAR at 08:47

## 2023-06-15 RX ADMIN — METOCLOPRAMIDE HYDROCHLORIDE 10 MG: 5 INJECTION INTRAMUSCULAR; INTRAVENOUS at 00:05

## 2023-06-15 RX ADMIN — SODIUM CHLORIDE 1000 ML: 9 INJECTION, SOLUTION INTRAVENOUS at 05:57

## 2023-06-15 RX ADMIN — POTASSIUM CHLORIDE 10 MEQ: 7.46 INJECTION, SOLUTION INTRAVENOUS at 14:04

## 2023-06-15 RX ADMIN — PROCHLORPERAZINE EDISYLATE 10 MG: 5 INJECTION INTRAMUSCULAR; INTRAVENOUS at 08:55

## 2023-06-15 RX ADMIN — HYDROMORPHONE HYDROCHLORIDE 0.5 MG: 1 INJECTION, SOLUTION INTRAMUSCULAR; INTRAVENOUS; SUBCUTANEOUS at 00:04

## 2023-06-15 RX ADMIN — DROPERIDOL 1.25 MG: 2.5 INJECTION, SOLUTION INTRAMUSCULAR; INTRAVENOUS at 00:32

## 2023-06-15 RX ADMIN — LORAZEPAM 1 MG: 2 INJECTION INTRAMUSCULAR; INTRAVENOUS at 04:03

## 2023-06-15 RX ADMIN — SODIUM CHLORIDE 1000 ML: 9 INJECTION, SOLUTION INTRAVENOUS at 00:31

## 2023-06-15 ASSESSMENT — ACTIVITIES OF DAILY LIVING (ADL)
ADLS_ACUITY_SCORE: 37
ADLS_ACUITY_SCORE: 35
ADLS_ACUITY_SCORE: 37
ADLS_ACUITY_SCORE: 35

## 2023-06-15 NOTE — DISCHARGE SUMMARY
[facility]  Discharge Summary  Name: Wally Avendnao    MRN: [id number]  YOB: 1990    Age: 32 year old  Date of Discharge:  6/15/2023  Date of Admission: 6/14/2023  Primary Care Provider: MISAEL Cao [facility]  Discharge Physician:  Tyler Kauffman M.D  Discharging Service:  Hospitalist      Discharge Diagnosis:  Gastroparesis   Diabetes mellitus type 1  Intractable nausea, vomiting and abdominal pain  Ketosis and lactic acidosis  Dehydration    Please see H&P for detailed information.  Patient felt better slightly and wanted to be discharged and refused to stay in the hospital.  He was discharged against advice and aware of the consequences of his decision..     Other Diagnosis:  none     Discharge Disposition:  Discharged to home the patient declined to stay in the hospital     Allergies:  No Known Allergies     Discharge Medications:   Current Discharge Medication List      CONTINUE these medications which have NOT CHANGED    Details   Continuous Blood Gluc Sensor (DEXCOM G6 SENSOR) MISC Change every 10 days.  Qty: 3 each, Refills: 11    Associated Diagnoses: Type 1 diabetes mellitus with other specified complication (H)      Continuous Blood Gluc Transmit (DEXCOM G6 TRANSMITTER) MISC CHANGE EVERY 3 MONTHS  Qty: 1 each, Refills: 3    Associated Diagnoses: Type 1 diabetes mellitus with other specified complication (H)      insulin aspart (NOVOLOG PEN) 100 UNIT/ML pen Inject 2 Units Subcutaneous Take with snacks or supplements Only when BS goes up PRN      insulin glargine (LANTUS PEN) 100 UNIT/ML pen Inject 20 Units Subcutaneous every morning  Qty: 15 mL, Refills: 1    Comments: If Lantus is not covered by insurance, may substitute Basaglar or Semglee or other insulin glargine product per insurance preference at same dose and frequency.    Associated Diagnoses: Diabetic ketoacidosis without coma associated with type 1 diabetes mellitus (H)      metoclopramide (REGLAN) 5 MG  "tablet Take 2 tablets (10 mg) by mouth 3 times daily Take 10-15 min before a meal  Qty: 120 tablet, Refills: 3    Associated Diagnoses: Gastroparesis      ondansetron (ZOFRAN ODT) 8 MG ODT tab DISSOLVE 1 TABLET ON TOP OF THE TONGUE EVERY 8 HOURS THEN SWALLOW      prochlorperazine (COMPAZINE) 10 MG tablet Take 10 mg by mouth every 6 hours as needed for nausea or vomiting      Glucagon (BAQSIMI ONE PACK) 3 MG/DOSE POWD Use only for severe hypoglycemia.  Qty: 1 each, Refills: 1    Associated Diagnoses: Type 1 diabetes mellitus with other specified complication (H)      !! insulin pen needle (32G X 4 MM) 32G X 4 MM miscellaneous Use 6 pen needles daily or as directed.  Qty: 200 each, Refills: 6    Associated Diagnoses: Type 1 diabetes mellitus with other specified complication (H)      !! insulin pen needle 30G X 5 MM Use 6 pen needles daily or as directed.  Qty: 200 each, Refills: 11    Associated Diagnoses: Type 1 diabetes mellitus with other specified complication (H)       !! - Potential duplicate medications found. Please discuss with provider.           Condition on Discharge:  Discharge condition: Fair   Discharge vitals: Blood pressure 130/80, pulse 89, temperature 98.1  F (36.7  C), temperature source Axillary, resp. rate 16, height 1.93 m (6' 4\"), weight 72.6 kg (160 lb), SpO2 96 %.   Code status on discharge: Full Code     History of Illness:  See detailed admission note for full details.    Significant Physical Exam Findings:  See aH&P no chnage    Procedures other than Imaging:  none     Imaging:  Recent Results (from the past 24 hour(s))   CT Abdomen Pelvis w Contrast    Narrative    EXAM: CT ABDOMEN PELVIS W CONTRAST  LOCATION: [facility]  DATE: 6/15/2023    INDICATION: generalized pain vomiting; hx obstructions  COMPARISON: CT abdomen pelvis 08/11/2022  TECHNIQUE: CT scan of the abdomen and pelvis was performed following injection of IV contrast. Multiplanar reformats were " obtained. Dose reduction techniques were used.  CONTRAST: 81mL Isovue 370    FINDINGS:   LOWER CHEST: Normal.    HEPATOBILIARY: Focal fat or altered perfusion near the falciform. No suspicious liver lesion. No calcified gallstones or biliary dilation.    PANCREAS: Homogeneous enhancement of the pancreas. No acute peripancreatic inflammation. No duct dilation.    SPLEEN: Normal.    ADRENAL GLANDS: Normal.    KIDNEYS/BLADDER: No significant mass, stone, or hydronephrosis. Excreted contrast in the renal collecting systems and bladder.    BOWEL: Distal esophageal thickening is likely reactive in the setting of repeated vomiting. No small bowel obstruction. Appendix is normal. Slight haziness in the perirectal fat. Colon is decompressed.    LYMPH NODES: No lymphadenopathy.    VASCULATURE: Normal caliber abdominal aorta. The portal, splenic, and superior mesenteric veins are patent.    PELVIC ORGANS: Normal.    MUSCULOSKELETAL: Normal.      Impression    IMPRESSION:   1.  Slight haziness of the perirectal fat is nonspecific but could reflect a proctitis in the appropriate clinical setting.    2.  Distal esophageal thickening is likely reactive in the setting of repeated vomiting.    3.  No small bowel obstruction.        Consultations:  No consultations were requested during this admission.     Recent Lab Results:  Recent Labs   Lab 06/14/23  2342   WBC 17.2*   HGB 13.6   HCT 39.7*   MCV 83        Recent Labs   Lab 06/15/23  1407 06/15/23  0925 06/15/23  0914 06/15/23  0014 06/14/23  2342   NA  --  144  --   --  142   POTASSIUM  --  3.6  --   --  3.5   CHLORIDE  --  108*  --   --  99   CO2  --  23  --   --  21*   ANIONGAP  --  13  --   --  22*   * 258* 245*   < > 308*   BUN  --  10.3  --   --  13.4   CR  --  0.69  --   --  0.76   GFRESTIMATED  --  >90  --   --  >90   MARY  --  8.2*  --   --  9.7    < > = values in this interval not displayed.          Pending Results:    Unresulted Labs Ordered in the Past 30  Days of this Admission     No orders found for last 31 day(s).              Reason for your hospital stay    Protracted nausea and vomiting, diabetes mellitus type 1, gastroparesis     Follow-up and recommended labs and tests     Follow up with primary care provider, [facility] Rita Cooper, within 7 days for hospital follow- up.     Activity    Your activity upon discharge: activity as tolerated     Diet    Follow this diet upon discharge: Orders Placed This Encounter      NPO for Medical/Clinical Reasons Except for: Meds, Ice Chips         Total time spent in face to face contact with the patient and coordinating discharge was: <30 Minutes.

## 2023-06-15 NOTE — PLAN OF CARE
"Pt appears lethargic, but reported feeling much better. 240 ml water given. No vomiting so far. Pt declined to eat now, but will try after 20 min. Pt requesting to go home. Second bag pg potassium infusing. Pt ambulate to the bathroom and void independently. Possible discharge today 1630.  BS reading 195. Pt refused sliding scale due to has not eating food.   1500: Pt vomited about 200 ml fluids after drinking water. PRN Zofran given.   /80 (BP Location: Right arm, Patient Position: Supine, Cuff Size: Adult Small)   Pulse 89   Temp 98.1  F (36.7  C) (Axillary)   Resp 16   Ht 1.93 m (6' 4\")   Wt 72.6 kg (160 lb)   SpO2 96%   BMI 19.48 kg/m        "

## 2023-06-15 NOTE — ED PROVIDER NOTES
History     Chief Complaint:  Abdominal Pain and Nausea, Vomiting, & Diarrhea       HPI   Wally Avendano is a 32 year old male who presents with nausea, vomiting, and abdominal pain since 1300. The patient has a history of gastroparesis and type 2 diabetes, and his father reports that his blood sugar was elevated and his gastroparesis has been flaring up. He denies fever. No recent sick contact or issues with his diabetes. Wally has no history of abdominal surgery. The patient took Zofran with no alleviation of his symptoms.      Independent Historian:    Supplemented by father    Review of External Notes:  None    ROS:  See HPI    Allergies:  No Known Allergies     Physical Exam     Patient Vitals for the past 24 hrs:   BP Temp Temp src Pulse Resp SpO2 Height Weight   06/15/23 0440 110/63 -- -- 98 15 -- -- --   06/15/23 0340 (!) 140/92 -- -- 117 16 -- -- --   06/15/23 0320 116/64 -- -- 93 15 98 % -- --   06/15/23 0300 113/60 -- -- 90 14 98 % -- --   06/15/23 0240 121/62 -- -- 96 16 98 % -- --   06/15/23 0215 110/57 -- -- 89 -- 98 % -- --   06/15/23 0200 93/44 -- -- 93 -- 98 % -- --   06/15/23 0145 93/43 -- -- 91 -- 98 % -- --   06/15/23 0130 -- -- -- 94 -- 98 % -- --   06/15/23 0115 93/42 -- -- 95 -- 98 % -- --   06/15/23 0100 114/55 -- -- 92 -- 98 % -- --   06/15/23 0045 (!) 146/76 -- -- -- -- -- -- --   06/15/23 0039 -- -- -- 97 28 98 % -- --   06/15/23 0030 119/74 -- -- 95 -- -- -- --   06/15/23 0022 (!) 146/82 -- -- 94 (!) 9 98 % -- --   06/15/23 0021 -- -- -- 99 13 92 % -- --   06/15/23 0020 -- -- -- 102 (!) 9 (!) 82 % -- --   06/15/23 0019 -- -- -- 103 (!) 5 (!) 75 % -- --   06/15/23 0015 (!) 134/102 -- -- 106 25 96 % -- --   06/15/23 0000 132/74 -- -- 103 29 99 % -- --   06/14/23 2350 138/88 -- -- -- -- 98 % -- --   06/14/23 2349 138/88 -- -- (!) 124 -- 100 % -- --   06/14/23 2348 -- -- -- -- -- 99 % -- --   06/14/23 2345 138/88 -- -- (!) 124 -- -- -- --   06/14/23 2335 138/88 97.4  F (36.3  C) Temporal (!)  "127 20 100 % 1.93 m (6' 4\") 72.6 kg (160 lb)        Physical Exam  Constitutional: Vital signs reviewed as above.   HENT:               Head: No external signs of trauma noted.              Eyes: Conjunctivae are normal. Pupils are equal, round, and reactive to light.   Cardiovascular:               Normal rate, regular rhythm and normal heart sounds.                Exam reveals no friction rub.                No murmur heard.  Pulmonary/Chest:               Effort normal and breath sounds normal.               No respiratory distress.               There are no wheezes.               There are no rales.   Gastrointestinal:               Soft.               Bowel sounds normal.               There is no distension.               There is generalized tenderness.               There is no rebound or guarding.   Musculoskeletal:               Normal range of motion.               Normal Tone  Neurological: Patient is alert and oriented to person, place, and time.   Skin: Skin is warm and dry. Patient is not diaphoretic  Psychiatric: The patient appears calm    Emergency Department Course   ECG  ECG taken at 2352, ECG read at 0033  Normal sinus rhythm  Rightward axis  Borderline ECG   Sinus tachycardia resolved as compared to prior, dated 3/29/23.  Rate 98 bpm. NH interval 132 ms. QRS duration 100 ms. QT/QTc 356/454 ms. P-R-T axes 77 109 70.     Imaging:  CT Abdomen Pelvis w Contrast    (Results Pending)      Report per radiology    Laboratory:  Labs Ordered and Resulted from Time of ED Arrival to Time of ED Departure   COMPREHENSIVE METABOLIC PANEL - Abnormal       Result Value    Sodium 142      Potassium 3.5      Chloride 99      Carbon Dioxide (CO2) 21 (*)     Anion Gap 22 (*)     Urea Nitrogen 13.4      Creatinine 0.76      Calcium 9.7      Glucose 308 (*)     Alkaline Phosphatase 61      AST 21      ALT 16      Protein Total 7.7      Albumin 5.1      Bilirubin Total 0.5      GFR Estimate >90     KETONE " BETA-HYDROXYBUTYRATE QUANTITATIVE, RAPID - Abnormal    Ketone (Beta-Hydroxybutyrate) Quantitative 1.50 (*)    CBC WITH PLATELETS AND DIFFERENTIAL - Abnormal    WBC Count 17.2 (*)     RBC Count 4.76      Hemoglobin 13.6      Hematocrit 39.7 (*)     MCV 83      MCH 28.6      MCHC 34.3      RDW 12.6      Platelet Count 344      % Neutrophils 93      % Lymphocytes 4      % Monocytes 3      % Eosinophils 0      % Basophils 0      % Immature Granulocytes 0      NRBCs per 100 WBC 0      Absolute Neutrophils 15.9 (*)     Absolute Lymphocytes 0.7 (*)     Absolute Monocytes 0.4      Absolute Eosinophils 0.0      Absolute Basophils 0.0      Absolute Immature Granulocytes 0.1      Absolute NRBCs 0.0     ISTAT GASES LACTATE VENOUS POCT - Abnormal    Lactic Acid POCT 3.1 (*)     Bicarbonate Venous POCT 23      O2 Sat, Venous POCT 85 (*)     pCO2 Venous POCT 23 (*)     pH Venous POCT 7.60 (*)     pO2 Venous POCT 40     GLUCOSE BY METER - Abnormal    GLUCOSE BY METER POCT 255 (*)    GLUCOSE BY METER - Abnormal    GLUCOSE BY METER POCT 248 (*)    LIPASE - Normal    Lipase 32     LACTIC ACID WHOLE BLOOD - Normal    Lactic Acid 0.7     GLUCOSE MONITOR NURSING POCT   GLUCOSE MONITOR NURSING POCT        Emergency Department Course & Assessments:      Interventions:  Medications   lidocaine 1 % 0.1-1 mL (has no administration in time range)   lidocaine (LMX4) cream (has no administration in time range)   sodium chloride (PF) 0.9% PF flush 3 mL (has no administration in time range)   sodium chloride (PF) 0.9% PF flush 3 mL (has no administration in time range)   melatonin tablet 1 mg (has no administration in time range)   sodium chloride 0.9% infusion (has no administration in time range)   acetaminophen (TYLENOL) tablet 650 mg (has no administration in time range)     Or   acetaminophen (TYLENOL) Suppository 650 mg (has no administration in time range)   ketorolac (TORADOL) injection 30 mg (has no administration in time range)    senna-docusate (SENOKOT-S/PERICOLACE) 8.6-50 MG per tablet 1 tablet (has no administration in time range)     Or   senna-docusate (SENOKOT-S/PERICOLACE) 8.6-50 MG per tablet 2 tablet (has no administration in time range)   polyethylene glycol (MIRALAX) Packet 17 g (has no administration in time range)   ondansetron (ZOFRAN ODT) ODT tab 4 mg (has no administration in time range)     Or   ondansetron (ZOFRAN) injection 4 mg (has no administration in time range)   prochlorperazine (COMPAZINE) injection 10 mg (has no administration in time range)     Or   prochlorperazine (COMPAZINE) tablet 10 mg (has no administration in time range)     Or   prochlorperazine (COMPAZINE) suppository 25 mg (has no administration in time range)   metoclopramide (REGLAN) tablet 10 mg (has no administration in time range)   glucose gel 15-30 g (has no administration in time range)     Or   dextrose 50 % injection 25-50 mL (has no administration in time range)     Or   glucagon injection 1 mg (has no administration in time range)   insulin aspart (NovoLOG) injection (RAPID ACTING) (has no administration in time range)   ondansetron (ZOFRAN) injection 4 mg (4 mg Intravenous $Given 6/14/23 2340)   0.9% sodium chloride BOLUS (0 mLs Intravenous Stopped 6/15/23 0039)   metoclopramide (REGLAN) injection 10 mg (10 mg Intravenous $Given 6/15/23 0005)   HYDROmorphone (PF) (DILAUDID) injection 0.5 mg (0.5 mg Intravenous $Given 6/15/23 0004)   0.9% sodium chloride BOLUS (0 mLs Intravenous Stopped 6/15/23 0204)   droperidol (INAPSINE) injection 1.25 mg (1.25 mg Intravenous $Given 6/15/23 0032)   LORazepam (ATIVAN) injection 1 mg (1 mg Intravenous $Given 6/15/23 0403)   HYDROmorphone (PF) (DILAUDID) injection 0.5 mg (0.5 mg Intravenous $Given 6/15/23 0600)   iopamidol (ISOVUE-370) solution 500 mL (81 mLs Intravenous $Given 6/15/23 0652)   CT scan flush (60 mLs Intravenous $Given 6/15/23 0652)        Assessments, Independent Interpretation,  Consult/Discussion of ManagementTests:  ED Course as of 06/15/23 0707   Thu Sanjiv 15, 2023   0025 I obtained history and examined the patient as noted above   0121 I rechecked and updated the patient   0545 Rechecked and updated. The patient's IV infiltrated, so the CT was not performed. Patient is now vomiting and noting abdominal pain again.   0553 D/W Dr. Prashanth Beltran. Ok for admission.       Social Determinants of Health affecting care:  None    Disposition:  The patient was admitted to the hospital under the care of Dr. Prashanth Beltran.     Impression & Plan    CMS Diagnoses: The Lactic acid level is elevated due to dehydration, at this time there is no sign of severe sepsis or septic shock.    Code Status: Full Code    Medical Decision Making:    This 32 year old male presents to the ED due to Abdominal Pain and Nausea, Vomiting, & Diarrhea   . Please see the HPI and exam for specifics. A broad differential was considered including bowel obstruction, intra-abdominal inflammation/infection, DKA, etc.    Based on the differential, exam, and any decision tools, the above workup was undertaken. Lab and imaging results were reviewed by me and are notable for hyperglycemia and ketosis without an acidotic venous pH; leukocytosis and lactic acidosis (though this improved with fluids).    Management of these findings included medications and fluids as above    Based on this, I felt that the most likely etiology of their symptoms is gastroparesis.  A CT scan of the patient's abdomen/pelvis is pending.  I discussed the case with the admitting hospitalist.  The patient will be admitted for ongoing monitoring and care.      Diagnosis:    ICD-10-CM    1. Gastroparesis  K31.84       2. Dehydration  E86.0              Blaine MACHADO, am serving as a scribe at 11:55 PM on 6/14/2023 to document services personally performed by Jamshid Valencia DO based on my observations and the provider's statements to me.   6/14/2023   Erik  DO Erik Calvillo, Jamshid Keith DO  06/15/23 0710

## 2023-06-15 NOTE — PROVIDER NOTIFICATION
Pt continue to report nausea, vomiting. Other PRN in use. Would you like Scopolamine patch?   Awaiting for respond.

## 2023-06-15 NOTE — ED NOTES
Assisted Pt. To the bathroom, Pt. Gait was steady. Applied monitoring equipment onto Patient (Pulse ox and BP). Hen Pt. Arrived back to room O2 sats were at 86% at RA. Placed Pt. On NC via 2LPM.Pt. O2 sats now at 97%. RN notified.

## 2023-06-15 NOTE — PLAN OF CARE
"Reviewed discharge instructions with patient. Questions answered. Patient discharged to home via [employer] with discharge instructions, and belongings.    /75 (BP Location: Left arm)   Pulse 100   Temp 98.3  F (36.8  C) (Oral)   Resp 16   Ht 1.93 m (6' 4\")   Wt 72.6 kg (160 lb)   SpO2 98%   BMI 19.48 kg/m          "

## 2023-06-15 NOTE — H&P
[facility]  History and Physical  Hospitalist - Sagar Beltran DO       Date of Admission:  6/14/2023    Chief Complaint   Nausea, vomiting, and abdominal pain    History is obtained from the patient, the emergency department physician, as well as the electronic medical record.    History of Present Illness   Wally Avendano is a 32 year old male with past medical history of type 1 diabetes with gastroparesis, history of small bowel obstruction who presented on 6/14/2023 with chief complaint of nausea, vomiting, abdominal pain.  The patient reports that the day prior to admission at approximately 1400 he developed abdominal pain followed by nausea and vomiting.  His last bowel movement was 2 days prior to admission.  He did take his insulin the morning of admission.  Currently, the patient is quite somnolent but arousable.  He reports some improvement in his nausea.    In the emergency department, the patient was found to have a temperature of 97.4  F, heart rate 127, blood pressure 138/88, respiratory rate 20, SPO2 100% on room air.  Initial lab work showed bicarb 21, anion gap 22, glucose 308, quantitative ketone 1.5, lactic acid 3.1 that improved to 0.7 after IV fluids, lipase 32, venous pH 7.6, venous PCO2 23, leukocytosis of 17.2.  CT abdomen and pelvis is currently pending.  The patient was provided with IV fluids and antiemetics with Reglan for suspected gastroparesis flare for small bowel obstruction.    ASSESSMENT/PLAN    Acute Intractable Nausea, Vomiting, Abdominal Pain  Gastroparesis Flare vs SBO  - NPO  - IVF  - Pain Control  - Antiemetics  - Reglan QID   - CT a/p pending    Ketosis  Lactic Acidosis  Type 1 Diabetes Mellitus  - Most recent A1C = 8.4 on 5/18/23  - ISS q4h while NPO  - Resume PTA lantus 20 units qAM once symptoms improve and diet advances.  Will hold for now  - IVF as above    Underweight  Severe Malnutrition  - Secondary to DM  - Encourage PO intake    PLAN: Resume home  medications as appropriate once confirmed by pharmacy.     DVT Prophylaxis: Pneumatic Compression Devices  Code Status: Full Code  Discharge Plan:    Expected Discharge Date: 06/17/2023                 Sagar Beltran DO  Securely message with Vocera (more info)  Text page via Crypt[employer]ent[employer]     Primary Care Physician   [facility] [place]    -----------------------------------------------------------------------------------------------------------------------------------------------------------------------------------------------------    Past Medical History    I have reviewed this patient's medical history and updated it with pertinent information if needed.   Past Medical History:   Diagnosis Date     Diabetes (H)     type 1     Known health problems: none        Past Surgical History   I have reviewed this patient's surgical history and updated it with pertinent information if needed.  Past Surgical History:   Procedure Laterality Date     ESOPHAGOSCOPY, GASTROSCOPY, DUODENOSCOPY (EGD), COMBINED N/A 7/28/2021    Procedure: ESOPHAGOGASTRODUODENOSCOPY, WITH BIOPSY with duodenum biopsies for celiac sprue and gastric biopsies for h.Pylori by cold biopsy forceps;  Surgeon: Mikel Restrepo MD;  Location:  GI     NO HISTORY OF SURGERY         Prior to Admission Medications   Prior to Admission Medications   Prescriptions Last Dose Informant Patient Reported? Taking?   Continuous Blood Gluc Sensor (DEXCOM G6 SENSOR) MISC   No No   Sig: Change every 10 days.   Continuous Blood Gluc Transmit (DEXCOM G6 TRANSMITTER) MISC   No No   Sig: CHANGE EVERY 3 MONTHS   Glucagon (BAQSIMI ONE PACK) 3 MG/DOSE POWD   No No   Sig: Use only for severe hypoglycemia.   Patient not taking: Reported on 5/18/2023   acetone urine (KETOSTIX) test strip   No No   Sig: Test urine ketones when BG above 300 or when sick. Use up 2 strips a day.   Patient not taking: Reported on 5/18/2023   blood glucose (NO BRAND  SPECIFIED) lancets standard   No No   Sig: To use to test glucose level in the blood  Use to test blood sugar  3  times daily as directed. To accompany glucose monitor brands per insurance coverage.   blood glucose (NO BRAND SPECIFIED) lancets standard   No No   Sig: Use to test blood sugar 4 times daily or as directed.   blood glucose (NO BRAND SPECIFIED) test strip   No No   Sig: Use to test blood sugar three times daily as directed. To accompany glucose monitor brands per insurance coverage.   blood glucose calibration (NO BRAND SPECIFIED) solution   No No   Sig: Used to calibrate the blood glucose monitor as needed and as directed.  To accompany  blood glucose brands per insurance coverage   blood glucose monitoring (CONTOUR NEXT MONITOR W/DEVICE KIT) meter device kit   Yes No   blood glucose monitoring (NO BRAND SPECIFIED) meter device kit   No No   Sig: Use to test blood sugar 4 times daily or as directed.   insulin aspart (NOVOLOG PEN) 100 UNIT/ML pen   Yes No   Sig: Inject 1 unit Subcutaneously per 15 grams of carbs or each carb serving with your meals.   insulin glargine (LANTUS PEN) 100 UNIT/ML pen   No No   Sig: Inject 20 Units Subcutaneous every morning   insulin lispro (HUMALOG KWIKPEN) 100 UNIT/ML (1 unit dial) KWIKPEN   Yes No   Si unit per 15g of carbs with meals   Patient not taking: Reported on 2023   insulin pen needle (32G X 4 MM) 32G X 4 MM miscellaneous   No No   Sig: Use 6 pen needles daily or as directed.   insulin pen needle 30G X 5 MM   No No   Sig: Use 6 pen needles daily or as directed.   metoclopramide (REGLAN) 5 MG tablet   No No   Sig: Take 2 tablets (10 mg) by mouth 3 times daily Take 10-15 min before a meal   ondansetron (ZOFRAN ODT) 8 MG ODT tab   Yes No   Sig: DISSOLVE 1 TABLET ON TOP OF THE TONGUE EVERY 8 HOURS THEN SWALLOW      Facility-Administered Medications: None     Allergies   No Known Allergies    Social History   I have reviewed this patient's social history and  updated it with pertinent information if needed. Wally Avendano  reports that he has never smoked. He has never used smokeless tobacco. He reports current alcohol use. He reports current drug use. Drug: Marijuana.    Family History   I have reviewed this patient's family history and updated it with pertinent information if needed.   Family History   Problem Relation Age of Onset     Other - See Comments Mother         PBC       -----------------------------------------------------------------------------------------------------------------------------------------------------------------------------------------------------    Review of Systems   The 10 point Review of Systems is negative other than noted in the HPI or here.     Physical Exam   Temp: 97.4  F (36.3  C) Temp src: Temporal BP: 110/63 Pulse: 98   Resp: 15 SpO2: 98 % O2 Device: Nasal cannula Oxygen Delivery: 3 LPM  Vital Signs with Ranges  Temp:  [97.4  F (36.3  C)] 97.4  F (36.3  C)  Pulse:  [] 98  Resp:  [5-29] 15  BP: ()/() 110/63  SpO2:  [75 %-100 %] 98 %  160 lbs 0 oz    Constitutional: Awake, alert, cooperative, no apparent distress.  Eyes: Conjunctiva and pupils examined and normal.  HEENT: Moist mucous membranes, normal dentition.  Respiratory: Clear to auscultation bilaterally, no crackles or wheezing.  Cardiovascular: Regular rate and rhythm, normal S1 and S2, and no murmur noted.  GI: Soft, non-distended, non-tender, normal bowel sounds.  Lymph/Hematologic: No anterior cervical or supraclavicular adenopathy.  Skin: No rashes, no cyanosis, no edema.  Musculoskeletal: No joint swelling, erythema or tenderness.  Neurologic: Cranial nerves 2-12 intact, normal strength and sensation.  Psychiatric: Alert, oriented to person, place and time, no obvious anxiety or depression.     Data     Recent Labs   Lab 06/15/23  0537 06/15/23  0014 06/14/23  2342   WBC  --   --  17.2*   HGB  --   --  13.6   MCV  --   --  83   PLT  --   --  344   NA   --   --  142   POTASSIUM  --   --  3.5   CHLORIDE  --   --  99   CO2  --   --  21*   BUN  --   --  13.4   CR  --   --  0.76   ANIONGAP  --   --  22*   MARY  --   --  9.7   * 255* 308*   ALBUMIN  --   --  5.1   PROTTOTAL  --   --  7.7   BILITOTAL  --   --  0.5   ALKPHOS  --   --  61   ALT  --   --  16   AST  --   --  21   LIPASE  --   --  32       No results found for this or any previous visit (from the past 24 hour(s)).

## 2023-06-15 NOTE — ED NOTES
Patient continues with N/V abd pain. Emesis clear/green/yellow. Zofran, compazine and Toradol given. Pt states relief after meds given. IVF started. Updated patient and Father on plan of care.

## 2023-06-15 NOTE — ED NOTES
Prior to assessment by Dr. Valencia, pt had to be prompted verbally several times to wake up after dilaudid administration. No episodes of dry heaves or emesis present from [id number]. This RN woke pt to assess pain and inform pt that the MD would be in for assessment shortly.     Pt was heard dry heaving as this RN left the room. Dr. Valencia aware.

## 2023-06-15 NOTE — ED NOTES
[facility]  ED Nurse Handoff Report    ED Chief complaint: Abdominal Pain and Nausea, Vomiting, & Diarrhea  . ED Diagnosis:   Final diagnoses:   Gastroparesis   Dehydration       Allergies: No Known Allergies    Code Status: Full Code    Activity level - Baseline/Home:  independent.  Activity Level - Current:   standby.   Lift room needed: No.   Bariatric: No   Needed: No   Isolation: No.   Infection: Not Applicable.     Respiratory status: Room air    Vital Signs (within 30 minutes):   Vitals:    06/15/23 0733 06/15/23 0803 06/15/23 0821 06/15/23 0900   BP: (!) 149/86 (!) 134/91  (!) 132/112   Pulse:  115  100   Resp:       Temp:       TempSrc:       SpO2: 98% 98% 97% 97%   Weight:       Height:           Cardiac Rhythm:  ,      Pain level:    Patient confused: No.   Patient Falls Risk: nonskid shoes/slippers when out of bed.   Elimination Status: Has voided     Patient Report - Initial Complaint: Abdominal pain, N/V/D.   Focused Assessment: Wally Avendano is a 32 year old male who presents with nausea, vomiting, and abdominal pain since 1300. The patient has a history of gastroparesis and type 2 diabetes, and his father reports that his blood sugar was elevated and his gastroparesis has been flaring up. He denies fever. No recent sick contact or issues with his diabetes. Wally has no history of abdominal surgery. The patient took Zofran with no alleviation of his symptoms.     Abnormal Results:   Labs Ordered and Resulted from Time of ED Arrival to Time of ED Departure   COMPREHENSIVE METABOLIC PANEL - Abnormal       Result Value    Sodium 142      Potassium 3.5      Chloride 99      Carbon Dioxide (CO2) 21 (*)     Anion Gap 22 (*)     Urea Nitrogen 13.4      Creatinine 0.76      Calcium 9.7      Glucose 308 (*)     Alkaline Phosphatase 61      AST 21      ALT 16      Protein Total 7.7      Albumin 5.1      Bilirubin Total 0.5      GFR Estimate >90     KETONE BETA-HYDROXYBUTYRATE  QUANTITATIVE, RAPID - Abnormal    Ketone (Beta-Hydroxybutyrate) Quantitative 1.50 (*)    CBC WITH PLATELETS AND DIFFERENTIAL - Abnormal    WBC Count 17.2 (*)     RBC Count 4.76      Hemoglobin 13.6      Hematocrit 39.7 (*)     MCV 83      MCH 28.6      MCHC 34.3      RDW 12.6      Platelet Count 344      % Neutrophils 93      % Lymphocytes 4      % Monocytes 3      % Eosinophils 0      % Basophils 0      % Immature Granulocytes 0      NRBCs per 100 WBC 0      Absolute Neutrophils 15.9 (*)     Absolute Lymphocytes 0.7 (*)     Absolute Monocytes 0.4      Absolute Eosinophils 0.0      Absolute Basophils 0.0      Absolute Immature Granulocytes 0.1      Absolute NRBCs 0.0     ISTAT GASES LACTATE VENOUS POCT - Abnormal    Lactic Acid POCT 3.1 (*)     Bicarbonate Venous POCT 23      O2 Sat, Venous POCT 85 (*)     pCO2 Venous POCT 23 (*)     pH Venous POCT 7.60 (*)     pO2 Venous POCT 40     GLUCOSE BY METER - Abnormal    GLUCOSE BY METER POCT 255 (*)    GLUCOSE BY METER - Abnormal    GLUCOSE BY METER POCT 248 (*)    GLUCOSE BY METER - Abnormal    GLUCOSE BY METER POCT 245 (*)    LIPASE - Normal    Lipase 32     LACTIC ACID WHOLE BLOOD - Normal    Lactic Acid 0.7     GLUCOSE MONITOR NURSING POCT   GLUCOSE MONITOR NURSING POCT   BASIC METABOLIC PANEL        CT Abdomen Pelvis w Contrast   Final Result   IMPRESSION:    1.  Slight haziness of the perirectal fat is nonspecific but could reflect a proctitis in the appropriate clinical setting.      2.  Distal esophageal thickening is likely reactive in the setting of repeated vomiting.      3.  No small bowel obstruction.          Treatments provided: nausea meds, pain meds, fluids, (See MAR)  Family Comments: n/a  OBS brochure/video discussed/provided to patient:  N/A  ED Medications:   Medications   lidocaine 1 % 0.1-1 mL (has no administration in time range)   lidocaine (LMX4) cream (has no administration in time range)   sodium chloride (PF) 0.9% PF flush 3 mL (3 mLs  Intracatheter $Given 6/15/23 0855)   sodium chloride (PF) 0.9% PF flush 3 mL (has no administration in time range)   melatonin tablet 1 mg (has no administration in time range)   sodium chloride 0.9% infusion ( Intravenous $New Bag 6/15/23 0852)   acetaminophen (TYLENOL) tablet 650 mg (has no administration in time range)     Or   acetaminophen (TYLENOL) Suppository 650 mg (has no administration in time range)   ketorolac (TORADOL) injection 30 mg (30 mg Intravenous $Given 6/15/23 0847)   senna-docusate (SENOKOT-S/PERICOLACE) 8.6-50 MG per tablet 1 tablet (has no administration in time range)     Or   senna-docusate (SENOKOT-S/PERICOLACE) 8.6-50 MG per tablet 2 tablet (has no administration in time range)   polyethylene glycol (MIRALAX) Packet 17 g (has no administration in time range)   ondansetron (ZOFRAN ODT) ODT tab 4 mg ( Oral See Alternative 6/15/23 0823)     Or   ondansetron (ZOFRAN) injection 4 mg (4 mg Intravenous $Given 6/15/23 0823)   prochlorperazine (COMPAZINE) injection 10 mg (10 mg Intravenous $Given 6/15/23 0855)     Or   prochlorperazine (COMPAZINE) tablet 10 mg ( Oral See Alternative 6/15/23 0855)     Or   prochlorperazine (COMPAZINE) suppository 25 mg ( Rectal See Alternative 6/15/23 0855)   metoclopramide (REGLAN) tablet 10 mg (10 mg Oral Not Given 6/15/23 0855)   glucose gel 15-30 g (has no administration in time range)     Or   dextrose 50 % injection 25-50 mL (has no administration in time range)     Or   glucagon injection 1 mg (has no administration in time range)   insulin aspart (NovoLOG) injection (RAPID ACTING) (has no administration in time range)   ondansetron (ZOFRAN) injection 4 mg (4 mg Intravenous $Given 6/14/23 2340)   0.9% sodium chloride BOLUS (0 mLs Intravenous Stopped 6/15/23 0039)   metoclopramide (REGLAN) injection 10 mg (10 mg Intravenous $Given 6/15/23 0005)   HYDROmorphone (PF) (DILAUDID) injection 0.5 mg (0.5 mg Intravenous $Given 6/15/23 0004)   0.9% sodium chloride  BOLUS (0 mLs Intravenous Stopped 6/15/23 0204)   droperidol (INAPSINE) injection 1.25 mg (1.25 mg Intravenous $Given 6/15/23 0032)   LORazepam (ATIVAN) injection 1 mg (1 mg Intravenous $Given 6/15/23 0403)   HYDROmorphone (PF) (DILAUDID) injection 0.5 mg (0.5 mg Intravenous $Given 6/15/23 0600)   iopamidol (ISOVUE-370) solution 500 mL (81 mLs Intravenous $Given 6/15/23 0652)   CT scan flush (60 mLs Intravenous $Given 6/15/23 0652)       Drips infusing:  No  For the majority of the shift this patient was Green.   Interventions performed were n/a.    Sepsis treatment initiated: No    Cares/treatment/interventions/medications to be completed following ED care: monitoring    ED Nurse Name: Khanh Louie RN  6:02 AM    RECEIVING UNIT ED HANDOFF REVIEW    Above ED Nurse Handoff Report was reviewed: Yes  Reviewed by: Jaleesa Garza RN on Ashlyn 15, 2023 at 5:17 PM

## 2023-06-15 NOTE — ED TRIAGE NOTES
Pt. Presents to ED with 12 hours of abdominal pain, nausea, vomiting, diarrhea that he attributes to his baseline gastroparesis. Reports a hx of type 2 diabetes with DKA, reports his BGs have been increasing throughout the day, last one being around 300. Pt. Reports this feels more like gastroparesis than DKA. Reports hx of bowel obstructions. Reports last BM just prior to arrival, loose. Tachycardic with vomiting, OVSS on RA. A & O x 4.      Triage Assessment     Row Name 06/14/23 8584       Triage Assessment (Adult)    Airway WDL WDL       Respiratory WDL    Respiratory WDL WDL       Skin Circulation/Temperature WDL    Skin Circulation/Temperature WDL WDL       Cardiac WDL    Cardiac WDL X;all    Pulse Rate & Regularity tachycardic       Peripheral/Neurovascular WDL    Peripheral Neurovascular WDL WDL       Cognitive/Neuro/Behavioral WDL    Cognitive/Neuro/Behavioral WDL WDL

## 2023-06-15 NOTE — ED NOTES
Pt appears extremely uncomfortable with continued vomiting despite zofran. Tachycardic.     Dr. Valencia notified. Dilaudid and reglan ordered and given.

## 2023-06-15 NOTE — PHARMACY-ADMISSION MEDICATION HISTORY
Pharmacist Admission Medication History    Admission medication history is complete. The information provided in this note is only as accurate as the sources available at the time of the update.    Medication reconciliation/reorder completed by provider prior to medication history? No    Information Source(s): Patient via in-person    Pertinent Information: none    Changes made to PTA medication list:    Added: Prochlorperazine 10mg    Deleted: Humalog kwikpen    Changed: Novolog 1 unit/15 grams of carbs -> 2 units after eating only when BG goes up    Medication Affordability:  Not including over the counter (OTC) medications, was there a time in the past 3 months when you did not take your medications as prescribed because of cost?: No    Allergies reviewed with patient and updates made in EHR: yes    Medication History Completed By: Trace Rivers RPH 6/15/2023 9:09 AM    Prior to Admission medications    Medication Sig Last Dose Taking? Auth Provider Long Term End Date   Continuous Blood Gluc Sensor (DEXCOM G6 SENSOR) MISC Change every 10 days.  Yes Deonna Salazar CNP     Continuous Blood Gluc Transmit (DEXCOM G6 TRANSMITTER) MISC CHANGE EVERY 3 MONTHS  Yes Deonna Salazar CNP     insulin aspart (NOVOLOG PEN) 100 UNIT/ML pen Inject 2 Units Subcutaneous Take with snacks or supplements Only when BS goes up PRN 6/14/2023 Yes Reported, Patient No    insulin glargine (LANTUS PEN) 100 UNIT/ML pen Inject 20 Units Subcutaneous every morning 6/14/2023 Yes Deonna Salazar CNP Yes    metoclopramide (REGLAN) 5 MG tablet Take 2 tablets (10 mg) by mouth 3 times daily Take 10-15 min before a meal 6/14/2023 Yes Christofer Ley MD No    ondansetron (ZOFRAN ODT) 8 MG ODT tab DISSOLVE 1 TABLET ON TOP OF THE TONGUE EVERY 8 HOURS THEN SWALLOW 6/14/2023 Yes Reported, Patient No    prochlorperazine (COMPAZINE) 10 MG tablet Take 10 mg by mouth every 6 hours as needed for nausea or vomiting  at PRN Yes Unknown, Entered By  History     Glucagon (BAQSIMI ONE PACK) 3 MG/DOSE POWD Use only for severe hypoglycemia.  Patient taking differently: Spray 1 spray in nostril as needed Use only for severe hypoglycemia.  at PRN  Radha Davalos PA-C Yes    insulin pen needle (32G X 4 MM) 32G X 4 MM miscellaneous Use 6 pen needles daily or as directed.   Deonna Salazar CNP     insulin pen needle 30G X 5 MM Use 6 pen needles daily or as directed.   Deonna Salazar CNP       For patients on insulin therapy:  Do you use sliding scale insulin based on blood sugars? No  Do you typically eat three meals a day? No  How many times do you check your blood glucose per day? Continuous testing  How many episodes of hypoglycemia do you typically have per month? 2 to 3 times

## 2023-06-15 NOTE — ED NOTES
Pt continued to sit up in recliner, lay back down, roll on side, etc. Pt requiring frequent intervention by this RN to keep monitors on, assess, reassess, give medications, etc.     This RN unable to cardiac monitor patient safely post droperidol due to frequently moving in recliner and restlessness. Pt moved to ED 09.

## 2023-06-15 NOTE — PROGRESS NOTES
Patient seen and examined, assumed care today, H&P, labs, medications reviewed.  Continue to have nausea and vomiting, did not have any oral intake yet, but stated he wants to be discharged home.  I also discussed with him at length that he needs to stay in the hospital at least for tonight for hydration and trial of oral intake.  Initially patient refused, then advised to take some oral intake prior to going home.  RN reported that patient vomited after he takes sips of liquid and now okay with staying in the hospital.  I also called his wife earlier and discussed with her.  -Continue nausea vomiting protocol.  -Advance diet as tolerated.  -Continue gentle hydration with IV fluids.  -If he tolerates oral intake can be discharged tomorrow.    ADDENDUM.  -RN paged and notified me patient wants to be discharged.  -DC order placed.  -Change him to observation status.

## 2023-06-17 ENCOUNTER — PATIENT OUTREACH (OUTPATIENT)
Dept: CARE COORDINATION | Facility: CLINIC | Age: 33
End: 2023-06-17

## 2023-06-17 ENCOUNTER — HOSPITAL ENCOUNTER (INPATIENT)
Facility: CLINIC | Age: 33
LOS: 2 days | Discharge: HOME | End: 2023-06-19
Attending: STUDENT IN AN ORGANIZED HEALTH CARE EDUCATION/TRAINING PROGRAM | Admitting: INTERNAL MEDICINE
Payer: COMMERCIAL

## 2023-06-17 DIAGNOSIS — E87.6 HYPOKALEMIA: ICD-10-CM

## 2023-06-17 DIAGNOSIS — R11.0 NAUSEA: Primary | ICD-10-CM

## 2023-06-17 DIAGNOSIS — T73.0XXA STARVATION KETOACIDOSIS: ICD-10-CM

## 2023-06-17 DIAGNOSIS — E87.29 STARVATION KETOACIDOSIS: ICD-10-CM

## 2023-06-17 LAB
ALBUMIN SERPL BCG-MCNC: 4.2 G/DL (ref 3.5–5.2)
ALP SERPL-CCNC: 59 U/L (ref 40–129)
ALT SERPL W P-5'-P-CCNC: 13 U/L (ref 0–70)
ANION GAP SERPL CALCULATED.3IONS-SCNC: 14 MMOL/L (ref 7–15)
ANION GAP SERPL CALCULATED.3IONS-SCNC: 21 MMOL/L (ref 7–15)
AST SERPL W P-5'-P-CCNC: 18 U/L (ref 0–45)
B-OH-BUTYR SERPL-SCNC: 1.2 MMOL/L
B-OH-BUTYR SERPL-SCNC: 4.1 MMOL/L
BASE EXCESS BLDV CALC-SCNC: 1.3 MMOL/L (ref -7.7–1.9)
BASOPHILS # BLD AUTO: 0 10E3/UL (ref 0–0.2)
BASOPHILS NFR BLD AUTO: 0 %
BILIRUB SERPL-MCNC: 0.6 MG/DL
BUN SERPL-MCNC: 10.1 MG/DL (ref 6–20)
BUN SERPL-MCNC: 11.1 MG/DL (ref 6–20)
CALCIUM SERPL-MCNC: 8.2 MG/DL (ref 8.6–10)
CALCIUM SERPL-MCNC: 8.6 MG/DL (ref 8.6–10)
CHLORIDE SERPL-SCNC: 102 MMOL/L (ref 98–107)
CHLORIDE SERPL-SCNC: 99 MMOL/L (ref 98–107)
CREAT SERPL-MCNC: 0.55 MG/DL (ref 0.67–1.17)
CREAT SERPL-MCNC: 0.61 MG/DL (ref 0.67–1.17)
DEPRECATED HCO3 PLAS-SCNC: 20 MMOL/L (ref 22–29)
DEPRECATED HCO3 PLAS-SCNC: 25 MMOL/L (ref 22–29)
EOSINOPHIL # BLD AUTO: 0 10E3/UL (ref 0–0.7)
EOSINOPHIL NFR BLD AUTO: 0 %
ERYTHROCYTE [DISTWIDTH] IN BLOOD BY AUTOMATED COUNT: 12.7 % (ref 10–15)
GFR SERPL CREATININE-BSD FRML MDRD: >90 ML/MIN/1.73M2
GFR SERPL CREATININE-BSD FRML MDRD: >90 ML/MIN/1.73M2
GLUCOSE BLDC GLUCOMTR-MCNC: 160 MG/DL (ref 70–99)
GLUCOSE BLDC GLUCOMTR-MCNC: 235 MG/DL (ref 70–99)
GLUCOSE BLDC GLUCOMTR-MCNC: 272 MG/DL (ref 70–99)
GLUCOSE BLDC GLUCOMTR-MCNC: 290 MG/DL (ref 70–99)
GLUCOSE SERPL-MCNC: 252 MG/DL (ref 70–99)
GLUCOSE SERPL-MCNC: 311 MG/DL (ref 70–99)
HCO3 BLDV-SCNC: 23 MMOL/L (ref 21–28)
HCT VFR BLD AUTO: 38.2 % (ref 40–53)
HGB BLD-MCNC: 12.2 G/DL (ref 13.3–17.7)
HGB BLD-MCNC: 13 G/DL (ref 13.3–17.7)
IMM GRANULOCYTES # BLD: 0.1 10E3/UL
IMM GRANULOCYTES NFR BLD: 0 %
LIPASE SERPL-CCNC: 7 U/L (ref 13–60)
LYMPHOCYTES # BLD AUTO: 0.8 10E3/UL (ref 0.8–5.3)
LYMPHOCYTES NFR BLD AUTO: 7 %
MAGNESIUM SERPL-MCNC: 2 MG/DL (ref 1.7–2.3)
MCH RBC QN AUTO: 29.1 PG (ref 26.5–33)
MCHC RBC AUTO-ENTMCNC: 34 G/DL (ref 31.5–36.5)
MCV RBC AUTO: 86 FL (ref 78–100)
MONOCYTES # BLD AUTO: 0.5 10E3/UL (ref 0–1.3)
MONOCYTES NFR BLD AUTO: 4 %
NEUTROPHILS # BLD AUTO: 10.1 10E3/UL (ref 1.6–8.3)
NEUTROPHILS NFR BLD AUTO: 89 %
NRBC # BLD AUTO: 0 10E3/UL
NRBC BLD AUTO-RTO: 0 /100
O2/TOTAL GAS SETTING VFR VENT: 0 %
OXYHGB MFR BLDV: 90 % (ref 70–75)
PCO2 BLDV: 28 MM HG (ref 40–50)
PH BLDV: 7.52 [PH] (ref 7.32–7.43)
PLATELET # BLD AUTO: 261 10E3/UL (ref 150–450)
PO2 BLDV: 69 MM HG (ref 25–47)
POTASSIUM SERPL-SCNC: 3.1 MMOL/L (ref 3.4–5.3)
POTASSIUM SERPL-SCNC: 3.3 MMOL/L (ref 3.4–5.3)
POTASSIUM SERPL-SCNC: 3.4 MMOL/L (ref 3.4–5.3)
PROT SERPL-MCNC: 6.7 G/DL (ref 6.4–8.3)
RBC # BLD AUTO: 4.47 10E6/UL (ref 4.4–5.9)
SODIUM SERPL-SCNC: 140 MMOL/L (ref 136–145)
SODIUM SERPL-SCNC: 141 MMOL/L (ref 136–145)
WBC # BLD AUTO: 11.4 10E3/UL (ref 4–11)

## 2023-06-17 PROCEDURE — 250N000011 HC RX IP 250 OP 636: Performed by: NURSE PRACTITIONER

## 2023-06-17 PROCEDURE — 36415 COLL VENOUS BLD VENIPUNCTURE: CPT | Performed by: EMERGENCY MEDICINE

## 2023-06-17 PROCEDURE — 250N000012 HC RX MED GY IP 250 OP 636 PS 637: Performed by: STUDENT IN AN ORGANIZED HEALTH CARE EDUCATION/TRAINING PROGRAM

## 2023-06-17 PROCEDURE — 83690 ASSAY OF LIPASE: CPT | Performed by: EMERGENCY MEDICINE

## 2023-06-17 PROCEDURE — 96361 HYDRATE IV INFUSION ADD-ON: CPT

## 2023-06-17 PROCEDURE — 96375 TX/PRO/DX INJ NEW DRUG ADDON: CPT

## 2023-06-17 PROCEDURE — 258N000003 HC RX IP 258 OP 636: Performed by: INTERNAL MEDICINE

## 2023-06-17 PROCEDURE — C9113 INJ PANTOPRAZOLE SODIUM, VIA: HCPCS | Performed by: NURSE PRACTITIONER

## 2023-06-17 PROCEDURE — 258N000003 HC RX IP 258 OP 636: Performed by: STUDENT IN AN ORGANIZED HEALTH CARE EDUCATION/TRAINING PROGRAM

## 2023-06-17 PROCEDURE — 85018 HEMOGLOBIN: CPT | Performed by: INTERNAL MEDICINE

## 2023-06-17 PROCEDURE — 36415 COLL VENOUS BLD VENIPUNCTURE: CPT | Performed by: INTERNAL MEDICINE

## 2023-06-17 PROCEDURE — 99418 PROLNG IP/OBS E/M EA 15 MIN: CPT | Performed by: NURSE PRACTITIONER

## 2023-06-17 PROCEDURE — 82010 KETONE BODYS QUAN: CPT | Performed by: EMERGENCY MEDICINE

## 2023-06-17 PROCEDURE — 120N000001 HC R&B MED SURG/OB

## 2023-06-17 PROCEDURE — 250N000011 HC RX IP 250 OP 636: Performed by: INTERNAL MEDICINE

## 2023-06-17 PROCEDURE — 99285 EMERGENCY DEPT VISIT HI MDM: CPT | Mod: 25

## 2023-06-17 PROCEDURE — 96365 THER/PROPH/DIAG IV INF INIT: CPT

## 2023-06-17 PROCEDURE — 80053 COMPREHEN METABOLIC PANEL: CPT | Performed by: EMERGENCY MEDICINE

## 2023-06-17 PROCEDURE — 84132 ASSAY OF SERUM POTASSIUM: CPT | Performed by: INTERNAL MEDICINE

## 2023-06-17 PROCEDURE — 84132 ASSAY OF SERUM POTASSIUM: CPT | Performed by: NURSE PRACTITIONER

## 2023-06-17 PROCEDURE — 250N000011 HC RX IP 250 OP 636: Performed by: EMERGENCY MEDICINE

## 2023-06-17 PROCEDURE — 82805 BLOOD GASES W/O2 SATURATION: CPT | Performed by: EMERGENCY MEDICINE

## 2023-06-17 PROCEDURE — 82010 KETONE BODYS QUAN: CPT | Performed by: INTERNAL MEDICINE

## 2023-06-17 PROCEDURE — 250N000011 HC RX IP 250 OP 636: Performed by: STUDENT IN AN ORGANIZED HEALTH CARE EDUCATION/TRAINING PROGRAM

## 2023-06-17 PROCEDURE — 85025 COMPLETE CBC W/AUTO DIFF WBC: CPT | Performed by: EMERGENCY MEDICINE

## 2023-06-17 PROCEDURE — 36415 COLL VENOUS BLD VENIPUNCTURE: CPT | Performed by: NURSE PRACTITIONER

## 2023-06-17 PROCEDURE — 82962 GLUCOSE BLOOD TEST: CPT

## 2023-06-17 PROCEDURE — 99223 1ST HOSP IP/OBS HIGH 75: CPT | Performed by: NURSE PRACTITIONER

## 2023-06-17 PROCEDURE — 83735 ASSAY OF MAGNESIUM: CPT | Performed by: INTERNAL MEDICINE

## 2023-06-17 PROCEDURE — 258N000003 HC RX IP 258 OP 636: Performed by: NURSE PRACTITIONER

## 2023-06-17 RX ORDER — MAGNESIUM SULFATE HEPTAHYDRATE 40 MG/ML
2 INJECTION, SOLUTION INTRAVENOUS ONCE
Status: COMPLETED | OUTPATIENT
Start: 2023-06-17 | End: 2023-06-17

## 2023-06-17 RX ORDER — DEXTROSE MONOHYDRATE 25 G/50ML
25-50 INJECTION, SOLUTION INTRAVENOUS
Status: DISCONTINUED | OUTPATIENT
Start: 2023-06-17 | End: 2023-06-18

## 2023-06-17 RX ORDER — ACETAMINOPHEN 650 MG/1
650 SUPPOSITORY RECTAL EVERY 6 HOURS PRN
Status: DISCONTINUED | OUTPATIENT
Start: 2023-06-17 | End: 2023-06-19 | Stop reason: HOSPADM

## 2023-06-17 RX ORDER — SODIUM CHLORIDE AND POTASSIUM CHLORIDE 150; 900 MG/100ML; MG/100ML
INJECTION, SOLUTION INTRAVENOUS CONTINUOUS
Status: DISCONTINUED | OUTPATIENT
Start: 2023-06-17 | End: 2023-06-19 | Stop reason: HOSPADM

## 2023-06-17 RX ORDER — KETOROLAC TROMETHAMINE 15 MG/ML
15 INJECTION, SOLUTION INTRAMUSCULAR; INTRAVENOUS ONCE
Status: COMPLETED | OUTPATIENT
Start: 2023-06-17 | End: 2023-06-17

## 2023-06-17 RX ORDER — NICOTINE POLACRILEX 4 MG
15-30 LOZENGE BUCCAL
Status: DISCONTINUED | OUTPATIENT
Start: 2023-06-17 | End: 2023-06-19 | Stop reason: HOSPADM

## 2023-06-17 RX ORDER — ONDANSETRON 2 MG/ML
4 INJECTION INTRAMUSCULAR; INTRAVENOUS EVERY 6 HOURS PRN
Status: DISCONTINUED | OUTPATIENT
Start: 2023-06-17 | End: 2023-06-19 | Stop reason: HOSPADM

## 2023-06-17 RX ORDER — ACETAMINOPHEN 325 MG/1
650 TABLET ORAL EVERY 6 HOURS PRN
Status: DISCONTINUED | OUTPATIENT
Start: 2023-06-17 | End: 2023-06-19 | Stop reason: HOSPADM

## 2023-06-17 RX ORDER — HYDROMORPHONE HYDROCHLORIDE 1 MG/ML
.3-.5 INJECTION, SOLUTION INTRAMUSCULAR; INTRAVENOUS; SUBCUTANEOUS
Status: DISCONTINUED | OUTPATIENT
Start: 2023-06-17 | End: 2023-06-18

## 2023-06-17 RX ORDER — NICOTINE POLACRILEX 4 MG
15-30 LOZENGE BUCCAL
Status: DISCONTINUED | OUTPATIENT
Start: 2023-06-17 | End: 2023-06-18

## 2023-06-17 RX ORDER — SODIUM CHLORIDE, SODIUM LACTATE, POTASSIUM CHLORIDE, CALCIUM CHLORIDE 600; 310; 30; 20 MG/100ML; MG/100ML; MG/100ML; MG/100ML
INJECTION, SOLUTION INTRAVENOUS CONTINUOUS
Status: ACTIVE | OUTPATIENT
Start: 2023-06-17 | End: 2023-06-17

## 2023-06-17 RX ORDER — LIDOCAINE 40 MG/G
CREAM TOPICAL
Status: DISCONTINUED | OUTPATIENT
Start: 2023-06-17 | End: 2023-06-19 | Stop reason: HOSPADM

## 2023-06-17 RX ORDER — PROCHLORPERAZINE MALEATE 10 MG
10 TABLET ORAL EVERY 6 HOURS PRN
Status: DISCONTINUED | OUTPATIENT
Start: 2023-06-17 | End: 2023-06-18

## 2023-06-17 RX ORDER — ONDANSETRON 4 MG/1
8 TABLET, ORALLY DISINTEGRATING ORAL EVERY 8 HOURS PRN
Status: DISCONTINUED | OUTPATIENT
Start: 2023-06-17 | End: 2023-06-17

## 2023-06-17 RX ORDER — POTASSIUM CHLORIDE 7.45 MG/ML
10 INJECTION INTRAVENOUS
Status: COMPLETED | OUTPATIENT
Start: 2023-06-17 | End: 2023-06-17

## 2023-06-17 RX ORDER — PROCHLORPERAZINE MALEATE 10 MG
10 TABLET ORAL EVERY 6 HOURS PRN
Status: DISCONTINUED | OUTPATIENT
Start: 2023-06-17 | End: 2023-06-19 | Stop reason: HOSPADM

## 2023-06-17 RX ORDER — METOCLOPRAMIDE HYDROCHLORIDE 5 MG/ML
10 INJECTION INTRAMUSCULAR; INTRAVENOUS ONCE
Status: COMPLETED | OUTPATIENT
Start: 2023-06-17 | End: 2023-06-17

## 2023-06-17 RX ORDER — ONDANSETRON 4 MG/1
4 TABLET, ORALLY DISINTEGRATING ORAL EVERY 6 HOURS PRN
Status: DISCONTINUED | OUTPATIENT
Start: 2023-06-17 | End: 2023-06-19 | Stop reason: HOSPADM

## 2023-06-17 RX ORDER — METOCLOPRAMIDE HYDROCHLORIDE 5 MG/ML
10 INJECTION INTRAMUSCULAR; INTRAVENOUS EVERY 6 HOURS
Status: DISCONTINUED | OUTPATIENT
Start: 2023-06-17 | End: 2023-06-19 | Stop reason: HOSPADM

## 2023-06-17 RX ORDER — PROCHLORPERAZINE 25 MG
25 SUPPOSITORY, RECTAL RECTAL EVERY 12 HOURS PRN
Status: DISCONTINUED | OUTPATIENT
Start: 2023-06-17 | End: 2023-06-19 | Stop reason: HOSPADM

## 2023-06-17 RX ORDER — PROCHLORPERAZINE 25 MG
25 SUPPOSITORY, RECTAL RECTAL EVERY 12 HOURS PRN
Status: DISCONTINUED | OUTPATIENT
Start: 2023-06-17 | End: 2023-06-18

## 2023-06-17 RX ORDER — DROPERIDOL 2.5 MG/ML
1.25 INJECTION, SOLUTION INTRAMUSCULAR; INTRAVENOUS ONCE
Status: COMPLETED | OUTPATIENT
Start: 2023-06-17 | End: 2023-06-17

## 2023-06-17 RX ORDER — DEXTROSE MONOHYDRATE 25 G/50ML
25-50 INJECTION, SOLUTION INTRAVENOUS
Status: DISCONTINUED | OUTPATIENT
Start: 2023-06-17 | End: 2023-06-19 | Stop reason: HOSPADM

## 2023-06-17 RX ORDER — HYDROMORPHONE HCL IN WATER/PF 6 MG/30 ML
0.2 PATIENT CONTROLLED ANALGESIA SYRINGE INTRAVENOUS
Status: DISCONTINUED | OUTPATIENT
Start: 2023-06-17 | End: 2023-06-17

## 2023-06-17 RX ORDER — POTASSIUM CHLORIDE 29.8 MG/ML
20 INJECTION INTRAVENOUS ONCE
Status: DISCONTINUED | OUTPATIENT
Start: 2023-06-17 | End: 2023-06-17

## 2023-06-17 RX ORDER — DEXTROSE MONOHYDRATE 100 MG/ML
INJECTION, SOLUTION INTRAVENOUS CONTINUOUS PRN
Status: DISCONTINUED | OUTPATIENT
Start: 2023-06-17 | End: 2023-06-19 | Stop reason: HOSPADM

## 2023-06-17 RX ORDER — DIPHENHYDRAMINE HYDROCHLORIDE 50 MG/ML
25 INJECTION INTRAMUSCULAR; INTRAVENOUS ONCE
Status: COMPLETED | OUTPATIENT
Start: 2023-06-17 | End: 2023-06-17

## 2023-06-17 RX ADMIN — POTASSIUM CHLORIDE 10 MEQ: 7.46 INJECTION, SOLUTION INTRAVENOUS at 21:12

## 2023-06-17 RX ADMIN — POTASSIUM CHLORIDE 10 MEQ: 7.46 INJECTION, SOLUTION INTRAVENOUS at 18:16

## 2023-06-17 RX ADMIN — PANTOPRAZOLE SODIUM 8 MG/HR: 40 INJECTION, POWDER, LYOPHILIZED, FOR SOLUTION INTRAVENOUS at 23:14

## 2023-06-17 RX ADMIN — POTASSIUM CHLORIDE 10 MEQ: 7.46 INJECTION, SOLUTION INTRAVENOUS at 20:13

## 2023-06-17 RX ADMIN — MAGNESIUM SULFATE HEPTAHYDRATE 2 G: 2 INJECTION, SOLUTION INTRAVENOUS at 18:54

## 2023-06-17 RX ADMIN — METOCLOPRAMIDE HYDROCHLORIDE 10 MG: 5 INJECTION INTRAMUSCULAR; INTRAVENOUS at 17:12

## 2023-06-17 RX ADMIN — HYDROMORPHONE HYDROCHLORIDE 0.5 MG: 1 INJECTION, SOLUTION INTRAMUSCULAR; INTRAVENOUS; SUBCUTANEOUS at 22:24

## 2023-06-17 RX ADMIN — METOCLOPRAMIDE HYDROCHLORIDE 10 MG: 5 INJECTION INTRAMUSCULAR; INTRAVENOUS at 22:23

## 2023-06-17 RX ADMIN — SODIUM CHLORIDE, POTASSIUM CHLORIDE, SODIUM LACTATE AND CALCIUM CHLORIDE: 600; 310; 30; 20 INJECTION, SOLUTION INTRAVENOUS at 17:23

## 2023-06-17 RX ADMIN — POTASSIUM CHLORIDE AND SODIUM CHLORIDE: 900; 150 INJECTION, SOLUTION INTRAVENOUS at 19:16

## 2023-06-17 RX ADMIN — HYDROMORPHONE HYDROCHLORIDE 0.5 MG: 1 INJECTION, SOLUTION INTRAMUSCULAR; INTRAVENOUS; SUBCUTANEOUS at 20:14

## 2023-06-17 RX ADMIN — DROPERIDOL 1.25 MG: 2.5 INJECTION, SOLUTION INTRAMUSCULAR; INTRAVENOUS at 11:02

## 2023-06-17 RX ADMIN — POTASSIUM CHLORIDE 10 MEQ: 7.46 INJECTION, SOLUTION INTRAVENOUS at 12:14

## 2023-06-17 RX ADMIN — POTASSIUM CHLORIDE 10 MEQ: 7.46 INJECTION, SOLUTION INTRAVENOUS at 11:05

## 2023-06-17 RX ADMIN — METOCLOPRAMIDE HYDROCHLORIDE 10 MG: 5 INJECTION INTRAMUSCULAR; INTRAVENOUS at 09:22

## 2023-06-17 RX ADMIN — HYDROMORPHONE HYDROCHLORIDE 0.2 MG: 0.2 INJECTION, SOLUTION INTRAMUSCULAR; INTRAVENOUS; SUBCUTANEOUS at 17:12

## 2023-06-17 RX ADMIN — INSULIN GLARGINE 20 UNITS: 100 INJECTION, SOLUTION SUBCUTANEOUS at 11:34

## 2023-06-17 RX ADMIN — SODIUM CHLORIDE, POTASSIUM CHLORIDE, SODIUM LACTATE AND CALCIUM CHLORIDE 1000 ML: 600; 310; 30; 20 INJECTION, SOLUTION INTRAVENOUS at 09:36

## 2023-06-17 RX ADMIN — KETOROLAC TROMETHAMINE 15 MG: 15 INJECTION, SOLUTION INTRAMUSCULAR; INTRAVENOUS at 09:23

## 2023-06-17 RX ADMIN — PANTOPRAZOLE SODIUM 8 MG/HR: 40 INJECTION, POWDER, LYOPHILIZED, FOR SOLUTION INTRAVENOUS at 14:49

## 2023-06-17 RX ADMIN — ONDANSETRON 8 MG: 4 TABLET, ORALLY DISINTEGRATING ORAL at 18:16

## 2023-06-17 RX ADMIN — POTASSIUM CHLORIDE 10 MEQ: 7.46 INJECTION, SOLUTION INTRAVENOUS at 19:11

## 2023-06-17 RX ADMIN — DIPHENHYDRAMINE HYDROCHLORIDE 25 MG: 50 INJECTION INTRAMUSCULAR; INTRAVENOUS at 09:23

## 2023-06-17 RX ADMIN — DEXTROSE AND SODIUM CHLORIDE: 5; 900 INJECTION, SOLUTION INTRAVENOUS at 11:35

## 2023-06-17 ASSESSMENT — ACTIVITIES OF DAILY LIVING (ADL)
FALL_HISTORY_WITHIN_LAST_SIX_MONTHS: NO
DIFFICULTY_EATING/SWALLOWING: NO
ADLS_ACUITY_SCORE: 35
HEARING_DIFFICULTY_OR_DEAF: NO
DIFFICULTY_COMMUNICATING: NO
WEAR_GLASSES_OR_BLIND: NO
TOILETING_ISSUES: NO
DOING_ERRANDS_INDEPENDENTLY_DIFFICULTY: NO
ADLS_ACUITY_SCORE: 20
CHANGE_IN_FUNCTIONAL_STATUS_SINCE_ONSET_OF_CURRENT_ILLNESS/INJURY: NO
WALKING_OR_CLIMBING_STAIRS_DIFFICULTY: NO
CONCENTRATING,_REMEMBERING_OR_MAKING_DECISIONS_DIFFICULTY: NO
ADLS_ACUITY_SCORE: 35
DRESSING/BATHING_DIFFICULTY: NO
ADLS_ACUITY_SCORE: 35
ADLS_ACUITY_SCORE: 35

## 2023-06-17 NOTE — PLAN OF CARE
/80   Pulse 86   Temp 98.9  F (37.2  C) (Oral)   Resp 19   SpO2 97%      Pt alert and oriented x 4, sleeping between cares, declined blood sugar checks, both PIVs infusing, Dextrose 5% and 0.9% NaCl Infusing at 100 ml/hr and Protonix at 8 mg/hr, continue plan of care.

## 2023-06-17 NOTE — ED NOTES
Patient states he's nauseous again- given PRN meds. Talked to wife on the phone, updated on admission- gave hospital phone to patient to call her.

## 2023-06-17 NOTE — ED TRIAGE NOTES
Pt has hx of gastrophoresis also type 1 diabetic. Has not been able to keep down anything orally since Tuesday. Seen Wednesday in ED, given fluids and meds, felt better, but was unable to tolerate oral intake on Thursday.  C/o pain in stomach. C/o diarrhea but states he has diarrhea normally off and on.    Triage Assessment     Row Name 06/17/23 0826       Triage Assessment (Adult)    Airway WDL WDL       Respiratory WDL    Respiratory WDL WDL       Skin Circulation/Temperature WDL    Skin Circulation/Temperature WDL WDL       Cardiac WDL    Cardiac WDL WDL       Peripheral/Neurovascular WDL    Peripheral Neurovascular WDL WDL       Cognitive/Neuro/Behavioral WDL    Cognitive/Neuro/Behavioral WDL WDL

## 2023-06-17 NOTE — ED NOTES
[facility]  ED Nurse Handoff Report    ED Chief complaint: Abdominal Pain  . ED Diagnosis:   Final diagnoses:   Starvation ketoacidosis   Hypokalemia       Allergies: No Known Allergies    Code Status: Full Code    Activity level - Baseline/Home:  independent.  Activity Level - Current:   standby.   Lift room needed: No.   Bariatric: No   Needed: No   Isolation: No.   Infection: Not Applicable.     Respiratory status: Room air    Vital Signs (within 30 minutes):   Vitals:    06/17/23 0850   BP: (!) 149/96   Pulse: 72   Resp: 22   Temp: 98.9  F (37.2  C)   TempSrc: Oral   SpO2: 99%       Cardiac Rhythm: , NSR    Pain level:  6/10  Patient confused: No.   Patient Falls Risk: nonskid shoes/slippers when out of bed and patient and family education.   Elimination Status: Has voided     Patient Report - Initial Complaint: Abdominal pain, nausea.   Focused Assessment: Wally Avendano is a 32 year old male with history of Diabetes mellitus, type 1, and gastroparesis, who presents to the ED for evaluation of abdominal pain. Patient endorses nausea and abdominal pain similar to his previous ED visit three days ago. He states he was prescribed medication after previous ED visit which alleviated symptoms temporarily, but symptoms have worsened. He endorses consistent hematemesis and is not sure when it started, but was present at last ED visit. Blood is bright red in color. Patient reports he typically has blood in his stomach when similar episodes occurred previously. He notes he drank one beer 3 days ago. He also notes he smokes marijuana once or twice daily, as it helps abdominal pain. Patient denies fever, rash, rectum pain, or problems with urination or bowel movements. Patient denies taking blood thinners.      Abnormal Results:   Labs Ordered and Resulted from Time of ED Arrival to Time of ED Departure   COMPREHENSIVE METABOLIC PANEL - Abnormal       Result Value    Sodium 140       Potassium 3.3 (*)     Chloride 99      Carbon Dioxide (CO2) 20 (*)     Anion Gap 21 (*)     Urea Nitrogen 11.1      Creatinine 0.61 (*)     Calcium 8.6      Glucose 311 (*)     Alkaline Phosphatase 59      AST 18      ALT 13      Protein Total 6.7      Albumin 4.2      Bilirubin Total 0.6      GFR Estimate >90     LIPASE - Abnormal    Lipase 7 (*)    KETONE BETA-HYDROXYBUTYRATE QUANTITATIVE, RAPID - Abnormal    Ketone (Beta-Hydroxybutyrate) Quantitative 4.10 (*)    BLOOD GAS VENOUS WITH OXYHEMOGLOBIN - Abnormal    pH Venous 7.52 (*)     pCO2 Venous 28 (*)     pO2 Venous 69 (*)     Bicarbonate Venous 23      FIO2 0      Oxyhemoglobin Venous 90 (*)     Base Excess/Deficit (+/-) 1.3     CBC WITH PLATELETS AND DIFFERENTIAL - Abnormal    WBC Count 11.4 (*)     RBC Count 4.47      Hemoglobin 13.0 (*)     Hematocrit 38.2 (*)     MCV 86      MCH 29.1      MCHC 34.0      RDW 12.7      Platelet Count 261      % Neutrophils 89      % Lymphocytes 7      % Monocytes 4      % Eosinophils 0      % Basophils 0      % Immature Granulocytes 0      NRBCs per 100 WBC 0      Absolute Neutrophils 10.1 (*)     Absolute Lymphocytes 0.8      Absolute Monocytes 0.5      Absolute Eosinophils 0.0      Absolute Basophils 0.0      Absolute Immature Granulocytes 0.1      Absolute NRBCs 0.0     GLUCOSE BY METER - Abnormal    GLUCOSE BY METER POCT 290 (*)    GLUCOSE MONITOR NURSING POCT   GLUCOSE MONITOR NURSING POCT   GLUCOSE MONITOR NURSING POCT   GLUCOSE MONITOR NURSING POCT        No orders to display       Treatments provided: See MAR  Family Comments: Wife updated via phone by RN  OBS brochure/video discussed/provided to patient:  No  ED Medications:   Medications   dextrose 5% and 0.9% NaCl infusion ( Intravenous $New Bag 6/17/23 1135)   potassium chloride 10 mEq in 100 mL sterile water infusion (10 mEq Intravenous $New Bag 6/17/23 1105)   glucose gel 15-30 g (has no administration in time range)     Or   dextrose 50 % injection 25-50 mL  (has no administration in time range)     Or   glucagon injection 1 mg (has no administration in time range)   insulin aspart (NovoLOG) injection (RAPID ACTING) (has no administration in time range)   insulin aspart (NovoLOG) injection (RAPID ACTING) (has no administration in time range)   metoclopramide (REGLAN) injection 10 mg (10 mg Intravenous $Given 6/17/23 0922)   diphenhydrAMINE (BENADRYL) injection 25 mg (25 mg Intravenous $Given 6/17/23 0923)   ketorolac (TORADOL) injection 15 mg (15 mg Intravenous $Given 6/17/23 0923)   lactated ringers BOLUS 1,000 mL (0 mLs Intravenous Stopped 6/17/23 1140)   insulin glargine (LANTUS PEN) injection 20 Units (20 Units Subcutaneous $Given 6/17/23 1134)   droperidol (INAPSINE) injection 1.25 mg (1.25 mg Intravenous $Given 6/17/23 1102)       Drips infusing:  No  For the majority of the shift this patient was Green.   Interventions performed were N/A.    Sepsis treatment initiated: No    Cares/treatment/interventions/medications to be completed following ED care: Nausea and pain control    ED Nurse Name: Abeba Galarza RN  11:49 AM   RECEIVING UNIT ED HANDOFF REVIEW    Above ED Nurse Handoff Report was reviewed: Yes  Reviewed by: Rosa Pino RN on June 17, 2023 at 9:06 PM

## 2023-06-17 NOTE — ED PROVIDER NOTES
History     Chief Complaint:  Abdominal Pain       The history is provided by the patient.      Wally Avendano is a 32 year old male with history of Diabetes mellitus, type 1, and gastroparesis, who presents to the ED for evaluation of abdominal pain. Patient endorses nausea and abdominal pain similar to his previous ED visit three days ago. He states he was prescribed medication after previous ED visit which alleviated symptoms temporarily, but symptoms have worsened. He endorses consistent hematemesis and is not sure when it started, but was present at last ED visit. Blood is bright red in color. Patient reports he typically has blood in his stomach when similar episodes occurred previously. He notes he drank one beer 3 days ago. He also notes he smokes marijuana once or twice daily, as it helps abdominal pain. Patient denies fever, rash, rectum pain, or problems with urination or bowel movements. Patient denies taking blood thinners.     Independent Historian:   None - Patient Only    Review of External Notes:   I reviewed patient's hospitalist note from 6/14/23.    Medications:    Insulin  Reglan  Zofran  Compazine    Past Medical History:    Diabetes mellitus, type 1  DKA  Gastroparesis  Hypokalemia  Intractable nausea and vomiting  Dehydration     Past Surgical History:    EGD    Physical Exam     Patient Vitals for the past 24 hrs:   BP Temp Temp src Pulse Resp SpO2   06/17/23 1200 -- -- -- 86 19 97 %   06/17/23 1145 -- -- -- 84 11 96 %   06/17/23 1130 -- -- -- 94 12 --   06/17/23 1115 -- -- -- 91 10 --   06/17/23 1030 131/80 -- -- 99 -- --   06/17/23 1015 124/77 -- -- 92 -- --   06/17/23 1000 114/73 -- -- 96 -- 96 %   06/17/23 0945 124/75 -- -- 84 -- 100 %   06/17/23 0930 119/70 -- -- 82 -- --   06/17/23 0850 (!) 149/96 98.9  F (37.2  C) Oral 72 22 99 %        Physical Exam     General: Intermittent retching. Appears nauseous and uncomfortable.  Eyes: PERRL, conjunctivae pink no scleral icterus or  conjunctival injection  ENT:  Dry mucus membranes, posterior oropharynx clear without erythema or exudates  Respiratory:  Lungs clear to auscultation bilaterally, no crackles/rubs/wheezes.  Good air movement  CV: Normal rate and rhythm, no murmurs/rubs/gallops  GI:  Abdomen reveals mild epigastric tenderness, without rebound, guarding, mass or organomegaly. Abdomen is soft. Negative Li signs.  Skin: Warm, dry.  No rashes or petechiae  Musculoskeletal: No peripheral edema or calf tenderness  Neuro: Alert and oriented to person/place/time  Psychiatric: Normal affect    Emergency Department Course     Laboratory:  Labs Ordered and Resulted from Time of ED Arrival to Time of ED Departure   COMPREHENSIVE METABOLIC PANEL - Abnormal       Result Value    Sodium 140      Potassium 3.3 (*)     Chloride 99      Carbon Dioxide (CO2) 20 (*)     Anion Gap 21 (*)     Urea Nitrogen 11.1      Creatinine 0.61 (*)     Calcium 8.6      Glucose 311 (*)     Alkaline Phosphatase 59      AST 18      ALT 13      Protein Total 6.7      Albumin 4.2      Bilirubin Total 0.6      GFR Estimate >90     LIPASE - Abnormal    Lipase 7 (*)    KETONE BETA-HYDROXYBUTYRATE QUANTITATIVE, RAPID - Abnormal    Ketone (Beta-Hydroxybutyrate) Quantitative 4.10 (*)    BLOOD GAS VENOUS WITH OXYHEMOGLOBIN - Abnormal    pH Venous 7.52 (*)     pCO2 Venous 28 (*)     pO2 Venous 69 (*)     Bicarbonate Venous 23      FIO2 0      Oxyhemoglobin Venous 90 (*)     Base Excess/Deficit (+/-) 1.3     CBC WITH PLATELETS AND DIFFERENTIAL - Abnormal    WBC Count 11.4 (*)     RBC Count 4.47      Hemoglobin 13.0 (*)     Hematocrit 38.2 (*)     MCV 86      MCH 29.1      MCHC 34.0      RDW 12.7      Platelet Count 261      % Neutrophils 89      % Lymphocytes 7      % Monocytes 4      % Eosinophils 0      % Basophils 0      % Immature Granulocytes 0      NRBCs per 100 WBC 0      Absolute Neutrophils 10.1 (*)     Absolute Lymphocytes 0.8      Absolute Monocytes 0.5      Absolute  Eosinophils 0.0      Absolute Basophils 0.0      Absolute Immature Granulocytes 0.1      Absolute NRBCs 0.0     GLUCOSE BY METER - Abnormal    GLUCOSE BY METER POCT 290 (*)    GLUCOSE BY METER - Abnormal    GLUCOSE BY METER POCT 272 (*)    POTASSIUM - Normal    Potassium 3.4     GLUCOSE MONITOR NURSING POCT   GLUCOSE MONITOR NURSING POCT   GLUCOSE MONITOR NURSING POCT       Emergency Department Course & Assessments:    Interventions:  Medications   dextrose 5% and 0.9% NaCl infusion ( Intravenous Rate/Dose Verify 6/17/23 1457)   glucose gel 15-30 g (has no administration in time range)     Or   dextrose 50 % injection 25-50 mL (has no administration in time range)     Or   glucagon injection 1 mg (has no administration in time range)   insulin glargine (LANTUS PEN) injection 20 Units (has no administration in time range)   ondansetron (ZOFRAN ODT) ODT tab 8 mg (has no administration in time range)   lidocaine 1 % 0.1-1 mL (has no administration in time range)   lidocaine (LMX4) cream (has no administration in time range)   sodium chloride (PF) 0.9% PF flush 3 mL (3 mLs Intracatheter Not Given 6/17/23 1550)   sodium chloride (PF) 0.9% PF flush 3 mL (has no administration in time range)   melatonin tablet 1 mg (has no administration in time range)   dextrose 10% infusion (has no administration in time range)   insulin 1 unit/mL in saline (NovoLIN, HumuLIN Regular) drip - ADULT IV Infusion (has no administration in time range)   glucose gel 15-30 g (has no administration in time range)     Or   dextrose 50 % injection 25-50 mL (has no administration in time range)     Or   glucagon injection 1 mg (has no administration in time range)   metoclopramide (REGLAN) injection 10 mg (has no administration in time range)   dextrose 5% and 0.9% NaCl infusion ( Intravenous Rate/Dose Change 6/17/23 1552)   acetaminophen (TYLENOL) tablet 650 mg (has no administration in time range)     Or   acetaminophen (TYLENOL) Suppository 650  mg (has no administration in time range)   HYDROmorphone (DILAUDID) injection 0.2 mg (has no administration in time range)   prochlorperazine (COMPAZINE) injection 10 mg (has no administration in time range)     Or   prochlorperazine (COMPAZINE) tablet 10 mg (has no administration in time range)     Or   prochlorperazine (COMPAZINE) suppository 25 mg (has no administration in time range)   insulin aspart (NovoLOG) injection (RAPID ACTING) (3 Units Subcutaneous $Given 6/17/23 1302)   pantoprazole (PROTONIX) 80 mg in sodium chloride 0.9 % 100 mL infusion (8 mg/hr Intravenous $New Bag 6/17/23 1449)   metoclopramide (REGLAN) injection 10 mg (10 mg Intravenous $Given 6/17/23 0922)   diphenhydrAMINE (BENADRYL) injection 25 mg (25 mg Intravenous $Given 6/17/23 0923)   ketorolac (TORADOL) injection 15 mg (15 mg Intravenous $Given 6/17/23 0923)   lactated ringers BOLUS 1,000 mL (0 mLs Intravenous Stopped 6/17/23 1140)   potassium chloride 10 mEq in 100 mL sterile water infusion (0 mEq Intravenous Stopped 6/17/23 1315)   insulin glargine (LANTUS PEN) injection 20 Units (20 Units Subcutaneous $Given 6/17/23 1134)   droperidol (INAPSINE) injection 1.25 mg (1.25 mg Intravenous $Given 6/17/23 1102)      Assessments:  0925 I obtained history and examined the patient as noted above.  1055 I rechecked the patient and explained findings. We discussed plans for admission and the patient is comfortable with this plan.  1248 I rechecked the patient and explained findings. He states he is feeling better.    Independent Interpretation (X-rays, CTs, rhythm strip):  None    Consultations/Discussion of Management or Tests:  1028 I spoke with pharmacy for insulin recommendations.  1139 I spoke with Anitha Castillo on behalf of Dr. Caban of the hospitalist team regarding the patient, who accepted the patient for admission.     Social Determinants of Health affecting care:   None    Disposition:  The patient was admitted to the hospital under the  care of Dr. Caban.     Impression & Plan    CMS Diagnoses: None    Medical Decision Making:  Patient presents with symptoms consistent with starvation ketosis.  Chronic conditions complicating - DMI, gastroparesis, daily marijuana use  DDx considered SBO, metabolic abnormality, DKA.  Labs s/f mild leukocytosis, pH 7.52, ketones present, and anion gap elevated at 21 and glucose 270's. With normal pH, not suggestive of DKA, instead likely starvation ketosis.  Therefore, started D5 NS.  Given IV toradol, metoclopramide which briefly helped symptoms.    Symptoms returned so given IV droperidol which helped significantly.    Discussed with hospitalist who will admit patient.    Diagnosis:    ICD-10-CM    1. Starvation ketoacidosis  T73.0XXA     E87.29       2. Hypokalemia  E87.6            Discharge Medications:  New Prescriptions    No medications on file          Scribe Disclosure:  Manuel MACHADO Hailie, am serving as a scribe at 11:07 AM on 6/17/2023 to document services personally performed by Bk Reynolds MD based on my observations and the provider's statements to me.   6/17/2023   Bk Reynolds MD Foss, Kevin, MD  06/17/23 4329

## 2023-06-17 NOTE — PROGRESS NOTES
I was  paged by ED RN regarding the patient and to discuss the management plan.  -Question about the need to initiate insulin drip which was ordered but not yet started.  -I Ordered stat repeat ketone, BMP, and hemoglobin level.  -Noted patient is already on D5 NS at 125 ml/hr, but was not started on insulin drip, glucose is 252 at the time.  -Repeat labs are as follows.  Potassium 3.1, bicarb improved from 20 to 25, anion gap also closed was 21 earlier, now 14.  Ketone also improved from 4.1 to 1.2.  Plan.  -Patient already received Lantus 20 units subcu x1 earlier.  -Discontinue  insulin drip( not started).  -Start moderate sliding scale NovoLog every 4 hours while n.p.o..  -Discontinue IV fluids containing dextrose.  -Start patient on 0.9% NS with KCl at 125 ml/hr.  -Magnesium protocol ordered.  -Replace potassium per protocol in addition to the IV fluid potassium.  -We will check BMP and CBC at midnight.  I discussed with ED RN.

## 2023-06-17 NOTE — PHARMACY-ADMISSION MEDICATION HISTORY
Pharmacist Admission Medication History    Admission medication history is complete. The information provided in this note is only as accurate as the sources available at the time of the update.    Medication reconciliation/reorder completed by provider prior to medication history? No    Information Source(s): Patient via in-person    Pertinent Information:     Changes made to PTA medication list:    Added: None    Deleted: None    Changed: None    Medication Affordability:       Allergies reviewed with patient and updates made in EHR: yes    Medication History Completed By: Josefa Winters [facility] 6/17/2023 11:39 AM    Prior to Admission medications    Medication Sig Last Dose Taking? Auth Provider Long Term End Date   Glucagon (BAQSIMI ONE PACK) 3 MG/DOSE POWD Use only for severe hypoglycemia.  Patient taking differently: Spray 1 spray in nostril as needed Use only for severe hypoglycemia. Unknown at na Yes Radha Davalos PA-C Yes    insulin aspart (NOVOLOG PEN) 100 UNIT/ML pen Inject 2 Units Subcutaneous Take with snacks or supplements Only when BS goes up PRN Past Month at na Yes Reported, Patient No    insulin glargine (LANTUS PEN) 100 UNIT/ML pen Inject 20 Units Subcutaneous every morning Past Week at na Yes Deonna Salazar CNP Yes    metoclopramide (REGLAN) 5 MG tablet Take 2 tablets (10 mg) by mouth 3 times daily Take 10-15 min before a meal Past Week Yes Christofer Ley MD No    ondansetron (ZOFRAN ODT) 8 MG ODT tab DISSOLVE 1 TABLET ON TOP OF THE TONGUE EVERY 8 HOURS THEN SWALLOW Past Week Yes Reported, Patient No    prochlorperazine (COMPAZINE) 10 MG tablet Take 10 mg by mouth every 6 hours as needed for nausea or vomiting Past Week Yes Unknown, Entered By History     Continuous Blood Gluc Sensor (DEXCOM G6 SENSOR) MISC Change every 10 days. na  Deonna Salazar CNP     Continuous Blood Gluc Transmit (DEXCOM G6 TRANSMITTER) MISC CHANGE EVERY 3 MONTHS na  Deonna Salazar CNP     insulin pen  needle (32G X 4 MM) 32G X 4 MM miscellaneous Use 6 pen needles daily or as directed. Deonna Cameron, CNP     insulin pen needle 30G X 5 MM Use 6 pen needles daily or as directed. Deonna Cameron, CNP

## 2023-06-17 NOTE — PROGRESS NOTES
[facility]    Background: Transitional Care Management program identified per system criteria and reviewed by [facility] Center team for possible outreach.    Assessment: Upon chart review, [facility] Team member will not proceed with patient outreach related to this episode of Transitional Care Management program due to reason below:    Patient has presented to Emergency Department, been readmitted to hospital, or transferred to another hospital.    Plan: Transitional Care Management episode addressed appropriately per reason noted above.      KATIE Story  [facility], [facility]    *Connected Care Resource Team does NOT follow patient ongoing. Referrals are identified based on internal discharge reports and the outreach is to ensure patient has an understanding of their discharge instructions.

## 2023-06-17 NOTE — H&P
[facility]    History and Physical - Hospitalist Service       Date of Admission:  6/17/2023    Assessment & Plan      Wally Avendano is a 32 year old male with PMH significant for Gastroparesis, prior SBO, Habitual marijuana use, and type 1 diabetes admitted on 6/17/2023 for acidosis, nausea, vomiting, hematemesis, and abdominal pain.  He was recently admitted on 6/14 for similar symptoms but left AMA.  At that time, CT scan did not show SBO.  During this visit he is also endorsing hematemesis with unknown onset, but reports this is typical when he has had previous episodes of intractable nausea    ED work up significant for Ketones of 4.1 and Anion Gap of 21, and PH of 7.52 suggestive of metabolic acidosis.  WBC mildly elevated at 11.4 but appears consistent with his baseline.  Hemoglobin is stable at 13.  His glucose is 311 in the ED.  Previous ED in May was 8.4    #Gastroparesis  #Intractible nausea, vomiting, and hematemesis  #Severe malnutrition  Admitted on 6/17 for the same symptoms, left AMA.  At that time SBO was ruled out.  He is also a habitual marijuana user, could consider cyclical vomiting syndrome as a contributing factor.    -NPO  -GI consult, patient would be open to discussions of a feeding tube to manage his oral intake.  -D5NS ordered  -Protonix drip ordered.  -Scheduled Zofran and reglan, PRN Compazine.  -AM CBC, BMP, Ketones      #Type 1 diabetes  #Starvation ketoacidosis  #Hypokalemia  Most recent A1C on 5/18 was 8.4%  -Insulin drip ordered.  Will resume his home insulin pump and 20units of lantus once glucose normalized and acidosis resolved  -D5NS ordered.    -Potassium replacement protocol      #Habitual marijuana use  See above, consider cyclical vomiting syndrome.         Diet:   NPO  DVT Prophylaxis: Ambulate every shift  Fuentes Catheter: Not present  Lines: None     Cardiac Monitoring: None  Code Status:   Full    Clinically Significant Risk Factors Present on  Admission        # Hypokalemia: Lowest K = 3.3 mmol/L in last 2 days, will replace as needed      # Anion Gap Metabolic Acidosis: Highest Anion Gap = 21 mmol/L in last 2 days, will monitor and treat as appropriate                    Disposition Plan      Expected Discharge Date: 06/19/2023                The patient's care was discussed with the Attending Physician, Dr. Silva and Patient.    BAKARI Anguiano [facility]  Hospitalist Service  [facility]  Securely message with [employer] (more info)  Text page via [facility] Paging/Directory     ______________________________________________________________________    Chief Complaint   Nausea, vomiting, hematemesis, abdominal pain.    History is obtained from the patient    History of Present Illness   Wally Avendano is a 32 year old male with PMH significant for Gastroparesis, prior SBO, Habitual marijuana use, and type 1 diabetes who presented to the ED for worsening nausea, hematemasis, and abdominal pain.  He was admitted on 6/14 for the same symptoms but left AMA.  Initially, he was feeling better with antiemetics, but symptoms worsened when he went home.  He reports it is not unusual for him to have hematemesis when he has repetitive vomiting.  Denies regular alcohol use, does smoke marijuana 1-2 times a day to help with his abdominal pain.  Denies any fevers, urinary symptoms, or issues with BM.      He is on insulin pump and CGM at home.        Past Medical History    Past Medical History:   Diagnosis Date     Diabetes (H)     type 1     Known health problems: none        Past Surgical History   Past Surgical History:   Procedure Laterality Date     ESOPHAGOSCOPY, GASTROSCOPY, DUODENOSCOPY (EGD), COMBINED N/A 7/28/2021    Procedure: ESOPHAGOGASTRODUODENOSCOPY, WITH BIOPSY with duodenum biopsies for celiac sprue and gastric biopsies for h.Pylori by cold biopsy forceps;  Surgeon: Mikel Restrepo MD;  Location: [facility]     NO HISTORY OF SURGERY          Prior to Admission Medications   Prior to Admission Medications   Prescriptions Last Dose Informant Patient Reported? Taking?   Continuous Blood Gluc Sensor (DEXCOM G6 SENSOR) MISC na  No No   Sig: Change every 10 days.   Continuous Blood Gluc Transmit (DEXCOM G6 TRANSMITTER) MISC na  No No   Sig: CHANGE EVERY 3 MONTHS   Glucagon (BAQSIMI ONE PACK) 3 MG/DOSE POWD Unknown at na  No Yes   Sig: Use only for severe hypoglycemia.   Patient taking differently: Spray 1 spray in nostril as needed Use only for severe hypoglycemia.   insulin aspart (NOVOLOG PEN) 100 UNIT/ML pen Past Month at na  Yes Yes   Sig: Inject 2 Units Subcutaneous Take with snacks or supplements Only when BS goes up PRN   insulin glargine (LANTUS PEN) 100 UNIT/ML pen Past Week at   No Yes   Sig: Inject 20 Units Subcutaneous every morning   insulin pen needle (32G X 4 MM) 32G X 4 MM miscellaneous na  No No   Sig: Use 6 pen needles daily or as directed.   insulin pen needle 30G X 5 MM na  No No   Sig: Use 6 pen needles daily or as directed.   metoclopramide (REGLAN) 5 MG tablet Past Week  No Yes   Sig: Take 2 tablets (10 mg) by mouth 3 times daily Take 10-15 min before a meal   ondansetron (ZOFRAN ODT) 8 MG ODT tab Past Week  Yes Yes   Sig: DISSOLVE 1 TABLET ON TOP OF THE TONGUE EVERY 8 HOURS THEN SWALLOW   prochlorperazine (COMPAZINE) 10 MG tablet Past Week  Yes Yes   Sig: Take 10 mg by mouth every 6 hours as needed for nausea or vomiting      Facility-Administered Medications: None           Physical Exam   Vital Signs: Temp: 98.9  F (37.2  C) Temp src: Oral BP: 131/80 Pulse: 86   Resp: 19 SpO2: 97 % O2 Device: None (Room air)    Weight: 0 lbs 0 oz    GENERAL: No apparent distress, appears sangeeta thin/malnurished.   HEENT: Patient is normocephalic, atraumatic.  EYES: Anicteric without injection.  RESPIRATORY: Clear to auscultation bilaterally.  CARDIOVASCULAR: Regular rate and rhythm.  Normal S1, S2 - no m/g/r.  GASTROINTESTINAL: The abdomen was  normal in contour. Bowel sounds were present. Soft, non-tender without distension.  EXTREMITIES: Warm & well perfused. No edema  NEUROLOGIC: The patient was alert and conversation was appropriate. Grossly non-focal.  PSYCHIATRIC: Mood and affect congruent, flat affect and mildly somnolent.   SKIN: Exposed skin is within normal limits.      Medical Decision Making       90 MINUTES SPENT BY ME on the date of service doing chart review, history, exam, documentation & further activities per the note.      Data     I have personally reviewed the following data over the past 24 hrs:    11.4 (H)  \   13.0 (L)   / 261     140 99 11.1 /  290 (H)   3.3 (L) 20 (L) 0.61 (L) \       ALT: 13 AST: 18 AP: 59 TBILI: 0.6   ALB: 4.2 TOT PROTEIN: 6.7 LIPASE: 7 (L)       Imaging results reviewed over the past 24 hrs:   No results found for this or any previous visit (from the past 24 hour(s)).

## 2023-06-18 LAB
ANION GAP SERPL CALCULATED.3IONS-SCNC: 11 MMOL/L (ref 7–15)
ANION GAP SERPL CALCULATED.3IONS-SCNC: 13 MMOL/L (ref 7–15)
BUN SERPL-MCNC: 7.7 MG/DL (ref 6–20)
BUN SERPL-MCNC: 8.4 MG/DL (ref 6–20)
CALCIUM SERPL-MCNC: 8 MG/DL (ref 8.6–10)
CALCIUM SERPL-MCNC: 8.2 MG/DL (ref 8.6–10)
CHLORIDE SERPL-SCNC: 103 MMOL/L (ref 98–107)
CHLORIDE SERPL-SCNC: 103 MMOL/L (ref 98–107)
CREAT SERPL-MCNC: 0.57 MG/DL (ref 0.67–1.17)
CREAT SERPL-MCNC: 0.59 MG/DL (ref 0.67–1.17)
DEPRECATED HCO3 PLAS-SCNC: 24 MMOL/L (ref 22–29)
DEPRECATED HCO3 PLAS-SCNC: 25 MMOL/L (ref 22–29)
ERYTHROCYTE [DISTWIDTH] IN BLOOD BY AUTOMATED COUNT: 12.6 % (ref 10–15)
GFR SERPL CREATININE-BSD FRML MDRD: >90 ML/MIN/1.73M2
GFR SERPL CREATININE-BSD FRML MDRD: >90 ML/MIN/1.73M2
GLUCOSE BLDC GLUCOMTR-MCNC: 151 MG/DL (ref 70–99)
GLUCOSE BLDC GLUCOMTR-MCNC: 159 MG/DL (ref 70–99)
GLUCOSE BLDC GLUCOMTR-MCNC: 161 MG/DL (ref 70–99)
GLUCOSE BLDC GLUCOMTR-MCNC: 162 MG/DL (ref 70–99)
GLUCOSE BLDC GLUCOMTR-MCNC: 164 MG/DL (ref 70–99)
GLUCOSE BLDC GLUCOMTR-MCNC: 169 MG/DL (ref 70–99)
GLUCOSE BLDC GLUCOMTR-MCNC: 263 MG/DL (ref 70–99)
GLUCOSE SERPL-MCNC: 166 MG/DL (ref 70–99)
GLUCOSE SERPL-MCNC: 166 MG/DL (ref 70–99)
HCT VFR BLD AUTO: 37.7 % (ref 40–53)
HGB BLD-MCNC: 12.9 G/DL (ref 13.3–17.7)
HGB BLD-MCNC: 13.1 G/DL (ref 13.3–17.7)
MAGNESIUM SERPL-MCNC: 2.1 MG/DL (ref 1.7–2.3)
MCH RBC QN AUTO: 29.2 PG (ref 26.5–33)
MCHC RBC AUTO-ENTMCNC: 34.7 G/DL (ref 31.5–36.5)
MCV RBC AUTO: 84 FL (ref 78–100)
PLATELET # BLD AUTO: 242 10E3/UL (ref 150–450)
POTASSIUM SERPL-SCNC: 3.7 MMOL/L (ref 3.4–5.3)
POTASSIUM SERPL-SCNC: 3.9 MMOL/L (ref 3.4–5.3)
RBC # BLD AUTO: 4.48 10E6/UL (ref 4.4–5.9)
SODIUM SERPL-SCNC: 139 MMOL/L (ref 136–145)
SODIUM SERPL-SCNC: 140 MMOL/L (ref 136–145)
WBC # BLD AUTO: 10.1 10E3/UL (ref 4–11)

## 2023-06-18 PROCEDURE — 85027 COMPLETE CBC AUTOMATED: CPT | Performed by: NURSE PRACTITIONER

## 2023-06-18 PROCEDURE — 99232 SBSQ HOSP IP/OBS MODERATE 35: CPT | Performed by: INTERNAL MEDICINE

## 2023-06-18 PROCEDURE — 250N000011 HC RX IP 250 OP 636: Performed by: INTERNAL MEDICINE

## 2023-06-18 PROCEDURE — C9113 INJ PANTOPRAZOLE SODIUM, VIA: HCPCS | Performed by: INTERNAL MEDICINE

## 2023-06-18 PROCEDURE — 258N000003 HC RX IP 258 OP 636: Performed by: NURSE PRACTITIONER

## 2023-06-18 PROCEDURE — 83735 ASSAY OF MAGNESIUM: CPT | Performed by: INTERNAL MEDICINE

## 2023-06-18 PROCEDURE — 36415 COLL VENOUS BLD VENIPUNCTURE: CPT | Performed by: NURSE PRACTITIONER

## 2023-06-18 PROCEDURE — C9113 INJ PANTOPRAZOLE SODIUM, VIA: HCPCS | Performed by: NURSE PRACTITIONER

## 2023-06-18 PROCEDURE — 120N000001 HC R&B MED SURG/OB

## 2023-06-18 PROCEDURE — 80048 BASIC METABOLIC PNL TOTAL CA: CPT | Performed by: NURSE PRACTITIONER

## 2023-06-18 PROCEDURE — 250N000011 HC RX IP 250 OP 636: Performed by: NURSE PRACTITIONER

## 2023-06-18 RX ORDER — NALOXONE HYDROCHLORIDE 0.4 MG/ML
0.4 INJECTION, SOLUTION INTRAMUSCULAR; INTRAVENOUS; SUBCUTANEOUS
Status: DISCONTINUED | OUTPATIENT
Start: 2023-06-18 | End: 2023-06-19 | Stop reason: HOSPADM

## 2023-06-18 RX ORDER — HYDROMORPHONE HYDROCHLORIDE 1 MG/ML
0.5 INJECTION, SOLUTION INTRAMUSCULAR; INTRAVENOUS; SUBCUTANEOUS
Status: DISCONTINUED | OUTPATIENT
Start: 2023-06-18 | End: 2023-06-19 | Stop reason: HOSPADM

## 2023-06-18 RX ORDER — NALOXONE HYDROCHLORIDE 0.4 MG/ML
0.2 INJECTION, SOLUTION INTRAMUSCULAR; INTRAVENOUS; SUBCUTANEOUS
Status: DISCONTINUED | OUTPATIENT
Start: 2023-06-18 | End: 2023-06-19 | Stop reason: HOSPADM

## 2023-06-18 RX ORDER — LORAZEPAM 2 MG/ML
0.5 INJECTION INTRAMUSCULAR
Status: COMPLETED | OUTPATIENT
Start: 2023-06-18 | End: 2023-06-18

## 2023-06-18 RX ADMIN — POTASSIUM CHLORIDE AND SODIUM CHLORIDE: 900; 150 INJECTION, SOLUTION INTRAVENOUS at 11:46

## 2023-06-18 RX ADMIN — HYDROMORPHONE HYDROCHLORIDE 0.5 MG: 1 INJECTION, SOLUTION INTRAMUSCULAR; INTRAVENOUS; SUBCUTANEOUS at 05:46

## 2023-06-18 RX ADMIN — PANTOPRAZOLE SODIUM 8 MG/HR: 40 INJECTION, POWDER, LYOPHILIZED, FOR SOLUTION INTRAVENOUS at 09:27

## 2023-06-18 RX ADMIN — POTASSIUM CHLORIDE AND SODIUM CHLORIDE: 900; 150 INJECTION, SOLUTION INTRAVENOUS at 20:56

## 2023-06-18 RX ADMIN — HYDROMORPHONE HYDROCHLORIDE 0.5 MG: 1 INJECTION, SOLUTION INTRAMUSCULAR; INTRAVENOUS; SUBCUTANEOUS at 16:40

## 2023-06-18 RX ADMIN — LORAZEPAM 0.5 MG: 2 INJECTION INTRAMUSCULAR; INTRAVENOUS at 03:23

## 2023-06-18 RX ADMIN — ONDANSETRON 4 MG: 2 INJECTION INTRAMUSCULAR; INTRAVENOUS at 07:59

## 2023-06-18 RX ADMIN — METOCLOPRAMIDE HYDROCHLORIDE 10 MG: 5 INJECTION INTRAMUSCULAR; INTRAVENOUS at 09:27

## 2023-06-18 RX ADMIN — METOCLOPRAMIDE HYDROCHLORIDE 10 MG: 5 INJECTION INTRAMUSCULAR; INTRAVENOUS at 20:54

## 2023-06-18 RX ADMIN — HYDROMORPHONE HYDROCHLORIDE 0.5 MG: 1 INJECTION, SOLUTION INTRAMUSCULAR; INTRAVENOUS; SUBCUTANEOUS at 01:21

## 2023-06-18 RX ADMIN — HYDROMORPHONE HYDROCHLORIDE 0.5 MG: 1 INJECTION, SOLUTION INTRAMUSCULAR; INTRAVENOUS; SUBCUTANEOUS at 23:39

## 2023-06-18 RX ADMIN — POTASSIUM CHLORIDE AND SODIUM CHLORIDE: 900; 150 INJECTION, SOLUTION INTRAVENOUS at 03:15

## 2023-06-18 RX ADMIN — METOCLOPRAMIDE HYDROCHLORIDE 10 MG: 5 INJECTION INTRAMUSCULAR; INTRAVENOUS at 16:34

## 2023-06-18 RX ADMIN — ONDANSETRON 4 MG: 2 INJECTION INTRAMUSCULAR; INTRAVENOUS at 01:22

## 2023-06-18 RX ADMIN — HYDROMORPHONE HYDROCHLORIDE 0.5 MG: 1 INJECTION, SOLUTION INTRAMUSCULAR; INTRAVENOUS; SUBCUTANEOUS at 20:39

## 2023-06-18 RX ADMIN — PROCHLORPERAZINE EDISYLATE 10 MG: 5 INJECTION INTRAMUSCULAR; INTRAVENOUS at 02:50

## 2023-06-18 RX ADMIN — PANTOPRAZOLE SODIUM 40 MG: 40 INJECTION, POWDER, FOR SOLUTION INTRAVENOUS at 20:40

## 2023-06-18 RX ADMIN — METOCLOPRAMIDE HYDROCHLORIDE 10 MG: 5 INJECTION INTRAMUSCULAR; INTRAVENOUS at 04:21

## 2023-06-18 RX ADMIN — ONDANSETRON 4 MG: 2 INJECTION INTRAMUSCULAR; INTRAVENOUS at 23:39

## 2023-06-18 ASSESSMENT — ACTIVITIES OF DAILY LIVING (ADL)
ADLS_ACUITY_SCORE: 20

## 2023-06-18 NOTE — PROGRESS NOTES
[facility]    ED Boarding Nurse Handoff Addendum Report:    Date/time: 6/17/2023, 10:30 PM    Activity Level: standby    Fall Risk: Yes:  nonskid shoes/slippers when out of bed    Active Infusions: Protonix gtt, NS w/ 20k, Mg replaced and K replaced IV.    Current Meds Due: Scheduled medications    Current care needs: Pain management, BG monitoring/ correction.     Oxygen requirements (liters/min and/or FiO2): N/A    Respiratory status: Room air    Vital signs (within last 30 minutes):    Vitals:    06/17/23 1724 06/17/23 2014 06/17/23 2130 06/17/23 2136   BP:  (!) 147/101  139/75   BP Location:    Left arm   Pulse: 77 84  72   Resp: 25 20  16   Temp:  98.5  F (36.9  C)  98.1  F (36.7  C)   TempSrc:  Oral  Oral   SpO2: 96% 97%  97%   Weight:   71.3 kg (157 lb 3.2 oz)        Focused assessment within last 30 minutes:    Pt c/o pain 7-8/10- IV dilaudid increased to 0.3 to 0.5 mg q2h PRN. PT received dose of 0.5 mg which was somewhat effective. Continuous nausea, somewhat improved w/ PRN zofran. Transferred to MS5.    ED Boarding Nurse name: Enrique Valdez RN

## 2023-06-18 NOTE — PROGRESS NOTES
Patient vital signs are at baseline: Yes  Patient able to ambulate as they were prior to admission or with assist devices provided by therapies during their stay:  Yes  Patient MUST void prior to discharge:  Yes  Patient able to tolerate oral intake:  Yes, advance patient to full liquid diet. Waiting to see if he tolerated a regular diet before discharge.   Pain has adequate pain control using Oral analgesics:  Yes but only with IV pain medications.   Does patient have an identified :  Yes  Has goal D/C date and time been discussed with patient:  Yes  Patient is nauseous and c/o of 7-8/ 10 pain.   Patient wife called earlier today for an update and might be visiting later today.

## 2023-06-18 NOTE — PLAN OF CARE
To Do:  End of Shift Summary  For vital signs and complete assessments, please see documentation flowsheets.     Pertinent assessments: A/Ox4, c/o Intermittent N/V, frothy and yellow. PRN Dilaudid given for constant abdominal pain. PRN Zofran and Compazine for N/V. Standby assist to bathroom with IV pole.     Major Shift Events: Admitted to the floor. Pt requested a higher dose of Dilaudid, doc was paged and gave a 1x dose of Ativan ordered. Dilaudid changed to 0.5 mg.     Treatment Plan: Pain management. Symptom management.     Bedside Nurse: Rosa Pino RN

## 2023-06-18 NOTE — PROGRESS NOTES
Hospitalist Medicine Progress Note   [facility]       Wally Avendano is a 32 year old male with gastroparesis with type 1 diabetes mellitus, marijuana use, who was admitted 6/17/2023 with nausea vomiting hematemesis and abdominal pain with acidosis.  He was recently admitted 6/14/2023 with similar complaints but left AMA.  Patient recently had CT scan which was essentially negative except for some proctitis likes changes.  His pain has decreased and he was started on full liquid diet 6/18/2023       Date of Admission:  6/17/2023  Assessment & Plan     Nausea vomiting  Abdominal pain  Marijuana abuse  All of patient's symptoms have decreased  We will start him on full liquid diet and if tolerated discontinue IV fluids and send him home    Hematemesis  Patient denies having any vomitus or black-colored stools to me  Awaiting GI consultation  Patient wanted to eat  We will change Protonix drip to IV twice daily    Type 1 diabetes mellitus  Patient is on home dose of glargine insulin 20 units daily    Hypokalemia  Replace per protocol    Metabolic acidosis probably from starvation      Plan:   Full liquid diet  If tolerating advance diet as tolerated  Discontinue IV fluids when patient is able to eat and keep things down    Diet: NPO for Medical/Clinical Reasons Except for: Meds, Ice Chips    DVT Prophylaxis: Low Risk/Ambulatory with no VTE prophylaxis indicated  Fuentes Catheter: Not present  Code Status: Full Code               The patient's care was discussed with the Patient      Anselmo Caban MD  Hospitalist Service  [facility]    ______________________________________________________________________    Interval History     Symptoms   Says nausea and vomiting are better    Review of Systems:   Denies abdominal pain  Denies rectal pain    Data reviewed today: I reviewed all medications, new labs and imaging results over the last 24 hours.     Physical Exam   Vital Signs:  Temp: 98.5  F (36.9  C) Temp src: Oral BP: (!) 152/88 Pulse: 76   Resp: 20 SpO2: 98 % O2 Device: None (Room air)    Weight: 157 lbs 3.2 oz      GENERAL: Patient is not in acute distress  HEENT: EOM+,Conjunctiva is clear   NECK:  no Jugular Venous distention  HEART: S1 S2 regular Rate and Rhythm, there is  no murmur,   LUNGS: Respirations are  not laboured, Lungs are \ clear to auscultation \without Crepitations or Wheezing   ABDOMEN: Soft , there is no tenderness ,Bowel Sounds are Positive   LOWER LIMBS: no  Pedal Edema \ Bilaterally   CNS:  Alert,  Oriented x 3, Moving all the Four Limbs     Data   Recent Labs   Lab 06/18/23  1026 06/18/23  0744 06/18/23  0709 06/18/23  0154 06/17/23  2359 06/17/23  2132 06/17/23  1713 06/17/23  0943 06/17/23  0919 06/15/23  0014 06/14/23  2342   WBC  --   --  10.1  --   --   --   --   --  11.4*  --  17.2*   HGB  --   --  13.1*  --  12.9*  --  12.2*  --  13.0*  --  13.6   MCV  --   --  84  --   --   --   --   --  86  --  83   PLT  --   --  242  --   --   --   --   --  261  --  344   NA  --   --  140  --  139  --  141  --  140   < > 142   POTASSIUM  --   --  3.9  --  3.7  --  3.1*   < > 3.3*   < > 3.5   CHLORIDE  --   --  103  --  103  --  102  --  99   < > 99   CO2  --   --  24  --  25  --  25  --  20*   < > 21*   BUN  --   --  7.7  --  8.4  --  10.1  --  11.1   < > 13.4   CR  --   --  0.57*  --  0.59*  --  0.55*  --  0.61*   < > 0.76   ANIONGAP  --   --  13  --  11  --  14  --  21*   < > 22*   MARY  --   --  8.2*  --  8.0*  --  8.2*  --  8.6   < > 9.7   * 164* 166*   < > 166*   < > 252*   < > 311*   < > 308*   ALBUMIN  --   --   --   --   --   --   --   --  4.2  --  5.1   PROTTOTAL  --   --   --   --   --   --   --   --  6.7  --  7.7   BILITOTAL  --   --   --   --   --   --   --   --  0.6  --  0.5   ALKPHOS  --   --   --   --   --   --   --   --  59  --  61   ALT  --   --   --   --   --   --   --   --  13  --  16   AST  --   --   --   --   --   --   --   --  18  --  21    LIPASE  --   --   --   --   --   --   --   --  7*  --  32    < > = values in this interval not displayed.         No results found for this or any previous visit (from the past 24 hour(s)).

## 2023-06-18 NOTE — PROGRESS NOTES
[facility] Consultation     Wally Avendano  [address] [place] [address]  32 year old male     Admission Date/Time: 6/17/2023  Primary Care Provider: Rita - MISAEL [place]  Referring / Attending Physician:  Mee     We were asked to see the patient in consultation by Dr. Caban for evaluation of n/v.    ASSESSMENT:    Nearly resolved complaints of nausea, vomiting and perhaps recent hematemesis in the setting of type 1 diabetes (a1c 8.4), gastroparesis, marijuana use.    RECOMMENDATIONS:  Promethazine 12.5 mg IV every 6 is oftentimes helpful to break the cycle of nausea and vomiting in the setting of cannabis hyperemesis.  It does not seem as if the patient is in need of this now, and could likely advance diet.    We would be happy to see the patient once again in clinic however he will not likely improve with regard to the above-mentioned syndrome and thus he has better management of his glucose, and stopped marijuana use.    We will sign off.    Thank you for involving us in this patient's care.  Please call me with any questions.    Total time spent in chart review, direct medical discussion, examination, and documentation was 30 minutes    Kamran Arguello DO   [facility]  Cell [phone]    ________________________________________________________________________        CC: n/v     HPI: This is a 32-year-old gentleman who we are asked to see for abdominal pain and vomiting.  He has type 1 diabetes gastroparesis and marijuana use.  We have seen the patient in consultation both in the outpatient and inpatient setting as recently as 4/14/2023, and 1/21/2023 respectively.  More remotely, in 2021, the patient had an upper endoscopy with Dr. Restrepo, similar presentation, unremarkable findings.    He was admitted last night for symptoms of abdominal pain and vomiting.  Similar admission on 6/14 however he left the hospital AMA.  He reports that he frequently will have episodes of  hematemesis with repetitive vomiting.  He states that today he is feeling much better, no nausea presently and has not vomited.  He denies any melena recently he thinks the current syndrome was brought on by consumption of a single beer.  We discussed that it is also quite likely that marijuana is contributing, however he seemed less than receptive to this information.     ROS: A comprehensive ten point review of systems was negative aside from those in mentioned in the HPI.      PAST MEDICAL HISTORY:  Patient Active Problem List    Diagnosis Date Noted     Starvation ketoacidosis 06/17/2023     Priority: Medium     Dehydration 06/15/2023     Priority: Medium     Diabetic ketoacidosis without coma associated with type 1 diabetes mellitus (H) 08/12/2022     Priority: Medium     Gastroparesis 02/26/2022     Priority: Medium     Hypokalemia 02/26/2022     Priority: Medium     Intractable nausea and vomiting 02/26/2022     Priority: Medium     Type 1 diabetes mellitus with other specified complication (H) 07/26/2021     Priority: Medium     Diarrhea, unspecified type 07/26/2021     Priority: Medium     DKA (diabetic ketoacidoses) 04/02/2021     Priority: Medium     Replacing diagnoses that were inactivated after the 10/1/2021 regulatory import.       SOCIAL HISTORY:  Social History     Tobacco Use     Smoking status: Never     Smokeless tobacco: Never   Vaping Use     Vaping status: Never Used   Substance Use Topics     Alcohol use: Yes     Drug use: Yes     Types: Marijuana     FAMILY HISTORY:  Family History   Problem Relation Age of Onset     Other - See Comments Mother         PBC     ALLERGIES: No Known Allergies  MEDICATIONS:   Current Facility-Administered Medications   Medication     0.9% sodium chloride + KCl 20 mEq/L infusion     acetaminophen (TYLENOL) tablet 650 mg    Or     acetaminophen (TYLENOL) Suppository 650 mg     dextrose 10% infusion     glucose gel 15-30 g    Or     dextrose 50 % injection 25-50 mL     Or     glucagon injection 1 mg     HYDROmorphone (PF) (DILAUDID) injection 0.5 mg     insulin aspart (NovoLOG) injection (RAPID ACTING)     insulin glargine (LANTUS PEN) injection 20 Units     lidocaine (LMX4) cream     lidocaine 1 % 0.1-1 mL     melatonin tablet 1 mg     metoclopramide (REGLAN) injection 10 mg     naloxone (NARCAN) injection 0.2 mg    Or     naloxone (NARCAN) injection 0.4 mg    Or     naloxone (NARCAN) injection 0.2 mg    Or     naloxone (NARCAN) injection 0.4 mg     ondansetron (ZOFRAN ODT) ODT tab 4 mg    Or     ondansetron (ZOFRAN) injection 4 mg     pantoprazole (PROTONIX) IV push injection 40 mg     prochlorperazine (COMPAZINE) injection 10 mg    Or     prochlorperazine (COMPAZINE) tablet 10 mg    Or     prochlorperazine (COMPAZINE) suppository 25 mg     sodium chloride (PF) 0.9% PF flush 3 mL     sodium chloride (PF) 0.9% PF flush 3 mL     PHYSICAL EXAM:   BP (!) 152/88 (BP Location: Left arm)   Pulse 76   Temp 98.5  F (36.9  C) (Oral)   Resp 20   Wt 71.3 kg (157 lb 3.2 oz)   SpO2 98%   BMI 19.13 kg/m     GEN: Alert, oriented x3, communicative and in NAD.       ADDITIONAL DATA:   I reviewed the patient's new clinical lab test results.   Recent Labs   Lab Test 06/18/23 0709 06/17/23 2359 06/17/23 1713 06/17/23 0919 06/14/23 2342   WBC 10.1  --   --  11.4* 17.2*   HGB 13.1* 12.9* 12.2* 13.0* 13.6   MCV 84  --   --  86 83     --   --  261 344     Recent Labs   Lab Test 06/18/23  0709 06/17/23 2359 06/17/23  1713   POTASSIUM 3.9 3.7 3.1*   CHLORIDE 103 103 102   CO2 24 25 25   BUN 7.7 8.4 10.1   ANIONGAP 13 11 14     Recent Labs   Lab Test 06/17/23 0919 06/14/23  2342 03/29/23  1935 03/04/23  1842 01/20/23  1115 08/13/22  0707 08/11/22  2047 04/03/21  0648 04/01/21 2043   ALBUMIN 4.2 5.1 4.5   < >  --    < >  --    < >  --    BILITOTAL 0.6 0.5 0.8   < >  --    < >  --    < >  --    ALT 13 16 18   < >  --    < >  --    < >  --    AST 18 21 16   < >  --    < >  --    < >   --    PROTEIN  --   --   --   --  Negative  --  Negative  --  10*   LIPASE 7* 32 6*  --   --    < >  --    < >  --     < > = values in this interval not displayed.        I reviewed the patient's new imaging results.

## 2023-06-19 VITALS
OXYGEN SATURATION: 98 % | BODY MASS INDEX: 19.13 KG/M2 | WEIGHT: 157.2 LBS | SYSTOLIC BLOOD PRESSURE: 149 MMHG | DIASTOLIC BLOOD PRESSURE: 84 MMHG | TEMPERATURE: 98 F | HEART RATE: 100 BPM | RESPIRATION RATE: 18 BRPM

## 2023-06-19 LAB
ERYTHROCYTE [DISTWIDTH] IN BLOOD BY AUTOMATED COUNT: 12.3 % (ref 10–15)
GLUCOSE BLDC GLUCOMTR-MCNC: 135 MG/DL (ref 70–99)
GLUCOSE BLDC GLUCOMTR-MCNC: 141 MG/DL (ref 70–99)
GLUCOSE BLDC GLUCOMTR-MCNC: 150 MG/DL (ref 70–99)
GLUCOSE BLDC GLUCOMTR-MCNC: 171 MG/DL (ref 70–99)
HCT VFR BLD AUTO: 38.3 % (ref 40–53)
HGB BLD-MCNC: 13.3 G/DL (ref 13.3–17.7)
MAGNESIUM SERPL-MCNC: 1.9 MG/DL (ref 1.7–2.3)
MCH RBC QN AUTO: 29.4 PG (ref 26.5–33)
MCHC RBC AUTO-ENTMCNC: 34.7 G/DL (ref 31.5–36.5)
MCV RBC AUTO: 85 FL (ref 78–100)
PLATELET # BLD AUTO: 231 10E3/UL (ref 150–450)
POTASSIUM SERPL-SCNC: 3.8 MMOL/L (ref 3.4–5.3)
RBC # BLD AUTO: 4.53 10E6/UL (ref 4.4–5.9)
WBC # BLD AUTO: 7.8 10E3/UL (ref 4–11)

## 2023-06-19 PROCEDURE — 250N000011 HC RX IP 250 OP 636: Performed by: NURSE PRACTITIONER

## 2023-06-19 PROCEDURE — 83735 ASSAY OF MAGNESIUM: CPT | Performed by: INTERNAL MEDICINE

## 2023-06-19 PROCEDURE — 84132 ASSAY OF SERUM POTASSIUM: CPT | Performed by: INTERNAL MEDICINE

## 2023-06-19 PROCEDURE — 99239 HOSP IP/OBS DSCHRG MGMT >30: CPT | Performed by: INTERNAL MEDICINE

## 2023-06-19 PROCEDURE — 250N000011 HC RX IP 250 OP 636: Performed by: INTERNAL MEDICINE

## 2023-06-19 PROCEDURE — 85014 HEMATOCRIT: CPT | Performed by: INTERNAL MEDICINE

## 2023-06-19 PROCEDURE — C9113 INJ PANTOPRAZOLE SODIUM, VIA: HCPCS | Performed by: INTERNAL MEDICINE

## 2023-06-19 PROCEDURE — 36415 COLL VENOUS BLD VENIPUNCTURE: CPT | Performed by: INTERNAL MEDICINE

## 2023-06-19 RX ORDER — ONDANSETRON 4 MG/1
4 TABLET, ORALLY DISINTEGRATING ORAL EVERY 6 HOURS PRN
Qty: 15 TABLET | Refills: 0 | Status: SHIPPED | OUTPATIENT
Start: 2023-06-19

## 2023-06-19 RX ADMIN — ONDANSETRON 4 MG: 2 INJECTION INTRAMUSCULAR; INTRAVENOUS at 08:34

## 2023-06-19 RX ADMIN — HYDROMORPHONE HYDROCHLORIDE 0.5 MG: 1 INJECTION, SOLUTION INTRAMUSCULAR; INTRAVENOUS; SUBCUTANEOUS at 04:10

## 2023-06-19 RX ADMIN — POTASSIUM CHLORIDE AND SODIUM CHLORIDE: 900; 150 INJECTION, SOLUTION INTRAVENOUS at 12:28

## 2023-06-19 RX ADMIN — HYDROMORPHONE HYDROCHLORIDE 0.5 MG: 1 INJECTION, SOLUTION INTRAMUSCULAR; INTRAVENOUS; SUBCUTANEOUS at 01:51

## 2023-06-19 RX ADMIN — METOCLOPRAMIDE HYDROCHLORIDE 10 MG: 5 INJECTION INTRAMUSCULAR; INTRAVENOUS at 03:22

## 2023-06-19 RX ADMIN — POTASSIUM CHLORIDE AND SODIUM CHLORIDE: 900; 150 INJECTION, SOLUTION INTRAVENOUS at 04:12

## 2023-06-19 RX ADMIN — METOCLOPRAMIDE HYDROCHLORIDE 10 MG: 5 INJECTION INTRAMUSCULAR; INTRAVENOUS at 10:22

## 2023-06-19 RX ADMIN — PROCHLORPERAZINE EDISYLATE 10 MG: 5 INJECTION INTRAMUSCULAR; INTRAVENOUS at 01:51

## 2023-06-19 RX ADMIN — HYDROMORPHONE HYDROCHLORIDE 0.5 MG: 1 INJECTION, SOLUTION INTRAMUSCULAR; INTRAVENOUS; SUBCUTANEOUS at 08:40

## 2023-06-19 RX ADMIN — PANTOPRAZOLE SODIUM 40 MG: 40 INJECTION, POWDER, FOR SOLUTION INTRAVENOUS at 08:34

## 2023-06-19 ASSESSMENT — ACTIVITIES OF DAILY LIVING (ADL)
ADLS_ACUITY_SCORE: 20

## 2023-06-19 NOTE — DISCHARGE SUMMARY
[facility]  Hospitalist Discharge Summary      Date of Admission:  6/17/2023  Date of Discharge:  6/19/2023  3:51 PM  Discharging Provider: Anselmo Caban MD  Discharge Service: Hospitalist Service    Discharge Diagnoses   Nausea vomiting  Abdominal pain  Marijuana abuse  Hematemesis per history  Type 1 diabetes mellitus  Hypokalemia  Metabolic acidosis  Marijuana use      Clinically Significant Risk Factors          Follow-ups Needed After Discharge   Follow-up Appointments     Follow-up and recommended labs and tests       Follow up with primary care provider, [facility] Rita - Allison,   within 7 days for hospital follow- up.  The following labs/tests are   recommended: BMP.             Discharge Disposition   Discharged to home  Condition at discharge: Stable    Hospital Course   Wally Avendano is a 32 year old male with gastroparesis with type 1 diabetes mellitus, marijuana use, who was admitted 6/17/2023 with nausea vomiting hematemesis and abdominal pain with acidosis.  He was recently admitted 6/14/2023 with similar complaints but left AMA.  Patient recently had CT scan which was essentially negative except for some proctitis likes changes.  His pain has decreased and he was started on full liquid diet 6/18/2023    Consultations This Hospital Stay   GASTROENTEROLOGY IP CONSULT  PHARMACY IP CONSULT    Code Status   Full Code    Time Spent on this Encounter   I, Anselmo Caban MD, personally saw the patient today and spent greater than 30 minutes discharging this patient.       Anselmo Caban MD  [facility] MEDICAL SURGICAL  [address] [place] [address]  Phone: [phone]  Fax: [phone]  ______________________________________________________________________    Physical Exam   Vital Signs: Temp: 98  F (36.7  C) Temp src: Axillary BP: (!) 149/84 Pulse: 100   Resp: 18 SpO2: 98 % O2 Device: None (Room air)    Weight: 157 lbs 3.2 oz  GENERAL: Patient  is not in acute distress  HEENT: EOM+,Conjunctiva is clear   NECK:  no Jugular Venous distention  HEART: S1 S2 regular Rate and Rhythm, there is  no murmur,   LUNGS: Respirations are  not laboured, Lungs are \ clear to auscultation \without Crepitations or Wheezing   ABDOMEN: Soft , there is no tenderness ,Bowel Sounds are Positive   LOWER LIMBS: no  Pedal Edema \ Bilaterally   CNS:  Alert,  Oriented x 3, Moving all the Four Limbs        Primary Care Physician   [facility] Rayray [place]    Discharge Orders      Reason for your hospital stay    Came in with nausea and vomiting which improved     Follow-up and recommended labs and tests     Follow up with primary care provider, [facility] Rayray [place], within 7 days for hospital follow- up.  The following labs/tests are recommended: BMP.     Activity    Your activity upon discharge: activity as tolerated     Diet    Follow this diet upon discharge: Orders Placed This Encounter  Regular diet       Significant Results and Procedures   Most Recent 3 CBC's:Recent Labs   Lab Test 06/19/23  0624 06/18/23  0709 06/17/23  2359 06/17/23  1713 06/17/23  0919   WBC 7.8 10.1  --   --  11.4*   HGB 13.3 13.1* 12.9*   < > 13.0*   MCV 85 84  --   --  86    242  --   --  261    < > = values in this interval not displayed.     Most Recent 3 BMP's:Recent Labs   Lab Test 06/19/23  1231 06/19/23  0833 06/19/23  0624 06/19/23  0549 06/18/23  0744 06/18/23  0709 06/18/23  0154 06/17/23  2359 06/17/23  2132 06/17/23  1713   NA  --   --   --   --   --  140  --  139  --  141   POTASSIUM  --   --  3.8  --   --  3.9  --  3.7  --  3.1*   CHLORIDE  --   --   --   --   --  103  --  103  --  102   CO2  --   --   --   --   --  24  --  25  --  25   BUN  --   --   --   --   --  7.7  --  8.4  --  10.1   CR  --   --   --   --   --  0.57*  --  0.59*  --  0.55*   ANIONGAP  --   --   --   --   --  13  --  11  --  14   MARY  --   --   --   --   --  8.2*  --  8.0*  --  8.2*   *  150*  --  135*   < > 166*   < > 166*   < > 252*    < > = values in this interval not displayed.     Most Recent 2 LFT's:Recent Labs   Lab Test 06/17/23  0919 06/14/23  2342   AST 18 21   ALT 13 16   ALKPHOS 59 61   BILITOTAL 0.6 0.5     Most Recent 6 glucoses:Recent Labs   Lab Test 06/19/23  1231 06/19/23  0833 06/19/23  0549 06/19/23  0145 06/18/23  2211 06/18/23  1859   * 150* 135* 141* 159* 162*     Most Recent Urinalysis:Recent Labs   Lab Test 01/20/23  1115   COLOR Light Yellow   APPEARANCE Clear   URINEGLC >=1000*   URINEBILI Negative   URINEKETONE 10*   SG 1.019   UBLD Negative   URINEPH 7.5*   PROTEIN Negative   NITRITE Negative   LEUKEST Negative   RBCU <1   WBCU 1   ,   Results for orders placed or performed during the hospital encounter of 06/14/23   CT Abdomen Pelvis w Contrast    Narrative    EXAM: CT ABDOMEN PELVIS W CONTRAST  LOCATION: [facility]  DATE: 6/15/2023    INDICATION: generalized pain vomiting; hx obstructions  COMPARISON: CT abdomen pelvis 08/11/2022  TECHNIQUE: CT scan of the abdomen and pelvis was performed following injection of IV contrast. Multiplanar reformats were obtained. Dose reduction techniques were used.  CONTRAST: 81mL Isovue 370    FINDINGS:   LOWER CHEST: Normal.    HEPATOBILIARY: Focal fat or altered perfusion near the falciform. No suspicious liver lesion. No calcified gallstones or biliary dilation.    PANCREAS: Homogeneous enhancement of the pancreas. No acute peripancreatic inflammation. No duct dilation.    SPLEEN: Normal.    ADRENAL GLANDS: Normal.    KIDNEYS/BLADDER: No significant mass, stone, or hydronephrosis. Excreted contrast in the renal collecting systems and bladder.    BOWEL: Distal esophageal thickening is likely reactive in the setting of repeated vomiting. No small bowel obstruction. Appendix is normal. Slight haziness in the perirectal fat. Colon is decompressed.    LYMPH NODES: No lymphadenopathy.    VASCULATURE: Normal  caliber abdominal aorta. The portal, splenic, and superior mesenteric veins are patent.    PELVIC ORGANS: Normal.    MUSCULOSKELETAL: Normal.      Impression    IMPRESSION:   1.  Slight haziness of the perirectal fat is nonspecific but could reflect a proctitis in the appropriate clinical setting.    2.  Distal esophageal thickening is likely reactive in the setting of repeated vomiting.    3.  No small bowel obstruction.       Discharge Medications   Current Discharge Medication List      START taking these medications    Details   !! ondansetron (ZOFRAN ODT) 4 MG ODT tab Take 1 tablet (4 mg) by mouth every 6 hours as needed for nausea or vomiting  Qty: 15 tablet, Refills: 0    Associated Diagnoses: Nausea       !! - Potential duplicate medications found. Please discuss with provider.      CONTINUE these medications which have NOT CHANGED    Details   Glucagon (BAQSIMI ONE PACK) 3 MG/DOSE POWD Use only for severe hypoglycemia.  Qty: 1 each, Refills: 1    Associated Diagnoses: Type 1 diabetes mellitus with other specified complication (H)      insulin aspart (NOVOLOG PEN) 100 UNIT/ML pen Inject 2 Units Subcutaneous Take with snacks or supplements Only when BS goes up PRN      insulin glargine (LANTUS PEN) 100 UNIT/ML pen Inject 20 Units Subcutaneous every morning  Qty: 15 mL, Refills: 1    Comments: If Lantus is not covered by insurance, may substitute Basaglar or Semglee or other insulin glargine product per insurance preference at same dose and frequency.    Associated Diagnoses: Diabetic ketoacidosis without coma associated with type 1 diabetes mellitus (H)      metoclopramide (REGLAN) 5 MG tablet Take 2 tablets (10 mg) by mouth 3 times daily Take 10-15 min before a meal  Qty: 120 tablet, Refills: 3    Associated Diagnoses: Gastroparesis      !! ondansetron (ZOFRAN ODT) 8 MG ODT tab DISSOLVE 1 TABLET ON TOP OF THE TONGUE EVERY 8 HOURS THEN SWALLOW      prochlorperazine (COMPAZINE) 10 MG tablet Take 10 mg by mouth  every 6 hours as needed for nausea or vomiting      Continuous Blood Gluc Sensor (DEXCOM G6 SENSOR) MISC Change every 10 days.  Qty: 3 each, Refills: 11    Associated Diagnoses: Type 1 diabetes mellitus with other specified complication (H)      Continuous Blood Gluc Transmit (DEXCOM G6 TRANSMITTER) MISC CHANGE EVERY 3 MONTHS  Qty: 1 each, Refills: 3    Associated Diagnoses: Type 1 diabetes mellitus with other specified complication (H)      !! insulin pen needle (32G X 4 MM) 32G X 4 MM miscellaneous Use 6 pen needles daily or as directed.  Qty: 200 each, Refills: 6    Associated Diagnoses: Type 1 diabetes mellitus with other specified complication (H)      !! insulin pen needle 30G X 5 MM Use 6 pen needles daily or as directed.  Qty: 200 each, Refills: 11    Associated Diagnoses: Type 1 diabetes mellitus with other specified complication (H)       !! - Potential duplicate medications found. Please discuss with provider.        Allergies   No Known Allergies

## 2023-06-19 NOTE — CONSULTS
Care Management Discharge Note    Discharge Date: 06/19/2023       Discharge Disposition:  Home  Discharge Services:      Discharge DME:      Discharge Transportation:      Handoff Referral Completed: No    Additional Information:  Elevated URR 23%. Admitted for abdominal pain, nausea and vomiting. History of DM Type 1 and marijuana use. Medically ready for discharge today. Per chart review no discharge needs identified.    Larissa Gonzalez RN BSN OCN  Care Coordinator  [facility]  [phone]

## 2023-06-19 NOTE — PLAN OF CARE
Pt is alert and oriented x4. Pt stated abdominal pain. Pt was given Dilaudid IVV during the shift. Pt is on IVF 0.9 NS+ KCL 20 meq @ 125 ml/hr. Pt was given his scheduled Reglan and PRN Zofran. Pt is independent in transfer and cares. Pt is on blood sugar checks Q 4 hours. Pt was given insulin per sliding scale. Pt was given his scheduled Protonix BID. Pt is on full liquid diet. Pt had emesis x2 during the shift. Pt is on pot and mag protocol. Recheck in the morning. Pt is sleeping in bed and liya light within reach. Will continue to monitor and assess pt.

## 2023-06-19 NOTE — PLAN OF CARE
Goal Outcome Evaluation:    Assumed cares [id number]     Vital signs:  Temp: 98  F (36.7  C) Temp src: Axillary BP: (!) 149/84 Pulse: 100   Resp: 18 SpO2: 98 % O2 Device: None (Room air)       To Do:  End of Shift Summary  For vital signs and complete assessments, please see documentation flowsheets.     Pertinent assessments: A/Ox4, calm cooperative, up independently in his room, VSS, denies sob, reports nausea relieved by antinausea meds, reports pain relieved by pain medications, VSS, on RA, tolerates diet. Continues with IVFs.      Major Shift Events. Uneventful.    Treatment Plan: IVFs, symptom management, pain management.     Bedside Nurse: Prakash Daniels RN

## 2023-07-03 ENCOUNTER — TELEPHONE (OUTPATIENT)
Dept: ENDOCRINOLOGY | Facility: CLINIC | Age: 33
End: 2023-07-03
Payer: COMMERCIAL

## 2023-09-01 DIAGNOSIS — K31.84 GASTROPARESIS: ICD-10-CM

## 2023-09-02 DIAGNOSIS — K31.84 GASTROPARESIS: ICD-10-CM

## 2023-09-03 ENCOUNTER — HEALTH MAINTENANCE LETTER (OUTPATIENT)
Age: 33
End: 2023-09-03

## 2023-09-06 ENCOUNTER — MYC REFILL (OUTPATIENT)
Dept: PEDIATRICS | Facility: CLINIC | Age: 33
End: 2023-09-06
Payer: COMMERCIAL

## 2023-09-06 DIAGNOSIS — K31.84 GASTROPARESIS: ICD-10-CM

## 2023-09-06 RX ORDER — METOCLOPRAMIDE 5 MG/1
TABLET ORAL
Qty: 120 TABLET | Refills: 3 | OUTPATIENT
Start: 2023-09-06

## 2023-09-06 RX ORDER — METOCLOPRAMIDE 5 MG/1
TABLET ORAL
Qty: 540 TABLET | Refills: 0 | Status: SHIPPED | OUTPATIENT
Start: 2023-09-06 | End: 2024-04-23

## 2023-09-06 RX ORDER — METOCLOPRAMIDE 5 MG/1
TABLET ORAL
Qty: 540 TABLET | Refills: 0 | OUTPATIENT
Start: 2023-09-06

## 2023-09-15 DIAGNOSIS — E10.69 TYPE 1 DIABETES MELLITUS WITH OTHER SPECIFIED COMPLICATION (H): ICD-10-CM

## 2023-09-21 RX ORDER — PROCHLORPERAZINE 25 MG/1
SUPPOSITORY RECTAL
Qty: 1 EACH | Refills: 4 | Status: SHIPPED | OUTPATIENT
Start: 2023-09-21

## 2023-09-22 ENCOUNTER — TELEPHONE (OUTPATIENT)
Dept: PEDIATRICS | Facility: CLINIC | Age: 33
End: 2023-09-22
Payer: COMMERCIAL

## 2023-09-22 NOTE — TELEPHONE ENCOUNTER
Patient calling to request a 90 day refill for reglan. Advised that there was a 3 month supply sent on 9/6/23. Advised to call pharmacy to make sure it is ready to be picked up. Patient verbalized understanding, states he will call back if there are any issues. No further questions at this time.    Yari Pavon RN on 9/22/2023 at 12:59 PM

## 2023-11-15 ENCOUNTER — MYC REFILL (OUTPATIENT)
Dept: EDUCATION SERVICES | Facility: CLINIC | Age: 33
End: 2023-11-15
Payer: COMMERCIAL

## 2023-11-15 DIAGNOSIS — E10.10 DIABETIC KETOACIDOSIS WITHOUT COMA ASSOCIATED WITH TYPE 1 DIABETES MELLITUS (H): ICD-10-CM

## 2024-01-21 ENCOUNTER — HEALTH MAINTENANCE LETTER (OUTPATIENT)
Age: 34
End: 2024-01-21

## 2024-01-27 DIAGNOSIS — E10.10 DIABETIC KETOACIDOSIS WITHOUT COMA ASSOCIATED WITH TYPE 1 DIABETES MELLITUS (H): ICD-10-CM

## 2024-01-27 NOTE — TELEPHONE ENCOUNTER
Notes from Pharmacy:  There are ZERO refills remaining on this prescription. Patient is requesting advanced approval of refills for this medication to PREVENT ANY MISSED DOSES. (Note: Prescription will be refilled when due).

## 2024-03-01 DIAGNOSIS — E10.69 TYPE 1 DIABETES MELLITUS WITH OTHER SPECIFIED COMPLICATION (H): ICD-10-CM

## 2024-03-01 RX ORDER — PEN NEEDLE, DIABETIC 32GX 5/32"
NEEDLE, DISPOSABLE MISCELLANEOUS
Qty: 200 EACH | Refills: 11 | Status: SHIPPED | OUTPATIENT
Start: 2024-03-01

## 2024-03-06 PROCEDURE — 36620 INSERTION CATHETER ARTERY: CPT | Performed by: ANESTHESIOLOGY

## 2024-04-14 DIAGNOSIS — E10.69 TYPE 1 DIABETES MELLITUS WITH OTHER SPECIFIED COMPLICATION (H): ICD-10-CM

## 2024-04-15 RX ORDER — PROCHLORPERAZINE 25 MG/1
SUPPOSITORY RECTAL
Qty: 3 EACH | Refills: 11 | Status: SHIPPED | OUTPATIENT
Start: 2024-04-15

## 2024-04-18 ENCOUNTER — PATIENT OUTREACH (OUTPATIENT)
Dept: CARE COORDINATION | Facility: CLINIC | Age: 34
End: 2024-04-18

## 2024-04-23 ENCOUNTER — MYC REFILL (OUTPATIENT)
Dept: PEDIATRICS | Facility: CLINIC | Age: 34
End: 2024-04-23

## 2024-04-23 DIAGNOSIS — K31.84 GASTROPARESIS: ICD-10-CM

## 2024-04-23 DIAGNOSIS — E10.10 DIABETIC KETOACIDOSIS WITHOUT COMA ASSOCIATED WITH TYPE 1 DIABETES MELLITUS (H): ICD-10-CM

## 2024-04-24 RX ORDER — METOCLOPRAMIDE 5 MG/1
10 TABLET ORAL 3 TIMES DAILY PRN
Qty: 90 TABLET | Refills: 0 | Status: SHIPPED | OUTPATIENT
Start: 2024-04-24 | End: 2024-05-31

## 2024-05-02 ENCOUNTER — PATIENT OUTREACH (OUTPATIENT)
Dept: CARE COORDINATION | Facility: CLINIC | Age: 34
End: 2024-05-02

## 2024-05-31 DIAGNOSIS — K31.84 GASTROPARESIS: ICD-10-CM

## 2024-05-31 RX ORDER — METOCLOPRAMIDE 5 MG/1
TABLET ORAL
Qty: 60 TABLET | Refills: 0 | Status: SHIPPED | OUTPATIENT
Start: 2024-05-31 | End: 2024-07-23

## 2024-06-09 ENCOUNTER — HEALTH MAINTENANCE LETTER (OUTPATIENT)
Age: 34
End: 2024-06-09

## 2024-07-22 DIAGNOSIS — K31.84 GASTROPARESIS: ICD-10-CM

## 2024-07-23 RX ORDER — METOCLOPRAMIDE 5 MG/1
TABLET ORAL
Qty: 60 TABLET | Refills: 0 | Status: SHIPPED | OUTPATIENT
Start: 2024-07-23 | End: 2024-09-05

## 2024-07-29 ENCOUNTER — TELEPHONE (OUTPATIENT)
Dept: PEDIATRICS | Facility: CLINIC | Age: 34
End: 2024-07-29

## 2024-07-29 DIAGNOSIS — E10.10 DIABETIC KETOACIDOSIS WITHOUT COMA ASSOCIATED WITH TYPE 1 DIABETES MELLITUS (H): ICD-10-CM

## 2024-07-29 RX ORDER — INSULIN GLARGINE 100 [IU]/ML
INJECTION, SOLUTION SUBCUTANEOUS
Qty: 15 ML | Refills: 0 | Status: SHIPPED | OUTPATIENT
Start: 2024-07-29

## 2024-07-29 NOTE — TELEPHONE ENCOUNTER
Talked to pt.  Scheduled appt at [facility].  Stated he doesn't really have a provider so will be setting up new est care there.    He will talk to the provider about meds needed then. 8/5/24    Closed encounter.    Rita HOBSON

## 2024-07-30 ENCOUNTER — TELEPHONE (OUTPATIENT)
Dept: PEDIATRICS | Facility: CLINIC | Age: 34
End: 2024-07-30

## 2024-07-30 DIAGNOSIS — E10.69 TYPE 1 DIABETES MELLITUS WITH OTHER SPECIFIED COMPLICATION (H): Primary | ICD-10-CM

## 2024-07-30 NOTE — TELEPHONE ENCOUNTER
Prior Authorization Retail Medication Request    Medication/Dose: Lantus SoloStar 100 Unit/ML Pen injectors  Diagnosis and ICD code (if different than what is on RX):  NA  New/renewal/insurance change PA/secondary ins. PA:  Previously Tried and Failed:  NA  Rationale:  NA    Insurance  Primary: [facility]   Insurance ID:  [id number]         Pharmacy Information (if different than what is on RX)  [facility]  Phone:  [phone]  Fax:[phone]

## 2024-07-31 ENCOUNTER — PATIENT OUTREACH (OUTPATIENT)
Dept: CARE COORDINATION | Facility: CLINIC | Age: 34
End: 2024-07-31

## 2024-07-31 NOTE — TELEPHONE ENCOUNTER
PRIOR AUTHORIZATION DENIED    Medication: LANTUS SOLOSTAR   UNIT/ML SC SOPN    Insurance Company: [employer]Specialty PA's - Phone [phone] Fax [phone]    Denial Date: 7/31/2024    Denial Reason(s):       Appeal Information:

## 2024-07-31 NOTE — TELEPHONE ENCOUNTER
PA Initiation    Medication: LANTUS SOLOSTAR   UNIT/ML SC SOPN  Insurance Company: [employer]-Specialty PA's - Phone [phone] Fax [phone]  Pharm[facility] the Rx[id number] - [place], [place] - [address] AT [facility]  Filling Pharmacy Phone: [phone]  Filling Pharmacy Fax: [phone]  Start Date: 7/31/2024

## 2024-08-01 RX ORDER — INSULIN GLARGINE-YFGN 100 [IU]/ML
20 INJECTION, SOLUTION SUBCUTANEOUS DAILY
Qty: 9 ML | Refills: 0 | Status: SHIPPED | OUTPATIENT
Start: 2024-08-01

## 2024-08-02 ENCOUNTER — TELEPHONE (OUTPATIENT)
Dept: FAMILY MEDICINE | Facility: CLINIC | Age: 34
End: 2024-08-02

## 2024-08-02 NOTE — TELEPHONE ENCOUNTER
Prior Authorization Retail Medication Request    Medication/Dose: INSULIN GLARG-YFGN 100U/ML PEN INJ  Diagnosis and ICD code (if different than what is on RX):    New/renewal/insurance change PA/secondary ins. PA:  Previously Tried and Failed:    Rationale:      Insurance   Primary:   Insurance ID:      Secondary (if applicable):  Insurance ID:      Pharmacy Information (if different than what is on RX)  Name:  [facility]  Phone:  [phone]  [phone]

## 2024-08-06 NOTE — TELEPHONE ENCOUNTER
Central Prior Authorization Team   Phone: [phone]    PA Initiation    Medication: INSULIN GLARG-YFGN 100U/ML PEN INJ  Insurance Company: [employer]ty PA's - Phone [phone] Fax [phone]  Pharmacy Filling the Rx: [id number] - [place], [place] - [address] AT [facility]  Filling Pharmacy Phone: [phone]  Filling Pharmacy Fax:    Start Date: 8/6/2024

## 2024-08-07 NOTE — TELEPHONE ENCOUNTER
PRIOR AUTHORIZATION DENIED    Medication: INSULIN GLARG-YFGN 100U/ML PEN INJ    Denial Date: 8/7/2024    Denial Rational: Pharmacy informed that brand is covered and to contact clinic if a new Rx is needed. Generic is not covered.     Your case has been denied based on the information in the patient's profile. Please review the appeals information attached. CaseId:[id number];Status:Denied;Review Type:Prior Auth;Appeal Information: Attention:ATTN: CLINICAL APPEALS DEPARTMENT [employer] [address] [address],[place], [place],[address] Phone:[phone] Fax:[phone];

## 2024-08-18 ENCOUNTER — HEALTH MAINTENANCE LETTER (OUTPATIENT)
Age: 34
End: 2024-08-18

## 2024-09-03 ENCOUNTER — TELEPHONE (OUTPATIENT)
Dept: PEDIATRICS | Facility: CLINIC | Age: 34
End: 2024-09-03

## 2024-09-03 DIAGNOSIS — K31.84 GASTROPARESIS: ICD-10-CM

## 2024-09-05 RX ORDER — METOCLOPRAMIDE 5 MG/1
TABLET ORAL
Qty: 30 TABLET | Refills: 0 | Status: SHIPPED | OUTPATIENT
Start: 2024-09-05

## 2024-09-05 NOTE — TELEPHONE ENCOUNTER
Due for visit. Hasn't been seen by me since 2022. Need in person and labs for further care or won't be able to continue filling medication.  Ok for myself or other provider if able to get in more quickly.      Deonna Salazar, CNP

## 2024-09-05 NOTE — TELEPHONE ENCOUNTER
Attempt #1    Placed call to patient, unable to LM due to mailbox being full. Dibspace message sent .     When patient calls back please help schedule overdue appt with provider of his choosing to est care with.

## 2024-10-22 ENCOUNTER — TELEPHONE (OUTPATIENT)
Dept: PEDIATRICS | Facility: CLINIC | Age: 34
End: 2024-10-22

## 2024-10-22 ENCOUNTER — MYC MEDICAL ADVICE (OUTPATIENT)
Dept: PEDIATRICS | Facility: CLINIC | Age: 34
End: 2024-10-22

## 2024-10-22 ENCOUNTER — VIRTUAL VISIT (OUTPATIENT)
Dept: PEDIATRICS | Facility: CLINIC | Age: 34
End: 2024-10-22
Payer: COMMERCIAL

## 2024-10-22 DIAGNOSIS — E10.69 TYPE 1 DIABETES MELLITUS WITH OTHER SPECIFIED COMPLICATION (H): ICD-10-CM

## 2024-10-22 DIAGNOSIS — E10.69 TYPE 1 DIABETES MELLITUS WITH OTHER SPECIFIED COMPLICATION (H): Primary | ICD-10-CM

## 2024-10-22 DIAGNOSIS — K31.84 GASTROPARESIS: ICD-10-CM

## 2024-10-22 DIAGNOSIS — R11.0 NAUSEA: ICD-10-CM

## 2024-10-22 PROCEDURE — 99213 OFFICE O/P EST LOW 20 MIN: CPT | Mod: 95 | Performed by: NURSE PRACTITIONER

## 2024-10-22 RX ORDER — INSULIN GLARGINE-YFGN 100 [IU]/ML
20 INJECTION, SOLUTION SUBCUTANEOUS EVERY MORNING
Qty: 15 ML | Refills: 0 | Status: SHIPPED | OUTPATIENT
Start: 2024-10-22 | End: 2024-10-30

## 2024-10-22 RX ORDER — METOCLOPRAMIDE 5 MG/1
10 TABLET ORAL 3 TIMES DAILY PRN
Qty: 60 TABLET | Refills: 0 | Status: SHIPPED | OUTPATIENT
Start: 2024-10-22 | End: 2024-10-30

## 2024-10-22 RX ORDER — ONDANSETRON 4 MG/1
4 TABLET, ORALLY DISINTEGRATING ORAL EVERY 6 HOURS PRN
Qty: 15 TABLET | Refills: 0 | Status: SHIPPED | OUTPATIENT
Start: 2024-10-22 | End: 2024-10-30

## 2024-10-22 NOTE — TELEPHONE ENCOUNTER
Graciela Francois NP [facility]  Needs OV for DM follow-up WITHIN 4 weeks for further refills.  Extender is fine.  Graciela

## 2024-10-22 NOTE — PROGRESS NOTES
Wally is a 34 year old who is being evaluated via a billable video visit.        Assessment & Plan     Type 1 diabetes mellitus with other specified complication (H)  Stable.  Has not had labs done in >1 year.  Glargine refilled and endocrinology referral given.  Follow-up in <1 months for DM follow-up and labs  - Insulin Glargine-yfgn (SEMGLEE, YFGN,) 100 UNIT/ML SOPN; Inject 20 Units subcutaneously every morning.  - Adult Endocrinology  Referral; Future    Gastroparesis  Nausea  Stable. Previously followed by [facility].  Encouraged follow-up.    Can sign MARIA ESTHER at next visit.  - ondansetron (ZOFRAN ODT) 4 MG ODT tab; Take 1 tablet (4 mg) by mouth every 6 hours as needed for nausea or vomiting.      Follow-up within 4 weeks for DM check-up    Subjective   Wally is a 34 year old, presenting for the following health issues:    No chief complaint on file.    HPI     DM:  -Type 1  -Does not check BS as often as he should.  DexCom not covered by insurance.  -Medication:   glargine 20 units every morning   Aspart 1-2 units as needed with snacks or supplements.  Does not use very often   States it is very diet controlled  -ACE/ARB: none  -Eye exam: >1 year ago  -Foot exam: none  -Statin: none.   -Complications: gastroparesis    LDL Cholesterol Calculated   Date Value Ref Range Status   05/18/2023 88 <=100 mg/dL Final   04/22/2021 75 <100 mg/dL Final     Comment:     Desirable:       <100 mg/dl       Gastroparesis.  Followed by [facility].  Last visit 2023.  Currently taking metoclopramide daily and Zofan as needed.    Review of Systems  Constitutional, HEENT, cardiovascular, pulmonary, gi and gu systems are negative, except as otherwise noted.      Objective       Vitals:  No vitals were obtained today due to virtual visit.    Physical Exam   GENERAL: alert and no distress  EYES: Eyes grossly normal to inspection.  No discharge or erythema, or obvious scleral/conjunctival abnormalities.  RESP: No audible wheeze, cough, or  visible cyanosis.    SKIN: Visible skin clear. No significant rash, abnormal pigmentation or lesions.  NEURO: Cranial nerves grossly intact.  Mentation and speech appropriate for age.  PSYCH: Appropriate affect, tone, and pace of words        Video-Visit Details    Type of service:  Video Visit   Originating Location (pt. Location): Home    Distant Location (provider location):  On-site  Platform used for Video Visit: Wanda  Signed Electronically by: Graciela Francois NP

## 2024-10-27 ENCOUNTER — HEALTH MAINTENANCE LETTER (OUTPATIENT)
Age: 34
End: 2024-10-27

## 2024-10-28 RX ORDER — INSULIN GLARGINE-YFGN 100 [IU]/ML
20 INJECTION, SOLUTION SUBCUTANEOUS DAILY
Qty: 9 ML | Refills: 0 | OUTPATIENT
Start: 2024-10-28

## 2024-10-30 ENCOUNTER — OFFICE VISIT (OUTPATIENT)
Dept: PEDIATRICS | Facility: CLINIC | Age: 34
End: 2024-10-30
Payer: COMMERCIAL

## 2024-10-30 VITALS
SYSTOLIC BLOOD PRESSURE: 110 MMHG | OXYGEN SATURATION: 99 % | TEMPERATURE: 97.8 F | HEIGHT: 76 IN | RESPIRATION RATE: 16 BRPM | HEART RATE: 93 BPM | DIASTOLIC BLOOD PRESSURE: 78 MMHG | WEIGHT: 150.5 LBS | BODY MASS INDEX: 18.33 KG/M2

## 2024-10-30 DIAGNOSIS — E10.69 TYPE 1 DIABETES MELLITUS WITH OTHER SPECIFIED COMPLICATION (H): Primary | ICD-10-CM

## 2024-10-30 DIAGNOSIS — R11.0 NAUSEA: ICD-10-CM

## 2024-10-30 DIAGNOSIS — K31.84 GASTROPARESIS: ICD-10-CM

## 2024-10-30 LAB
ANION GAP SERPL CALCULATED.3IONS-SCNC: 9 MMOL/L (ref 7–15)
BUN SERPL-MCNC: 7 MG/DL (ref 6–20)
CALCIUM SERPL-MCNC: 8.9 MG/DL (ref 8.8–10.4)
CHLORIDE SERPL-SCNC: 103 MMOL/L (ref 98–107)
CHOLEST SERPL-MCNC: 138 MG/DL
CREAT SERPL-MCNC: 0.68 MG/DL (ref 0.67–1.17)
EGFRCR SERPLBLD CKD-EPI 2021: >90 ML/MIN/1.73M2
EST. AVERAGE GLUCOSE BLD GHB EST-MCNC: 235 MG/DL
FASTING STATUS PATIENT QL REPORTED: YES
FASTING STATUS PATIENT QL REPORTED: YES
GLUCOSE SERPL-MCNC: 273 MG/DL (ref 70–99)
HBA1C MFR BLD: 9.8 % (ref 0–5.6)
HCO3 SERPL-SCNC: 25 MMOL/L (ref 22–29)
HDLC SERPL-MCNC: 51 MG/DL
LDLC SERPL CALC-MCNC: 67 MG/DL
NONHDLC SERPL-MCNC: 87 MG/DL
POTASSIUM SERPL-SCNC: 4.4 MMOL/L (ref 3.4–5.3)
SODIUM SERPL-SCNC: 137 MMOL/L (ref 135–145)
TRIGL SERPL-MCNC: 101 MG/DL

## 2024-10-30 PROCEDURE — 82043 UR ALBUMIN QUANTITATIVE: CPT | Performed by: PHYSICIAN ASSISTANT

## 2024-10-30 PROCEDURE — 82570 ASSAY OF URINE CREATININE: CPT | Performed by: PHYSICIAN ASSISTANT

## 2024-10-30 PROCEDURE — 80061 LIPID PANEL: CPT | Performed by: PHYSICIAN ASSISTANT

## 2024-10-30 PROCEDURE — 99214 OFFICE O/P EST MOD 30 MIN: CPT | Performed by: PHYSICIAN ASSISTANT

## 2024-10-30 PROCEDURE — G2211 COMPLEX E/M VISIT ADD ON: HCPCS | Performed by: PHYSICIAN ASSISTANT

## 2024-10-30 PROCEDURE — 36415 COLL VENOUS BLD VENIPUNCTURE: CPT | Performed by: PHYSICIAN ASSISTANT

## 2024-10-30 PROCEDURE — 80048 BASIC METABOLIC PNL TOTAL CA: CPT | Performed by: PHYSICIAN ASSISTANT

## 2024-10-30 PROCEDURE — 83036 HEMOGLOBIN GLYCOSYLATED A1C: CPT | Performed by: PHYSICIAN ASSISTANT

## 2024-10-30 RX ORDER — INSULIN GLARGINE-YFGN 100 [IU]/ML
20 INJECTION, SOLUTION SUBCUTANEOUS EVERY MORNING
Qty: 9 ML | Refills: 3 | Status: SHIPPED | OUTPATIENT
Start: 2024-10-30

## 2024-10-30 RX ORDER — METOCLOPRAMIDE 5 MG/1
10 TABLET ORAL 2 TIMES DAILY PRN
Qty: 120 TABLET | Refills: 1 | Status: SHIPPED | OUTPATIENT
Start: 2024-10-30

## 2024-10-30 RX ORDER — INSULIN LISPRO 100 [IU]/ML
2 INJECTION, SOLUTION INTRAVENOUS; SUBCUTANEOUS
Qty: 15 ML | Refills: 3 | Status: SHIPPED | OUTPATIENT
Start: 2024-10-30

## 2024-10-30 RX ORDER — ONDANSETRON 4 MG/1
4 TABLET, ORALLY DISINTEGRATING ORAL EVERY 6 HOURS PRN
Qty: 15 TABLET | Refills: 3 | Status: SHIPPED | OUTPATIENT
Start: 2024-10-30

## 2024-10-30 ASSESSMENT — PAIN SCALES - GENERAL: PAINLEVEL_OUTOF10: NO PAIN (0)

## 2024-10-30 NOTE — PATIENT INSTRUCTIONS
Endocrinology Appt:   You can reach us by phone at [phone] and we will be happy to assist you.  You can also request an appointment via [employer].

## 2024-10-30 NOTE — PROGRESS NOTES
Assessment & Plan     Type 1 diabetes mellitus with other specified complication (H)  Patient not currently monitoring blood sugars. Unable to get glucose monitor with current insurance.  Briefly discussed referral to diabetic education to help with management while waiting for endocrinology. Pt reports new insurance might be starting soon. Would like to wait on this referral.    Pt has not yet scheduled with endocrinology as it is in [place].  Pt given new referral 1 month ago. Discussed importance of having specialist.   Labs to be completed today.   Recommend yearly diabetic eye exam.   Refills sent for patient today.  - HEMOGLOBIN A1C  - Albumin Random Urine Quantitative with Creat Ratio  - Lipid panel reflex to direct LDL Non-fasting  - BASIC METABOLIC PANEL  - OPTOMETRY REFERRAL  - Insulin Glargine-yfgn (SEMGLEE, YFGN,) 100 UNIT/ML SOLN  Dispense: 9 mL; Refill: 3  - insulin lispro (HUMALOG KWIKPEN) 100 UNIT/ML (1 unit dial) KWIKPEN  Dispense: 15 mL; Refill: 3  - HEMOGLOBIN A1C  - Albumin Random Urine Quantitative with Creat Ratio  - Lipid panel reflex to direct LDL Non-fasting  - BASIC METABOLIC PANEL    Gastroparesis  Sees [facility]. Stable. Takes 2 tabs BID with meals.  - metoclopramide (REGLAN) 5 MG tablet  Dispense: 120 tablet; Refill: 1    Nausea  Takes when feeling sick, as needed  - ondansetron (ZOFRAN ODT) 4 MG ODT tab  Dispense: 15 tablet; Refill: 3    The longitudinal plan of care for the diagnosis(es)/condition(s) as documented were addressed during this visit. Due to the added complexity in care, I will continue to support Wally in the subsequent management and with ongoing continuity of care with PCP.    FUTURE APPOINTMENTS:       - Follow-up visit in 6 months for DM       - Schedule with Endocrinology Specialist    Lety   Wally is a 34 year old, presenting for the following health issues:  Diabetes      10/30/2024     8:54 AM   Additional Questions   Roomed by Naty   Accompanied by self          10/30/2024     8:54 AM   Patient Reported Additional Medications   Patient reports taking the following new medications no     History of Present Illness       Diabetes:   He presents for follow up of diabetes.  He is checking home blood glucose a few times a month.   He checks blood glucose before and after meals.  Blood glucose is sometimes over 200 and never under 70. He is aware of hypoglycemia symptoms including shakiness and weakness.    He has no concerns regarding his diabetes at this time.   He is not experiencing numbness or burning in feet, excessive thirst, blurry vision, weight changes or redness, sores or blisters on feet. The patient has had a diabetic eye exam in the last 12 months. Eye exam performed on Not sure. Location of last eye exam [facility].        Reason for visit:  Prescriptions refilled    He eats 2-3 servings of fruits and vegetables daily.He consumes 1 sweetened beverage(s) daily.He exercises with enough effort to increase his heart rate 9 or less minutes per day.  He exercises with enough effort to increase his heart rate 3 or less days per week.   He is taking medications regularly.     HPI      DM:  -Type 1; diagnosed at 30 y.o.   -Does not check BS as often as he should.  DexCom not covered by insurance. Might be getting new insurance soon. Only covers generic medications.  -Medication:   glargine 20 units every morning              Aspart 1-2 units as needed with snacks or supplements.  Does not use very often              States it is very diet controlled  -ACE/ARB: none  -Eye exam: >1 year ago  -Foot exam: none  -Statin: none.   -Complications: gastroparesis    Gastroparesis.  Followed by [facility].  Last visit 2023.  Currently taking metoclopramide daily and Zofan as needed.     Labs agreeable to routine labs. Declines Hep C and HIV testing.    Declines all vaccinations today          Review of Systems  Constitutional, HEENT, cardiovascular, pulmonary, gi and gu systems  "are negative, except as otherwise noted.      Objective    /78 (BP Location: Right arm, Patient Position: Sitting, Cuff Size: Adult Regular)   Pulse 93   Temp 97.8  F (36.6  C) (Tympanic)   Resp 16   Ht 1.93 m (6' 4\")   Wt 68.3 kg (150 lb 8 oz)   SpO2 99%   BMI 18.32 kg/m    Body mass index is 18.32 kg/m .    Physical Exam   GENERAL: alert and no distress  EYES: Eyes grossly normal to inspection, PERRL and conjunctivae and sclerae normal  HENT: ear canals and TM's normal, nose and mouth without ulcers or lesions  NECK: no adenopathy, no asymmetry, masses, or scars  RESP: lungs clear to auscultation - no rales, rhonchi or wheezes  CV: regular rate and rhythm, normal S1 S2, no S3 or S4, no murmur, click or rub, no peripheral edema  ABDOMEN: soft, nontender, no hepatosplenomegaly, no masses and bowel sounds normal  MS: no gross musculoskeletal defects noted, no edema. No sores.  SKIN: no suspicious lesions or rashes  PSYCH: mentation appears normal, affect normal/bright        Signed Electronically by: Priscilla Jernigan PA-C    "

## 2024-10-31 LAB
CREAT UR-MCNC: 141.8 MG/DL
MICROALBUMIN UR-MCNC: <12 MG/L
MICROALBUMIN/CREAT UR: NORMAL MG/G{CREAT}

## 2025-02-01 ENCOUNTER — HEALTH MAINTENANCE LETTER (OUTPATIENT)
Age: 35
End: 2025-02-01

## 2025-03-06 ENCOUNTER — HOSPITAL ENCOUNTER (INPATIENT)
Facility: CLINIC | Age: 35
DRG: 637 | End: 2025-03-06
Attending: EMERGENCY MEDICINE | Admitting: HOSPITALIST

## 2025-03-06 ENCOUNTER — APPOINTMENT (OUTPATIENT)
Dept: CT IMAGING | Facility: CLINIC | Age: 35
DRG: 637 | End: 2025-03-06
Attending: EMERGENCY MEDICINE

## 2025-03-06 VITALS
WEIGHT: 145.6 LBS | SYSTOLIC BLOOD PRESSURE: 89 MMHG | TEMPERATURE: 97.8 F | DIASTOLIC BLOOD PRESSURE: 71 MMHG | BODY MASS INDEX: 17.72 KG/M2 | OXYGEN SATURATION: 99 % | RESPIRATION RATE: 19 BRPM | HEART RATE: 107 BPM

## 2025-03-06 DIAGNOSIS — E87.29 HIGH ANION GAP METABOLIC ACIDOSIS: ICD-10-CM

## 2025-03-06 DIAGNOSIS — I48.91 ATRIAL FIBRILLATION WITH RAPID VENTRICULAR RESPONSE (H): ICD-10-CM

## 2025-03-06 DIAGNOSIS — E10.10 DIABETIC KETOACIDOSIS WITHOUT COMA ASSOCIATED WITH TYPE 1 DIABETES MELLITUS (H): ICD-10-CM

## 2025-03-06 DIAGNOSIS — E87.1 HYPONATREMIA: ICD-10-CM

## 2025-03-06 DIAGNOSIS — E10.69 TYPE 1 DIABETES MELLITUS WITH OTHER SPECIFIED COMPLICATION (H): Primary | ICD-10-CM

## 2025-03-06 DIAGNOSIS — E46 MALNUTRITION, UNSPECIFIED TYPE: ICD-10-CM

## 2025-03-06 LAB
ALBUMIN SERPL BCG-MCNC: 3.9 G/DL (ref 3.5–5.2)
ALBUMIN SERPL BCG-MCNC: 4.4 G/DL (ref 3.5–5.2)
ALBUMIN UR-MCNC: NEGATIVE MG/DL
ALLEN'S TEST: ABNORMAL
ALP SERPL-CCNC: 107 U/L (ref 40–150)
ALP SERPL-CCNC: 122 U/L (ref 40–150)
ALT SERPL W P-5'-P-CCNC: 35 U/L (ref 0–70)
ALT SERPL W P-5'-P-CCNC: 40 U/L (ref 0–70)
ANION GAP SERPL CALCULATED.3IONS-SCNC: 24 MMOL/L (ref 7–15)
ANION GAP SERPL CALCULATED.3IONS-SCNC: 25 MMOL/L (ref 7–15)
ANION GAP SERPL CALCULATED.3IONS-SCNC: 25 MMOL/L (ref 7–15)
ANION GAP SERPL CALCULATED.3IONS-SCNC: ABNORMAL MMOL/L
ANION GAP SERPL CALCULATED.3IONS-SCNC: ABNORMAL MMOL/L
APPEARANCE UR: CLEAR
AST SERPL W P-5'-P-CCNC: 59 U/L (ref 0–45)
AST SERPL W P-5'-P-CCNC: 66 U/L (ref 0–45)
ATRIAL RATE - MUSE: 314 BPM
ATRIAL RATE - MUSE: 91 BPM
ATRIAL RATE - MUSE: NORMAL BPM
B-OH-BUTYR SERPL-SCNC: 5.94 MMOL/L
B-OH-BUTYR SERPL-SCNC: 8.65 MMOL/L
B-OH-BUTYR SERPL-SCNC: >9 MMOL/L
BASE EXCESS BLDA CALC-SCNC: -10.2 MMOL/L (ref -3–3)
BASOPHILS # BLD AUTO: 0 10E3/UL (ref 0–0.2)
BASOPHILS # BLD AUTO: 0.1 10E3/UL (ref 0–0.2)
BASOPHILS NFR BLD AUTO: 0 %
BASOPHILS NFR BLD AUTO: 0 %
BILIRUB DIRECT SERPL-MCNC: 0.23 MG/DL (ref 0–0.3)
BILIRUB SERPL-MCNC: 0.5 MG/DL
BILIRUB SERPL-MCNC: 0.6 MG/DL
BILIRUB UR QL STRIP: NEGATIVE
BUN SERPL-MCNC: 72.6 MG/DL (ref 6–20)
BUN SERPL-MCNC: 74.5 MG/DL (ref 6–20)
BUN SERPL-MCNC: 81 MG/DL (ref 6–20)
BUN SERPL-MCNC: 88.4 MG/DL (ref 6–20)
BUN SERPL-MCNC: 92.5 MG/DL (ref 6–20)
CALCIUM SERPL-MCNC: 8.1 MG/DL (ref 8.8–10.4)
CALCIUM SERPL-MCNC: 8.1 MG/DL (ref 8.8–10.4)
CALCIUM SERPL-MCNC: 8.5 MG/DL (ref 8.8–10.4)
CALCIUM SERPL-MCNC: 8.5 MG/DL (ref 8.8–10.4)
CALCIUM SERPL-MCNC: 8.8 MG/DL (ref 8.8–10.4)
CHLORIDE SERPL-SCNC: 71 MMOL/L (ref 98–107)
CHLORIDE SERPL-SCNC: 75 MMOL/L (ref 98–107)
CHLORIDE SERPL-SCNC: 76 MMOL/L (ref 98–107)
CHLORIDE SERPL-SCNC: <60 MMOL/L (ref 98–107)
CHLORIDE SERPL-SCNC: <60 MMOL/L (ref 98–107)
COLOR UR AUTO: ABNORMAL
CREAT SERPL-MCNC: 0.89 MG/DL (ref 0.67–1.17)
CREAT SERPL-MCNC: 0.94 MG/DL (ref 0.67–1.17)
CREAT SERPL-MCNC: 0.99 MG/DL (ref 0.67–1.17)
CREAT SERPL-MCNC: 1.37 MG/DL (ref 0.67–1.17)
CREAT SERPL-MCNC: 1.41 MG/DL (ref 0.67–1.17)
DIASTOLIC BLOOD PRESSURE - MUSE: NORMAL MMHG
EGFRCR SERPLBLD CKD-EPI 2021: 67 ML/MIN/1.73M2
EGFRCR SERPLBLD CKD-EPI 2021: 69 ML/MIN/1.73M2
EGFRCR SERPLBLD CKD-EPI 2021: >90 ML/MIN/1.73M2
EOSINOPHIL # BLD AUTO: 0 10E3/UL (ref 0–0.7)
EOSINOPHIL # BLD AUTO: 0 10E3/UL (ref 0–0.7)
EOSINOPHIL NFR BLD AUTO: 0 %
EOSINOPHIL NFR BLD AUTO: 0 %
ERYTHROCYTE [DISTWIDTH] IN BLOOD BY AUTOMATED COUNT: 11.6 % (ref 10–15)
ERYTHROCYTE [DISTWIDTH] IN BLOOD BY AUTOMATED COUNT: 11.7 % (ref 10–15)
EST. AVERAGE GLUCOSE BLD GHB EST-MCNC: 275 MG/DL
ETHANOL SERPL-MCNC: <0.01 G/DL
GLUCOSE BLDC GLUCOMTR-MCNC: 201 MG/DL (ref 70–99)
GLUCOSE BLDC GLUCOMTR-MCNC: 257 MG/DL (ref 70–99)
GLUCOSE BLDC GLUCOMTR-MCNC: 308 MG/DL (ref 70–99)
GLUCOSE BLDC GLUCOMTR-MCNC: 349 MG/DL (ref 70–99)
GLUCOSE BLDC GLUCOMTR-MCNC: >600 MG/DL (ref 70–99)
GLUCOSE SERPL-MCNC: 355 MG/DL (ref 70–99)
GLUCOSE SERPL-MCNC: 382 MG/DL (ref 70–99)
GLUCOSE SERPL-MCNC: 541 MG/DL (ref 70–99)
GLUCOSE SERPL-MCNC: 541 MG/DL (ref 70–99)
GLUCOSE SERPL-MCNC: 829 MG/DL (ref 70–99)
GLUCOSE SERPL-MCNC: 829 MG/DL (ref 70–99)
GLUCOSE SERPL-MCNC: 889 MG/DL (ref 70–99)
GLUCOSE SERPL-MCNC: 896 MG/DL (ref 70–99)
GLUCOSE UR STRIP-MCNC: >=1000 MG/DL
HBA1C MFR BLD: 11.2 %
HCO3 BLD-SCNC: 15 MMOL/L (ref 21–28)
HCO3 BLDV-SCNC: 9 MMOL/L (ref 21–28)
HCO3 SERPL-SCNC: 14 MMOL/L (ref 22–29)
HCO3 SERPL-SCNC: 16 MMOL/L (ref 22–29)
HCO3 SERPL-SCNC: 17 MMOL/L (ref 22–29)
HCO3 SERPL-SCNC: 7 MMOL/L (ref 22–29)
HCO3 SERPL-SCNC: 8 MMOL/L (ref 22–29)
HCT VFR BLD AUTO: 40.9 % (ref 40–53)
HCT VFR BLD AUTO: 46.2 % (ref 40–53)
HGB BLD-MCNC: 17.2 G/DL (ref 13.3–17.7)
HGB BLD-MCNC: ABNORMAL G/DL
HGB UR QL STRIP: ABNORMAL
IMM GRANULOCYTES # BLD: 0.1 10E3/UL
IMM GRANULOCYTES # BLD: 0.1 10E3/UL
IMM GRANULOCYTES NFR BLD: 1 %
IMM GRANULOCYTES NFR BLD: 1 %
INR PPP: 1.15 (ref 0.85–1.15)
INTERPRETATION ECG - MUSE: NORMAL
KETONES UR STRIP-MCNC: 40 MG/DL
LACTATE BLD-SCNC: 2.3 MMOL/L
LACTATE SERPL-SCNC: 1.9 MMOL/L (ref 0.7–2)
LACTATE SERPL-SCNC: 2.6 MMOL/L (ref 0.7–2)
LEUKOCYTE ESTERASE UR QL STRIP: NEGATIVE
LIPASE SERPL-CCNC: 22 U/L (ref 13–60)
LYMPHOCYTES # BLD AUTO: 0.5 10E3/UL (ref 0.8–5.3)
LYMPHOCYTES # BLD AUTO: 0.8 10E3/UL (ref 0.8–5.3)
LYMPHOCYTES NFR BLD AUTO: 3 %
LYMPHOCYTES NFR BLD AUTO: 5 %
MAGNESIUM SERPL-MCNC: 2.5 MG/DL (ref 1.7–2.3)
MAGNESIUM SERPL-MCNC: 2.9 MG/DL (ref 1.7–2.3)
MAGNESIUM SERPL-MCNC: 3.5 MG/DL (ref 1.7–2.3)
MAGNESIUM SERPL-MCNC: 3.7 MG/DL (ref 1.7–2.3)
MCH RBC QN AUTO: 29.7 PG (ref 26.5–33)
MCH RBC QN AUTO: ABNORMAL PG
MCHC RBC AUTO-ENTMCNC: 37.2 G/DL (ref 31.5–36.5)
MCHC RBC AUTO-ENTMCNC: ABNORMAL G/DL
MCV RBC AUTO: 78 FL (ref 78–100)
MCV RBC AUTO: 80 FL (ref 78–100)
MONOCYTES # BLD AUTO: 1.3 10E3/UL (ref 0–1.3)
MONOCYTES # BLD AUTO: 1.6 10E3/UL (ref 0–1.3)
MONOCYTES NFR BLD AUTO: 8 %
MONOCYTES NFR BLD AUTO: 8 %
MUCOUS THREADS #/AREA URNS LPF: PRESENT /LPF
NEUTROPHILS # BLD AUTO: 14.4 10E3/UL (ref 1.6–8.3)
NEUTROPHILS # BLD AUTO: 17.7 10E3/UL (ref 1.6–8.3)
NEUTROPHILS NFR BLD AUTO: 87 %
NEUTROPHILS NFR BLD AUTO: 88 %
NITRATE UR QL: NEGATIVE
NRBC # BLD AUTO: 0 10E3/UL
NRBC # BLD AUTO: 0 10E3/UL
NRBC BLD AUTO-RTO: 0 /100
NRBC BLD AUTO-RTO: 0 /100
O2/TOTAL GAS SETTING VFR VENT: 21 %
OXYHGB MFR BLDA: 97 % (ref 92–100)
P AXIS - MUSE: -84 DEGREES
P AXIS - MUSE: 82 DEGREES
P AXIS - MUSE: NORMAL DEGREES
PCO2 BLD: 30 MM HG (ref 35–45)
PCO2 BLDV: 27 MM HG (ref 40–50)
PH BLD: 7.3 [PH] (ref 7.35–7.45)
PH BLDV: 7.12 [PH] (ref 7.32–7.43)
PH UR STRIP: 5 [PH] (ref 5–7)
PHOSPHATE SERPL-MCNC: 1 MG/DL (ref 2.5–4.5)
PHOSPHATE SERPL-MCNC: 1.5 MG/DL (ref 2.5–4.5)
PHOSPHATE SERPL-MCNC: 6.6 MG/DL (ref 2.5–4.5)
PHOSPHATE SERPL-MCNC: 7.3 MG/DL (ref 2.5–4.5)
PLAT MORPH BLD: NORMAL
PLATELET # BLD AUTO: 299 10E3/UL (ref 150–450)
PLATELET # BLD AUTO: 409 10E3/UL (ref 150–450)
PO2 BLD: 107 MM HG (ref 80–105)
PO2 BLDV: 45 MM HG (ref 25–47)
POTASSIUM SERPL-SCNC: 3.5 MMOL/L (ref 3.4–5.3)
POTASSIUM SERPL-SCNC: 3.6 MMOL/L (ref 3.4–5.3)
POTASSIUM SERPL-SCNC: 4.2 MMOL/L (ref 3.4–5.3)
POTASSIUM SERPL-SCNC: 4.6 MMOL/L (ref 3.4–5.3)
POTASSIUM SERPL-SCNC: 4.6 MMOL/L (ref 3.4–5.3)
PR INTERVAL - MUSE: 136 MS
PR INTERVAL - MUSE: NORMAL MS
PR INTERVAL - MUSE: NORMAL MS
PROCALCITONIN SERPL IA-MCNC: 1.24 NG/ML
PROT SERPL-MCNC: 5.9 G/DL (ref 6.4–8.3)
PROT SERPL-MCNC: 7 G/DL (ref 6.4–8.3)
QRS DURATION - MUSE: 108 MS
QRS DURATION - MUSE: 82 MS
QRS DURATION - MUSE: 98 MS
QT - MUSE: 266 MS
QT - MUSE: 300 MS
QT - MUSE: 390 MS
QTC - MUSE: 430 MS
QTC - MUSE: 443 MS
QTC - MUSE: 479 MS
R AXIS - MUSE: 107 DEGREES
R AXIS - MUSE: 108 DEGREES
R AXIS - MUSE: 109 DEGREES
RBC # BLD AUTO: 5.28 10E6/UL (ref 4.4–5.9)
RBC # BLD AUTO: 5.8 10E6/UL (ref 4.4–5.9)
RBC MORPH BLD: NORMAL
RBC URINE: 6 /HPF
SAO2 % BLDA: 98.1 % (ref 96–97)
SAO2 % BLDV: 66 % (ref 70–75)
SODIUM SERPL-SCNC: 103 MMOL/L (ref 135–145)
SODIUM SERPL-SCNC: 110 MMOL/L (ref 135–145)
SODIUM SERPL-SCNC: 116 MMOL/L (ref 135–145)
SODIUM SERPL-SCNC: 117 MMOL/L (ref 135–145)
SODIUM SERPL-SCNC: 98 MMOL/L (ref 135–145)
SP GR UR STRIP: 1.02 (ref 1–1.03)
SQUAMOUS EPITHELIAL: <1 /HPF
SYSTOLIC BLOOD PRESSURE - MUSE: NORMAL MMHG
T AXIS - MUSE: -5 DEGREES
T AXIS - MUSE: 59 DEGREES
T AXIS - MUSE: 6 DEGREES
UROBILINOGEN UR STRIP-MCNC: NORMAL MG/DL
VENTRICULAR RATE- MUSE: 131 BPM
VENTRICULAR RATE- MUSE: 157 BPM
VENTRICULAR RATE- MUSE: 91 BPM
WBC # BLD AUTO: 16.6 10E3/UL (ref 4–11)
WBC # BLD AUTO: 20 10E3/UL (ref 4–11)
WBC URINE: 8 /HPF

## 2025-03-06 PROCEDURE — 82010 KETONE BODYS QUAN: CPT | Performed by: HOSPITALIST

## 2025-03-06 PROCEDURE — 258N000003 HC RX IP 258 OP 636: Performed by: EMERGENCY MEDICINE

## 2025-03-06 PROCEDURE — 99291 CRITICAL CARE FIRST HOUR: CPT | Mod: 25 | Performed by: ANESTHESIOLOGY

## 2025-03-06 PROCEDURE — 99285 EMERGENCY DEPT VISIT HI MDM: CPT | Mod: 25

## 2025-03-06 PROCEDURE — 36415 COLL VENOUS BLD VENIPUNCTURE: CPT | Performed by: INTERNAL MEDICINE

## 2025-03-06 PROCEDURE — 83605 ASSAY OF LACTIC ACID: CPT

## 2025-03-06 PROCEDURE — 36415 COLL VENOUS BLD VENIPUNCTURE: CPT

## 2025-03-06 PROCEDURE — 250N000012 HC RX MED GY IP 250 OP 636 PS 637: Performed by: ANESTHESIOLOGY

## 2025-03-06 PROCEDURE — 82435 ASSAY OF BLOOD CHLORIDE: CPT | Performed by: ANESTHESIOLOGY

## 2025-03-06 PROCEDURE — 258N000003 HC RX IP 258 OP 636: Performed by: ANESTHESIOLOGY

## 2025-03-06 PROCEDURE — 84145 PROCALCITONIN (PCT): CPT | Performed by: ANESTHESIOLOGY

## 2025-03-06 PROCEDURE — 82248 BILIRUBIN DIRECT: CPT | Performed by: EMERGENCY MEDICINE

## 2025-03-06 PROCEDURE — 82310 ASSAY OF CALCIUM: CPT | Performed by: HOSPITALIST

## 2025-03-06 PROCEDURE — 250N000009 HC RX 250: Performed by: ANESTHESIOLOGY

## 2025-03-06 PROCEDURE — 250N000011 HC RX IP 250 OP 636: Performed by: EMERGENCY MEDICINE

## 2025-03-06 PROCEDURE — 258N000003 HC RX IP 258 OP 636: Performed by: HOSPITALIST

## 2025-03-06 PROCEDURE — 200N000001 HC R&B ICU

## 2025-03-06 PROCEDURE — 99223 1ST HOSP IP/OBS HIGH 75: CPT | Performed by: HOSPITALIST

## 2025-03-06 PROCEDURE — 250N000011 HC RX IP 250 OP 636: Performed by: HOSPITALIST

## 2025-03-06 PROCEDURE — 85025 COMPLETE CBC W/AUTO DIFF WBC: CPT | Performed by: EMERGENCY MEDICINE

## 2025-03-06 PROCEDURE — 96361 HYDRATE IV INFUSION ADD-ON: CPT

## 2025-03-06 PROCEDURE — 85014 HEMATOCRIT: CPT | Performed by: EMERGENCY MEDICINE

## 2025-03-06 PROCEDURE — 82040 ASSAY OF SERUM ALBUMIN: CPT | Performed by: EMERGENCY MEDICINE

## 2025-03-06 PROCEDURE — 82435 ASSAY OF BLOOD CHLORIDE: CPT | Performed by: EMERGENCY MEDICINE

## 2025-03-06 PROCEDURE — 82805 BLOOD GASES W/O2 SATURATION: CPT | Performed by: HOSPITALIST

## 2025-03-06 PROCEDURE — 36415 COLL VENOUS BLD VENIPUNCTURE: CPT | Performed by: HOSPITALIST

## 2025-03-06 PROCEDURE — 83036 HEMOGLOBIN GLYCOSYLATED A1C: CPT | Performed by: EMERGENCY MEDICINE

## 2025-03-06 PROCEDURE — 85610 PROTHROMBIN TIME: CPT | Performed by: EMERGENCY MEDICINE

## 2025-03-06 PROCEDURE — 84155 ASSAY OF PROTEIN SERUM: CPT | Performed by: HOSPITALIST

## 2025-03-06 PROCEDURE — 82962 GLUCOSE BLOOD TEST: CPT

## 2025-03-06 PROCEDURE — 82010 KETONE BODYS QUAN: CPT | Performed by: EMERGENCY MEDICINE

## 2025-03-06 PROCEDURE — 84100 ASSAY OF PHOSPHORUS: CPT | Performed by: HOSPITALIST

## 2025-03-06 PROCEDURE — 85004 AUTOMATED DIFF WBC COUNT: CPT | Performed by: HOSPITALIST

## 2025-03-06 PROCEDURE — 80048 BASIC METABOLIC PNL TOTAL CA: CPT | Performed by: ANESTHESIOLOGY

## 2025-03-06 PROCEDURE — 83735 ASSAY OF MAGNESIUM: CPT | Performed by: ANESTHESIOLOGY

## 2025-03-06 PROCEDURE — 82803 BLOOD GASES ANY COMBINATION: CPT

## 2025-03-06 PROCEDURE — 80069 RENAL FUNCTION PANEL: CPT | Performed by: EMERGENCY MEDICINE

## 2025-03-06 PROCEDURE — 80048 BASIC METABOLIC PNL TOTAL CA: CPT | Performed by: EMERGENCY MEDICINE

## 2025-03-06 PROCEDURE — 87149 DNA/RNA DIRECT PROBE: CPT | Performed by: INTERNAL MEDICINE

## 2025-03-06 PROCEDURE — 82040 ASSAY OF SERUM ALBUMIN: CPT | Performed by: HOSPITALIST

## 2025-03-06 PROCEDURE — 250N000011 HC RX IP 250 OP 636: Performed by: ANESTHESIOLOGY

## 2025-03-06 PROCEDURE — 85014 HEMATOCRIT: CPT | Performed by: HOSPITALIST

## 2025-03-06 PROCEDURE — 36415 COLL VENOUS BLD VENIPUNCTURE: CPT | Performed by: EMERGENCY MEDICINE

## 2025-03-06 PROCEDURE — 83605 ASSAY OF LACTIC ACID: CPT | Performed by: INTERNAL MEDICINE

## 2025-03-06 PROCEDURE — 80051 ELECTROLYTE PANEL: CPT | Performed by: EMERGENCY MEDICINE

## 2025-03-06 PROCEDURE — 82077 ASSAY SPEC XCP UR&BREATH IA: CPT | Performed by: EMERGENCY MEDICINE

## 2025-03-06 PROCEDURE — 93010 ELECTROCARDIOGRAM REPORT: CPT | Performed by: INTERNAL MEDICINE

## 2025-03-06 PROCEDURE — 96365 THER/PROPH/DIAG IV INF INIT: CPT

## 2025-03-06 PROCEDURE — 81003 URINALYSIS AUTO W/O SCOPE: CPT | Performed by: EMERGENCY MEDICINE

## 2025-03-06 PROCEDURE — 83930 ASSAY OF BLOOD OSMOLALITY: CPT | Performed by: HOSPITALIST

## 2025-03-06 PROCEDURE — 250N000009 HC RX 250: Performed by: EMERGENCY MEDICINE

## 2025-03-06 PROCEDURE — 93005 ELECTROCARDIOGRAM TRACING: CPT

## 2025-03-06 PROCEDURE — 70450 CT HEAD/BRAIN W/O DYE: CPT

## 2025-03-06 PROCEDURE — 80048 BASIC METABOLIC PNL TOTAL CA: CPT | Performed by: HOSPITALIST

## 2025-03-06 PROCEDURE — 36415 COLL VENOUS BLD VENIPUNCTURE: CPT | Performed by: ANESTHESIOLOGY

## 2025-03-06 PROCEDURE — 87040 BLOOD CULTURE FOR BACTERIA: CPT | Performed by: INTERNAL MEDICINE

## 2025-03-06 PROCEDURE — 83735 ASSAY OF MAGNESIUM: CPT | Performed by: EMERGENCY MEDICINE

## 2025-03-06 PROCEDURE — 96375 TX/PRO/DX INJ NEW DRUG ADDON: CPT

## 2025-03-06 PROCEDURE — 250N000013 HC RX MED GY IP 250 OP 250 PS 637: Performed by: ANESTHESIOLOGY

## 2025-03-06 PROCEDURE — 83690 ASSAY OF LIPASE: CPT | Performed by: EMERGENCY MEDICINE

## 2025-03-06 PROCEDURE — 83735 ASSAY OF MAGNESIUM: CPT | Performed by: HOSPITALIST

## 2025-03-06 PROCEDURE — 82374 ASSAY BLOOD CARBON DIOXIDE: CPT | Performed by: ANESTHESIOLOGY

## 2025-03-06 RX ORDER — HYDROMORPHONE HCL IN WATER/PF 6 MG/30 ML
0.2 PATIENT CONTROLLED ANALGESIA SYRINGE INTRAVENOUS
Status: DISCONTINUED | OUTPATIENT
Start: 2025-03-06 | End: 2025-03-08 | Stop reason: HOSPADM

## 2025-03-06 RX ORDER — NICOTINE POLACRILEX 4 MG
15-30 LOZENGE BUCCAL
Status: DISCONTINUED | OUTPATIENT
Start: 2025-03-06 | End: 2025-03-06

## 2025-03-06 RX ORDER — NICOTINE POLACRILEX 4 MG
15-30 LOZENGE BUCCAL
Status: DISCONTINUED | OUTPATIENT
Start: 2025-03-06 | End: 2025-03-08 | Stop reason: HOSPADM

## 2025-03-06 RX ORDER — PROCHLORPERAZINE MALEATE 5 MG/1
10 TABLET ORAL EVERY 6 HOURS PRN
Status: DISCONTINUED | OUTPATIENT
Start: 2025-03-06 | End: 2025-03-08 | Stop reason: HOSPADM

## 2025-03-06 RX ORDER — ONDANSETRON 4 MG/1
4 TABLET, ORALLY DISINTEGRATING ORAL EVERY 6 HOURS PRN
Status: DISCONTINUED | OUTPATIENT
Start: 2025-03-06 | End: 2025-03-08 | Stop reason: HOSPADM

## 2025-03-06 RX ORDER — NALOXONE HYDROCHLORIDE 0.4 MG/ML
0.2 INJECTION, SOLUTION INTRAMUSCULAR; INTRAVENOUS; SUBCUTANEOUS
Status: DISCONTINUED | OUTPATIENT
Start: 2025-03-06 | End: 2025-03-08 | Stop reason: HOSPADM

## 2025-03-06 RX ORDER — INDOMETHACIN 25 MG/1
25 CAPSULE ORAL ONCE
Status: COMPLETED | OUTPATIENT
Start: 2025-03-06 | End: 2025-03-06

## 2025-03-06 RX ORDER — POTASSIUM CHLORIDE 7.45 MG/ML
10 INJECTION INTRAVENOUS
Status: COMPLETED | OUTPATIENT
Start: 2025-03-06 | End: 2025-03-07

## 2025-03-06 RX ORDER — AMOXICILLIN 250 MG
2 CAPSULE ORAL 2 TIMES DAILY PRN
Status: DISCONTINUED | OUTPATIENT
Start: 2025-03-06 | End: 2025-03-08 | Stop reason: HOSPADM

## 2025-03-06 RX ORDER — POTASSIUM CHLORIDE 20MEQ/15ML
20 LIQUID (ML) ORAL DAILY
Status: DISCONTINUED | OUTPATIENT
Start: 2025-03-06 | End: 2025-03-08

## 2025-03-06 RX ORDER — DEXTROSE MONOHYDRATE 25 G/50ML
25-50 INJECTION, SOLUTION INTRAVENOUS
Status: DISCONTINUED | OUTPATIENT
Start: 2025-03-06 | End: 2025-03-08 | Stop reason: HOSPADM

## 2025-03-06 RX ORDER — DEXTROSE MONOHYDRATE, SODIUM CHLORIDE, AND POTASSIUM CHLORIDE 50; 1.49; 4.5 G/1000ML; G/1000ML; G/1000ML
INJECTION, SOLUTION INTRAVENOUS CONTINUOUS
Status: DISCONTINUED | OUTPATIENT
Start: 2025-03-06 | End: 2025-03-07

## 2025-03-06 RX ORDER — POTASSIUM CHLORIDE 7.45 MG/ML
10 INJECTION INTRAVENOUS ONCE
Status: COMPLETED | OUTPATIENT
Start: 2025-03-06 | End: 2025-03-06

## 2025-03-06 RX ORDER — AMOXICILLIN 250 MG
1 CAPSULE ORAL 2 TIMES DAILY PRN
Status: DISCONTINUED | OUTPATIENT
Start: 2025-03-06 | End: 2025-03-08 | Stop reason: HOSPADM

## 2025-03-06 RX ORDER — INSULIN LISPRO 100 [IU]/ML
2 INJECTION, SOLUTION INTRAVENOUS; SUBCUTANEOUS
Status: DISCONTINUED | OUTPATIENT
Start: 2025-03-06 | End: 2025-03-08

## 2025-03-06 RX ORDER — DILTIAZEM HCL/D5W 125 MG/125
5-15 PLASTIC BAG, INJECTION (ML) INTRAVENOUS CONTINUOUS
Status: DISCONTINUED | OUTPATIENT
Start: 2025-03-06 | End: 2025-03-06

## 2025-03-06 RX ORDER — ONDANSETRON 2 MG/ML
4 INJECTION INTRAMUSCULAR; INTRAVENOUS EVERY 6 HOURS PRN
Status: DISCONTINUED | OUTPATIENT
Start: 2025-03-06 | End: 2025-03-06

## 2025-03-06 RX ORDER — HEPARIN SODIUM 5000 [USP'U]/.5ML
5000 INJECTION, SOLUTION INTRAVENOUS; SUBCUTANEOUS EVERY 8 HOURS
Status: DISCONTINUED | OUTPATIENT
Start: 2025-03-06 | End: 2025-03-07

## 2025-03-06 RX ORDER — NALOXONE HYDROCHLORIDE 0.4 MG/ML
0.4 INJECTION, SOLUTION INTRAMUSCULAR; INTRAVENOUS; SUBCUTANEOUS
Status: DISCONTINUED | OUTPATIENT
Start: 2025-03-06 | End: 2025-03-08 | Stop reason: HOSPADM

## 2025-03-06 RX ORDER — MAGNESIUM SULFATE HEPTAHYDRATE 40 MG/ML
2 INJECTION, SOLUTION INTRAVENOUS ONCE
Status: COMPLETED | OUTPATIENT
Start: 2025-03-06 | End: 2025-03-06

## 2025-03-06 RX ORDER — DEXTROSE MONOHYDRATE AND SODIUM CHLORIDE 5; .45 G/100ML; G/100ML
1000 INJECTION, SOLUTION INTRAVENOUS CONTINUOUS PRN
Status: DISCONTINUED | OUTPATIENT
Start: 2025-03-06 | End: 2025-03-06

## 2025-03-06 RX ORDER — DEXTROSE MONOHYDRATE 25 G/50ML
25-50 INJECTION, SOLUTION INTRAVENOUS
Status: DISCONTINUED | OUTPATIENT
Start: 2025-03-06 | End: 2025-03-06

## 2025-03-06 RX ORDER — ONDANSETRON 2 MG/ML
4 INJECTION INTRAMUSCULAR; INTRAVENOUS EVERY 6 HOURS PRN
Status: DISCONTINUED | OUTPATIENT
Start: 2025-03-06 | End: 2025-03-08 | Stop reason: HOSPADM

## 2025-03-06 RX ORDER — ADENOSINE 3 MG/ML
INJECTION, SOLUTION INTRAVENOUS
Status: COMPLETED
Start: 2025-03-06 | End: 2025-03-06

## 2025-03-06 RX ORDER — SODIUM CHLORIDE AND POTASSIUM CHLORIDE 150; 900 MG/100ML; MG/100ML
INJECTION, SOLUTION INTRAVENOUS CONTINUOUS
Status: DISCONTINUED | OUTPATIENT
Start: 2025-03-06 | End: 2025-03-07

## 2025-03-06 RX ORDER — ADENOSINE 3 MG/ML
12 INJECTION, SOLUTION INTRAVENOUS ONCE
Status: COMPLETED | OUTPATIENT
Start: 2025-03-06 | End: 2025-03-06

## 2025-03-06 RX ORDER — PHENYLEPHRINE HCL IN 0.9% NACL 50MG/250ML
.5-1.25 PLASTIC BAG, INJECTION (ML) INTRAVENOUS CONTINUOUS
Status: DISCONTINUED | OUTPATIENT
Start: 2025-03-06 | End: 2025-03-07

## 2025-03-06 RX ORDER — METOCLOPRAMIDE HYDROCHLORIDE 5 MG/ML
10 INJECTION INTRAMUSCULAR; INTRAVENOUS EVERY 6 HOURS
Status: DISCONTINUED | OUTPATIENT
Start: 2025-03-06 | End: 2025-03-08 | Stop reason: HOSPADM

## 2025-03-06 RX ORDER — ADENOSINE 3 MG/ML
6 INJECTION, SOLUTION INTRAVENOUS ONCE
Status: COMPLETED | OUTPATIENT
Start: 2025-03-06 | End: 2025-03-06

## 2025-03-06 RX ORDER — DILTIAZEM HYDROCHLORIDE 5 MG/ML
15 INJECTION INTRAVENOUS ONCE
Status: COMPLETED | OUTPATIENT
Start: 2025-03-06 | End: 2025-03-06

## 2025-03-06 RX ADMIN — SODIUM BICARBONATE 25 MEQ: 84 INJECTION INTRAVENOUS at 17:35

## 2025-03-06 RX ADMIN — POTASSIUM CHLORIDE 10 MEQ: 7.46 INJECTION, SOLUTION INTRAVENOUS at 21:11

## 2025-03-06 RX ADMIN — INSULIN GLARGINE 20 UNITS: 100 INJECTION, SOLUTION SUBCUTANEOUS at 23:19

## 2025-03-06 RX ADMIN — SODIUM CHLORIDE AND POTASSIUM CHLORIDE: .9; .15 SOLUTION INTRAVENOUS at 21:25

## 2025-03-06 RX ADMIN — SODIUM CHLORIDE 1000 ML: 9 INJECTION, SOLUTION INTRAVENOUS at 16:45

## 2025-03-06 RX ADMIN — POTASSIUM CHLORIDE 10 MEQ: 7.46 INJECTION, SOLUTION INTRAVENOUS at 22:17

## 2025-03-06 RX ADMIN — SODIUM CHLORIDE 1000 ML: 0.9 INJECTION, SOLUTION INTRAVENOUS at 13:24

## 2025-03-06 RX ADMIN — POTASSIUM PHOSPHATE, MONOBASIC POTASSIUM PHOSPHATE, DIBASIC 30 MMOL: 224; 236 INJECTION, SOLUTION, CONCENTRATE INTRAVENOUS at 20:58

## 2025-03-06 RX ADMIN — HEPARIN SODIUM 5000 UNITS: 5000 INJECTION, SOLUTION INTRAVENOUS; SUBCUTANEOUS at 20:22

## 2025-03-06 RX ADMIN — SODIUM CHLORIDE, POTASSIUM CHLORIDE, SODIUM LACTATE AND CALCIUM CHLORIDE 1000 ML: 600; 310; 30; 20 INJECTION, SOLUTION INTRAVENOUS at 17:49

## 2025-03-06 RX ADMIN — METOCLOPRAMIDE HYDROCHLORIDE 10 MG: 5 INJECTION INTRAMUSCULAR; INTRAVENOUS at 18:43

## 2025-03-06 RX ADMIN — DILTIAZEM HYDROCHLORIDE 15 MG: 5 INJECTION INTRAVENOUS at 16:34

## 2025-03-06 RX ADMIN — Medication 10 UNITS: at 17:41

## 2025-03-06 RX ADMIN — SODIUM CHLORIDE, POTASSIUM CHLORIDE, SODIUM LACTATE AND CALCIUM CHLORIDE 1000 ML: 600; 310; 30; 20 INJECTION, SOLUTION INTRAVENOUS at 18:52

## 2025-03-06 RX ADMIN — Medication 5 MG/HR: at 16:41

## 2025-03-06 RX ADMIN — POTASSIUM CHLORIDE 10 MEQ: 7.46 INJECTION, SOLUTION INTRAVENOUS at 23:21

## 2025-03-06 RX ADMIN — INSULIN HUMAN: 1 INJECTION, SOLUTION INTRAVENOUS at 14:56

## 2025-03-06 RX ADMIN — POTASSIUM CHLORIDE 10 MEQ: 7.46 INJECTION, SOLUTION INTRAVENOUS at 15:53

## 2025-03-06 RX ADMIN — ADENOSINE 6 MG: 3 INJECTION INTRAVENOUS at 19:30

## 2025-03-06 RX ADMIN — POTASSIUM CHLORIDE 10 MEQ: 7.46 INJECTION, SOLUTION INTRAVENOUS at 20:09

## 2025-03-06 RX ADMIN — AMIODARONE HYDROCHLORIDE 150 MG: 1.5 INJECTION, SOLUTION INTRAVENOUS at 19:43

## 2025-03-06 RX ADMIN — MAGNESIUM SULFATE HEPTAHYDRATE 2 G: 40 INJECTION, SOLUTION INTRAVENOUS at 20:21

## 2025-03-06 RX ADMIN — ADENOSINE 12 MG: 3 INJECTION INTRAVENOUS at 19:34

## 2025-03-06 RX ADMIN — AMIODARONE HYDROCHLORIDE 1 MG/MIN: 1.8 INJECTION, SOLUTION INTRAVENOUS at 20:00

## 2025-03-06 ASSESSMENT — ACTIVITIES OF DAILY LIVING (ADL)
ADLS_ACUITY_SCORE: 70
ADLS_ACUITY_SCORE: 67
ADLS_ACUITY_SCORE: 67
ADLS_ACUITY_SCORE: 55
ADLS_ACUITY_SCORE: 67
ADLS_ACUITY_SCORE: 55
ADLS_ACUITY_SCORE: 55

## 2025-03-06 NOTE — ED TRIAGE NOTES
"Pt comes in from home. Type I DM. Has been off insulin for about a month d/t change of jobs and change of insurance. NV started on satruday. Has been having generalized weakness and syncopal episode x2 at home. BG monitor shows \"high\".         "

## 2025-03-06 NOTE — ED PROVIDER NOTES
Emergency Department Note      History of Present Illness     Chief Complaint   Hyperglycemia      HPI   Wally Avendano is a 34 year old male with a history of type 1 diabetes mellitus, diabetic gastroparesis with history of frequent episodes of vomiting, SBO, who presents to the emergency department with his significant other for worsening lethargy, generalized weakness and confusion.  Patient has had a fall this morning and a fall last night due to being generally weak.  Patient has been off of his normal insulin due to financial and insurance issues.  He states he has been intermittently taking NovoLog but has not used his Lantus.  No known fevers.  He denies any injuries.  No chest pain, abdominal pain, shortness of breath.  No focal numbness tingling or weakness.  His symptoms got acutely worse 5 days ago when he started having a typical episode of vomiting due to his gastroparesis.  Since then he has just worsened and now is too lethargic and weak to stand up.  No hematemesis.      Independent Historian   Patient's significant other provided the above history.    Review of External Notes   I reviewed the hospital H&P from 6/17/2023:  He was admitted at that time for starvation ketosis, vomiting with presentation possibly related to gastroparesis versus cyclical vomiting syndrome versus cannabinoid hyperemesis syndrome.    Past Medical History     Medical History and Problem List   DKA (diabetic ketoacidoses)   Type 1 diabetes mellitus with other specified complication (H)  Gastroparesis  Hypokalemia    Medications   Continuous Blood Gluc Sensor  Insulin Glargine  insulin lispro   metoclopramide  ondansetron     Surgical History   EGD    Physical Exam     Patient Vitals for the past 24 hrs:   BP Temp Temp src Pulse Resp SpO2   03/06/25 1500 101/50 -- -- 96 15 99 %   03/06/25 1445 114/63 -- -- 94 14 100 %   03/06/25 1430 101/46 -- -- 96 15 99 %   03/06/25 1415 115/52 -- -- 93 18 100 %   03/06/25 1400 106/63 --  -- 97 12 99 %   03/06/25 1345 110/62 -- -- 93 19 100 %   03/06/25 1330 100/60 -- -- 92 18 100 %   03/06/25 1249 96/68 (!) 96.7  F (35.9  C) Temporal 116 18 97 %   03/06/25 1245 (!) 71/23 -- -- -- -- --     Physical Exam  General: Somnolent.  Appears chronically ill and under nourished.   Head:  Scalp, face, and head appear normal  Eyes:  Pupils are equal, round, reactive to light EOMI, no nystagmus     Conjunctivae non-injected and sclerae white  ENT:    The external nose is normal    Pinnae are normal  Neck:  Normal range of motion    There is no rigidity noted    Trachea is in the midline  CV:  Tachycardic rate, regular rhythm    Normal S1/S2, no S3/S4    No murmur or rub. Radial pulses 2+ bilaterally.  Resp:  Lungs are clear and equal bilaterally  There is no tachypnea    No increased work of breathing    No rales, wheezing, or rhonchi  GI:  Abdomen is soft, no rigidity or guarding    No distension, or mass    No tenderness or rebound tenderness   MS:  Normal muscular tone    Symmetric motor strength    No lower extremity edema  Skin:  No rash or acute skin lesions noted  Neuro:  Somnolent, arouses easily to voice. CN II-XII intact. Strength 4/5 but symmetric throughout. No tremors.   Speech is normal and fluent  Moves all extremities spontaneously  Psych: No agitation      Diagnostics     Lab Results   Labs Ordered and Resulted from Time of ED Arrival to Time of ED Departure   COMPREHENSIVE METABOLIC PANEL - Abnormal       Result Value    Sodium 98 (*)     Potassium 4.6      Carbon Dioxide (CO2) 7 (*)     Anion Gap        Urea Nitrogen 88.4 (*)     Creatinine 1.41 (*)     GFR Estimate 67      Calcium 8.8      Chloride <60 (*)     Glucose 896 (*)     Alkaline Phosphatase 122      AST 66 (*)     ALT 40      Protein Total 7.0      Albumin 4.4      Bilirubin Total 0.6     MAGNESIUM - Abnormal    Magnesium 3.7 (*)    PHOSPHORUS - Abnormal    Phosphorus 7.3 (*)    KETONE BETA-HYDROXYBUTYRATE QUANTITATIVE, RAPID -  Abnormal    Ketone (Beta-Hydroxybutyrate) Quantitative >9.00 (*)    GLUCOSE BY METER - Abnormal    GLUCOSE BY METER POCT >600 (*)    CBC WITH PLATELETS AND DIFFERENTIAL - Abnormal    WBC Count 20.0 (*)     RBC Count 5.80      Hemoglobin 17.2      Hematocrit 46.2      MCV 80      MCH 29.7      MCHC 37.2 (*)     RDW 11.6      Platelet Count 409      % Neutrophils 88      % Lymphocytes 3      % Monocytes 8      % Eosinophils 0      % Basophils 0      % Immature Granulocytes 1      NRBCs per 100 WBC 0      Absolute Neutrophils 17.7 (*)     Absolute Lymphocytes 0.5 (*)     Absolute Monocytes 1.6 (*)     Absolute Eosinophils 0.0      Absolute Basophils 0.1      Absolute Immature Granulocytes 0.1      Absolute NRBCs 0.0     ISTAT GASES LACTATE VENOUS POCT - Abnormal    Lactic Acid POCT 2.3 (*)     Bicarbonate Venous POCT 9 (*)     O2 Sat, Venous POCT 66 (*)     pCO2 Venous POCT 27 (*)     pH Venous POCT 7.12 (*)     pO2 Venous POCT 45     GLUCOSE BY METER - Abnormal    GLUCOSE BY METER POCT >600 (*)    GLUCOSE - Abnormal    Glucose 889 (*)    HEMOGLOBIN A1C - Abnormal    Estimated Average Glucose 275 (*)     Hemoglobin A1C 11.2 (*)    INR - Normal    INR 1.15     LIPASE - Normal    Lipase 22     ETHYL ALCOHOL LEVEL - Normal    Alcohol ethyl <0.01     RBC AND PLATELET MORPHOLOGY    RBC Morphology Confirmed RBC Indices      Platelet Assessment        Value: Automated Count Confirmed. Platelet morphology is normal.   ROUTINE UA WITH MICROSCOPIC REFLEX TO CULTURE   GLUCOSE MONITOR NURSING POCT   GLUCOSE MONITOR NURSING POCT   LACTIC ACID WHOLE BLOOD   GLUCOSE   BASIC METABOLIC PANEL       Imaging   CT Head w/o Contrast   Final Result   IMPRESSION:   1.  No acute intracranial process.          EKG   ECG results from 03/06/25   EKG 12-lead, tracing only     Value    Systolic Blood Pressure     Diastolic Blood Pressure     Ventricular Rate 91    Atrial Rate 91    NE Interval 136    QRS Duration 108        QTc 479    P Axis  82    R AXIS 108    T Axis 59    Interpretation ECG      Sinus rhythm  Right atrial enlargement  Rightward axis  Pulmonary disease pattern  Minimal voltage criteria for LVH, may be normal variant ( Dany product )  Abnormal ECG  When compared with ECG of 14-Jun-2023 23:52 no significant change.  Maurisio Li MD           Independent Interpretation   See below     ED Course      Medications Administered   Medications   dextrose 5% and 0.45% NaCl infusion (has no administration in time range)   dextrose 50 % injection 25-50 mL (has no administration in time range)   potassium chloride 10 mEq in 100 mL sterile water infusion (10 mEq Intravenous $New Bag 3/6/25 1553)   insulin regular (MYXREDLIN) 1 unit/mL infusion ( Intravenous $New Bag 3/6/25 1456)   sodium chloride 0.9% BOLUS 1,000 mL (has no administration in time range)   sodium chloride 0.9% BOLUS 1,000 mL (1,000 mLs Intravenous $New Bag 3/6/25 1324)       Procedures   Procedures     Discussion of Management   See below     ED Course   ED Course as of 03/06/25 1554   Thu Mar 06, 2025   1248 Patient seen and evaluated    1441 Patient rechecked and updated   1552 Case discussed with hospitalist Dr. Steen       Additional Documentation  None    Medical Decision Making / Diagnosis     CMS Diagnoses: Lactic acid elevated due to DKA. At this time there are no signs of sepsis or septic shock    MIPS       None    [facility]   Wally Avendano is a 34 year old male with a history of type 1 diabetes and gastroparesis who presents with vomiting lethargy and hypotension.  On my evaluation no focal neurologic deficits are present however he is somnolent but responds appropriately with stimulation.  No increased work of breathing, respiratory distress or hypoxia.  Initial blood pressures were hypotensive which responded well to IV fluids.  ED evaluation reveals severe DKA with blood glucose of 896, ketones greater than 9, bicarb of 7 and pseudohyponatremia (due to elevated glucose)  with sodium of 98.  Potassium is 4.6, and supplemented.  VBG with a partially compensated metabolic acidosis, pH of 7.12.  pCO2 27.  CBC with leukocytosis of 20,000 due to DKA.  No fever.  Patient was treated with IV crystalloid resuscitation, insulin infusion.  CT scan of the head was negative for any acute findings.  DKA triggered due to patient being off of his insulin.  No evidence of infection or other acute underlying trigger at this time.  Patient will be admitted to the ICU for ongoing close monitoring evaluation and treatment.  The case was discussed with the hospitalist and the patient was admitted in stabilized but serious condition.    Critical Care Time:   Upon my evaluation, this patient had a critical illness with high probability of imminent or life-threatening deterioration due to severe metabolic acidosis, DKA, which required my direct attention, intervention, and personal management.    I have personally provided 45 minutes of critical care time exclusive of time spent on separately billable procedures. Time includes review of laboratory data, radiology results, discussion with consultants, reassessment of the patient and monitoring for potential decompensation. Interventions were performed as documented above.       Disposition   The patient was admitted to the hospital.     Diagnosis     ICD-10-CM    1. Diabetic ketoacidosis without coma associated with type 1 diabetes mellitus (H)  E10.10       2. Hyponatremia  E87.1       3. Malnutrition, unspecified type  E46       4. High anion gap metabolic acidosis  E87.29                    MD Guillermo Mathews, Maurisio Mehta MD  03/06/25 1554

## 2025-03-06 NOTE — H&P
[facility]    History and Physical - Hospitalist Service       Date of Admission:  3/6/2025    Assessment & Plan   Wally Avendano is a 34 year old male admitted on 3/6/2025. He presents with weakness and confusion to ED. He has been found with hyperglycemia > 800, Ketones, electrolytes derangement compatible with HHS/DKA. Patient has Type 1 DM and run out of insuline for several weeks.     HHS/DKA secondary to non compliance.  Type 1 Diabetes Mellitus complicated with Gastroparesis.   Anion gap metabolic acidosis (Ketones, Lactic acid)  Severe dehydration with high osmolality.      Admit to ICU  Cardiac telemetry.  Fluid resuscitation initiated in the ER with normal saline 2 L.  Continue with lactate Ringer in the ICU and continue maintenance per protocol  Strict BELIA's especially urine output.  Blood pressure control following intra-arterial line monitor.  Follow-up glucose, ketones, lactic acid and electrolytes per protocol  Lites replacement per protocol (phosphorus, potassium, magnesium and).   Insulin IV infusion and 0.1 units/KG/hour (goal to decrease  mg/dl if/hour)  Titrate insulin dose based on glucose levels  Close monitoring of:  Vital signs and neurological status.  Glucose, electrolytes and osmolality lives.  Urine output and fluid balance.    Compensatory sinus tachycardia.  Secondary to severe dehydration/hypovolemia and acidosis.  In emergency department an irregular tachyarrhythmia was seen and documented.  It was interpreted like atrial fibrillation with RVR.  Patient received 1 bolus of diltiazem 15 mg once.  He was started on Cardizem drip before transfer to the ICU.  On arrival to the ICU patient collapsed and rapid response was called.  Patient was assessed and treated by the intensivist on duty.  At this point patient had consistent regular tach in the monitor with rate 140.  Interpretation was in favor of sinus tachycardia given regularity on auscultation, palpation  of femoral pulse and trace of equidistant narrow QRS complex on the monitor.  Nicardipine drip was discontinued on anticipation of profound hypotension perpetrated by Cardizem drip on the compensatory sinus tachycardia.    Dr. Raffaele Rader from cardiology did review the first EKG with irregular rate on in the emergency department, reported to me that he was sinus tachycardia with irregular beats secondary to acid-basic imbalance and electrolyte derangements of the DKA-    Diet:  advance by DKA treatment protocol  DVT Prophylaxis: Pneumatic Compression Devices  Fuentes Catheter: External bonilla cath  Lines: Arterial line in left wrist     Cardiac Monitoring: None  Code Status:  FULL CODE    Clinically Significant Risk Factors Present on Admission         # Hyponatremia: Lowest Na = 98 mmol/L in last 2 days, will monitor as appropriate  # Hypochloremia: Lowest Cl = 54 mmol/L in last 2 days, will monitor as appropriate                   Disposition Plan     Medically Ready for Discharge: Anticipated in 2-4 Days           Abdulaziz Steen MD  Hospitalist Service  [facility]  Securely message with [employer] (more info)  Text page via [employer] Paging/Directory     ______________________________________________________________________    Chief Complaint   Weakness and confusion    History is obtained from the patient, electronic health record, emergency department physician, and patient's father    History of Present Illness   Wally Avendano is a 34 year old male who has type 1 diabetes mellitus, complicated with gastroparesis.  He has recently lost his job and insurance, according to his father he could not get any insulin.  Patient has been without treatment for some time now, I cannot precise but it could be weeks.  In the last few days he had vomiting episodes which is not uncommon given his history of gastroparesis.  He has not had any fever, urinary symptoms, respiratory symptoms or any other acute  intercurrent problem.  He is not a smoker, he does not drink alcohol and he admits to use THC to help with the nausea related to gastroparesis.        Past Medical History    Past Medical History:   Diagnosis Date    Diabetes (H)     type 1    Known health problems: none        Past Surgical History   Past Surgical History:   Procedure Laterality Date    ESOPHAGOSCOPY, GASTROSCOPY, DUODENOSCOPY (EGD), COMBINED N/A 7/28/2021    Procedure: ESOPHAGOGASTRODUODENOSCOPY, WITH BIOPSY with duodenum biopsies for celiac sprue and gastric biopsies for h.Pylori by cold biopsy forceps;  Surgeon: Mikel Restrepo MD;  Location:  GI    NO HISTORY OF SURGERY         Prior to Admission Medications   Prior to Admission Medications   Prescriptions Last Dose Informant Patient Reported? Taking?   Continuous Blood Gluc Sensor (DEXCOM G6 SENSOR) MISC   No No   Sig: CHANGE SENSOR EVERY 10 DAYS   Continuous Blood Gluc Transmit (DEXCOM G6 TRANSMITTER) MISC   No No   Sig: CHANGE EVERY 3 MONTH   Glucagon (BAQSIMI ONE PACK) 3 MG/DOSE POWD   No No   Sig: Use only for severe hypoglycemia.   Insulin Glargine-yfgn (SEMGLEE, YFGN,) 100 UNIT/ML SOLN 3/6/2025 Morning  No Yes   Sig: Inject 20 Units subcutaneously every morning.   insulin lispro (HUMALOG KWIKPEN) 100 UNIT/ML (1 unit dial) KWIKPEN   No Yes   Sig: Inject 2 Units subcutaneously Take with snacks or supplements for high blood sugar.   insulin pen needle (BD PEN NEEDLE DIEGO 2ND GEN) 32G X 4 MM miscellaneous   No No   Sig: USE 6 NEEDLES DAILY   insulin pen needle 30G X 5 MM   No No   Sig: Use 6 pen needles daily or as directed.   metoclopramide (REGLAN) 5 MG tablet   No Yes   Sig: Take 2 tablets (10 mg) by mouth 2 times daily as needed (for gastroparesis).   ondansetron (ZOFRAN ODT) 4 MG ODT tab   No Yes   Sig: Take 1 tablet (4 mg) by mouth every 6 hours as needed for nausea or vomiting.      Facility-Administered Medications: None        Review of Systems    The 10 point Review of  Systems is negative other than noted in the HPI or here.       Physical Exam   Vital Signs: Temp: (!) 96.7  F (35.9  C) Temp src: Temporal BP: 114/78 Pulse: (!) 135   Resp: 13 SpO2: 100 % O2 Device: None (Room air)    Weight: 0 lbs 0 oz    GEN:  Sick aspect. Alert, cooperative, not in pain, NAD.  HEENT:  Normocephalic/atraumatic, no scleral icterus, no nasal discharge, mouth moist.  CV:  Sinus tajhycardia, no murmur or JVD.  S1 + S2 noted, no S3 or S4.  LUNGS:  Clear to auscultation bilaterally without rales/rhonchi/wheezing/retractions.  Symmetric chest rise on inhalation noted.  ABD:  Active bowel sounds, soft, non-tender/non-distended.  No rebound/guarding/rigidity.  EXT:  No edema or cyanosis.  No joint synovitis noted.  SKIN:  Dry to touch, no exanthems noted in the visualized areas.       Medical Decision Making       75 MINUTES SPENT BY ME on the date of service doing chart review, history, exam, documentation & further activities per the note.      Data     I have personally reviewed the following data over the past 24 hrs:    20.0 (H)  \   17.2   / 409     98 (LL) <60 (L) 88.4 (H) /  889 (HH)   4.6 7 (LL) 1.41 (H) \     ALT: 40 AST: 66 (H) AP: 122 TBILI: 0.6   ALB: 4.4 TOT PROTEIN: 7.0 LIPASE: 22     TSH: N/A T4: N/A A1C: 11.2 (H)     Procal: N/A CRP: N/A Lactic Acid: 2.3 (H)       INR:  1.15 PTT:  N/A   D-dimer:  N/A Fibrinogen:  N/A       Imaging results reviewed over the past 24 hrs:   Recent Results (from the past 24 hours)   CT Head w/o Contrast    Narrative    EXAM: CT HEAD W/O CONTRAST  LOCATION: [facility]  DATE: 3/6/2025    INDICATION: altered mental status, falls  COMPARISON: None.  TECHNIQUE: Routine CT Head without IV contrast. Multiplanar reformats. Dose reduction techniques were used.    FINDINGS:  INTRACRANIAL CONTENTS: No intracranial hemorrhage, extraaxial collection, or mass effect.  No CT evidence of acute infarct. Normal parenchymal attenuation. Normal ventricles  and sulci.     VISUALIZED ORBITS/SINUSES/MASTOIDS: No intraorbital abnormality. No paranasal sinus mucosal disease. No middle ear or mastoid effusion.    BONES/SOFT TISSUES: No acute abnormality.      Impression    IMPRESSION:  1.  No acute intracranial process.

## 2025-03-06 NOTE — PROVIDER NOTIFICATION
03/06/25 1751   Critical Test Results/Notification   Critical Lab Result (Lab Name and Value) Ketones above 9   What Time Did The Lab Notify You? 1752   Provider Notified yes   Date of Provider Notification 03/06/25   Time of Provider Notification 1752   Mechanism of Provider notification verbal (face-to-face)   What Provider Did You Notify? Micky   Response aware

## 2025-03-06 NOTE — PHARMACY-ADMISSION MEDICATION HISTORY
Pharmacist Admission Medication History    Admission medication history is complete. The information provided in this note is only as accurate as the sources available at the time of the update.    Information Source(s): Patient and Family member via in-person    Pertinent Information:  Pt mentioned that he takes Semglee most days. He rarely uses the Humalog as it's based on high sugars.     Changes made to PTA medication list:  Added: None  Deleted: None  Changed: None    Allergies reviewed with patient and updates made in EHR: yes    Medication History Completed By: Sherly Brown [facility] 3/6/2025 2:30 PM    PTA Med List   Medication Sig Last Dose/Taking    Insulin Glargine-yfgn (SEMGLEE, YFGN,) 100 UNIT/ML SOLN Inject 20 Units subcutaneously every morning. 3/6/2025 Morning    insulin lispro (HUMALOG KWIKPEN) 100 UNIT/ML (1 unit dial) KWIKPEN Inject 2 Units subcutaneously Take with snacks or supplements for high blood sugar. Taking As Needed    metoclopramide (REGLAN) 5 MG tablet Take 2 tablets (10 mg) by mouth 2 times daily as needed (for gastroparesis). Taking As Needed    ondansetron (ZOFRAN ODT) 4 MG ODT tab Take 1 tablet (4 mg) by mouth every 6 hours as needed for nausea or vomiting. Taking As Needed

## 2025-03-07 ENCOUNTER — APPOINTMENT (OUTPATIENT)
Dept: CARDIOLOGY | Facility: CLINIC | Age: 35
DRG: 637 | End: 2025-03-07
Attending: HOSPITALIST

## 2025-03-07 LAB
ALBUMIN SERPL BCG-MCNC: 3.8 G/DL (ref 3.5–5.2)
ALP SERPL-CCNC: 80 U/L (ref 40–150)
ALT SERPL W P-5'-P-CCNC: 40 U/L (ref 0–70)
ANION GAP SERPL CALCULATED.3IONS-SCNC: 10 MMOL/L (ref 7–15)
ANION GAP SERPL CALCULATED.3IONS-SCNC: 11 MMOL/L (ref 7–15)
ANION GAP SERPL CALCULATED.3IONS-SCNC: 14 MMOL/L (ref 7–15)
ANION GAP SERPL CALCULATED.3IONS-SCNC: 9 MMOL/L (ref 7–15)
AST SERPL W P-5'-P-CCNC: 62 U/L (ref 0–45)
ATRIAL RATE - MUSE: 104 BPM
ATRIAL RATE - MUSE: 314 BPM
ATRIAL RATE - MUSE: 82 BPM
ATRIAL RATE - MUSE: NORMAL BPM
B-OH-BUTYR SERPL-SCNC: 0.4 MMOL/L
B-OH-BUTYR SERPL-SCNC: 0.95 MMOL/L
B-OH-BUTYR SERPL-SCNC: 1.04 MMOL/L
B-OH-BUTYR SERPL-SCNC: <0.18 MMOL/L
BILIRUB SERPL-MCNC: 0.3 MG/DL
BUN SERPL-MCNC: 20.4 MG/DL (ref 6–20)
BUN SERPL-MCNC: 27.5 MG/DL (ref 6–20)
BUN SERPL-MCNC: 45.9 MG/DL (ref 6–20)
BUN SERPL-MCNC: 45.9 MG/DL (ref 6–20)
BUN SERPL-MCNC: 51.1 MG/DL (ref 6–20)
BUN SERPL-MCNC: 55.8 MG/DL (ref 6–20)
CALCIUM SERPL-MCNC: 7.4 MG/DL (ref 8.8–10.4)
CALCIUM SERPL-MCNC: 7.5 MG/DL (ref 8.8–10.4)
CALCIUM SERPL-MCNC: 7.9 MG/DL (ref 8.8–10.4)
CALCIUM SERPL-MCNC: 8 MG/DL (ref 8.8–10.4)
CHLORIDE SERPL-SCNC: 84 MMOL/L (ref 98–107)
CHLORIDE SERPL-SCNC: 89 MMOL/L (ref 98–107)
CHLORIDE SERPL-SCNC: 90 MMOL/L (ref 98–107)
CHLORIDE SERPL-SCNC: 91 MMOL/L (ref 98–107)
CHLORIDE SERPL-SCNC: 91 MMOL/L (ref 98–107)
CHLORIDE SERPL-SCNC: 93 MMOL/L (ref 98–107)
CREAT SERPL-MCNC: 0.59 MG/DL (ref 0.67–1.17)
CREAT SERPL-MCNC: 0.59 MG/DL (ref 0.67–1.17)
CREAT SERPL-MCNC: 0.69 MG/DL (ref 0.67–1.17)
CREAT SERPL-MCNC: 0.69 MG/DL (ref 0.67–1.17)
CREAT SERPL-MCNC: 0.7 MG/DL (ref 0.67–1.17)
CREAT SERPL-MCNC: 0.72 MG/DL (ref 0.67–1.17)
DIASTOLIC BLOOD PRESSURE - MUSE: NORMAL MMHG
EGFRCR SERPLBLD CKD-EPI 2021: >90 ML/MIN/1.73M2
ERYTHROCYTE [DISTWIDTH] IN BLOOD BY AUTOMATED COUNT: 12 % (ref 10–15)
GLUCOSE BLDC GLUCOMTR-MCNC: 120 MG/DL (ref 70–99)
GLUCOSE BLDC GLUCOMTR-MCNC: 123 MG/DL (ref 70–99)
GLUCOSE BLDC GLUCOMTR-MCNC: 126 MG/DL (ref 70–99)
GLUCOSE BLDC GLUCOMTR-MCNC: 136 MG/DL (ref 70–99)
GLUCOSE BLDC GLUCOMTR-MCNC: 145 MG/DL (ref 70–99)
GLUCOSE BLDC GLUCOMTR-MCNC: 147 MG/DL (ref 70–99)
GLUCOSE BLDC GLUCOMTR-MCNC: 157 MG/DL (ref 70–99)
GLUCOSE BLDC GLUCOMTR-MCNC: 161 MG/DL (ref 70–99)
GLUCOSE BLDC GLUCOMTR-MCNC: 161 MG/DL (ref 70–99)
GLUCOSE BLDC GLUCOMTR-MCNC: 164 MG/DL (ref 70–99)
GLUCOSE BLDC GLUCOMTR-MCNC: 180 MG/DL (ref 70–99)
GLUCOSE BLDC GLUCOMTR-MCNC: 197 MG/DL (ref 70–99)
GLUCOSE BLDC GLUCOMTR-MCNC: 210 MG/DL (ref 70–99)
GLUCOSE BLDC GLUCOMTR-MCNC: 93 MG/DL (ref 70–99)
GLUCOSE SERPL-MCNC: 118 MG/DL (ref 70–99)
GLUCOSE SERPL-MCNC: 124 MG/DL (ref 70–99)
GLUCOSE SERPL-MCNC: 124 MG/DL (ref 70–99)
GLUCOSE SERPL-MCNC: 149 MG/DL (ref 70–99)
GLUCOSE SERPL-MCNC: 171 MG/DL (ref 70–99)
GLUCOSE SERPL-MCNC: 281 MG/DL (ref 70–99)
HCO3 SERPL-SCNC: 23 MMOL/L (ref 22–29)
HCO3 SERPL-SCNC: 24 MMOL/L (ref 22–29)
HCO3 SERPL-SCNC: 25 MMOL/L (ref 22–29)
HCO3 SERPL-SCNC: 25 MMOL/L (ref 22–29)
HCO3 SERPL-SCNC: 26 MMOL/L (ref 22–29)
HCO3 SERPL-SCNC: 26 MMOL/L (ref 22–29)
HCT VFR BLD AUTO: 39 % (ref 40–53)
HGB BLD-MCNC: 15.1 G/DL (ref 13.3–17.7)
INTERPRETATION ECG - MUSE: NORMAL
LVEF ECHO: NORMAL
MAGNESIUM SERPL-MCNC: 1.8 MG/DL (ref 1.7–2.3)
MAGNESIUM SERPL-MCNC: 2.1 MG/DL (ref 1.7–2.3)
MAGNESIUM SERPL-MCNC: 2.4 MG/DL (ref 1.7–2.3)
MAGNESIUM SERPL-MCNC: 2.4 MG/DL (ref 1.7–2.3)
MAGNESIUM SERPL-MCNC: 2.6 MG/DL (ref 1.7–2.3)
MAGNESIUM SERPL-MCNC: 2.6 MG/DL (ref 1.7–2.3)
MAGNESIUM SERPL-MCNC: 2.8 MG/DL (ref 1.7–2.3)
MCH RBC QN AUTO: 29.9 PG (ref 26.5–33)
MCHC RBC AUTO-ENTMCNC: 38.7 G/DL (ref 31.5–36.5)
MCV RBC AUTO: 77 FL (ref 78–100)
OSMOLALITY SERPL: 331 MMOL/KG (ref 275–295)
OSMOLALITY SERPL: 336 MMOL/KG (ref 275–295)
P AXIS - MUSE: -84 DEGREES
P AXIS - MUSE: 50 DEGREES
P AXIS - MUSE: 69 DEGREES
P AXIS - MUSE: NORMAL DEGREES
PHOSPHATE SERPL-MCNC: 1.1 MG/DL (ref 2.5–4.5)
PHOSPHATE SERPL-MCNC: 1.3 MG/DL (ref 2.5–4.5)
PHOSPHATE SERPL-MCNC: 1.4 MG/DL (ref 2.5–4.5)
PHOSPHATE SERPL-MCNC: 1.5 MG/DL (ref 2.5–4.5)
PHOSPHATE SERPL-MCNC: 1.7 MG/DL (ref 2.5–4.5)
PHOSPHATE SERPL-MCNC: 3 MG/DL (ref 2.5–4.5)
PLAT MORPH BLD: ABNORMAL
PLATELET # BLD AUTO: 277 10E3/UL (ref 150–450)
POTASSIUM SERPL-SCNC: 4.4 MMOL/L (ref 3.4–5.3)
POTASSIUM SERPL-SCNC: 4.6 MMOL/L (ref 3.4–5.3)
POTASSIUM SERPL-SCNC: 4.8 MMOL/L (ref 3.4–5.3)
PR INTERVAL - MUSE: 114 MS
PR INTERVAL - MUSE: 138 MS
PR INTERVAL - MUSE: NORMAL MS
PR INTERVAL - MUSE: NORMAL MS
PROT SERPL-MCNC: 5.7 G/DL (ref 6.4–8.3)
QRS DURATION - MUSE: 82 MS
QRS DURATION - MUSE: 88 MS
QRS DURATION - MUSE: 90 MS
QRS DURATION - MUSE: 98 MS
QT - MUSE: 266 MS
QT - MUSE: 300 MS
QT - MUSE: 398 MS
QT - MUSE: 410 MS
QTC - MUSE: 430 MS
QTC - MUSE: 443 MS
QTC - MUSE: 464 MS
QTC - MUSE: 539 MS
R AXIS - MUSE: 106 DEGREES
R AXIS - MUSE: 107 DEGREES
R AXIS - MUSE: 109 DEGREES
R AXIS - MUSE: 111 DEGREES
RBC # BLD AUTO: 5.05 10E6/UL (ref 4.4–5.9)
RBC MORPH BLD: ABNORMAL
ROULEAUX BLD QL SMEAR: PRESENT
SODIUM SERPL-SCNC: 121 MMOL/L (ref 135–145)
SODIUM SERPL-SCNC: 124 MMOL/L (ref 135–145)
SODIUM SERPL-SCNC: 125 MMOL/L (ref 135–145)
SODIUM SERPL-SCNC: 127 MMOL/L (ref 135–145)
SODIUM SERPL-SCNC: 128 MMOL/L (ref 135–145)
SODIUM SERPL-SCNC: 128 MMOL/L (ref 135–145)
SYSTOLIC BLOOD PRESSURE - MUSE: NORMAL MMHG
T AXIS - MUSE: -5 DEGREES
T AXIS - MUSE: 6 DEGREES
T AXIS - MUSE: 73 DEGREES
T AXIS - MUSE: 80 DEGREES
VENTRICULAR RATE- MUSE: 104 BPM
VENTRICULAR RATE- MUSE: 131 BPM
VENTRICULAR RATE- MUSE: 157 BPM
VENTRICULAR RATE- MUSE: 82 BPM
WBC # BLD AUTO: 13.5 10E3/UL (ref 4–11)

## 2025-03-07 PROCEDURE — 250N000013 HC RX MED GY IP 250 OP 250 PS 637: Performed by: HOSPITALIST

## 2025-03-07 PROCEDURE — 258N000001 HC RX 258: Performed by: ANESTHESIOLOGY

## 2025-03-07 PROCEDURE — 84132 ASSAY OF SERUM POTASSIUM: CPT | Performed by: ANESTHESIOLOGY

## 2025-03-07 PROCEDURE — 84100 ASSAY OF PHOSPHORUS: CPT | Performed by: HOSPITALIST

## 2025-03-07 PROCEDURE — 85014 HEMATOCRIT: CPT | Performed by: ANESTHESIOLOGY

## 2025-03-07 PROCEDURE — 80051 ELECTROLYTE PANEL: CPT | Performed by: ANESTHESIOLOGY

## 2025-03-07 PROCEDURE — 258N000003 HC RX IP 258 OP 636: Performed by: ANESTHESIOLOGY

## 2025-03-07 PROCEDURE — 99254 IP/OBS CNSLTJ NEW/EST MOD 60: CPT | Performed by: INTERNAL MEDICINE

## 2025-03-07 PROCEDURE — 250N000012 HC RX MED GY IP 250 OP 636 PS 637: Performed by: ANESTHESIOLOGY

## 2025-03-07 PROCEDURE — 255N000002 HC RX 255 OP 636: Performed by: HOSPITALIST

## 2025-03-07 PROCEDURE — 82010 KETONE BODYS QUAN: CPT | Performed by: HOSPITALIST

## 2025-03-07 PROCEDURE — 258N000003 HC RX IP 258 OP 636: Performed by: HOSPITALIST

## 2025-03-07 PROCEDURE — 99233 SBSQ HOSP IP/OBS HIGH 50: CPT | Performed by: HOSPITALIST

## 2025-03-07 PROCEDURE — 83735 ASSAY OF MAGNESIUM: CPT | Performed by: ANESTHESIOLOGY

## 2025-03-07 PROCEDURE — 80048 BASIC METABOLIC PNL TOTAL CA: CPT | Performed by: ANESTHESIOLOGY

## 2025-03-07 PROCEDURE — 82010 KETONE BODYS QUAN: CPT | Performed by: ANESTHESIOLOGY

## 2025-03-07 PROCEDURE — 99291 CRITICAL CARE FIRST HOUR: CPT | Performed by: ANESTHESIOLOGY

## 2025-03-07 PROCEDURE — 999N000208 ECHOCARDIOGRAM COMPLETE

## 2025-03-07 PROCEDURE — 250N000013 HC RX MED GY IP 250 OP 250 PS 637: Performed by: ANESTHESIOLOGY

## 2025-03-07 PROCEDURE — 93010 ELECTROCARDIOGRAM REPORT: CPT | Performed by: INTERNAL MEDICINE

## 2025-03-07 PROCEDURE — 250N000011 HC RX IP 250 OP 636: Performed by: ANESTHESIOLOGY

## 2025-03-07 PROCEDURE — 250N000009 HC RX 250: Performed by: HOSPITALIST

## 2025-03-07 PROCEDURE — C8929 TTE W OR WO FOL WCON,DOPPLER: HCPCS

## 2025-03-07 PROCEDURE — 93306 TTE W/DOPPLER COMPLETE: CPT | Mod: 26 | Performed by: INTERNAL MEDICINE

## 2025-03-07 PROCEDURE — 36415 COLL VENOUS BLD VENIPUNCTURE: CPT | Performed by: ANESTHESIOLOGY

## 2025-03-07 PROCEDURE — 84100 ASSAY OF PHOSPHORUS: CPT | Performed by: ANESTHESIOLOGY

## 2025-03-07 PROCEDURE — 82565 ASSAY OF CREATININE: CPT | Performed by: ANESTHESIOLOGY

## 2025-03-07 PROCEDURE — 250N000009 HC RX 250: Performed by: ANESTHESIOLOGY

## 2025-03-07 PROCEDURE — 200N000001 HC R&B ICU

## 2025-03-07 PROCEDURE — 250N000011 HC RX IP 250 OP 636: Performed by: HOSPITALIST

## 2025-03-07 PROCEDURE — 93005 ELECTROCARDIOGRAM TRACING: CPT

## 2025-03-07 RX ORDER — MAGNESIUM SULFATE HEPTAHYDRATE 40 MG/ML
2 INJECTION, SOLUTION INTRAVENOUS ONCE
Status: COMPLETED | OUTPATIENT
Start: 2025-03-07 | End: 2025-03-07

## 2025-03-07 RX ORDER — MULTIPLE VITAMINS W/ MINERALS TAB 9MG-400MCG
1 TAB ORAL DAILY
Status: DISCONTINUED | OUTPATIENT
Start: 2025-03-07 | End: 2025-03-08 | Stop reason: HOSPADM

## 2025-03-07 RX ORDER — ENOXAPARIN SODIUM 100 MG/ML
30 INJECTION SUBCUTANEOUS EVERY 24 HOURS
Status: DISCONTINUED | OUTPATIENT
Start: 2025-03-07 | End: 2025-03-08 | Stop reason: HOSPADM

## 2025-03-07 RX ORDER — DEXTROSE MONOHYDRATE, SODIUM CHLORIDE, AND POTASSIUM CHLORIDE 50; 1.49; 9 G/1000ML; G/1000ML; G/1000ML
INJECTION, SOLUTION INTRAVENOUS CONTINUOUS
Status: DISCONTINUED | OUTPATIENT
Start: 2025-03-07 | End: 2025-03-07

## 2025-03-07 RX ORDER — DEXTROSE MONOHYDRATE 25 G/50ML
25-50 INJECTION, SOLUTION INTRAVENOUS
Status: DISCONTINUED | OUTPATIENT
Start: 2025-03-07 | End: 2025-03-07

## 2025-03-07 RX ORDER — NICOTINE POLACRILEX 4 MG
15-30 LOZENGE BUCCAL
Status: DISCONTINUED | OUTPATIENT
Start: 2025-03-07 | End: 2025-03-07

## 2025-03-07 RX ORDER — METOPROLOL TARTRATE 25 MG/1
25 TABLET, FILM COATED ORAL 2 TIMES DAILY
Status: DISCONTINUED | OUTPATIENT
Start: 2025-03-07 | End: 2025-03-08 | Stop reason: HOSPADM

## 2025-03-07 RX ADMIN — AMIODARONE HYDROCHLORIDE 1 MG/MIN: 1.8 INJECTION, SOLUTION INTRAVENOUS at 00:58

## 2025-03-07 RX ADMIN — INSULIN HUMAN 2 UNITS/HR: 1 INJECTION, SOLUTION INTRAVENOUS at 04:56

## 2025-03-07 RX ADMIN — Medication 5 MG: at 21:01

## 2025-03-07 RX ADMIN — HUMAN ALBUMIN MICROSPHERES AND PERFLUTREN 3 ML: 10; .22 INJECTION, SOLUTION INTRAVENOUS at 09:52

## 2025-03-07 RX ADMIN — METOCLOPRAMIDE HYDROCHLORIDE 10 MG: 5 INJECTION INTRAMUSCULAR; INTRAVENOUS at 23:57

## 2025-03-07 RX ADMIN — DEXTROSE, SODIUM CHLORIDE, AND POTASSIUM CHLORIDE: 5; .9; .15 INJECTION INTRAVENOUS at 09:23

## 2025-03-07 RX ADMIN — METOPROLOL TARTRATE 25 MG: 25 TABLET, FILM COATED ORAL at 09:14

## 2025-03-07 RX ADMIN — METOCLOPRAMIDE HYDROCHLORIDE 10 MG: 5 INJECTION INTRAMUSCULAR; INTRAVENOUS at 00:05

## 2025-03-07 RX ADMIN — POTASSIUM & SODIUM PHOSPHATES POWDER PACK 280-160-250 MG 2 PACKET: 280-160-250 PACK at 09:31

## 2025-03-07 RX ADMIN — INSULIN HUMAN 5 UNITS: 100 INJECTION, SUSPENSION SUBCUTANEOUS at 09:25

## 2025-03-07 RX ADMIN — INSULIN ASPART 1 UNITS: 100 INJECTION, SOLUTION INTRAVENOUS; SUBCUTANEOUS at 16:28

## 2025-03-07 RX ADMIN — METOCLOPRAMIDE HYDROCHLORIDE 10 MG: 5 INJECTION INTRAMUSCULAR; INTRAVENOUS at 06:12

## 2025-03-07 RX ADMIN — POTASSIUM CHLORIDE 20 MEQ: 1.5 SOLUTION ORAL at 09:14

## 2025-03-07 RX ADMIN — SODIUM PHOSPHATE, MONOBASIC, MONOHYDRATE AND SODIUM PHOSPHATE, DIBASIC, ANHYDROUS 15 MMOL: 142; 276 INJECTION, SOLUTION INTRAVENOUS at 20:07

## 2025-03-07 RX ADMIN — MAGNESIUM SULFATE HEPTAHYDRATE 2 G: 40 INJECTION, SOLUTION INTRAVENOUS at 18:54

## 2025-03-07 RX ADMIN — POTASSIUM & SODIUM PHOSPHATES POWDER PACK 280-160-250 MG 2 PACKET: 280-160-250 PACK at 12:03

## 2025-03-07 RX ADMIN — POTASSIUM CHLORIDE 10 MEQ: 7.46 INJECTION, SOLUTION INTRAVENOUS at 00:21

## 2025-03-07 RX ADMIN — ONDANSETRON 4 MG: 2 INJECTION, SOLUTION INTRAMUSCULAR; INTRAVENOUS at 20:03

## 2025-03-07 RX ADMIN — HEPARIN SODIUM 5000 UNITS: 5000 INJECTION, SOLUTION INTRAVENOUS; SUBCUTANEOUS at 04:07

## 2025-03-07 RX ADMIN — POTASSIUM PHOSPHATE, MONOBASIC POTASSIUM PHOSPHATE, DIBASIC 30 MMOL: 224; 236 INJECTION, SOLUTION, CONCENTRATE INTRAVENOUS at 09:26

## 2025-03-07 RX ADMIN — SODIUM PHOSPHATE, MONOBASIC, MONOHYDRATE AND SODIUM PHOSPHATE, DIBASIC, ANHYDROUS 15 MMOL: 142; 276 INJECTION, SOLUTION INTRAVENOUS at 05:29

## 2025-03-07 RX ADMIN — METOCLOPRAMIDE HYDROCHLORIDE 10 MG: 5 INJECTION INTRAMUSCULAR; INTRAVENOUS at 12:04

## 2025-03-07 RX ADMIN — METOCLOPRAMIDE HYDROCHLORIDE 10 MG: 5 INJECTION INTRAMUSCULAR; INTRAVENOUS at 17:45

## 2025-03-07 RX ADMIN — METOPROLOL TARTRATE 25 MG: 25 TABLET, FILM COATED ORAL at 21:01

## 2025-03-07 RX ADMIN — ENOXAPARIN SODIUM 30 MG: 30 INJECTION SUBCUTANEOUS at 12:03

## 2025-03-07 RX ADMIN — POTASSIUM CHLORIDE, DEXTROSE MONOHYDRATE AND SODIUM CHLORIDE: 150; 5; 450 INJECTION, SOLUTION INTRAVENOUS at 01:30

## 2025-03-07 ASSESSMENT — ACTIVITIES OF DAILY LIVING (ADL)
ADLS_ACUITY_SCORE: 52
ADLS_ACUITY_SCORE: 43
ADLS_ACUITY_SCORE: 52
ADLS_ACUITY_SCORE: 52
ADLS_ACUITY_SCORE: 47
ADLS_ACUITY_SCORE: 52
ADLS_ACUITY_SCORE: 47
ADLS_ACUITY_SCORE: 52
ADLS_ACUITY_SCORE: 43
ADLS_ACUITY_SCORE: 52
ADLS_ACUITY_SCORE: 47
ADLS_ACUITY_SCORE: 47

## 2025-03-07 NOTE — PROCEDURES
Arterial Line Procedure Note:  Indication: Hypoxia / shock; need for frequent lab draws and hemodynamic monitoring  Consent: signed on chart   Description:   A timeout was done with nursing.  Ultrasound was used to confirm patent / pulsatile ulnar and radial flow.    The left wrist was cleaned with chlorhexidine.  Lidocaine.  Sterile gloves, gown, mask, bouffant.  Arrow Quickcath under ultrasound guidance.  Wire up, catheter over wire, needle and wire removed.  Good waveform, with MAP within 10% of cuff.  Secured with Cavilon, sterile tegaderm, and tape.  No complications.    I performed / was present for the entire procedure.      PINA Lopez MD  Clinical   Anesthesia / Critical Care  *[phone]

## 2025-03-07 NOTE — CONSULTS
CLINICAL NUTRITION SERVICES - ASSESSMENT NOTE    Malnutrition Status:    Malnutrition Diagnosis: Severe malnutrition in the context of acute on chronic illness    Malnutrition Present on Admission: Yes    Registered Dietitian Interventions:  Continue current diet order per provider     Nutrition supplements: Will attempt an Ensure Clear at 2 PM today pending pt's ability to tolerate PO intake. Additionally, will send Ensure Enlive w/ dinner tonight. At this time, will not schedule supplements d/t unknown ability to tolerate, but please allow pt to order PRN. RD team will follow and, if doing okay with meals and supplements, will schedule supplements TID. Pt verbalized preference for Berry Ensure Clear and Chocolate/Strawberry Ensure Enlive.    Ordering prophylactic MVI/M d/t concern for sub-optimal intakes     Recommend frequent intakes (goal of 4-6 per day) in an effort to meet both calorie and protein needs    Recommended eating at a slow-moderate pace so as not to aggravate GI as attempting to increase intake. Additionally, recommended following a blander diet and provided examples.    Future/Additional Recommendations:  Pt would benefit from nutrition education for diabetes management. At this point, pt is not appropriate for nutrition education given continued confusion and lethargy. Will continue to evaluate for ability to provide education during admission. If deemed appropriate for nutrition education prior to RD's next follow-up, please formally consult.     If pt unable to tolerate oral intake whether d/t emesis or gastroparesis, may need to consider some form of nutrition support in order to meet needs.     REASON FOR ASSESSMENT  Positive admission nutrition risk screen of 3 for weight loss and poor PO d/t decreased appetite    PMH: DKA, T1DM, diarrhea, gastroparesis, possible cannabinoid hyperemesis syndrome    Per EMR, pt presented to ED on 3/6 with weakness and confusion. Found to have hyperglycemia  >800, ketones, electrolytes derangement compatible with HHS/DKA. Pt w/ T1DM and has been out of insulin for several weeks.     SUBJECTIVE INFORMATION  Assessed patient in room.    NUTRITION HISTORY  - Information obtained from chart review and pt at bedside. Pt's girlfriend and father were in room at time of assessment. Majority of nutrition history was provided by pt's girlfriend. Pt was fairly lethargic throughout conversation.  - Diet at home: Pt follows a regular diet at home. He really enjoys cooking and tends to do majority of cooking from scratch.  - Usual intakes: Typically, pt will eat 2 meals per day. Noted that these are usually lunch and dinner. Mentioned that he will usually do something like a sandwich for lunch and then he will usually make something along the lines of pasta, chicken, soup, or pot roast for dinner.  - Barriers to PO intakes: Pt struggles with cyclic vomiting and it was reported that this has been a recurrent issue for years. However, prior to one week ago when most recent cycle started, it had been a few months since he had had an episode of vomiting longer than a day or two.  - Use of oral supplements: None at baseline, but pt is open to them.   - Chewing/swallowing issues: None noted.  - Meal preparation/grocery shopping: Self at home  - Allergies: NKFA    Pt last seen by an RD in April of 2021.     CURRENT NUTRITION ORDERS  Diet: Mod CCHO    CURRENT INTAKE/TOLERANCE  No meals ordered since admission.    Per RN in IDT rounds, it sounds like pt is not tolerating oral intake very well. He is able to take some sips of liquids.     Pt's girlfriend noted that pt has not had a meal since last Friday night. She reports that, for the last week, pt was only able to keep down liquids like apple juice, orange juice, and Gatorade. She also reports that he was able to have a few pieces of candy.     Discussed how pt's appetite is doing right now. He reported that he is feeling hungry and he is  "interested in eating. However, he has not been able to tolerate any solid food. At time of visit, pt did have a cup of chicken noodle soup sitting at the bedside. He was able to take a few bites. He started to eat very quickly and his girlfriend noted that, once he starts feeling a little bit better after episodes of cyclic vomiting, he frequently tries to eat too quickly and make himself sick. She was reminding him to slow down.   Discussed options for nutrition supplements with pt. He was open to trying Ensure Clear at this time and was open to trying Ensure Enlive pending tolerance.     LABS  Nutrition-relevant labs: Reviewed  Noted: Na 128(L), BUN 45.9(H), Mag 2.4(H), Phos 1.5(L), Ketone 0.4(H),   - Phos being replaced    MEDICATIONS  Nutrition-relevant medications: Reviewed  Noted: 10 Units NPH BID, Reglan, Neutra-phos, kayciel, potassium phosphate, D5 and 0/9% NaCl + KCl @ 125 ml/hr, insulin drip      ANTHROPOMETRICS  Height: 193 cm (6'4\")  Most Recent Weight: 66 kg (145 lb 9.6 oz)  IBW: 86.8 kg  % IBW: 76%  BMI (kg/m ): Body mass index is 17.72 kg/m . (Underweight BMI < 18.5)  Weight History:  Wt Readings from Last 10 Encounters:   03/06/25 66 kg (145 lb 9.6 oz)   10/30/24 68.3 kg (150 lb 8 oz)   06/17/23 71.3 kg (157 lb 3.2 oz)   06/14/23 72.6 kg (160 lb)   05/18/23 73.8 kg (162 lb 9.6 oz)   03/29/23 68.1 kg (150 lb 2.1 oz)   03/05/23 69 kg (152 lb 1.9 oz)   01/20/23 66.7 kg (147 lb)   08/23/22 66.7 kg (147 lb)   08/14/22 65.8 kg (145 lb)     No additional weights available per chart review. Weight change from October to current not significant for malnutrition.     Pt's girlfriend stated that pt had been gaining weight prior to one week ago when he started feeling sick.       ASSESSED NUTRITION NEEDS  Dosing Weight: 66 kg, based on actual wt  Estimated Energy Needs: >/= 35 - 40 kcals/kg  Justification: Repletion and Underweight  Estimated Protein Needs: >/= 1.2 - 1.5 grams of pro/kg  Justification: " "Repletion  Estimated Fluid Needs: per provider pending fluid status    SYSTEM FINDINGS    Skin/wounds:  Edema: None currently documented  Pressure Injury: None currently documented  Non-Healing Wound(s): None currently documented  Surgical Wound(s): None currently documented    GI symptoms:   Stooling Patterns Reviewed - No BM yet documented. Limited window of admission so far.     MALNUTRITION  % Weight Loss: Weight loss does not meet criteria   % Intake: </= 50% for >/= 5 days (severe) and </=75% for >/= 1 month (severe) - suspected chronically low intakes based on underweight BMI.   Subcutaneous Fat Loss: Triceps: Moderate and Fat overlying the ribs: Moderate  Muscle Loss: Wasting of the temples (temporalis muscle): Mild, Clavicles (pectoralis and deltoids): Severe, Shoulders (deltoids): Severe, Thigh (quadriceps): Severe, and Calf (gastrocnemius): Moderate  Fluid Accumulation/Edema: None noted  Malnutrition Diagnosis: Severe malnutrition in the context of acute on chronic illness  Malnutrition Present on Admission: Yes    NUTRITION DIAGNOSIS  Inadequate oral intake related to nausea and gastroparesis as evidenced by inability to tolerate oral intake, reported intake for the week PTA, estimated chronically low intake based on underweight BMI, moderate fat losses, and mild-severe muscle losses.    INTERVENTIONS  See nutrition interventions above    Goals  Patient to consume at least 50% of nutritionally adequate meal trays or supplements TID to show improved trending of intake.      Monitoring/Evaluation  Progress toward goals will be monitored and evaluated per policy.        Precious Yang RD, LD  Clinical Dietitian  [facility] Message Group: \"Dietitian [[place]]\"  Office Phone: [phone]  Pagers: 3rd floor/ICU: [phone]  All other floors: [phone]  Weekend/holiday: [phone]    "

## 2025-03-07 NOTE — CONSULTS
[facility]    CARDIOLOGY CONSULT    Date of Admission:  3/6/2025  Date of Consult: March 7, 2025    ASSESSMENT:  34-year-old male seen for atrial arrhythmia in the context of DKA.  Rhythm is atrial flutter.  1 EKG may have showed A-fib.  This is certainly provoked by the DKA.  Heart rates are now in the  range he is asymptomatic.  Echo is pending.  Would recommend ongoing management of his DKA.  Amiodarone can be continued for 24 hours.  If he is still in an arrhythmia on Monday, could consider cardioversion.  Of note telemetry and EKG at first glance looks like sinus, but there is likely flutter waves buried in the T wave.  Could repeat adenosine to look for flutter waves if any question about the rhythm over the next few days.    RECOMMENDATIONS:  1.  Atrial flutter secondary to DKA  - continue amiodarone today, could add beta-blocker when blood pressure stable  - cardioversion before discharge if arrhythmia persists    Raffaele Rader MD  Cardiology - [facility]  Pager:  [phone]  Text Page  March 7, 2025    CODE STATUS:  Full Code    REASON FOR CONSULT: Atrial arrhythmia    PRIMARY CARE PHYSICIAN:  [facility] [place]    HISTORY OF PRESENT ILLNESS:  34 year old male seen for atrial arrhythmia in the context of DKA.  He has diabetes type 1 with gastroparesis.    Prior to this admission he denies any chest pain or palpitations.  Heart rate is not checked at home.  He was diagnosed with diabetes age 29.  He is much improved this morning.  Labs are markedly better, blood sugar has normalized.  He slept poorly and is quite tired this morning.  He denies any chest pain or palpitations.    He presents with DKA.  He  was between jobs and had been off insulin for some period of time, exact duration is unknown.  He presents with very elevated ketones and glucose over 800.  Rhythm appears to be atrial flutter upon presentation, he may have had a brief period of A-fib.  He  received adenosine which revealed under lying flutter waves.  Amiodarone was started.    PAST MEDICAL HISTORY:  I have reviewed this patient's medical history and updated it with pertinent information if needed.   Past Medical History:   Diagnosis Date    Diabetes (H)     type 1    Known health problems: none        PAST SURGICAL HISTORY:  I have reviewed this patient's surgical history and updated it with pertinent information if needed.  Past Surgical History:   Procedure Laterality Date    ESOPHAGOSCOPY, GASTROSCOPY, DUODENOSCOPY (EGD), COMBINED N/A 7/28/2021    Procedure: ESOPHAGOGASTRODUODENOSCOPY, WITH BIOPSY with duodenum biopsies for celiac sprue and gastric biopsies for h.Pylori by cold biopsy forceps;  Surgeon: Mikel Restrepo MD;  Location:  GI    NO HISTORY OF SURGERY         HOME MEDICATIONS:  Prior to Admission Medications   Prescriptions Last Dose Informant Patient Reported? Taking?   Continuous Blood Gluc Sensor (DEXCOM G6 SENSOR) MISC   No No   Sig: CHANGE SENSOR EVERY 10 DAYS   Continuous Blood Gluc Transmit (DEXCOM G6 TRANSMITTER) MISC   No No   Sig: CHANGE EVERY 3 MONTH   Glucagon (BAQSIMI ONE PACK) 3 MG/DOSE POWD   No No   Sig: Use only for severe hypoglycemia.   Insulin Glargine-yfgn (SEMGLEE, YFGN,) 100 UNIT/ML SOLN 3/6/2025 Morning  No Yes   Sig: Inject 20 Units subcutaneously every morning.   insulin lispro (HUMALOG KWIKPEN) 100 UNIT/ML (1 unit dial) KWIKPEN   No Yes   Sig: Inject 2 Units subcutaneously Take with snacks or supplements for high blood sugar.   insulin pen needle (BD PEN NEEDLE DIEGO 2ND GEN) 32G X 4 MM miscellaneous   No No   Sig: USE 6 NEEDLES DAILY   insulin pen needle 30G X 5 MM   No No   Sig: Use 6 pen needles daily or as directed.   metoclopramide (REGLAN) 5 MG tablet   No Yes   Sig: Take 2 tablets (10 mg) by mouth 2 times daily as needed (for gastroparesis).   ondansetron (ZOFRAN ODT) 4 MG ODT tab   No Yes   Sig: Take 1 tablet (4 mg) by mouth every 6 hours as needed  for nausea or vomiting.      Facility-Administered Medications: None       ALLERGIES:  No Known Allergies    SOCIAL HISTORY:  I have reviewed this patient's social history and updated it with pertinent information if needed. Wally Avendano  reports that he has never smoked. He has never used smokeless tobacco. He reports current alcohol use. He reports current drug use. Drug: Marijuana.    FAMILY HISTORY:  I have reviewed this patient's family history and updated it with pertinent information if needed.   Family History   Problem Relation Age of Onset    Other - See Comments Mother         PBC       REVIEW OF SYSTEMS:  Constitutional:  No weight loss, fever, chills  HEENT:  Eyes:  No visual loss, blurred vision, double vision or yellow sclerae. No hearing loss, sneezing, congestion, runny nose or sore throat.  Skin:  No rash or itching.  Cardiovascular: per HPI  Respiratory: per HPI  GI:  per HPI  :  No dysurea, hematuria  Neurologic:  No headache, paralysis, ataxia, numbness or tingling in the extremities. No change in bowel or bladder control.  Musculoskeletal:  No muscle pain  Hematologic:  No bleeding or bruising.  Lymphatics:  No enlarged nodes. No history of splenectomy.  Endocrine:  No reports of sweating, cold or heat intolerance. No polyuria or polydipsia.  Allergies:  No history of asthma, hives, eczema or rhinitis.    PHYSICAL EXAM:  Temp: 98  F (36.7  C) Temp src: Oral BP: 89/71 Pulse: 115   Resp: 28 SpO2: 98 % O2 Device: None (Room air)    Vital Signs with Ranges  Temp:  [96.7  F (35.9  C)-98  F (36.7  C)] 98  F (36.7  C)  Pulse:  [] 115  Resp:  [0-39] 28  BP: ()/(23-92) 89/71  MAP:  [23 mmHg-102 mmHg] 80 mmHg  Arterial Line BP: ()/(-19-90) 98/78  SpO2:  [87 %-100 %] 98 %  145 lbs 9.6 oz    Constitutional: awake, alert, no distress  Eyes: PERRL, sclera nonicteric  ENT: trachea midline  Respiratory: lungs clear  Cardiovascular: mild tachycardia, regular rhythm  GI: nondistended,  nontender, bowel sounds present  Lymph/Hematologic: no lymphadenopathy  Skin: dry, no rash  Musculoskeletal: good muscle tone, strength 5/5 in upper and lower extremities  Neurologic: no focal deficits  Neuropsychiatric: appropriate affact    Intake/Output Summary (Last 24 hours) at 3/7/2025 0911  Last data filed at 3/7/2025 0600  Gross per 24 hour   Intake 2399.34 ml   Output 2625 ml   Net -225.66 ml       Clinically Significant Risk Factors Present on Admission         # Hyponatremia: Lowest Na = 98 mmol/L in last 2 days, will monitor as appropriate  # Hypochloremia: Lowest Cl = 54 mmol/L in last 2 days, will monitor as appropriate  # Hypocalcemia: Lowest Ca = 7.9 mg/dL in last 2 days, will monitor and replace as appropriate    # Anion Gap Metabolic Acidosis: Highest Anion Gap = 25 mmol/L in last 2 days, will monitor and treat as appropriate                          Cardiac Arrhythmia: Atrial flutter: Typical    Diabetes Type 1    Dehydration    Not present on admission    Not present on admission    Not present on admission    DATA:  Labs: Sodium 128, creatinine 0.6, lactate 1.9, hemoglobin 15, platelets 277  Recent Labs   Lab Test 10/30/24  0924 05/18/23  0916   CHOL 138 156   HDL 51 53   LDL 67 88   TRIG 101 74     EKG: Presenting EKG shows what is probably atrial flutter with a subtle P waves buried in the T wave, repeat EKG shows what may be A-fib, overnight he had EKGs that are clearly atrial flutter with heart rate in the 150s, repeat morning of March 7 shows what is probably for a flutter with variable conduction, ventricular rate 100, no ST elevation or depression    Tele: Rhythm probably atrial flutter, rate 95, subtle P wave pump in the T waves, less likely would be sinus

## 2025-03-07 NOTE — CONSULTS
Patient is currently uninsured and in need of insulin therapy.       All insulins are available for $35 per 30 days using  copay cards.  [facility] does not break boxes of insulin.  As such, assuming a 5 x 300u box of pens, if patient is usin+ units/day, cost will be $35 (1-30 day supply)  25-49 units/day, cost will be $70 (31-60 day supply)  1-24 units/day, cost will be $105 (61-90 day supply)    Applicable  copay cards will be provided by the [facility]charge [facility] with the filled medications for ongoing use.  Please send separate prescription for syringes or pen needles.    The cheapest available cash option for glucometer and testing supplies that can be provided is the Accu-Chek Guide system.  [facility] can dispense 100 Accu-Chek Guide test strips + Accu-Chek Guide Me meter + 100 Softclix or Fastclix lancets for $50.  Control solution is an additional $12.       Freestyle John Paul 3    [place]: $65   Sensors: $84/mo        Dexcom G7    [employer]ie[place]: $249   Sensors:  $341/mo      Patient has the option of using the free apps (LibreLink and Dexcom, respectively) in lieu of the [place]/.    Sneha Cameron  Pharmacy Technician/Liaison, [facility]   [phone] (voice or text)  [email or web address]  Pharmacy test claims are estimates and may not reflect final costs.  Suggested alternatives aim to be cost-effective and may not be therapeutically equivalent as this consult is informational and does not constitute medical advice.  Clinical decisions should be made by qualified healthcare providers.

## 2025-03-07 NOTE — PROGRESS NOTES
[facility]    Medicine Progress Note - Hospitalist Service    Date of Admission:  3/6/2025    Assessment & Plan   Wally Avendano is a 34 year old male admitted on 3/6/2025. He presents with weakness and confusion to ED. He has been found with hyperglycemia > 800, Ketones, electrolytes derangement compatible with HHS/DKA. Patient has Type 1 DM and run out of insuline for several weeks.     HHS/DKA secondary to non compliance.  Type 1 Diabetes Mellitus complicated with Gastroparesis.   Anion gap metabolic acidosis (Ketones, Lactic acid)  Severe dehydration with high osmolality.       ICU  Cardiac telemetry.  Fluid resuscitation initiated in the ER with normal saline 2 L.  Continue with lactate Ringer in the ICU and continue maintenance per protocol  Strict BELIA's especially urine output.  Blood pressure control following intra-arterial line monitor.  Follow-up glucose, ketones, lactic acid and electrolytes per protocol  Lites replacement per protocol (phosphorus, potassium, magnesium and).   Insulin IV infusion and 0.1 units/kg/hour (goal to decrease  mg/dl if/hour)  Titrate insulin dose based on glucose levels  Close monitoring of:  Vital signs and neurological status.  Glucose, electrolytes and osmolality.  Urine output and fluid balance.    A flutter.  Secondary to severe dehydration/hypovolemia and acidosis.  In emergency department an irregular tachyarrhythmia was seen and documented.  It was interpreted like atrial fibrillation with RVR.  Patient received 1 bolus of diltiazem 15 mg once.  He was started on Cardizem drip before transfer to the ICU.  On arrival to the ICU patient collapsed and rapid response was called.  Patient was assessed and treated by the intensivist on duty.  At this point patient had consistent regular tach in the monitor with rate 140.  Dr Lopez suspected other supraventricular tachy and, after Adenosine bolus, found A flutter 2:1. Received Amiodarone overnight  and changed to BB this am.  Echocardiogram completed and within normal limits. Cardiology consulted. Dr Rader is recommending DCCV on Monday if Flutter persists over the weekend.     Diet: Consistent Carbohydrate Diet Moderate Consistent Carb (60 g CHO per Meal) Diet  Snacks/Supplements Adult: Other; Ensure Clear or Ensure Enlive; Between Meals    DVT Prophylaxis: Enoxaparin (Lovenox) SQ  Fuentes Catheter: Not present  Lines: PRESENT      Arterial Line 03/06/25 Radial-Site Assessment: WDL      Cardiac Monitoring: ACTIVE order. Indication: ICU  Code Status: Full Code      Clinically Significant Risk Factors Present on Admission         # Hyponatremia: Lowest Na = 98 mmol/L in last 2 days, will monitor as appropriate  # Hypochloremia: Lowest Cl = 54 mmol/L in last 2 days, will monitor as appropriate  # Hypocalcemia: Lowest Ca = 7.5 mg/dL in last 2 days, will monitor and replace as appropriate    # Anion Gap Metabolic Acidosis: Highest Anion Gap = 25 mmol/L in last 2 days, will monitor and treat as appropriate                 # Severe Malnutrition: based on nutrition assessment and treatment provided per dietitian's recommendations.            Social Drivers of Health    Physical Activity: Inactive (5/18/2023)    Exercise Vital Sign     Days of Exercise per Week: 0 days     Minutes of Exercise per Session: 0 min   Social Connections: Socially Isolated (5/18/2023)    Social Connection and Isolation Panel [NHANES]     Frequency of Communication with Friends and Family: Once a week     Frequency of Social Gatherings with Friends and Family: Once a week     Attends Confucianism Services: Never     Active Member of Clubs or Organizations: No     Marital Status:           Disposition Plan     Medically Ready for Discharge: Anticipated in 2-4 Days       Abdulaziz Steen MD  Hospitalist Service  [facility]  Securely message with [employer] (more info)  Text page via [facility] Paging/Directory    ______________________________________________________________________    Interval History   Patient is feeling better to, not feeling thirty. Somehow hungry. Passing good amount of urine.     Physical Exam   Vital Signs: Temp: 98.1  F (36.7  C) Temp src: Axillary BP: (!) 169/84 Pulse: 96   Resp: 14 SpO2: 98 % O2 Device: None (Room air)    Weight: 145 lbs 9.6 oz    Constitutional: awake, alert, cooperative, no apparent distress, and appears stated age  Respiratory: No increased work of breathing, good air exchange, clear to auscultation bilaterally, no crackles or wheezing  Cardiovascular: Normal apical impulse, regular rate and rhythm, normal S1 and S2, no S3 or S4, and no murmur noted  GI: No scars, normal bowel sounds, soft, non-distended, non-tender, no masses palpated, no hepatosplenomegally    Medical Decision Making       50 MINUTES SPENT BY ME on the date of service doing chart review, history, exam, documentation & further activities per the note.      Data     I have personally reviewed the following data over the past 24 hrs:    13.5 (H)  \   15.1   / 277     127 (L) 93 (L) 27.5 (H) /  93   4.6 25 0.59 (L) \     ALT: 40 AST: 62 (H) AP: 80 TBILI: 0.3   ALB: 3.8 TOT PROTEIN: 5.7 (L) LIPASE: N/A     TSH: N/A T4: N/A A1C: N/A     Procal: N/A CRP: N/A Lactic Acid: 1.9         Imaging results reviewed over the past 24 hrs:   Recent Results (from the past 24 hours)   CT Head w/o Contrast    Narrative    EXAM: CT HEAD W/O CONTRAST  LOCATION: [facility]  DATE: 3/6/2025    INDICATION: altered mental status, falls  COMPARISON: None.  TECHNIQUE: Routine CT Head without IV contrast. Multiplanar reformats. Dose reduction techniques were used.    FINDINGS:  INTRACRANIAL CONTENTS: No intracranial hemorrhage, extraaxial collection, or mass effect.  No CT evidence of acute infarct. Normal parenchymal attenuation. Normal ventricles and sulci.     VISUALIZED ORBITS/SINUSES/MASTOIDS: No intraorbital  abnormality. No paranasal sinus mucosal disease. No middle ear or mastoid effusion.    BONES/SOFT TISSUES: No acute abnormality.      Impression    IMPRESSION:  1.  No acute intracranial process.   Echocardiogram Complete   Result Value    LVEF  60-65%    [facility]    [id number]  [id number]  [id number]  [id number]^DON^GARRETT^PARISH     [facility]  Echocardiography Laboratory  [address] [place] [address]     Name: GENEVIEVE ARCHER  MRN: [id number]  : 1990  Study Date: 2025 08:11 AM  Age: 34 yrs  Gender: Male  Patient [facility]  Reason For Study: Aflutter  Ordering Physician: GARRETT OCHOA  Performed By: Rosa Bonner     BSA: 1.9 m2  Height: 76 in  Weight: 145 lb  HR: 106  BP: 89/71 mmHg  ______________________________________________________________________________  Procedure  Echocardiogram with two-dimensional, color and spectral Doppler. Optison (NDC  #0630-5924) given intravenously.  ______________________________________________________________________________  Interpretation Summary     The rhythm was sinus tachycardia.  Left ventricular systolic function is normal.  The visual ejection fraction is 60-65%.  The left ventricle is normal in size.  The right ventricle is normal in structure, function and size.  Normal left atrial size.  Right atrial size is normal.     Doppler interrogation does not demonstrate signficant stenosis or  insufficiency invovlng cardiac valves     No old studeis for comparison  ______________________________________________________________________________  Left Ventricle  The left ventricle is normal in size. There is normal left ventricular wall  thickness. Left ventricular systolic function is normal. The visual ejection  fraction is 60-65%. No regional wall motion abnormalities noted. There is no  thrombus seen in the left ventricle.     Right Ventricle  The right ventricle is normal in structure, function and size. There is no  mass or  thrombus in the right ventricle.     Atria  Normal left atrial size. Right atrial size is normal. There is no atrial shunt  seen. The left atrial appendage is not well visualized.     Mitral Valve  The mitral valve leaflets appear normal. There is no evidence of stenosis,  fluttering, or prolapse. There is no mitral regurgitation noted. There is no  mitral valve stenosis.     Tricuspid Valve  Normal tricuspid valve. No tricuspid regurgitation. Right ventricular systolic  pressure could not be approximated due to inadequate tricuspid regurgitation.  There is no tricuspid stenosis.     Aortic Valve  The aortic valve is trileaflet. No aortic regurgitation is present. No aortic  stenosis is present.     Pulmonic Valve  Normal pulmonic valve. There is no pulmonic valvular regurgitation. There is  no pulmonic valvular stenosis.     Vessels  The aortic root is normal size. Normal size ascending aorta. The inferior vena  cava is normal. The pulmonary artery is normal size.     Pericardium  The pericardium appears normal. There is no pleural effusion.     Rhythm  The rhythm was sinus tachycardia.  ______________________________________________________________________________  MMode/2D Measurements & Calculations     IVSd: 0.99 cm  LVIDd: 3.8 cm  LVIDs: 2.5 cm  LVPWd: 0.99 cm  FS: 32.8 %  LV mass(C)d: 114.0 grams  LV mass(C)dI: 59.0 grams/m2  Ao root diam: 3.1 cm  LVOT diam: 2.0 cm  LVOT area: 3.2 cm2  Ao root diam index Ht(cm/m): 1.6  Ao root diam index BSA (cm/m2): 1.6  RWT: 0.52     ______________________________________________________________________________  Report approved by: Dr. Andrea Shen on 03/07/2025 10:02 AM

## 2025-03-07 NOTE — H&P
INTENSIVIST FACULTY NOTE:  34M DM1, gastroparesis, frequent admits with DKA  Question of cannbinoid hyperemesis syndrome in the past  In-between jobs, lost insurance temporarily, so no access to insulin  Admit with lethargy, found to be in DKA.  No clear sick symptoms preceding this; probaly just his vomiting and no insulin  Admit uncorrected sodium 98  Had syncopal episode / code called on arrival to unit (was never actually pulseless)    Exam lethargic, confused, but protecting airway well  Abdomen tense, not definitely peritonitic, but more of a classic DKA abdomen  Urine dilute, clear  No definite rashes  Tongue and mucous membranes dry  Overall actually looks kind of emaciated    Labs after some hours in ED now uncorrected sodium 103   Potassium lower than expected at 4.6  Serum bicarb only 8  Creatinine is elevated, but downtrending  Glucose still >800  Ketones still >9  HgbA1c 11.2  Llast lactate was 2.3  Transaminases, lipase, etc all fine  Leukocytosis to 20, Hgb looks concentrarted at 17.2, everything else looks fine  Urinalysis bloody, but does not otherwise look infected  etOH negative, but a full tox screen was not done    CT brain no concerning findings  EKG computer read is afib, but poor tracing, and it sure looks like sinus tachy to me on bedside tele    This is a 34M with DM1 complicated by gastroparesis and a possible history of cannabinoid hyperemesis syndrome who is admitted with severe diabetic ketoacidosis.  He needs an additional fluid bolus now and ongoing potassium replacement with a marked increase in insulin.  He has a pseudohyponatremia; I am not worried about over-correcting him at this time.     I gave him additional insulin, bicarb, and fluid boluses on arrival; and placed arterial access for serial labs and adequate large-bore PIV access to ensure concurrent administration of all these meds.     METABOLIC ENCEPHALOPATHY / DELIRIUM:  -due to DKA, pseudohyponatremia.  Even corrected  sodium is low enough that he may be at risk for neurologic complications overnight  -nothing for sleep or sedation, but can have narcotics for pain  -using non-pharmacologic methods as much as possible, but may need restraints to keep from pulling at lines    HYPONATREMIA:  -corrected sodium is probably around 120mEq/l.  Probably a hypovolemic hyponatremia related to gastric losses, above and beyond the pseudohyponatremia from his hyperglycemia  -should be low risk for central pontine demyelinolysis, but will follow q2h sodiums    DM1 / DKA:  -lantus 20u now  -regular insulin 10u now  -insulin infusion per protocol  -LR 2L bolus now; then MIVF as per protocol  -potassium replacement additional 20mEq now; then replace as per protocol  -1 amp bicarb now  -thus far no evidence of infectious trigger.  Procalcitonin pending as a screen for bacterial infection.    POSSIBLE ATRIAL FIBRILLATION:  -discontinue diltiazem drip for now.  I suspect this is dehydration / DKA-related sinus tachy rather than a more malignant supraventricular tachyarrhythmia.  I did not bother with diagnostic adenosine at this time.  Will repeat EKG after he is more adequately resuscitated, and if looks more convincingly like afib, then will follow up with formal echo, etc in the morning.    -peripheral phenylephrine ok as a temporizing measure, but this is probably all a volume problem    ADDENDUM 2000pm:  -Addenosine push demonstrated atrial flutter.  Starting amiodarone as a hemodynamically stable rate-controlling agent, and plan for formal echo in the morning    MISC:  -Code status is full code  -family (father Prashanth and significant other Marcin) updated at bedside  -H until we see where his creatinine ends up; PPI not necessary  -lines: peripheral + arterial line  -walker not necessary (condom cath ok)  -anticipate discharge to home in ~2 days.  Will work with social work in the morning to figure out a discharge plan with insulin given ongoing  insurance coverage issues    Billing statement: 57min of critical care time; spent in an initial review of imaging, labs, physical exam, and discussion of the patient with my own team and the extended care team including the primary service.   Based on this patient's presentation / recent intervention and my bedside assessment, I felt there was or is a reasonably high probability of imminent or life-threatening deterioration today or tonight for electrolyte or cardiopulmonary reasons.   My overall critical care time, as described in detail above, includes such things as coordination of care, arrhythmia and hemodynamics management with infusions of medicines, respiratory management, fluid therapy including fluid boluses, and pain and sedation therapy. This time excludes time I spent personally performing or supervising procedures for this patient.    PINA Lopez MD  Clinical   Anesthesia / Critical Care  *[phone]

## 2025-03-07 NOTE — PLAN OF CARE
RN end of shift summary    Pt is A&Ox2-3. Disoriented to time and situation. Confused and impulsive. Sitter was initiated for pt safety regarding impulsivity. Requires frequent redirection and education regarding plan of care. CMS intact though hands are cold bilaterally. Denies n/t or diminished sensation.     Pt is extremely impulsive and difficult to redirect when needing to void. Writer called into room by sitter for pt attempting to get out of bed. Mentation and general wellbeing appeared to be slightly improving so agreed to let pt try to void at edge of bed with strict directions not to stand. Assisted to edge of bed and approximately 5 seconds later pt had another syncopal episode. Was guided back to lying position with assist of 2. Instructed pt that they will remain on strict bedrest for safety and pt appeared to understand considering immediate event.    1920 pt was tachycardic in 150s. MD was requested at bedside and adenosine IV push was given x2 to determine rhythm which was shown to be atrial flutter. Amiodarone bolus and gtt was started with conversion to sinus tachycardia. HR low to mid-100s at rest and up to 120s with activity. No edema and strong pulses.    Respiratory WDL; LS clear and remains RA.     Denies nausea. NPO. Abdomen flat with normoactive BS. Positive flatus.    Frequent urination with urgency which is fueling the impulsivity. Needs assist with urinal. Output slowed down as LR bolus was finishing though plenty of other IV intake. Best to ask pt if they need to void frequently to alleviate intense urgency leading to impulsivity.    Bedrest for pt safety. Repositions self in bed.  Notable skin issues. Bruising to bilateral hips/flank. Blanchable erythema to bilateral elbows, sacrum and buttocks. No open areas.     Arterial line in left radial with adequate waveform and blood returnl Utilizing for frequent lab checks.    PIV x4 with current infusions:  D5 0.45% NS with 20 mEq Kcl @ 125  "ml/hr  Insulin @ 6 units/hr  Amiodarone @ 16.7 ml/hr    Many electrolyte replacements throughout shift  Magnesium protocol with Recheck for 3/8 with AM labs  Phosphorus protocol with replacement currently infusing and recheck for 1240          Goal Outcome Evaluation:           Overall Patient Progress: improvingOverall Patient Progress: improving    Outcome Evaluation: See above      Problem: Adult Inpatient Plan of Care  Goal: Plan of Care Review  Description: The Plan of Care Review/Shift note should be completed every shift.  The Outcome Evaluation is a brief statement about your assessment that the patient is improving, declining, or no change.  This information will be displayed automatically on your shift  note.  Outcome: Progressing  Flowsheets (Taken 3/7/2025 0112)  Outcome Evaluation: See above  Overall Patient Progress: improving  Goal: Patient-Specific Goal (Individualized)  Description: You can add care plan individualizations to a care plan. Examples of Individualization might be:  \"Parent requests to be called daily at 9am for status\", \"I have a hard time hearing out of my right ear\", or \"Do not touch me to wake me up as it startles  me\".  Outcome: Progressing  Goal: Absence of Hospital-Acquired Illness or Injury  Outcome: Progressing  Intervention: Identify and Manage Fall Risk  Recent Flowsheet Documentation  Taken 3/7/2025 0030 by Phillip Palomo, RN  Safety Promotion/Fall Prevention:   activity supervised   clutter free environment maintained   increased rounding and observation   increase visualization of patient   lighting adjusted   patient and family education   room organization consistent   safety round/check completed  Taken 3/6/2025 2030 by Phillip Palomo, RN  Safety Promotion/Fall Prevention:   activity supervised   clutter free environment maintained   increased rounding and observation   increase visualization of patient   lighting adjusted   patient and family education   room " organization consistent   safety round/check completed  Intervention: Prevent Skin Injury  Recent Flowsheet Documentation  Taken 3/7/2025 0030 by Phillip Palomo RN  Body Position:   weight shifting   position changed independently  Taken 3/6/2025 2030 by Phillip Palomo RN  Body Position:   weight shifting   position changed independently  Intervention: Prevent and Manage VTE (Venous Thromboembolism) Risk  Recent Flowsheet Documentation  Taken 3/7/2025 0030 by Phillip Palomo RN  VTE Prevention/Management: (Subq Heparin) SCDs off (sequential compression devices)  Taken 3/6/2025 2030 by Phillip Palomo RN  VTE Prevention/Management: (Subq Heparin) SCDs off (sequential compression devices)  Goal: Optimal Comfort and Wellbeing  Outcome: Progressing  Intervention: Provide Person-Centered Care  Recent Flowsheet Documentation  Taken 3/7/2025 0030 by Phillip Palomo RN  Trust Relationship/Rapport:   care explained   choices provided   emotional support provided   empathic listening provided   questions answered   questions encouraged   reassurance provided   thoughts/feelings acknowledged  Taken 3/6/2025 2030 by Phillip Palomo RN  Trust Relationship/Rapport:   care explained   choices provided   emotional support provided   empathic listening provided   questions answered   questions encouraged   reassurance provided   thoughts/feelings acknowledged  Goal: Readiness for Transition of Care  Outcome: Progressing  Flowsheets (Taken 3/6/2025 2300)  Transportation Anticipated: family or friend will provide  Intervention: Mutually Develop Transition Plan  Recent Flowsheet Documentation  Taken 3/6/2025 2300 by Phillip Palomo RN  Transportation Anticipated: family or friend will provide  Patient/Family Anticipates Transition to: home  Equipment Currently Used at Home: none     Problem: Comorbidity Management  Goal: Blood Glucose Levels Within Targeted Range  Outcome: Progressing  Intervention: Monitor and Manage  Glycemia  Recent Flowsheet Documentation  Taken 3/7/2025 0030 by Phillip Palomo RN  Medication Review/Management:   medications reviewed   infusion titrated  Taken 3/6/2025 2030 by Phillip Palomo RN  Medication Review/Management:   medications reviewed   infusion titrated     Problem: Fall Injury Risk  Goal: Absence of Fall and Fall-Related Injury  Outcome: Progressing  Intervention: Identify and Manage Contributors  Recent Flowsheet Documentation  Taken 3/7/2025 0030 by Phillip Palomo RN  Self-Care Promotion:   independence encouraged   BADL personal objects within reach  Medication Review/Management:   medications reviewed   infusion titrated  Taken 3/6/2025 2030 by Phillip Palomo RN  Medication Review/Management:   medications reviewed   infusion titrated  Intervention: Promote Injury-Free Environment  Recent Flowsheet Documentation  Taken 3/7/2025 0030 by Phillip Palomo RN  Safety Promotion/Fall Prevention:   activity supervised   clutter free environment maintained   increased rounding and observation   increase visualization of patient   lighting adjusted   patient and family education   room organization consistent   safety round/check completed  Taken 3/6/2025 2030 by Phillip Palomo RN  Safety Promotion/Fall Prevention:   activity supervised   clutter free environment maintained   increased rounding and observation   increase visualization of patient   lighting adjusted   patient and family education   room organization consistent   safety round/check completed     Problem: Diabetic Ketoacidosis  Goal: Optimal Coping  Outcome: Progressing  Goal: Fluid and Electrolyte Balance with Absence of Ketosis  Outcome: Progressing  Intervention: Monitor and Manage Ketoacidosis  Recent Flowsheet Documentation  Taken 3/7/2025 0030 by Phillip Palomo RN  Fluid/Electrolyte Management: electrolyte supplement adjusted  Glycemic Management:   blood glucose monitored   insulin infusion adjusted  Taken  3/6/2025 2030 by Phillip Palomo RN  Fluid/Electrolyte Management:   electrolyte supplement initiated   intravenous fluid replacement initiated  Glycemic Management:   blood glucose monitored   insulin infusion adjusted   ketones tested     Problem: Skin Injury Risk Increased  Goal: Skin Health and Integrity  Outcome: Progressing  Intervention: Plan: Nurse Driven Intervention: Moisture Management  Recent Flowsheet Documentation  Taken 3/7/2025 0030 by Phillip Palomo RN  Moisture Interventions:   Encourage regular toileting   Incontinence pad  Taken 3/6/2025 2030 by Phillip Palomo RN  Moisture Interventions:   Encourage regular toileting   Incontinence pad  Bathing/Skin Care:   dressed/undressed   incontinence care  Intervention: Optimize Skin Protection  Recent Flowsheet Documentation  Taken 3/7/2025 0030 by Phillip Palomo RN  Activity Management: bedrest  Head of Bed (HOB) Positioning: HOB at 15 degrees  Taken 3/6/2025 2030 by Phillip Palomo RN  Activity Management: bedrest  Head of Bed (HOB) Positioning: HOB flat

## 2025-03-07 NOTE — PROGRESS NOTES
Patient had syncopal event of arrival to ICU when transferring from cart to bed.  Patient did not fall to ground or hit head.
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
## 2025-03-07 NOTE — PROGRESS NOTES
"INTENSIVIST FACULTY NOTE:  Still confusion / near syncope overnight  Finally started to clear ketones after aggressive increase in insulin dosing and fluid resuscitation last night  Found to actually be in atrial flutter after diagnostic adenosine    Exam still drowsy this morning, but not lethargic - more just that he didn't sleep last night.  less confused  More normal respiratory pattern, not panting  Abdomen now soft   Tongue no longer so dry  All IV sites seem to be working well    Labs notable for a sodium of 128 (admit apparent sodium was 98, but corrected I calculated that to be about 120), potassium in goal range ~4.4  Creatinine normal  Anion gap has closed  Mg++ just a little high; but that's where I wanted it  Phosphorus still low at 1.4  Ketones still present at 1.04  CBC still shows a leukocytosis and Hgb higher than I would have expected, but everything else looks fine  Procalcitonin indeterminate.  Not high enough to \"definitively\" suggest bacterial infection    Repeat EKG this morning pending    This is a 34M with DM1 complicated by gastroparesis and a possible history of cannabinoid hyperemesis syndrome who was admitted with severe diabetic ketoacidosis due to a lack of insurance coverage for insulin, complicated by concurrent atrial flutter and potentially chronic hyponatremia.      Today will discontinue his amiodarone and just use metoprolol.  He may or may not have chronic paroxysmal afib/flutter, but I suspect last night's tachyarrhythmias were more related to his acute illness.  Will wait to convert to sliding scale insulin until after his ketosis has completely resolved, but in the meantime, will allow him to have a diet and switch to NPH since I think that might be a more affordable / workable outpatient regimen for him given his insurance issues.     METABOLIC ENCEPHALOPATHY / DELIRIUM:  -resolving.  Was due to DKA, pseudohyponatremia.    -start melatonin for sleep, but can have narcotics " for pain  -using non-pharmacologic methods as much as possible, but may need restraints to keep from pulling at lines    HYPONATREMIA:  -corrected sodium on admission was probably around 120mEq/l.  Currently 128mEq/L, an acceptable rate of rise.  Probably a hypovolemic hyponatremia related to gastric losses  -q6h labs acceptable now  -MIVF with D5NS rather than D5-1/2NS at this poiint    HYPOPHOSPHATEMIA:  -replace    DM1 / DKA:  -transition from lantus to NPH.  5U NPH now since he got 20u lantus last night, then will transition to 10u bid this evening  -continue insulin infusion until ketosis has fully resolved, so probably won't go to sliding scale insulin until this evening  -based on lack of fever, urinary or respiratory or abdominal symptoms other than his baseline gastroparesis, plus the only moderate procalcitonin, I feel pretty comfortable saying there is no infectious trigger for this episode.    -will need to have an affordable insulin regimen on discharge - NPH bid at $35/month plus an aspart pen I hope will be doable, but will talk with pharmacy & social work    ATRIAL FLUTTER:  -presumably secondary to acute illness / fluid & electrolyte shifts; will obtain formal transthoracic echocardiogram today to rule out structural disease as a potential marker of possible long-standing paroxysmal afib  -hemodynamically stable; able to be adequately rate-controlled  -appears to have cardioverted overnight with amiodarone.  Will stop amio drip cold turkey and start metoprolol, which should also be cheap.  Ideally he would have a Holter monitor / [facility] as an outpatient, but it sounds as though lack of insurance coverage will inhibit this approach.  -CHADS-2 score of <2 implies no need for long-term systemic anticoagulation.  Technically, he should probably be anticoagulated transiently since I pursued pharmacologic cardioversion, same as electrical cardioversion, but I am comfortable withholding this since  he does not report a history of palpitations or significant COPD or anything that would make me think he's definitely a long-standing paroxysmal afib tenisha with high risk of harboring thrombus.     MISC:  -Code status is full code  -family (father Prashanth and significant other Marcin) updated by myself.  They have a lot of questions about how to ensure a steady supply of insulin  -transition to lovenox; PPI not necessary  -lines: peripheral + arterial line.  Keep A line for lab draws one more day, or at least until ketosis resolves  -walker not necessary (condom cath ok)  -anticipate discharge to home late this evening or tomorrow.  Will work with social work in the morning to figure out a discharge plan with insulin given ongoing insurance coverage issues    Billing statement: 35min of critical care time; spent in an initial review of imaging, labs, physical exam, and discussion of the patient with my own team and the extended care team including the primary service.   Based on this patient's presentation / recent intervention and my bedside assessment, I felt there was or is a reasonably high probability of imminent or life-threatening deterioration today or tonight for electrolyte or cardiopulmonary reasons.   My overall critical care time, as described in detail above, includes such things as coordination of care, arrhythmia and hemodynamics management with infusions of medicines, respiratory management, fluid therapy including fluid boluses, and pain and sedation therapy. This time excludes time I spent personally performing or supervising procedures for this patient.    PINA Lopez MD  Clinical   Anesthesia / Critical Care  *[id number]

## 2025-03-08 VITALS
TEMPERATURE: 99 F | RESPIRATION RATE: 17 BRPM | DIASTOLIC BLOOD PRESSURE: 88 MMHG | OXYGEN SATURATION: 96 % | SYSTOLIC BLOOD PRESSURE: 139 MMHG | WEIGHT: 145.6 LBS | HEART RATE: 90 BPM | BODY MASS INDEX: 17.72 KG/M2

## 2025-03-08 LAB
ALBUMIN SERPL BCG-MCNC: 3.4 G/DL (ref 3.5–5.2)
ALP SERPL-CCNC: 69 U/L (ref 40–150)
ALT SERPL W P-5'-P-CCNC: 43 U/L (ref 0–70)
ANION GAP SERPL CALCULATED.3IONS-SCNC: 10 MMOL/L (ref 7–15)
ANION GAP SERPL CALCULATED.3IONS-SCNC: 10 MMOL/L (ref 7–15)
ANION GAP SERPL CALCULATED.3IONS-SCNC: 7 MMOL/L (ref 7–15)
ANION GAP SERPL CALCULATED.3IONS-SCNC: 9 MMOL/L (ref 7–15)
AST SERPL W P-5'-P-CCNC: 62 U/L (ref 0–45)
BILIRUB SERPL-MCNC: 0.6 MG/DL
BUN SERPL-MCNC: 10.1 MG/DL (ref 6–20)
BUN SERPL-MCNC: 12 MG/DL (ref 6–20)
BUN SERPL-MCNC: 12 MG/DL (ref 6–20)
BUN SERPL-MCNC: 15.2 MG/DL (ref 6–20)
CALCIUM SERPL-MCNC: 7.7 MG/DL (ref 8.8–10.4)
CALCIUM SERPL-MCNC: 7.8 MG/DL (ref 8.8–10.4)
CALCIUM SERPL-MCNC: 8.1 MG/DL (ref 8.8–10.4)
CALCIUM SERPL-MCNC: 8.1 MG/DL (ref 8.8–10.4)
CHLORIDE SERPL-SCNC: 90 MMOL/L (ref 98–107)
CHLORIDE SERPL-SCNC: 92 MMOL/L (ref 98–107)
CHLORIDE SERPL-SCNC: 92 MMOL/L (ref 98–107)
CHLORIDE SERPL-SCNC: 93 MMOL/L (ref 98–107)
CREAT SERPL-MCNC: 0.53 MG/DL (ref 0.67–1.17)
CREAT SERPL-MCNC: 0.59 MG/DL (ref 0.67–1.17)
CREAT SERPL-MCNC: 0.59 MG/DL (ref 0.67–1.17)
CREAT SERPL-MCNC: 0.62 MG/DL (ref 0.67–1.17)
EGFRCR SERPLBLD CKD-EPI 2021: >90 ML/MIN/1.73M2
ENTEROCOCCUS FAECALIS: NOT DETECTED
ENTEROCOCCUS FAECIUM: NOT DETECTED
ERYTHROCYTE [DISTWIDTH] IN BLOOD BY AUTOMATED COUNT: 12.2 % (ref 10–15)
GLUCOSE BLDC GLUCOMTR-MCNC: 192 MG/DL (ref 70–99)
GLUCOSE BLDC GLUCOMTR-MCNC: 207 MG/DL (ref 70–99)
GLUCOSE BLDC GLUCOMTR-MCNC: 243 MG/DL (ref 70–99)
GLUCOSE SERPL-MCNC: 186 MG/DL (ref 70–99)
GLUCOSE SERPL-MCNC: 186 MG/DL (ref 70–99)
GLUCOSE SERPL-MCNC: 205 MG/DL (ref 70–99)
GLUCOSE SERPL-MCNC: 306 MG/DL (ref 70–99)
HCO3 SERPL-SCNC: 27 MMOL/L (ref 22–29)
HCO3 SERPL-SCNC: 28 MMOL/L (ref 22–29)
HCT VFR BLD AUTO: 37.7 % (ref 40–53)
HGB BLD-MCNC: 14.1 G/DL (ref 13.3–17.7)
LISTERIA SPECIES (DETECTED/NOT DETECTED): NOT DETECTED
MAGNESIUM SERPL-MCNC: 2.3 MG/DL (ref 1.7–2.3)
MAGNESIUM SERPL-MCNC: 2.4 MG/DL (ref 1.7–2.3)
MCH RBC QN AUTO: 29.9 PG (ref 26.5–33)
MCHC RBC AUTO-ENTMCNC: 37.4 G/DL (ref 31.5–36.5)
MCV RBC AUTO: 80 FL (ref 78–100)
PHOSPHATE SERPL-MCNC: 1.8 MG/DL (ref 2.5–4.5)
PHOSPHATE SERPL-MCNC: 2.1 MG/DL (ref 2.5–4.5)
PLATELET # BLD AUTO: 190 10E3/UL (ref 150–450)
POTASSIUM SERPL-SCNC: 3.6 MMOL/L (ref 3.4–5.3)
POTASSIUM SERPL-SCNC: 3.9 MMOL/L (ref 3.4–5.3)
POTASSIUM SERPL-SCNC: 3.9 MMOL/L (ref 3.4–5.3)
POTASSIUM SERPL-SCNC: 4.1 MMOL/L (ref 3.4–5.3)
PROT SERPL-MCNC: 5.3 G/DL (ref 6.4–8.3)
RBC # BLD AUTO: 4.71 10E6/UL (ref 4.4–5.9)
SODIUM SERPL-SCNC: 126 MMOL/L (ref 135–145)
SODIUM SERPL-SCNC: 128 MMOL/L (ref 135–145)
SODIUM SERPL-SCNC: 129 MMOL/L (ref 135–145)
SODIUM SERPL-SCNC: 129 MMOL/L (ref 135–145)
STAPHYLOCOCCUS AUREUS: NOT DETECTED
STAPHYLOCOCCUS EPIDERMIDIS: NOT DETECTED
STAPHYLOCOCCUS LUGDUNENSIS: NOT DETECTED
STAPHYLOCOCCUS SPECIES: NOT DETECTED
STREPTOCOCCUS AGALACTIAE: NOT DETECTED
STREPTOCOCCUS ANGINOSUS GROUP: NOT DETECTED
STREPTOCOCCUS PNEUMONIAE: NOT DETECTED
STREPTOCOCCUS PYOGENES: NOT DETECTED
STREPTOCOCCUS SPECIES: NOT DETECTED
WBC # BLD AUTO: 6.1 10E3/UL (ref 4–11)

## 2025-03-08 PROCEDURE — 250N000013 HC RX MED GY IP 250 OP 250 PS 637: Performed by: ANESTHESIOLOGY

## 2025-03-08 PROCEDURE — 80048 BASIC METABOLIC PNL TOTAL CA: CPT | Performed by: ANESTHESIOLOGY

## 2025-03-08 PROCEDURE — 36415 COLL VENOUS BLD VENIPUNCTURE: CPT | Performed by: HOSPITALIST

## 2025-03-08 PROCEDURE — 258N000003 HC RX IP 258 OP 636: Performed by: INTERNAL MEDICINE

## 2025-03-08 PROCEDURE — 250N000013 HC RX MED GY IP 250 OP 250 PS 637: Performed by: HOSPITALIST

## 2025-03-08 PROCEDURE — 250N000011 HC RX IP 250 OP 636: Performed by: HOSPITALIST

## 2025-03-08 PROCEDURE — 83735 ASSAY OF MAGNESIUM: CPT | Performed by: ANESTHESIOLOGY

## 2025-03-08 PROCEDURE — 93010 ELECTROCARDIOGRAM REPORT: CPT | Performed by: INTERNAL MEDICINE

## 2025-03-08 PROCEDURE — 85014 HEMATOCRIT: CPT | Performed by: ANESTHESIOLOGY

## 2025-03-08 PROCEDURE — 250N000009 HC RX 250: Performed by: INTERNAL MEDICINE

## 2025-03-08 PROCEDURE — 36415 COLL VENOUS BLD VENIPUNCTURE: CPT | Performed by: INTERNAL MEDICINE

## 2025-03-08 PROCEDURE — 36415 COLL VENOUS BLD VENIPUNCTURE: CPT | Performed by: ANESTHESIOLOGY

## 2025-03-08 PROCEDURE — 999N000147 HC STATISTIC PT IP EVAL DEFER

## 2025-03-08 PROCEDURE — 99239 HOSP IP/OBS DSCHRG MGMT >30: CPT | Performed by: HOSPITALIST

## 2025-03-08 PROCEDURE — 99232 SBSQ HOSP IP/OBS MODERATE 35: CPT | Performed by: INTERNAL MEDICINE

## 2025-03-08 PROCEDURE — 82040 ASSAY OF SERUM ALBUMIN: CPT | Performed by: ANESTHESIOLOGY

## 2025-03-08 PROCEDURE — 80051 ELECTROLYTE PANEL: CPT | Performed by: ANESTHESIOLOGY

## 2025-03-08 PROCEDURE — 82435 ASSAY OF BLOOD CHLORIDE: CPT | Performed by: ANESTHESIOLOGY

## 2025-03-08 PROCEDURE — 84100 ASSAY OF PHOSPHORUS: CPT | Performed by: HOSPITALIST

## 2025-03-08 PROCEDURE — 80053 COMPREHEN METABOLIC PANEL: CPT | Performed by: ANESTHESIOLOGY

## 2025-03-08 PROCEDURE — 84100 ASSAY OF PHOSPHORUS: CPT | Performed by: INTERNAL MEDICINE

## 2025-03-08 PROCEDURE — 85048 AUTOMATED LEUKOCYTE COUNT: CPT | Performed by: ANESTHESIOLOGY

## 2025-03-08 RX ORDER — POTASSIUM CHLORIDE 1500 MG/1
20 TABLET, EXTENDED RELEASE ORAL DAILY
Status: DISCONTINUED | OUTPATIENT
Start: 2025-03-08 | End: 2025-03-08 | Stop reason: HOSPADM

## 2025-03-08 RX ADMIN — INSULIN ASPART 3 UNITS: 100 INJECTION, SOLUTION INTRAVENOUS; SUBCUTANEOUS at 12:18

## 2025-03-08 RX ADMIN — INSULIN ASPART 2 UNITS: 100 INJECTION, SOLUTION INTRAVENOUS; SUBCUTANEOUS at 08:44

## 2025-03-08 RX ADMIN — METOPROLOL TARTRATE 25 MG: 25 TABLET, FILM COATED ORAL at 08:43

## 2025-03-08 RX ADMIN — METOCLOPRAMIDE HYDROCHLORIDE 10 MG: 5 INJECTION INTRAMUSCULAR; INTRAVENOUS at 12:40

## 2025-03-08 RX ADMIN — Medication 1 TABLET: at 08:43

## 2025-03-08 RX ADMIN — METOCLOPRAMIDE HYDROCHLORIDE 10 MG: 5 INJECTION INTRAMUSCULAR; INTRAVENOUS at 06:35

## 2025-03-08 RX ADMIN — SODIUM PHOSPHATE, MONOBASIC, MONOHYDRATE AND SODIUM PHOSPHATE, DIBASIC, ANHYDROUS 15 MMOL: 142; 276 INJECTION, SOLUTION INTRAVENOUS at 03:29

## 2025-03-08 ASSESSMENT — ACTIVITIES OF DAILY LIVING (ADL)
ADLS_ACUITY_SCORE: 53
ADLS_ACUITY_SCORE: 52
ADLS_ACUITY_SCORE: 45
ADLS_ACUITY_SCORE: 52
ADLS_ACUITY_SCORE: 51
ADLS_ACUITY_SCORE: 52
ADLS_ACUITY_SCORE: 53
ADLS_ACUITY_SCORE: 52
ADLS_ACUITY_SCORE: 45
ADLS_ACUITY_SCORE: 45

## 2025-03-08 NOTE — DISCHARGE SUMMARY
[facility]  Hospitalist Discharge Summary      Date of Admission:  3/6/2025  Date of Discharge:  3/8/2025  Discharging Provider: Abdulaziz Steen MD  Discharge Service: Hospitalist Service    Discharge Diagnoses   HHS/DKA secondary to non compliance.  Type 1 Diabetes Mellitus complicated with Gastroparesis.   Anion gap metabolic acidosis (Ketones, Lactic acid)  Severe dehydration with high osmolality.   A flutter, transient episode   .  Clinically Significant Risk Factors     # Severe Malnutrition: based on nutrition assessment and treatment provided per dietitian's recommendations.      Follow-ups Needed After Discharge   Follow-up Appointments       Follow-up and recommended labs and tests       Follow up with primary care provider, [facility], within 7 days for hospital follow- up and the following labs/tests are recommended: BMP.  Routine diabetic care            Additional follow-up instructions/to-do's for PCP    : PCP    Unresulted Labs Ordered in the Past 30 Days of this Admission       Date and Time Order Name Status Description    3/6/2025  7:57 PM Blood Culture Hand, Right Preliminary     3/6/2025  7:57 PM Blood Culture Arm, Right Preliminary         These results will be followed up by PCP    Discharge Disposition   Discharged to home  Condition at discharge: Stable    Hospital Course   Wally Avendano is a 34 year old male admitted on 3/6/2025. He presents with weakness and confusion to ED. He has been found with hyperglycemia > 800, Ketones, electrolytes derangement compatible with HHS/DKA. Patient has Type 1 DM and run out of insuline for several weeks.     Patient admitted in ketoacidosis, hyperosmolar state having severe dehydration.  He came in critical condition and was admitted to the ICU.  Fluid resuscitated by protocol, started on insulin, electrolyte correction and hemodynamic surveillance including arterial line placement.  In the emergency  department patient developed irregular tachycardia that later in the intensive care with regular and after adenosine testing proven to be a flutter. He has been consulted with cardiology, echocardiogram performed and rate controlled initially with amiodarone.  Once acid-base status and electrolyte derangements corrected, severe dehydration resolved with etc. the patient going back to his normal baseline.     HHS/DKA secondary to non compliance.  Type 1 Diabetes Mellitus complicated with Gastroparesis.   Anion gap metabolic acidosis (Ketones, Lactic acid)  Severe dehydration with high osmolality.       ICU  Cardiac telemetry.  Fluid resuscitation initiated in the ER with normal saline 2 L.  Continue with lactate Ringer in the ICU and continue maintenance per protocol  Strict BELIA's especially urine output.  Blood pressure control following intra-arterial line monitor.  Follow-up glucose, ketones, lactic acid and electrolytes per protocol  Lites replacement per protocol (phosphorus, potassium, magnesium and).   Insulin IV infusion and 0.1 units/kg/hour (goal to decrease  mg/dl if/hour)  Titrate insulin dose based on glucose levels  Close monitoring of:  Vital signs and neurological status.  Glucose, electrolytes and osmolality.  Urine output and fluid balance.    A flutter.  Secondary to severe dehydration/hypovolemia and acidosis.  In emergency department an irregular tachyarrhythmia was seen and documented.  It was interpreted like atrial fibrillation with RVR.  Patient received 1 bolus of diltiazem 15 mg once.  He was started on Cardizem drip before transfer to the ICU.  On arrival to the ICU patient collapsed and rapid response was called.  Patient was assessed and treated by the intensivist on duty.  At this point patient had consistent regular tach in the monitor with rate 140.  Dr Lopez suspected other supraventricular tachy and, after Adenosine bolus, found A flutter 2:1. Received Amiodarone overnight  and changed to BB this am.  Echocardiogram completed and within normal limits. Cardiology consulted. No flutter last 24 hours.       Consultations This Hospital Stay   PHYSICAL THERAPY ADULT IP CONSULT  OCCUPATIONAL THERAPY ADULT IP CONSULT  CARDIOLOGY IP CONSULT  PHARMACY LIAISON FOR MEDICATION COVERAGE CONSULT    Code Status   Full Code    Time Spent on this Encounter   I, Abdulaziz Steen MD, personally saw the patient today and spent greater than 30 minutes discharging this patient.       Abdulaziz Steen MD  [facility] ICU  [address] [place] [address]  Phone: [phone]  Fax: [phone]  ______________________________________________________________________    Physical Exam   Vital Signs: Temp: 99  F (37.2  C) Temp src: Oral BP: 139/88 Pulse: 90   Resp: 17 SpO2: 96 % O2 Device: None (Room air)    Weight: 145 lbs 9.6 oz  Constitutional: awake, alert, cooperative, no apparent distress, and appears stated age  Respiratory: No increased work of breathing, good air exchange, clear to auscultation bilaterally, no crackles or wheezing  Cardiovascular: Normal apical impulse, regular rate and rhythm, normal S1 and S2, no S3 or S4, and no murmur noted       Primary Care Physician   [facility]    Discharge Orders      Reason for your hospital stay    HHS/DKA due to medication noncompliance.  Acute metabolic encephalopathy     Follow-up and recommended labs and tests     Follow up with primary care provider, [facility] Rayray Cooper, within 7 days for hospital follow- up and the following labs/tests are recommended: BMP.  Routine diabetic care     Activity    Your activity upon discharge: activity as tolerated     Diet    Follow this diet upon discharge: Current Diet:Orders Placed This Encounter      Snacks/Supplements Adult: Other; Ensure Clear or Ensure Enlive; Between Meals      Consistent Carbohydrate Diet Moderate Consistent Carb (60 g CHO per Meal)  Diet       Significant Results and Procedures   Results for orders placed or performed during the hospital encounter of 25   CT Head w/o Contrast    Narrative    EXAM: CT HEAD W/O CONTRAST  LOCATION: [facility]  DATE: 3/6/2025    INDICATION: altered mental status, falls  COMPARISON: None.  TECHNIQUE: Routine CT Head without IV contrast. Multiplanar reformats. Dose reduction techniques were used.    FINDINGS:  INTRACRANIAL CONTENTS: No intracranial hemorrhage, extraaxial collection, or mass effect.  No CT evidence of acute infarct. Normal parenchymal attenuation. Normal ventricles and sulci.     VISUALIZED ORBITS/SINUSES/MASTOIDS: No intraorbital abnormality. No paranasal sinus mucosal disease. No middle ear or mastoid effusion.    BONES/SOFT TISSUES: No acute abnormality.      Impression    IMPRESSION:  1.  No acute intracranial process.   Echocardiogram Complete     Value    LVEF  60-65%    Narrative    [id number]  [id number]  [id number]  [id number]^DON^GARRETT^PARISH     [facility]  Echocardiography Laboratory  [address] [place] [address]     Name: GENEVIEVE ARCHER  MRN: [id number]  : 1990  Study Date: 2025 08:11 AM  Age: 34 yrs  Gender: Male  Patient Location: [facility]  Reason For Study: Aflutter  Ordering Physician: GARRETT OCHOA  Performed By: Rosa Bonner     BSA: 1.9 m2  Height: 76 in  Weight: 145 lb  HR: 106  BP: 89/71 mmHg  ______________________________________________________________________________  Procedure  Echocardiogram with two-dimensional, color and spectral Doppler. Optison (NDC  #[id number]) given intravenously.  ______________________________________________________________________________  Interpretation Summary     The rhythm was sinus tachycardia.  Left ventricular systolic function is normal.  The visual ejection fraction is 60-65%.  The left ventricle is normal in size.  The right ventricle is normal in structure, function and  size.  Normal left atrial size.  Right atrial size is normal.     Doppler interrogation does not demonstrate signficant stenosis or  insufficiency invovlng cardiac valves     No old studeis for comparison  ______________________________________________________________________________  Left Ventricle  The left ventricle is normal in size. There is normal left ventricular wall  thickness. Left ventricular systolic function is normal. The visual ejection  fraction is 60-65%. No regional wall motion abnormalities noted. There is no  thrombus seen in the left ventricle.     Right Ventricle  The right ventricle is normal in structure, function and size. There is no  mass or thrombus in the right ventricle.     Atria  Normal left atrial size. Right atrial size is normal. There is no atrial shunt  seen. The left atrial appendage is not well visualized.     Mitral Valve  The mitral valve leaflets appear normal. There is no evidence of stenosis,  fluttering, or prolapse. There is no mitral regurgitation noted. There is no  mitral valve stenosis.     Tricuspid Valve  Normal tricuspid valve. No tricuspid regurgitation. Right ventricular systolic  pressure could not be approximated due to inadequate tricuspid regurgitation.  There is no tricuspid stenosis.     Aortic Valve  The aortic valve is trileaflet. No aortic regurgitation is present. No aortic  stenosis is present.     Pulmonic Valve  Normal pulmonic valve. There is no pulmonic valvular regurgitation. There is  no pulmonic valvular stenosis.     Vessels  The aortic root is normal size. Normal size ascending aorta. The inferior vena  cava is normal. The pulmonary artery is normal size.     Pericardium  The pericardium appears normal. There is no pleural effusion.     Rhythm  The rhythm was sinus tachycardia.  ______________________________________________________________________________  MMode/2D Measurements & Calculations     IVSd: 0.99 cm  LVIDd: 3.8 cm  LVIDs: 2.5  cm  LVPWd: 0.99 cm  FS: 32.8 %  LV mass(C)d: 114.0 grams  LV mass(C)dI: 59.0 grams/m2  Ao root diam: 3.1 cm  LVOT diam: 2.0 cm  LVOT area: 3.2 cm2  Ao root diam index Ht(cm/m): 1.6  Ao root diam index BSA (cm/m2): 1.6  RWT: 0.52     ______________________________________________________________________________  Report approved by: Dr. Andrea Shen on 03/07/2025 10:02 AM             Discharge Medications   Current Discharge Medication List        START taking these medications    Details   insulin glargine (LANTUS PEN) 100 UNIT/ML pen Inject 20 Units subcutaneously at bedtime.  Qty: 15 mL, Refills: 0    Comments: If Lantus is not covered by insurance, may substitute Basaglar or Semglee or other insulin glargine product per insurance preference at same dose and frequency.    Associated Diagnoses: Type 1 diabetes mellitus with other specified complication (H)           CONTINUE these medications which have NOT CHANGED    Details   Insulin Glargine-yfgn (SEMGLEE, YFGN,) 100 UNIT/ML SOLN Inject 20 Units subcutaneously every morning.  Qty: 9 mL, Refills: 3    Associated Diagnoses: Type 1 diabetes mellitus with other specified complication (H)      insulin lispro (HUMALOG KWIKPEN) 100 UNIT/ML (1 unit dial) KWIKPEN Inject 2 Units subcutaneously Take with snacks or supplements for high blood sugar.  Qty: 15 mL, Refills: 3    Associated Diagnoses: Type 1 diabetes mellitus with other specified complication (H)      metoclopramide (REGLAN) 5 MG tablet Take 2 tablets (10 mg) by mouth 2 times daily as needed (for gastroparesis).  Qty: 120 tablet, Refills: 1    Associated Diagnoses: Gastroparesis      ondansetron (ZOFRAN ODT) 4 MG ODT tab Take 1 tablet (4 mg) by mouth every 6 hours as needed for nausea or vomiting.  Qty: 15 tablet, Refills: 3    Associated Diagnoses: Nausea      Continuous Blood Gluc Sensor (DEXCOM G6 SENSOR) MISC CHANGE SENSOR EVERY 10 DAYS  Qty: 3 each, Refills: 11    Associated Diagnoses: Type 1 diabetes  mellitus with other specified complication (H)      Continuous Blood Gluc Transmit (DEXCOM G6 TRANSMITTER) MISC CHANGE EVERY 3 MONTH  Qty: 1 each, Refills: 4    Associated Diagnoses: Type 1 diabetes mellitus with other specified complication (H)      Glucagon (BAQSIMI ONE PACK) 3 MG/DOSE POWD Use only for severe hypoglycemia.  Qty: 1 each, Refills: 1    Associated Diagnoses: Type 1 diabetes mellitus with other specified complication (H)      !! insulin pen needle (BD PEN NEEDLE DIEGO 2ND GEN) 32G X 4 MM miscellaneous USE 6 NEEDLES DAILY  Qty: 200 each, Refills: 11    Associated Diagnoses: Type 1 diabetes mellitus with other specified complication (H)      !! insulin pen needle 30G X 5 MM Use 6 pen needles daily or as directed.  Qty: 200 each, Refills: 11    Associated Diagnoses: Type 1 diabetes mellitus with other specified complication (H)       !! - Potential duplicate medications found. Please discuss with provider.        Allergies   No Known Allergies

## 2025-03-08 NOTE — PROGRESS NOTES
[facility] [place] ESTABLISHED PATIENT CARDIOLOGY PROGRESS NOTE    Patient Name: Wally Avendano  Age:34 year old   Sex: male   Admission Date: 3/6/2025      SUBJECTIVE:   No complaints.  Normal sinus rhythm.  Sodium in mid 120s.      PERTINENT INTERVAL EVENTS:   None.      CURRENT MEDICATIONS:     Current Facility-Administered Medications   Medication Dose Route Frequency Provider Last Rate Last Admin    glucose gel 15-30 g  15-30 g Oral Q15 Min PRAbdulaziz Flores MD        Or    dextrose 50 % injection 25-50 mL  25-50 mL Intravenous Q15 Min PRN Abdulaziz Steen MD        Or    glucagon injection 1 mg  1 mg Subcutaneous Q15 Min PRN Abdulaziz Steen MD        enoxaparin ANTICOAGULANT (LOVENOX) injection 30 mg  30 mg Subcutaneous Q24H Jacky Lopez MD   30 mg at 03/07/25 1203    HYDROmorphone (DILAUDID) injection 0.2 mg  0.2 mg Intravenous Q2H PRN Jacky Lopez MD        insulin aspart (NovoLOG) injection (RAPID ACTING)  1-7 Units Subcutaneous TID AC Jacky Lopez MD   1 Units at 03/07/25 1628    insulin aspart (NovoLOG) injection (RAPID ACTING)  1-5 Units Subcutaneous At Bedtime Jacky Lopez MD        [Held by provider] insulin lispro (HumaLOG KWIKPEN) injection (RAPID ACTING) 2 Units  2 Units Subcutaneous With Snacks or Supplements Abdulaziz Steen MD        insulin NPH injection 10 Units  10 Units Subcutaneous BID AC Jacky Lopez MD   10 Units at 03/07/25 1628    Med Instruction - Transition from IV Insulin Infusion to Sub-Q Insulin   Does not apply Continuous PRJacky Anglin MD        melatonin tablet 5 mg  5 mg Oral At Bedtime Jacky Lopez MD   5 mg at 03/07/25 2101    metoclopramide (REGLAN) injection 10 mg  10 mg Intravenous Q6H Abdulaziz Steen MD   10 mg at 03/07/25 1745    metoprolol tartrate (LOPRESSOR) tablet 25 mg  25 mg Oral BID Jacky Lopez MD   25 mg at 03/07/25 2101    multivitamin w/minerals (THERA-VIT-M) tablet 1 tablet  1  tablet Oral Daily Abdulaziz Steen MD        naloxone (NARCAN) injection 0.2 mg  0.2 mg Intravenous Q2 Min PRN Jacky Lopez MD        Or    naloxone (NARCAN) injection 0.4 mg  0.4 mg Intravenous Q2 Min PRN Jacky Lopez MD        Or    naloxone (NARCAN) injection 0.2 mg  0.2 mg Intramuscular Q2 Min PRN Jacky Lopez MD        Or    naloxone (NARCAN) injection 0.4 mg  0.4 mg Intramuscular Q2 Min PRN Jakcy Lopez MD        ondansetron (ZOFRAN ODT) ODT tab 4 mg  4 mg Oral Q6H PRN Jacky Lopez MD        Or    ondansetron (ZOFRAN) injection 4 mg  4 mg Intravenous Q6H PRN Jacky Lopez MD   4 mg at 25    potassium chloride (KAYCIEL) solution 20 mEq  20 mEq Oral Daily Jacky Lopez MD   20 mEq at 25 0914    prochlorperazine (COMPAZINE) injection 10 mg  10 mg Intravenous Q6H PRN Jacky Lopez MD        Or    prochlorperazine (COMPAZINE) tablet 10 mg  10 mg Oral Q6H PRN Jacky Lopez MD        senna-docusate (SENOKOT-S/PERICOLACE) 8.6-50 MG per tablet 1 tablet  1 tablet Oral BID PRN Jacky Lopez MD        Or    senna-docusate (SENOKOT-S/PERICOLACE) 8.6-50 MG per tablet 2 tablet  2 tablet Oral BID PRN Jacky Lopez MD        sodium phosphate 15 mmol in NS 250mL intermittent infusion  15 mmol Intravenous Once Abdulaziz Steen MD   15 mmol at 25         ALLERGIES:   No Known Allergies      PHYSICAL EXAM:   Vitals were reviewed  Blood pressure 113/66, pulse 101, temperature 98.3  F (36.8  C), temperature source Axillary, resp. rate 13, weight 66 kg (145 lb 9.6 oz), SpO2 95%.  Temperatures:  Current - Temp: 98.3  F (36.8  C); Max - Temp  Av  F (36.7  C)  Min: 97.6  F (36.4  C)  Max: 98.3  F (36.8  C)  Respiration range: Resp  Avg: 15.8  Min: 0  Max: 30  Pulse range: Pulse  Av.5  Min: 76  Max: 115  Blood pressure range: Systolic (24hrs), Av , Min:113 , Max:169   ; Diastolic (24hrs),  "Av, Min:66, Max:92    Pulse oximetry range: SpO2  Av %  Min: 93 %  Max: 100 %    Intake/Output Summary (Last 24 hours) at 3/7/2025 2328  Last data filed at 3/7/2025 2306  Gross per 24 hour   Intake 2198.77 ml   Output 4900 ml   Net -2701.23 ml       GENERAL: Alert and oriented and in no acute distress    HEENT: No scleral icterus or conjunctival hemorrhage. Mucous membranes moist.    NECK: Supple without thyromegaly. No carotid bruits are present. No jugular venous distention.    CARDIOVASCULAR: Regular rate and rhythm, normal S1, normal S2, no S3 or S4, no murmurs, rubs or gallops.    RESPIRATORY: Clear to auscultation without crackles or wheezes. Respiratory effort is normal.    GASTROINTESTINAL: Soft, non-tender, normal bowel sounds.    PULSES: Grossly normal and symmetric lower extremity pulses.    EXTREMITIES: No clubbing or cyanosis. No edema.    SKIN: Warm and dry. No obvious rashes.    NEURO: Alert and appropriate. No obvious focal deficits.        LABS AND DIAGNOSTICS:  Labs personally reviewed by myself    CARDIAC ENZYMES:  No lab results found in last 7 days.  No results for input(s): \"NTBNPI\", \"NTBNP\" in the last 168 hours.    GENERAL:  Recent Labs   Lab 25  2153 25  1746 25  1558 25  1355 25  1209 25  0701 25  0605 25  0455 25  0410 25  2004 25  1829 25  1547 25  1434 25  1414 25  1311   WBC  --   --   --   --   --   --  13.5*  --   --   --  16.6*  --   --   --  20.0*   HGB  --   --   --   --   --   --  15.1  --   --   --   --   --   --   --  17.2   MCV  --   --   --   --   --   --  77*  --   --   --  78  --   --   --  80   PLT  --   --   --   --   --   --  277  --   --   --  299  --   --   --  409   INR  --   --   --   --   --   --   --   --   --   --   --   --   --   --  1.15   NA  --  124*  --   --  127*  --   --   --  128*  128*   < > 110*   < >  --   --  98*   POTASSIUM  --  4.4  --   --  4.6  --   " "--   --  4.4  4.4   < > 3.5   < >  --   --  4.6   CHLORIDE  --  90*  --   --  93*  --   --   --  91*  91*   < > 71*   < >  --   --  <60*   CO2  --  24  --   --  25  --   --   --  26  26   < > 14*   < >  --   --  7*   BUN  --  20.4*  --   --  27.5*  --   --   --  45.9*  45.9*   < > 81.0*   < >  --   --  88.4*   CR  --  0.59*  --   --  0.59*  --   --   --  0.69  0.69   < > 0.99   < >  --   --  1.41*   GFRESTIMATED  --  >90  --   --  >90  --   --   --  >90  >90   < > >90   < >  --   --  67   ANIONGAP  --  10  --   --  9  --   --   --  11  11   < > 25*  --   --   --   --    MARY  --  7.4*  --   --  7.5*  --   --   --  8.0*  8.0*   < > 8.1*   < >  --   --  8.8   * 281* 180*   < > 118*   < >  --    < > 124*  124*   < > 541*  541*   < > 889*   < > 896*   ALBUMIN  --   --   --   --   --   --   --   --  3.8  --   --   --  3.9  --  4.4   PROTTOTAL  --   --   --   --   --   --   --   --  5.7*  --   --   --  5.9*  --  7.0   BILITOTAL  --   --   --   --   --   --   --   --  0.3  --   --   --  0.5  --  0.6   ALKPHOS  --   --   --   --   --   --   --   --  80  --   --   --  107  --  122   ALT  --   --   --   --   --   --   --   --  40  --   --   --  35  --  40   AST  --   --   --   --   --   --   --   --  62*  --   --   --  59*  --  66*   LIPASE  --   --   --   --   --   --   --   --   --   --   --   --   --   --  22    < > = values in this interval not displayed.       No results for input(s): \"SED\", \"CRP\" in the last 168 hours.    No results for input(s): \"DD\" in the last 168 hours.    No results for input(s): \"TSH\" in the last 168 hours.    LIPIDS:  Recent Labs   Lab Test 10/30/24  0924 05/18/23  0916   CHOL 138 156   HDL 51 53   LDL 67 88   TRIG 101 74       BLOOD GASES:  Recent Labs   Lab 03/06/25  1824 03/06/25  1331   PH 7.30*  --    PHV  --  7.12*   PO2 107*  --    PO2V  --  45   PCO2 30*  --    PCO2V  --  27*   HCO3 15*  --    HCO3V  --  9*         ECG (personally reviewed):  3/7/2025: Normal sinus rhythm " at 82 bpm, left posterior fascicular block, LVH with early repolarization.  QTc 460 ms.    3/6/2025: Atrial flutter with 2-1 AV block with heart rate 27 bpm.    3/6/2025: Atrial fibrillation with rapid ventricular response with heart rate 131 bpm.      Echocardiogram 3/7/2025:  The rhythm was sinus tachycardia.  Left ventricular systolic function is normal.  The visual ejection fraction is 60-65%.  The left ventricle is normal in size.  The right ventricle is normal in structure, function and size.  Normal left atrial size.  Right atrial size is normal.      Imaging (past 48 hours):  Recent Results (from the past 48 hours)   CT Head w/o Contrast    Narrative    EXAM: CT HEAD W/O CONTRAST  LOCATION: [facility]  DATE: 3/6/2025    INDICATION: altered mental status, falls  COMPARISON: None.  TECHNIQUE: Routine CT Head without IV contrast. Multiplanar reformats. Dose reduction techniques were used.    FINDINGS:  INTRACRANIAL CONTENTS: No intracranial hemorrhage, extraaxial collection, or mass effect.  No CT evidence of acute infarct. Normal parenchymal attenuation. Normal ventricles and sulci.     VISUALIZED ORBITS/SINUSES/MASTOIDS: No intraorbital abnormality. No paranasal sinus mucosal disease. No middle ear or mastoid effusion.    BONES/SOFT TISSUES: No acute abnormality.      Impression    IMPRESSION:  1.  No acute intracranial process.   Echocardiogram Complete   Result Value    LVEF  60-65%    Narrative    [id number]  [id number]  [id number]  [id number]^DON^GARRETT^PARISH     [facility]  Echocardiography Laboratory  [address] [place] [address]     Name: GENEVIEVE ARCHER  MRN: [id number]  : 1990  Study Date: 2025 08:11 AM  Age: 34 yrs  Gender: Male  Patient Location: [facility]  Reason For Study: Aflutter  Ordering Physician: GARRETT OCHOA  Performed By: Rosa Bonner     BSA: 1.9 m2  Height: 76 in  Weight: 145 lb  HR: 106  BP: 89/71  mmHg  ______________________________________________________________________________  Procedure  Echocardiogram with two-dimensional, color and spectral Doppler. Optison (NDC  #[id number]) given intravenously.  ______________________________________________________________________________  Interpretation Summary     The rhythm was sinus tachycardia.  Left ventricular systolic function is normal.  The visual ejection fraction is 60-65%.  The left ventricle is normal in size.  The right ventricle is normal in structure, function and size.  Normal left atrial size.  Right atrial size is normal.     Doppler interrogation does not demonstrate signficant stenosis or  insufficiency invovlng cardiac valves     No old studeis for comparison  ______________________________________________________________________________  Left Ventricle  The left ventricle is normal in size. There is normal left ventricular wall  thickness. Left ventricular systolic function is normal. The visual ejection  fraction is 60-65%. No regional wall motion abnormalities noted. There is no  thrombus seen in the left ventricle.     Right Ventricle  The right ventricle is normal in structure, function and size. There is no  mass or thrombus in the right ventricle.     Atria  Normal left atrial size. Right atrial size is normal. There is no atrial shunt  seen. The left atrial appendage is not well visualized.     Mitral Valve  The mitral valve leaflets appear normal. There is no evidence of stenosis,  fluttering, or prolapse. There is no mitral regurgitation noted. There is no  mitral valve stenosis.     Tricuspid Valve  Normal tricuspid valve. No tricuspid regurgitation. Right ventricular systolic  pressure could not be approximated due to inadequate tricuspid regurgitation.  There is no tricuspid stenosis.     Aortic Valve  The aortic valve is trileaflet. No aortic regurgitation is present. No aortic  stenosis is present.     Pulmonic Valve  Normal  pulmonic valve. There is no pulmonic valvular regurgitation. There is  no pulmonic valvular stenosis.     Vessels  The aortic root is normal size. Normal size ascending aorta. The inferior vena  cava is normal. The pulmonary artery is normal size.     Pericardium  The pericardium appears normal. There is no pleural effusion.     Rhythm  The rhythm was sinus tachycardia.  ______________________________________________________________________________  MMode/2D Measurements & Calculations     IVSd: 0.99 cm  LVIDd: 3.8 cm  LVIDs: 2.5 cm  LVPWd: 0.99 cm  FS: 32.8 %  LV mass(C)d: 114.0 grams  LV mass(C)dI: 59.0 grams/m2  Ao root diam: 3.1 cm  LVOT diam: 2.0 cm  LVOT area: 3.2 cm2  Ao root diam index Ht(cm/m): 1.6  Ao root diam index BSA (cm/m2): 1.6  RWT: 0.52     ______________________________________________________________________________  Report approved by: Dr. Andrea Shen on 03/07/2025 10:02 AM               IMPRESSION AND PLAN:     Patient Active Problem List   Diagnosis    DKA (diabetic ketoacidoses)    Type 1 diabetes mellitus with other specified complication (H)    Diarrhea, unspecified type    Gastroparesis    Hypokalemia    Intractable nausea and vomiting    Diabetic ketoacidosis without coma associated with type 1 diabetes mellitus (H)    Dehydration    Starvation ketoacidosis    Hyponatremia    High anion gap metabolic acidosis    Malnutrition, unspecified type       Wally Avendano is a 34 year old male with past medical history pertinent for type 1 diabetes mellitus, possible cannabinoid hyperemesis syndrome, gastroparesis, prior episodes of DKA related to insulin noncompliance (lack of insurance), and several other noncardiac comorbidities who was admitted on 3/6/2025 with lethargy/unresponsiveness.  Admitted with a diagnosis of diabetic ketoacidosis and severe hyponatremia (98).  He has been appropriately treated by the ICU team.  Cardiology service was consulted because of episode of atrial  fibrillation with RVR switching to atrial flutter with 2-1 AV block; initiated on IV amiodarone; now converted back to sinus rhythm.  This is his first documented episode of atrial fibrillation/flutter.    New diagnosis paroxysmal atrial fibrillation/flutter:  A.  New diagnosis paroxysmal atrial fibrillation/flutter; likely precipitated by DKA/severe electrolyte abnormalities.  B.  Discontinued IV amiodarone.  Recommend metoprolol 25 mg twice daily.  C.  [id number] score is 1 (diabetes).  Echocardiogram demonstrates normal LVEF.  Background history of poorly controlled type 1 diabetes mellitus with previous episodes of DKA.  At this time do not recommend long-term anticoagulation.    Diabetic ketoacidosis, hyponatremia, dehydration:  A.  Appropriate DKA treatment including insulin infusion and IV hydration initiated by ICU/medicine service.  Defer management to medicine service.  B.  Sodium has improved to 126.    Thank you very much for asking me to participate in the care of your patient Wally Avendano. Please do not hesitate to contact me if you have questions, or if I can be of further assistance.    In summary continue metoprolol 25 mg twice daily.  Anticoagulation not currently recommended.  No further cardiology recommendations.  Cardiology service will sign off.  Please call if questions or concerns.    Moderate complexity     Casimiro Akhtar MD MD, FACC, FASE, [facility]

## 2025-03-08 NOTE — PLAN OF CARE
Physical Therapy: Orders received. Chart reviewed and discussed with care team.? Physical Therapy not indicated due to no IP PT needs.? Defer discharge recommendations to medical team.? Will complete orders.

## 2025-03-08 NOTE — PLAN OF CARE
"Goal Outcome Evaluation:      Outcome Evaluation: Patient more alert and less confused, Cooperative but impulve when wanting to urinate. SR-ST on tele rate controlled PO metoprolol given as scheduled. Room air. PIVx3 SL. Urinal. Consistent carb diet. Left forearm Waverly discontinued per tele intensivent, due to inaccurate readings and occluded. Site assessed unremarkable no hematoma pressure dressing clean dry and intact. Continuous patient observer for safety/ fall risk.    Problem: Adult Inpatient Plan of Care  Goal: Plan of Care Review  Description: The Plan of Care Review/Shift note should be completed every shift.  The Outcome Evaluation is a brief statement about your assessment that the patient is improving, declining, or no change.  This information will be displayed automatically on your shift  note.  Outcome: Progressing  Flowsheets (Taken 3/8/2025 0026)  Outcome Evaluation: Patient more alert and less confused, Cooperative but impulve when wanting to urinate. SR-ST on tele rate controlled PO metoprolol given as scheduled. Room air. PIVx3 SL. Urinal. Consistent carb diet. Left forearm Jaimee discontinued per tele intensivent, due to inaccurate readings and occluded. Site assessed unremarkable no hematoma pressure dressing clean dry and intact. Continuous patient observer for safety/ fall risk.  Goal: Patient-Specific Goal (Individualized)  Description: You can add care plan individualizations to a care plan. Examples of Individualization might be:  \"Parent requests to be called daily at 9am for status\", \"I have a hard time hearing out of my right ear\", or \"Do not touch me to wake me up as it startles  me\".  Outcome: Progressing  Goal: Absence of Hospital-Acquired Illness or Injury  Outcome: Progressing  Intervention: Identify and Manage Fall Risk  Recent Flowsheet Documentation  Taken 3/8/2025 0000 by Dany Rey RN  Safety Promotion/Fall Prevention:   activity supervised   clutter free environment " maintained   increased rounding and observation   increase visualization of patient   lighting adjusted   patient and family education   room organization consistent   safety round/check completed   bedside attendant  Taken 3/7/2025 2100 by Dany Rey RN  Safety Promotion/Fall Prevention:   activity supervised   clutter free environment maintained   increased rounding and observation   increase visualization of patient   lighting adjusted   patient and family education   room organization consistent   safety round/check completed   bedside attendant  Intervention: Prevent Skin Injury  Recent Flowsheet Documentation  Taken 3/8/2025 0000 by Dany Rey RN  Body Position: position changed independently  Taken 3/7/2025 2100 by Dany Rey RN  Body Position: position changed independently  Intervention: Prevent and Manage VTE (Venous Thromboembolism) Risk  Recent Flowsheet Documentation  Taken 3/7/2025 2100 by Dany Rey RN  VTE Prevention/Management: (lovenox subQ) SCDs off (sequential compression devices)  Goal: Optimal Comfort and Wellbeing  Outcome: Progressing  Intervention: Provide Person-Centered Care  Recent Flowsheet Documentation  Taken 3/8/2025 0000 by Dany Rey RN  Trust Relationship/Rapport:   care explained   choices provided   emotional support provided   empathic listening provided   questions answered   questions encouraged   reassurance provided   thoughts/feelings acknowledged  Taken 3/7/2025 2000 by Dany Rey RN  Trust Relationship/Rapport:   care explained   choices provided   emotional support provided   empathic listening provided   questions answered   questions encouraged   reassurance provided   thoughts/feelings acknowledged  Goal: Readiness for Transition of Care  Outcome: Progressing     Problem: Comorbidity Management  Goal: Blood Glucose Levels Within Targeted Range  Outcome: Progressing  Intervention: Monitor and Manage Glycemia  Recent Flowsheet  Documentation  Taken 3/8/2025 0000 by Dany Rey RN  Medication Review/Management:   medications reviewed   infusion titrated  Taken 3/7/2025 2100 by Dany Rey RN  Medication Review/Management:   medications reviewed   infusion titrated     Problem: Fall Injury Risk  Goal: Absence of Fall and Fall-Related Injury  Outcome: Progressing  Intervention: Identify and Manage Contributors  Recent Flowsheet Documentation  Taken 3/8/2025 0000 by Dany Rey RN  Self-Care Promotion:   independence encouraged   BADL personal objects within reach  Medication Review/Management:   medications reviewed   infusion titrated  Taken 3/7/2025 2100 by Dany Rey RN  Self-Care Promotion:   independence encouraged   BADL personal objects within reach  Medication Review/Management:   medications reviewed   infusion titrated  Intervention: Promote Injury-Free Environment  Recent Flowsheet Documentation  Taken 3/8/2025 0000 by Dany Rey RN  Safety Promotion/Fall Prevention:   activity supervised   clutter free environment maintained   increased rounding and observation   increase visualization of patient   lighting adjusted   patient and family education   room organization consistent   safety round/check completed   bedside attendant  Taken 3/7/2025 2100 by Dany Rey RN  Safety Promotion/Fall Prevention:   activity supervised   clutter free environment maintained   increased rounding and observation   increase visualization of patient   lighting adjusted   patient and family education   room organization consistent   safety round/check completed   bedside attendant     Problem: Diabetic Ketoacidosis  Goal: Optimal Coping  Outcome: Progressing  Goal: Fluid and Electrolyte Balance with Absence of Ketosis  Outcome: Progressing  Intervention: Monitor and Manage Ketoacidosis  Recent Flowsheet Documentation  Taken 3/7/2025 2100 by Dany Rey RN  Fluid/Electrolyte Management: electrolyte supplement  adjusted  Glycemic Management:   blood glucose monitored   insulin infusion adjusted     Problem: Skin Injury Risk Increased  Goal: Skin Health and Integrity  Outcome: Progressing  Intervention: Plan: Nurse Driven Intervention: Moisture Management  Recent Flowsheet Documentation  Taken 3/8/2025 0000 by Dany Rey RN  Moisture Interventions:   Encourage regular toileting   Incontinence pad  Bathing/Skin Care:   dressed/undressed   incontinence care   linen changed  Taken 3/7/2025 2100 by Dany Rey RN  Moisture Interventions:   Encourage regular toileting   Incontinence pad  Taken 3/7/2025 2000 by Dany Rey RN  Moisture Interventions:   Encourage regular toileting   Incontinence pad  Bathing/Skin Care:   dressed/undressed   incontinence care   linen changed  Intervention: Optimize Skin Protection  Recent Flowsheet Documentation  Taken 3/8/2025 0000 by Dany Rey RN  Activity Management: bedrest  Head of Bed (HOB) Positioning: HOB at 30 degrees  Taken 3/7/2025 2100 by Dany Rey RN  Activity Management: bedrest  Head of Bed (HOB) Positioning: HOB at 30 degrees

## 2025-03-10 LAB
ATRIAL RATE - MUSE: 82 BPM
BACTERIA BLD CULT: ABNORMAL
BACTERIA BLD CULT: ABNORMAL
DIASTOLIC BLOOD PRESSURE - MUSE: NORMAL MMHG
INTERPRETATION ECG - MUSE: NORMAL
P AXIS - MUSE: 49 DEGREES
PR INTERVAL - MUSE: 126 MS
QRS DURATION - MUSE: 92 MS
QT - MUSE: 398 MS
QTC - MUSE: 464 MS
R AXIS - MUSE: 104 DEGREES
SYSTOLIC BLOOD PRESSURE - MUSE: NORMAL MMHG
T AXIS - MUSE: 85 DEGREES
VENTRICULAR RATE- MUSE: 82 BPM

## 2025-03-11 LAB — BACTERIA BLD CULT: NO GROWTH

## 2025-03-21 NOTE — TELEPHONE ENCOUNTER
Please see my chart message below     Please review and advise     Thank you     Tierra Alvarez RN, BSN  [place] Triage      Received call from RN Team regarding patient IV line experiencing a partial occlusion of their two lumen PICC. Cathflo is requested by RN. Orders reviewed and sent to Insurance Coordinators. Delivery requested on 03/21/25.  Medication not approved by Insurance, pt's son agreeable to costs. Orders entered into [employer].    Processed fill for 2 vials Cathflo and SWFI for 03/21/25 dispense and straight delivery by 9 pm. Fill is a 1 day supply    Follow up 3/25 as previously planned.

## 2025-06-02 ENCOUNTER — TELEPHONE (OUTPATIENT)
Dept: PEDIATRICS | Facility: CLINIC | Age: 35
End: 2025-06-02

## 2025-06-02 DIAGNOSIS — E10.69 TYPE 1 DIABETES MELLITUS WITH OTHER SPECIFIED COMPLICATION (H): ICD-10-CM

## 2025-06-02 NOTE — TELEPHONE ENCOUNTER
Patient Quality Outreach    Patient is due for the following:   Diabetes -  A1C, Eye Exam, Diabetic Follow-Up Visit, and Foot Exam  Depression  - PHQ-2  Physical Preventive Adult Physical      Topic Date Due    Pneumococcal Vaccine (1 of 2 - PCV) Never done    Hepatitis B Vaccine (1 of 3 - 19+ 3-dose series) Never done    Diptheria Tetanus Pertussis (DTAP/TDAP/TD) Vaccine (1 - Tdap) Never done    COVID-19 Vaccine (1 - 2024-25 season) Never done   HIV screening, Hep C screening    Action(s) Taken:   Schedule a Adult Preventative    Type of outreach:    Sent [place] message.    Questions for provider review:    None         Uma Pal  Chart routed to None.

## 2025-06-10 NOTE — TELEPHONE ENCOUNTER
Patient Quality Outreach    Patient is due for the following:   Diabetes -  A1C, Eye Exam, Diabetic Follow-Up Visit, and Foot Exam  Physical Preventive Adult Physical      Topic Date Due    Pneumococcal Vaccine (1 of 2 - PCV) Never done    Hepatitis B Vaccine (1 of 3 - 19+ 3-dose series) Never done    Diptheria Tetanus Pertussis (DTAP/TDAP/TD) Vaccine (1 - Tdap) Never done    COVID-19 Vaccine (1 - 2024-25 season) Never done       Action(s) Taken:   Schedule a Adult Preventative    Type of outreach:    Phone, left message for patient/parent to call back.    Questions for provider review:    None         Uma Pal  Chart routed to None.

## 2025-06-15 ENCOUNTER — HEALTH MAINTENANCE LETTER (OUTPATIENT)
Age: 35
End: 2025-06-15

## 2025-06-19 NOTE — ED NOTES
Group Therapy Note    Date: 6/19/2025    Group Start Time: 1100  Group End Time: 1150  Group Topic: Cognitive Skills    [facility]    Marlene Duron CTRS        Group Therapy Note    Attendees: 8/19     Patient's Goal:  To increase social interaction ,practice decision making, and communication skills.         Notes: Pt participated in group tasks and discussion. Pt was able to practice decision making,   and communication skills independently.         Status After Intervention:  Improved     Participation Level: Active Listener,  sharing, supportive     Participation Quality: Appropriate,  Attentive, sharing, supportive        Speech:  Normal      Thought Process/Content: Logical, Linear.      Affective Functioning: Congruent, brightened      Mood: Euthymic     Level of consciousness:  Alert, and Attentive        Response to Learning:  Able to verbalize current knowledge, able to acknowledge/verbalize new learning,    Progressing to goal        Endings: None Reported     Modes of Intervention: Education, Support, Socialization, Exploration, Clarifying and Problem-solving        Discipline Responsible: Psychoeducational Specialist        Signature:  SHIRIN GROVES            Bed: ED17  Expected date:   Expected time:   Means of arrival:   Comments:  María Elena clayton

## 2025-08-19 ENCOUNTER — MYC REFILL (OUTPATIENT)
Dept: EDUCATION SERVICES | Facility: CLINIC | Age: 35
End: 2025-08-19

## 2025-08-19 ENCOUNTER — MYC REFILL (OUTPATIENT)
Dept: PEDIATRICS | Facility: CLINIC | Age: 35
End: 2025-08-19

## 2025-08-19 DIAGNOSIS — E10.69 TYPE 1 DIABETES MELLITUS WITH OTHER SPECIFIED COMPLICATION (H): ICD-10-CM

## (undated) DEVICE — ENDO SNARE HEXAGONAL ISNARE 2.5X4.0CM W/25GA NDL

## (undated) DEVICE — ENDO FORCEP ENDOJAW BIOPSY 2.8MMX160CM FB-220K

## (undated) DEVICE — KIT ENDO TURNOVER/PROCEDURE W/CLEAN A SCOPE LINERS 103888

## (undated) RX ORDER — ONDANSETRON 2 MG/ML
INJECTION INTRAMUSCULAR; INTRAVENOUS
Status: DISPENSED
Start: 2023-06-14

## (undated) RX ORDER — FENTANYL CITRATE 50 UG/ML
INJECTION, SOLUTION INTRAMUSCULAR; INTRAVENOUS
Status: DISPENSED
Start: 2021-07-28